# Patient Record
Sex: FEMALE | Race: WHITE | Employment: OTHER | URBAN - METROPOLITAN AREA
[De-identification: names, ages, dates, MRNs, and addresses within clinical notes are randomized per-mention and may not be internally consistent; named-entity substitution may affect disease eponyms.]

---

## 2017-07-26 PROBLEM — M79.89 LEG SWELLING: Status: ACTIVE | Noted: 2017-07-26

## 2017-07-26 PROBLEM — I89.0 LYMPHEDEMA OF BOTH LOWER EXTREMITIES: Status: ACTIVE | Noted: 2017-07-26

## 2018-03-14 ENCOUNTER — TELEPHONE (OUTPATIENT)
Dept: ADMINISTRATIVE | Age: 68
End: 2018-03-14

## 2018-03-14 NOTE — TELEPHONE ENCOUNTER
There is a referral in the workque and she is a cancer patient. I wasn't sure if there was any way she would be able to be seen sooner than August. Please advise thank you.

## 2018-04-25 ENCOUNTER — HOSPITAL ENCOUNTER (OUTPATIENT)
Age: 68
Discharge: HOME | End: 2018-04-27

## 2018-04-25 PROCEDURE — 87081 CULTURE SCREEN ONLY: CPT

## 2018-04-25 PROCEDURE — 88305 TISSUE EXAM BY PATHOLOGIST: CPT

## 2018-04-26 LAB — CLOTEST: NORMAL

## 2018-06-04 ENCOUNTER — HOSPITAL ENCOUNTER (OUTPATIENT)
Age: 68
Discharge: HOME | End: 2018-06-06
Payer: COMMERCIAL

## 2018-06-04 LAB
ALBUMIN SERPL-MCNC: 4 G/DL (ref 3.5–5.2)
ALP BLD-CCNC: 48 U/L (ref 35–104)
ALT SERPL-CCNC: 15 U/L (ref 0–32)
ANION GAP SERPL CALCULATED.3IONS-SCNC: 12 MMOL/L (ref 7–16)
AST SERPL-CCNC: 21 U/L (ref 0–31)
BASOPHILS ABSOLUTE: 0.03 E9/L (ref 0–0.2)
BASOPHILS RELATIVE PERCENT: 0.4 % (ref 0–2)
BILIRUB SERPL-MCNC: 1.1 MG/DL (ref 0–1.2)
BUN BLDV-MCNC: 20 MG/DL (ref 8–23)
CALCIUM SERPL-MCNC: 9.5 MG/DL (ref 8.6–10.2)
CHLORIDE BLD-SCNC: 103 MMOL/L (ref 98–107)
CHOLESTEROL, TOTAL: 203 MG/DL (ref 0–199)
CO2: 27 MMOL/L (ref 22–29)
CREAT SERPL-MCNC: 0.8 MG/DL (ref 0.5–1)
EOSINOPHILS ABSOLUTE: 0.08 E9/L (ref 0.05–0.5)
EOSINOPHILS RELATIVE PERCENT: 1 % (ref 0–6)
GFR AFRICAN AMERICAN: >60
GFR NON-AFRICAN AMERICAN: >60 ML/MIN/1.73
GLUCOSE BLD-MCNC: 94 MG/DL (ref 74–109)
HCT VFR BLD CALC: 40.1 % (ref 34–48)
HDLC SERPL-MCNC: 68 MG/DL
HEMOGLOBIN: 13.1 G/DL (ref 11.5–15.5)
IMMATURE GRANULOCYTES #: 0.01 E9/L
IMMATURE GRANULOCYTES %: 0.1 % (ref 0–5)
LDL CHOLESTEROL CALCULATED: 124 MG/DL (ref 0–99)
LYMPHOCYTES ABSOLUTE: 1.12 E9/L (ref 1.5–4)
LYMPHOCYTES RELATIVE PERCENT: 14.6 % (ref 20–42)
MCH RBC QN AUTO: 31 PG (ref 26–35)
MCHC RBC AUTO-ENTMCNC: 32.7 % (ref 32–34.5)
MCV RBC AUTO: 95 FL (ref 80–99.9)
MONOCYTES ABSOLUTE: 0.74 E9/L (ref 0.1–0.95)
MONOCYTES RELATIVE PERCENT: 9.7 % (ref 2–12)
NEUTROPHILS ABSOLUTE: 5.68 E9/L (ref 1.8–7.3)
NEUTROPHILS RELATIVE PERCENT: 74.2 % (ref 43–80)
PDW BLD-RTO: 13.2 FL (ref 11.5–15)
PLATELET # BLD: 231 E9/L (ref 130–450)
PMV BLD AUTO: 10.4 FL (ref 7–12)
POTASSIUM SERPL-SCNC: 4.7 MMOL/L (ref 3.5–5)
RBC # BLD: 4.22 E12/L (ref 3.5–5.5)
SODIUM BLD-SCNC: 142 MMOL/L (ref 132–146)
T4 FREE: 1.47 NG/DL (ref 0.93–1.7)
TOTAL PROTEIN: 6.6 G/DL (ref 6.4–8.3)
TRIGL SERPL-MCNC: 55 MG/DL (ref 0–149)
TSH SERPL DL<=0.05 MIU/L-ACNC: 3.4 UIU/ML (ref 0.27–4.2)
VITAMIN D 25-HYDROXY: 34 NG/ML (ref 30–100)
VLDLC SERPL CALC-MCNC: 11 MG/DL
WBC # BLD: 7.7 E9/L (ref 4.5–11.5)

## 2018-06-04 PROCEDURE — 82306 VITAMIN D 25 HYDROXY: CPT

## 2018-06-04 PROCEDURE — 84443 ASSAY THYROID STIM HORMONE: CPT

## 2018-06-04 PROCEDURE — 80053 COMPREHEN METABOLIC PANEL: CPT

## 2018-06-04 PROCEDURE — 85025 COMPLETE CBC W/AUTO DIFF WBC: CPT

## 2018-06-04 PROCEDURE — 84439 ASSAY OF FREE THYROXINE: CPT

## 2018-06-04 PROCEDURE — 80061 LIPID PANEL: CPT

## 2018-06-20 ENCOUNTER — OFFICE VISIT (OUTPATIENT)
Dept: VASCULAR SURGERY | Age: 68
End: 2018-06-20
Payer: COMMERCIAL

## 2018-06-20 DIAGNOSIS — R42 DIZZINESS: ICD-10-CM

## 2018-06-20 DIAGNOSIS — R09.89 BRUIT OF RIGHT CAROTID ARTERY: ICD-10-CM

## 2018-06-20 PROCEDURE — 1036F TOBACCO NON-USER: CPT | Performed by: SURGERY

## 2018-06-20 PROCEDURE — 4040F PNEUMOC VAC/ADMIN/RCVD: CPT | Performed by: SURGERY

## 2018-06-20 PROCEDURE — G8399 PT W/DXA RESULTS DOCUMENT: HCPCS | Performed by: SURGERY

## 2018-06-20 PROCEDURE — 3017F COLORECTAL CA SCREEN DOC REV: CPT | Performed by: SURGERY

## 2018-06-20 PROCEDURE — G8427 DOCREV CUR MEDS BY ELIG CLIN: HCPCS | Performed by: SURGERY

## 2018-06-20 PROCEDURE — G8420 CALC BMI NORM PARAMETERS: HCPCS | Performed by: SURGERY

## 2018-06-20 PROCEDURE — 1123F ACP DISCUSS/DSCN MKR DOCD: CPT | Performed by: SURGERY

## 2018-06-20 PROCEDURE — 1090F PRES/ABSN URINE INCON ASSESS: CPT | Performed by: SURGERY

## 2018-06-20 PROCEDURE — 99214 OFFICE O/P EST MOD 30 MIN: CPT | Performed by: SURGERY

## 2018-06-20 RX ORDER — GABAPENTIN 400 MG/1
400 CAPSULE ORAL 3 TIMES DAILY
Refills: 3 | COMMUNITY
Start: 2018-05-26 | End: 2020-12-28 | Stop reason: SDUPTHER

## 2018-06-21 ENCOUNTER — HOSPITAL ENCOUNTER (OUTPATIENT)
Dept: PET IMAGING | Age: 68
Discharge: HOME | End: 2018-06-23
Payer: COMMERCIAL

## 2018-06-21 DIAGNOSIS — C02.9 LEIOMYOSARCOMA OF TONGUE (HCC): ICD-10-CM

## 2018-06-21 PROCEDURE — 3430000000 HC RX DIAGNOSTIC RADIOPHARMACEUTICAL: Performed by: RADIOLOGY

## 2018-06-21 PROCEDURE — 78815 PET IMAGE W/CT SKULL-THIGH: CPT

## 2018-06-21 PROCEDURE — A9552 F18 FDG: HCPCS | Performed by: RADIOLOGY

## 2018-06-21 RX ORDER — FLUDEOXYGLUCOSE F 18 200 MCI/ML
15 INJECTION, SOLUTION INTRAVENOUS
Status: COMPLETED | OUTPATIENT
Start: 2018-06-21 | End: 2018-06-21

## 2018-06-21 RX ADMIN — FLUDEOXYGLUCOSE F 18 15 MILLICURIE: 200 INJECTION, SOLUTION INTRAVENOUS at 07:40

## 2018-08-02 ENCOUNTER — HOSPITAL ENCOUNTER (OUTPATIENT)
Dept: CARDIOLOGY | Age: 68
Discharge: HOME | End: 2018-08-02
Payer: COMMERCIAL

## 2018-08-02 DIAGNOSIS — R42 DIZZINESS: ICD-10-CM

## 2018-08-02 PROCEDURE — 93880 EXTRACRANIAL BILAT STUDY: CPT

## 2018-08-05 ENCOUNTER — TELEPHONE (OUTPATIENT)
Dept: VASCULAR SURGERY | Age: 68
End: 2018-08-05

## 2018-08-05 NOTE — PROCEDURES
[address]                   [address] [facility],  [facility]                                  VASCULAR REPORT    PATIENT NAME: Missy Sinha                  :        1950  MED REC NO:   [id number]                            ROOM:  ACCOUNT NO:   [de-identified]                           ADMIT DATE: 2018  PROVIDER:     Harvey Samuel MD    DATE OF PROCEDURE:  2018    CAROTID ULTRASOUND REPORT    INDICATION:  History of dizziness and concern for carotid artery disease. FINDINGS:  Duplex scan of the right carotid artery revealed the patient has  mild intimal thickening with a peak internal carotid velocity of 958,  diastolic velocity of 57 cm/sec without any significant stenosis. Duplex scan of the left carotid revealed mild intimal thickening with a  peak internal carotid velocity of 317, diastolic velocity of 57 cm/sec with  minimal disease, less than 20% to 30% stenosis. IMPRESSION:  Essentially normal carotid ultrasound study, stenosis being  less than 20% to 30% bilaterally.         Claudia Balbuena MD    D: 2018 17:40:45       T: 2018 94:57:88     [id number]  Job#: [id number]     Doc#: [id number]    CC:  Jesus Riggins DO

## 2018-08-24 ENCOUNTER — OFFICE VISIT (OUTPATIENT)
Dept: PHYSICAL MEDICINE AND REHAB | Age: 68
End: 2018-08-24
Payer: COMMERCIAL

## 2018-08-24 VITALS
SYSTOLIC BLOOD PRESSURE: 140 MMHG | WEIGHT: 117 LBS | TEMPERATURE: 97.6 F | HEIGHT: 62 IN | DIASTOLIC BLOOD PRESSURE: 92 MMHG | OXYGEN SATURATION: 99 % | BODY MASS INDEX: 21.53 KG/M2 | HEART RATE: 66 BPM

## 2018-08-24 DIAGNOSIS — M25.511 CHRONIC PAIN OF BOTH SHOULDERS: Primary | ICD-10-CM

## 2018-08-24 DIAGNOSIS — M25.512 CHRONIC PAIN OF BOTH SHOULDERS: Primary | ICD-10-CM

## 2018-08-24 DIAGNOSIS — G89.29 CHRONIC PAIN OF BOTH SHOULDERS: Primary | ICD-10-CM

## 2018-08-24 DIAGNOSIS — M75.101 TEAR OF RIGHT ROTATOR CUFF, UNSPECIFIED TEAR EXTENT: ICD-10-CM

## 2018-08-24 DIAGNOSIS — M75.102 TEAR OF LEFT ROTATOR CUFF, UNSPECIFIED TEAR EXTENT: ICD-10-CM

## 2018-08-24 DIAGNOSIS — I89.0 LYMPHEDEMA: ICD-10-CM

## 2018-08-24 PROCEDURE — 1123F ACP DISCUSS/DSCN MKR DOCD: CPT | Performed by: PHYSICAL MEDICINE & REHABILITATION

## 2018-08-24 PROCEDURE — 1101F PT FALLS ASSESS-DOCD LE1/YR: CPT | Performed by: PHYSICAL MEDICINE & REHABILITATION

## 2018-08-24 PROCEDURE — 3017F COLORECTAL CA SCREEN DOC REV: CPT | Performed by: PHYSICAL MEDICINE & REHABILITATION

## 2018-08-24 PROCEDURE — G8420 CALC BMI NORM PARAMETERS: HCPCS | Performed by: PHYSICAL MEDICINE & REHABILITATION

## 2018-08-24 PROCEDURE — 1036F TOBACCO NON-USER: CPT | Performed by: PHYSICAL MEDICINE & REHABILITATION

## 2018-08-24 PROCEDURE — 4040F PNEUMOC VAC/ADMIN/RCVD: CPT | Performed by: PHYSICAL MEDICINE & REHABILITATION

## 2018-08-24 PROCEDURE — G8427 DOCREV CUR MEDS BY ELIG CLIN: HCPCS | Performed by: PHYSICAL MEDICINE & REHABILITATION

## 2018-08-24 PROCEDURE — 1090F PRES/ABSN URINE INCON ASSESS: CPT | Performed by: PHYSICAL MEDICINE & REHABILITATION

## 2018-08-24 PROCEDURE — 99204 OFFICE O/P NEW MOD 45 MIN: CPT | Performed by: PHYSICAL MEDICINE & REHABILITATION

## 2018-08-24 PROCEDURE — G8399 PT W/DXA RESULTS DOCUMENT: HCPCS | Performed by: PHYSICAL MEDICINE & REHABILITATION

## 2018-08-24 PROCEDURE — 20610 DRAIN/INJ JOINT/BURSA W/O US: CPT | Performed by: PHYSICAL MEDICINE & REHABILITATION

## 2018-08-24 RX ORDER — TRIAMCINOLONE ACETONIDE 40 MG/ML
40 INJECTION, SUSPENSION INTRA-ARTICULAR; INTRAMUSCULAR ONCE
Status: COMPLETED | OUTPATIENT
Start: 2018-08-24 | End: 2018-08-24

## 2018-08-24 RX ORDER — LIDOCAINE HYDROCHLORIDE 10 MG/ML
6 INJECTION, SOLUTION INFILTRATION; PERINEURAL ONCE
Status: COMPLETED | OUTPATIENT
Start: 2018-08-24 | End: 2018-08-24

## 2018-08-24 RX ADMIN — TRIAMCINOLONE ACETONIDE 40 MG: 40 INJECTION, SUSPENSION INTRA-ARTICULAR; INTRAMUSCULAR at 12:05

## 2018-08-24 RX ADMIN — LIDOCAINE HYDROCHLORIDE 6 ML: 10 INJECTION, SOLUTION INFILTRATION; PERINEURAL at 12:04

## 2018-08-24 RX ADMIN — TRIAMCINOLONE ACETONIDE 40 MG: 40 INJECTION, SUSPENSION INTRA-ARTICULAR; INTRAMUSCULAR at 12:04

## 2018-09-18 ENCOUNTER — TELEPHONE (OUTPATIENT)
Dept: VASCULAR SURGERY | Age: 68
End: 2018-09-18

## 2018-09-18 NOTE — TELEPHONE ENCOUNTER
Referral for removal of venous port received from Dr. Abhishek Casanova left on pt's voice mail for a return call.

## 2018-10-05 ENCOUNTER — HOSPITAL ENCOUNTER (OUTPATIENT)
Dept: RADIATION ONCOLOGY | Age: 68
Discharge: HOME | End: 2018-10-05
Payer: COMMERCIAL

## 2018-10-05 VITALS
SYSTOLIC BLOOD PRESSURE: 124 MMHG | DIASTOLIC BLOOD PRESSURE: 80 MMHG | BODY MASS INDEX: 22.3 KG/M2 | TEMPERATURE: 98 F | HEART RATE: 78 BPM | OXYGEN SATURATION: 98 % | WEIGHT: 121.9 LBS

## 2018-10-05 DIAGNOSIS — C80.1 CANCER (HCC): Primary | ICD-10-CM

## 2018-10-05 PROCEDURE — 99213 OFFICE O/P EST LOW 20 MIN: CPT | Performed by: RADIOLOGY

## 2018-10-05 PROCEDURE — 99212 OFFICE O/P EST SF 10 MIN: CPT

## 2018-10-05 RX ORDER — FERROUS SULFATE 325(65) MG
325 TABLET ORAL
COMMUNITY
End: 2020-07-27

## 2018-10-05 NOTE — PROGRESS NOTES
Radiation Oncology   Follow Up Note  Gabriel Aguilera. Van Vizcarra MD MS DABR      10/5/2018  Kelsey Alcazar  Date of Service: 10/5/18        HPI:         Fide Brown is a pleasant 73year old woman s/p concurrent chemo-RT for locally advanced disease of the OP; she presents today for FU.                        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 Dental clearance 9/21/2016 per Dr. Mery Cote. Note in Media tab on EPIC. She underwent a port placement with Dr. Joseph Ware on 10/4/2016. Chemotherapy was begun on 10/6/2016 with Dr. Marleni Morales. PEG tube placed on 10/31/2016 by Dr. Filipe armijo. Donnell Alvarez 11/29/16, Flor Mendez completed 7000 cGy in 35 fractions directed to the Definitive head and neck irradiation for management of right base of tongue SCC.  States Pt follows with Dr Marleni Morales for medical oncology. PET scan NEGATIVE (SEE BELOW / Dr. Jono Miller following with US for thyroid findings) however she did have (another post Tx #3) interval PET that noted concerning findings. Dr. Jono Miller did a biopsy and the report noted negative findings, probably radiation changes only. A FU CT did not show LAD or a discrete mass (see EPIC) - previous interval.  On the MOST RECENT interval a PET scan negative. We will review this scan with multi-D and coordinate care.  Yadira does have persistent dry mouth and periodic swallowing difficulties; we will order ongoing supportive care measures PRN - continues with therapeutic massage.  Mild skin changes and right face / neck LE.  KPS 80.            PET 6/21/18:  Vera Cap level activity seen in association with the left parotid gland   of uncertain etiology or clinical significance, similar to the prior   examination. Query lymphomatous involvement. 2.  Additional findings, as detailed above.           PET 2/10/18:     1.  No identifiable hypermetabolic focus with
bag with coupons  2.  Route to the dietitian via [id number]

## 2018-10-18 ENCOUNTER — PREP FOR PROCEDURE (OUTPATIENT)
Dept: VASCULAR SURGERY | Age: 68
End: 2018-10-18

## 2018-10-18 ENCOUNTER — OFFICE VISIT (OUTPATIENT)
Dept: VASCULAR SURGERY | Age: 68
End: 2018-10-18
Payer: COMMERCIAL

## 2018-10-18 DIAGNOSIS — C02.9 CANCER OF TONGUE (HCC): Primary | ICD-10-CM

## 2018-10-18 PROCEDURE — G8484 FLU IMMUNIZE NO ADMIN: HCPCS | Performed by: SURGERY

## 2018-10-18 PROCEDURE — 1101F PT FALLS ASSESS-DOCD LE1/YR: CPT | Performed by: SURGERY

## 2018-10-18 PROCEDURE — G8399 PT W/DXA RESULTS DOCUMENT: HCPCS | Performed by: SURGERY

## 2018-10-18 PROCEDURE — 1090F PRES/ABSN URINE INCON ASSESS: CPT | Performed by: SURGERY

## 2018-10-18 PROCEDURE — G8420 CALC BMI NORM PARAMETERS: HCPCS | Performed by: SURGERY

## 2018-10-18 PROCEDURE — 1123F ACP DISCUSS/DSCN MKR DOCD: CPT | Performed by: SURGERY

## 2018-10-18 PROCEDURE — G8427 DOCREV CUR MEDS BY ELIG CLIN: HCPCS | Performed by: SURGERY

## 2018-10-18 PROCEDURE — 1036F TOBACCO NON-USER: CPT | Performed by: SURGERY

## 2018-10-18 PROCEDURE — 99215 OFFICE O/P EST HI 40 MIN: CPT | Performed by: SURGERY

## 2018-10-18 PROCEDURE — 3017F COLORECTAL CA SCREEN DOC REV: CPT | Performed by: SURGERY

## 2018-10-18 PROCEDURE — 4040F PNEUMOC VAC/ADMIN/RCVD: CPT | Performed by: SURGERY

## 2018-10-18 RX ORDER — SODIUM CHLORIDE 0.9 % (FLUSH) 0.9 %
10 SYRINGE (ML) INJECTION EVERY 12 HOURS SCHEDULED
Status: CANCELLED | OUTPATIENT
Start: 2018-10-18

## 2018-10-18 RX ORDER — SODIUM CHLORIDE 9 MG/ML
INJECTION, SOLUTION INTRAVENOUS CONTINUOUS
Status: CANCELLED | OUTPATIENT
Start: 2018-10-18

## 2018-10-18 RX ORDER — SODIUM CHLORIDE 0.9 % (FLUSH) 0.9 %
10 SYRINGE (ML) INJECTION PRN
Status: CANCELLED | OUTPATIENT
Start: 2018-10-18

## 2018-10-18 NOTE — PROGRESS NOTES
Chief Complaint:   Chief Complaint   Patient presents with    Circulatory Problem     port removal, tongue and neck cancer         HPI:  Patient came to the office, for preoperative discussion prior to removal of venous port, that was inserted for treatment Main topics for carcinoma of the tongue, currently remission, has completed  chemotherapy, was recommended to have the venous port removal by her oncologist Dr. Angel Nina    Patient is doing well otherwise, no major health issues      Patient denies any focal lateralizing neurological symptoms like loss of speech, vision or loss of function of extremity    Patient can walk a few blocks without difficulty, and denies any symptoms of rest pain    No Known Allergies    Current Outpatient Prescriptions   Medication Sig Dispense Refill    ferrous sulfate 325 (65 Fe) MG tablet Take 325 mg by mouth daily (with breakfast)      gabapentin (NEURONTIN) 400 MG capsule Take 400 mg by mouth nightly. .  3    Biotin 10 MG CAPS Take by mouth      gabapentin (NEURONTIN) 300 MG capsule Take 300 mg by mouth 2 times daily. Instructed to take with sip water am of procedure.  furosemide (LASIX) 20 MG tablet Take 20 mg by mouth daily as needed      Elastic Bandages & Supports (JOBST KNEE HIGH COMPRESSION ) MISC Knee high with 20- 30 mmhg of compression 1 each 10    Temazepam (RESTORIL PO) Take 15 mg by mouth nightly Sleep aide      ROPINIRole HCl (REQUIP PO) Take by mouth nightly as needed For restless legs      acetaminophen (TYLENOL) 500 MG tablet Take 500 mg by mouth every 6 hours as needed Instructed to take with sip water am of procedure if needs      levothyroxine (SYNTHROID) 75 MCG tablet Take 75 mcg by mouth daily       RABEprazole Sodium (ACIPHEX PO) Take 20 mg by mouth 2 times daily Instructed to take am of procedure      sucralfate (CARAFATE) 1 GM tablet Take 1 g by mouth daily as needed        No current facility-administered medications for this visit.

## 2018-10-19 ENCOUNTER — HOSPITAL ENCOUNTER (OUTPATIENT)
Dept: PET IMAGING | Age: 68
Discharge: HOME | End: 2018-10-21
Payer: COMMERCIAL

## 2018-10-19 DIAGNOSIS — C02.9 LEIOMYOSARCOMA OF TONGUE (HCC): ICD-10-CM

## 2018-10-19 LAB — METER GLUCOSE: 92 MG/DL (ref 70–110)

## 2018-10-19 PROCEDURE — 3430000000 HC RX DIAGNOSTIC RADIOPHARMACEUTICAL: Performed by: RADIOLOGY

## 2018-10-19 PROCEDURE — A9552 F18 FDG: HCPCS | Performed by: RADIOLOGY

## 2018-10-19 PROCEDURE — 78815 PET IMAGE W/CT SKULL-THIGH: CPT

## 2018-10-19 PROCEDURE — 82962 GLUCOSE BLOOD TEST: CPT

## 2018-10-19 RX ORDER — FLUDEOXYGLUCOSE F 18 200 MCI/ML
15 INJECTION, SOLUTION INTRAVENOUS
Status: COMPLETED | OUTPATIENT
Start: 2018-10-19 | End: 2018-10-19

## 2018-10-19 RX ADMIN — FLUDEOXYGLUCOSE F 18 15 MILLICURIE: 200 INJECTION, SOLUTION INTRAVENOUS at 07:45

## 2018-11-06 RX ORDER — POTASSIUM CHLORIDE 1.5 G/1.77G
1 POWDER, FOR SOLUTION ORAL DAILY PRN
COMMUNITY
End: 2019-10-30

## 2018-11-06 RX ORDER — SUCRALFATE ORAL 1 G/10ML
1 SUSPENSION ORAL PRN
COMMUNITY
Start: 2018-01-05 | End: 2019-10-30

## 2018-11-06 RX ORDER — SIMVASTATIN 20 MG
1 TABLET ORAL
COMMUNITY
End: 2019-04-22

## 2018-11-08 ENCOUNTER — ANESTHESIA EVENT (OUTPATIENT)
Dept: OPERATING ROOM | Age: 68
End: 2018-11-08
Payer: COMMERCIAL

## 2018-11-09 ENCOUNTER — HOSPITAL ENCOUNTER (OUTPATIENT)
Age: 68
Setting detail: OUTPATIENT SURGERY
Discharge: HOME | End: 2018-11-09
Attending: SURGERY | Admitting: SURGERY
Payer: COMMERCIAL

## 2018-11-09 ENCOUNTER — ANESTHESIA (OUTPATIENT)
Dept: OPERATING ROOM | Age: 68
End: 2018-11-09
Payer: COMMERCIAL

## 2018-11-09 VITALS
TEMPERATURE: 97.2 F | HEART RATE: 75 BPM | WEIGHT: 120 LBS | OXYGEN SATURATION: 100 % | BODY MASS INDEX: 22.66 KG/M2 | HEIGHT: 61 IN | DIASTOLIC BLOOD PRESSURE: 75 MMHG | RESPIRATION RATE: 16 BRPM | SYSTOLIC BLOOD PRESSURE: 132 MMHG

## 2018-11-09 VITALS — OXYGEN SATURATION: 100 % | DIASTOLIC BLOOD PRESSURE: 51 MMHG | SYSTOLIC BLOOD PRESSURE: 85 MMHG

## 2018-11-09 DIAGNOSIS — C02.9 CANCER OF TONGUE (HCC): Primary | ICD-10-CM

## 2018-11-09 LAB
ANION GAP SERPL CALCULATED.3IONS-SCNC: 10 MMOL/L (ref 7–16)
BUN BLDV-MCNC: 14 MG/DL (ref 8–23)
CALCIUM SERPL-MCNC: 9.5 MG/DL (ref 8.6–10.2)
CHLORIDE BLD-SCNC: 102 MMOL/L (ref 98–107)
CO2: 28 MMOL/L (ref 22–29)
CREAT SERPL-MCNC: 0.7 MG/DL (ref 0.5–1)
GFR AFRICAN AMERICAN: >60
GFR NON-AFRICAN AMERICAN: >60 ML/MIN/1.73
GLUCOSE BLD-MCNC: 96 MG/DL (ref 74–99)
HCT VFR BLD CALC: 38.1 % (ref 34–48)
HEMOGLOBIN: 12.1 G/DL (ref 11.5–15.5)
MCH RBC QN AUTO: 29.2 PG (ref 26–35)
MCHC RBC AUTO-ENTMCNC: 31.8 % (ref 32–34.5)
MCV RBC AUTO: 91.8 FL (ref 80–99.9)
PDW BLD-RTO: 13 FL (ref 11.5–15)
PLATELET # BLD: 236 E9/L (ref 130–450)
PMV BLD AUTO: 10 FL (ref 7–12)
POTASSIUM REFLEX MAGNESIUM: 4.3 MMOL/L (ref 3.5–5)
RBC # BLD: 4.15 E12/L (ref 3.5–5.5)
SODIUM BLD-SCNC: 140 MMOL/L (ref 132–146)
WBC # BLD: 4 E9/L (ref 4.5–11.5)

## 2018-11-09 PROCEDURE — 85027 COMPLETE CBC AUTOMATED: CPT

## 2018-11-09 PROCEDURE — 2580000003 HC RX 258: Performed by: SURGERY

## 2018-11-09 PROCEDURE — 3700000000 HC ANESTHESIA ATTENDED CARE: Performed by: SURGERY

## 2018-11-09 PROCEDURE — 3600000012 HC SURGERY LEVEL 2 ADDTL 15MIN: Performed by: SURGERY

## 2018-11-09 PROCEDURE — 2500000003 HC RX 250 WO HCPCS: Performed by: SURGERY

## 2018-11-09 PROCEDURE — 7100000011 HC PHASE II RECOVERY - ADDTL 15 MIN: Performed by: SURGERY

## 2018-11-09 PROCEDURE — 80048 BASIC METABOLIC PNL TOTAL CA: CPT

## 2018-11-09 PROCEDURE — 36415 COLL VENOUS BLD VENIPUNCTURE: CPT

## 2018-11-09 PROCEDURE — 7100000010 HC PHASE II RECOVERY - FIRST 15 MIN: Performed by: SURGERY

## 2018-11-09 PROCEDURE — 2709999900 HC NON-CHARGEABLE SUPPLY: Performed by: SURGERY

## 2018-11-09 PROCEDURE — 6360000002 HC RX W HCPCS: Performed by: NURSE ANESTHETIST, CERTIFIED REGISTERED

## 2018-11-09 PROCEDURE — 36590 REMOVAL TUNNELED CV CATH: CPT | Performed by: SURGERY

## 2018-11-09 PROCEDURE — 3600000002 HC SURGERY LEVEL 2 BASE: Performed by: SURGERY

## 2018-11-09 PROCEDURE — 6360000002 HC RX W HCPCS: Performed by: SURGERY

## 2018-11-09 PROCEDURE — 3700000001 HC ADD 15 MINUTES (ANESTHESIA): Performed by: SURGERY

## 2018-11-09 RX ORDER — SODIUM CHLORIDE 0.9 % (FLUSH) 0.9 %
10 SYRINGE (ML) INJECTION PRN
Status: DISCONTINUED | OUTPATIENT
Start: 2018-11-09 | End: 2018-11-09 | Stop reason: HOSPADM

## 2018-11-09 RX ORDER — ONDANSETRON 2 MG/ML
INJECTION INTRAMUSCULAR; INTRAVENOUS PRN
Status: DISCONTINUED | OUTPATIENT
Start: 2018-11-09 | End: 2018-11-09 | Stop reason: SDUPTHER

## 2018-11-09 RX ORDER — MIDAZOLAM HYDROCHLORIDE 1 MG/ML
INJECTION INTRAMUSCULAR; INTRAVENOUS PRN
Status: DISCONTINUED | OUTPATIENT
Start: 2018-11-09 | End: 2018-11-09 | Stop reason: SDUPTHER

## 2018-11-09 RX ORDER — SODIUM CHLORIDE 9 MG/ML
INJECTION, SOLUTION INTRAVENOUS CONTINUOUS
Status: DISCONTINUED | OUTPATIENT
Start: 2018-11-09 | End: 2018-11-09 | Stop reason: HOSPADM

## 2018-11-09 RX ORDER — HYDROCODONE BITARTRATE AND ACETAMINOPHEN 5; 325 MG/1; MG/1
1 TABLET ORAL EVERY 6 HOURS PRN
Qty: 28 TABLET | Refills: 0 | Status: SHIPPED | OUTPATIENT
Start: 2018-11-09 | End: 2018-12-03 | Stop reason: ALTCHOICE

## 2018-11-09 RX ORDER — SODIUM CHLORIDE 0.9 % (FLUSH) 0.9 %
10 SYRINGE (ML) INJECTION EVERY 12 HOURS SCHEDULED
Status: DISCONTINUED | OUTPATIENT
Start: 2018-11-09 | End: 2018-11-09 | Stop reason: HOSPADM

## 2018-11-09 RX ORDER — DEXAMETHASONE SODIUM PHOSPHATE 10 MG/ML
INJECTION, SOLUTION INTRAMUSCULAR; INTRAVENOUS PRN
Status: DISCONTINUED | OUTPATIENT
Start: 2018-11-09 | End: 2018-11-09 | Stop reason: SDUPTHER

## 2018-11-09 RX ORDER — PROPOFOL 10 MG/ML
INJECTION, EMULSION INTRAVENOUS PRN
Status: DISCONTINUED | OUTPATIENT
Start: 2018-11-09 | End: 2018-11-09 | Stop reason: SDUPTHER

## 2018-11-09 RX ADMIN — SODIUM CHLORIDE: 9 INJECTION, SOLUTION INTRAVENOUS at 06:11

## 2018-11-09 RX ADMIN — DEXAMETHASONE SODIUM PHOSPHATE 10 MG: 10 INJECTION INTRAMUSCULAR; INTRAVENOUS at 07:17

## 2018-11-09 RX ADMIN — ONDANSETRON HYDROCHLORIDE 4 MG: 2 INJECTION, SOLUTION INTRAMUSCULAR; INTRAVENOUS at 07:52

## 2018-11-09 RX ADMIN — PROPOFOL 150 MG: 10 INJECTION, EMULSION INTRAVENOUS at 07:43

## 2018-11-09 RX ADMIN — Medication 2 G: at 07:16

## 2018-11-09 RX ADMIN — MIDAZOLAM HYDROCHLORIDE 2 MG: 1 INJECTION, SOLUTION INTRAMUSCULAR; INTRAVENOUS at 07:20

## 2018-11-09 RX ADMIN — MIDAZOLAM HYDROCHLORIDE 2 MG: 1 INJECTION, SOLUTION INTRAMUSCULAR; INTRAVENOUS at 07:17

## 2018-11-09 ASSESSMENT — PAIN SCALES - GENERAL
PAINLEVEL_OUTOF10: 0

## 2018-11-09 ASSESSMENT — PAIN - FUNCTIONAL ASSESSMENT: PAIN_FUNCTIONAL_ASSESSMENT: 0-10

## 2018-11-09 NOTE — ANESTHESIA POSTPROCEDURE EVALUATION
Department of Anesthesiology  Postprocedure Note    Patient: Joycelyn Fonseca  MRN: [id number]  YOB: 1950  Date of evaluation: 11/9/2018  Time:  7:29 AM     Procedure Summary     Date:  11/09/18 Room / Location:  [place] [address] / [address] [place] [address]    Anesthesia Start:  0714 Anesthesia Stop:      Procedure:  PORT REMOVAL (N/A ) Diagnosis:  (ORAL CANCER )    Surgeon:  Shelly Carey MD Responsible Provider:  Sree Giang MD    Anesthesia Type:  MAC ASA Status:  3          Anesthesia Type: MAC    Rosa Phase I: Rosa Score: 10    Rosa Phase II:      Last vitals: Reviewed and per EMR flowsheets.        Anesthesia Post Evaluation    Patient location during evaluation: PACU  Patient participation: complete - patient participated  Level of consciousness: awake  Pain score: 0  Airway patency: patent  Nausea & Vomiting: no nausea and no vomiting  Complications: no  Cardiovascular status: hemodynamically stable  Respiratory status: acceptable  Hydration status: euvolemic

## 2018-11-09 NOTE — ANESTHESIA PRE PROCEDURE
Provider, MD   RABEprazole Sodium (ACIPHEX PO) Take 20 mg by mouth daily Instructed to take am of procedure   Yes Historical Provider, MD       Current medications:    Current Facility-Administered Medications   Medication Dose Route Frequency Provider Last Rate Last Dose    0.9 % sodium chloride infusion   Intravenous Continuous Jagjit Higuera MD        ceFAZolin (ANCEF) 2 g in dextrose 5 % 50 mL IVPB  2 g Intravenous On Call to [address] [place] [address],Erica PIERCE MD        sodium chloride flush 0.9 % injection 10 mL  10 mL Intravenous 2 times per day Jagjit Higuera MD        sodium chloride flush 0.9 % injection 10 mL  10 mL Intravenous PRN Jagjit Higuera MD           Allergies:  No Known Allergies    Problem List:    Patient Active Problem List   Diagnosis Code    Postmenopausal atrophic vaginitis N95.2    Closed fracture of right olecranon process S52.021A    Cancer of tongue (HCC) C02.9    Syncope and collapse R55    PEG (percutaneous endoscopic gastrostomy) adjustment/replacement/removal ([facility] [address] Z43.1    Hypokalemia E87.6    Hypomagnesemia E83.42    Volume depletion E86.9    Anemia associated with chemotherapy D64.81, T45.1X5A    Leg swelling M79.89    Lymphedema of both lower extremities I89.0    Dizziness R42       Past Medical History:        Diagnosis Date    Anemia associated with chemotherapy 11/16/2016    resolved    Cancer ([address]     tongue    Cancer of tongue ([facility] [address] 9/16/2016    treated with chemo and radiation / last treatment 11/2016    Cancer of tonsil ([facility] [address]     Cervical herniation     no limits on rom    Difficulty sleeping     Dizziness 6/20/2018    GERD (gastroesophageal reflux disease)     Hyperlipidemia     Hypokalemia 11/16/2016    with chemo / resolved    Hypomagnesemia 11/16/2016    related to chemo / resloved    Hypothyroidism     Leg swelling 7/26/2017    Lumbar herniated disc     after fell 2/2017 / now has chronic back pain and numbnes at left foot and oz (55.3 kg)   08/24/18 117 lb (53.1 kg)     Body mass index is 23.05 kg/m². CBC:   Lab Results   Component Value Date    WBC 7.7 06/04/2018    RBC 4.22 06/04/2018    HGB 13.1 06/04/2018    HCT 40.1 06/04/2018    MCV 95.0 06/04/2018    RDW 13.2 06/04/2018     06/04/2018       CMP:   Lab Results   Component Value Date     06/04/2018    K 4.7 06/04/2018     06/04/2018    CO2 27 06/04/2018    BUN 20 06/04/2018    CREATININE 0.8 06/04/2018    GFRAA >60 06/04/2018    LABGLOM >60 06/04/2018    GLUCOSE 94 06/04/2018    GLUCOSE 98 05/28/2012    PROT 6.6 06/04/2018    CALCIUM 9.5 06/04/2018    BILITOT 1.1 06/04/2018    ALKPHOS 48 06/04/2018    AST 21 06/04/2018    ALT 15 06/04/2018       POC Tests: No results for input(s): POCGLU, POCNA, POCK, POCCL, POCBUN, POCHEMO, POCHCT in the last 72 hours. Coags:   Lab Results   Component Value Date    PROTIME 12.4 08/13/2017    PROTIME 11.8 05/28/2012    INR 1.1 08/13/2017    APTT 24.4 10/04/2016       HCG (If Applicable): No results found for: PREGTESTUR, PREGSERUM, HCG, HCGQUANT     ABGs: No results found for: PHART, PO2ART, XCZ1CRV, VHO8KND, BEART, A1QCYYKZ     Type & Screen (If Applicable):  No results found for: LABABO, LABRH     ECHO 5/4/15   Summary   Ejection fraction is visually estimated at 60%. No regional wall motion abnormalities seen. Mild aortic valve regurgitation. Physiologic and/or trace mitral regurgitation is present. Physiologic and/or trace tricuspid regurgitation.     EKG 8/16/17 8/16/2017  2:10 AM - [facility] Incoming Ekg Results From [facility]   Component Results   Component Value Ref Range & Units Status Collected Lab   Ventricular Rate 103  BPM Final 08/13/2017  9:15 PM [facility]   Atrial Rate 103  BPM Final 08/13/2017  9:15 PM [facility]   P-R Interval 116  ms Final 08/13/2017  9:15 PM [facility]   QRS Duration 66  ms Final 08/13/2017  9:15 PM [facility]   Q-T Interval 316  ms Final 08/13/2017  9:15 PM [facility]   QTc Calculation

## 2018-11-09 NOTE — PROGRESS NOTES
Pt admitted to same day surgery. Pt alert/oriented x3. Respirations non-labored. Denies chest pain/shortness of breath. Skin warm/dry. No open wounds/rash. No distress noted. Call light in reach. Will continue to monitor.   Veronica Whaley RN

## 2018-11-09 NOTE — OP NOTE
DATE OF PROCEDURE:  11/9/2018      SURGEON:  Xavier San M.D.      ASSISTANT:  Cassie Gutierrez C.F.A., C.S.T. PREOPERATIVE DIAGNOSIS:  Carcinoma of tongue      POSTOPERATIVE DIAGNOSIS: same       OPERATION:  Removal of venous port. ANESTHESIA:  LMAC. ESTIMATED BLOOD LOSS:      COMPLICATIONS:      PROCEDURE:   With the patient in the supine position, the chest was prepped   and draped in the usual fashion. Then, 1% Xylocaine was used for local   anesthesia. A horizontal incision of 2 cm in length was made over the previous operative   scar and deepened down to the fibrous capsule where the venous port was   dissected free and removed. Hemostasis was secured. The incision was closed in layers by approximating the subcu with 3-0 Vicryl,   skin with 5-0 Vicryl, and dressed with Dermabond. The patient was   transferred back to the recovery room in a stable condition.            Jagjit Higuera MD  11/9/2018

## 2018-11-15 ENCOUNTER — OFFICE VISIT (OUTPATIENT)
Dept: VASCULAR SURGERY | Age: 68
End: 2018-11-15

## 2018-11-15 DIAGNOSIS — Z98.890 POST-OPERATIVE STATE: Primary | ICD-10-CM

## 2018-11-15 PROCEDURE — 99024 POSTOP FOLLOW-UP VISIT: CPT | Performed by: SURGERY

## 2018-12-03 ENCOUNTER — OFFICE VISIT (OUTPATIENT)
Dept: PHYSICAL MEDICINE AND REHAB | Age: 68
End: 2018-12-03
Payer: COMMERCIAL

## 2018-12-03 VITALS
HEART RATE: 79 BPM | OXYGEN SATURATION: 99 % | TEMPERATURE: 97.7 F | BODY MASS INDEX: 21.52 KG/M2 | DIASTOLIC BLOOD PRESSURE: 78 MMHG | HEIGHT: 61 IN | WEIGHT: 114 LBS | SYSTOLIC BLOOD PRESSURE: 116 MMHG

## 2018-12-03 DIAGNOSIS — M75.102 TEAR OF LEFT ROTATOR CUFF, UNSPECIFIED TEAR EXTENT: ICD-10-CM

## 2018-12-03 DIAGNOSIS — M25.511 CHRONIC PAIN OF BOTH SHOULDERS: ICD-10-CM

## 2018-12-03 DIAGNOSIS — M75.101 TEAR OF RIGHT ROTATOR CUFF, UNSPECIFIED TEAR EXTENT: Primary | ICD-10-CM

## 2018-12-03 DIAGNOSIS — G89.29 CHRONIC PAIN OF BOTH SHOULDERS: ICD-10-CM

## 2018-12-03 DIAGNOSIS — M25.512 CHRONIC PAIN OF BOTH SHOULDERS: ICD-10-CM

## 2018-12-03 PROCEDURE — 99214 OFFICE O/P EST MOD 30 MIN: CPT | Performed by: PHYSICAL MEDICINE & REHABILITATION

## 2018-12-03 PROCEDURE — G8420 CALC BMI NORM PARAMETERS: HCPCS | Performed by: PHYSICAL MEDICINE & REHABILITATION

## 2018-12-03 PROCEDURE — 1090F PRES/ABSN URINE INCON ASSESS: CPT | Performed by: PHYSICAL MEDICINE & REHABILITATION

## 2018-12-03 PROCEDURE — 1101F PT FALLS ASSESS-DOCD LE1/YR: CPT | Performed by: PHYSICAL MEDICINE & REHABILITATION

## 2018-12-03 PROCEDURE — G8484 FLU IMMUNIZE NO ADMIN: HCPCS | Performed by: PHYSICAL MEDICINE & REHABILITATION

## 2018-12-03 PROCEDURE — G8399 PT W/DXA RESULTS DOCUMENT: HCPCS | Performed by: PHYSICAL MEDICINE & REHABILITATION

## 2018-12-03 PROCEDURE — 20610 DRAIN/INJ JOINT/BURSA W/O US: CPT | Performed by: PHYSICAL MEDICINE & REHABILITATION

## 2018-12-03 PROCEDURE — 1123F ACP DISCUSS/DSCN MKR DOCD: CPT | Performed by: PHYSICAL MEDICINE & REHABILITATION

## 2018-12-03 PROCEDURE — G8427 DOCREV CUR MEDS BY ELIG CLIN: HCPCS | Performed by: PHYSICAL MEDICINE & REHABILITATION

## 2018-12-03 PROCEDURE — 3017F COLORECTAL CA SCREEN DOC REV: CPT | Performed by: PHYSICAL MEDICINE & REHABILITATION

## 2018-12-03 PROCEDURE — 4040F PNEUMOC VAC/ADMIN/RCVD: CPT | Performed by: PHYSICAL MEDICINE & REHABILITATION

## 2018-12-03 PROCEDURE — 1036F TOBACCO NON-USER: CPT | Performed by: PHYSICAL MEDICINE & REHABILITATION

## 2018-12-03 RX ORDER — TRIAMCINOLONE ACETONIDE 40 MG/ML
40 INJECTION, SUSPENSION INTRA-ARTICULAR; INTRAMUSCULAR ONCE
Status: COMPLETED | OUTPATIENT
Start: 2018-12-03 | End: 2018-12-03

## 2018-12-03 RX ORDER — BUPIVACAINE HYDROCHLORIDE 2.5 MG/ML
6 INJECTION, SOLUTION EPIDURAL; INFILTRATION; INTRACAUDAL ONCE
Status: COMPLETED | OUTPATIENT
Start: 2018-12-03 | End: 2018-12-03

## 2018-12-03 RX ADMIN — BUPIVACAINE HYDROCHLORIDE 15 MG: 2.5 INJECTION, SOLUTION EPIDURAL; INFILTRATION; INTRACAUDAL at 14:28

## 2018-12-03 RX ADMIN — TRIAMCINOLONE ACETONIDE 40 MG: 40 INJECTION, SUSPENSION INTRA-ARTICULAR; INTRAMUSCULAR at 14:30

## 2018-12-03 RX ADMIN — TRIAMCINOLONE ACETONIDE 40 MG: 40 INJECTION, SUSPENSION INTRA-ARTICULAR; INTRAMUSCULAR at 14:35

## 2019-03-08 ENCOUNTER — OFFICE VISIT (OUTPATIENT)
Dept: PHYSICAL MEDICINE AND REHAB | Age: 69
End: 2019-03-08
Payer: COMMERCIAL

## 2019-03-08 VITALS
WEIGHT: 117 LBS | SYSTOLIC BLOOD PRESSURE: 118 MMHG | HEART RATE: 73 BPM | HEIGHT: 61 IN | BODY MASS INDEX: 22.09 KG/M2 | OXYGEN SATURATION: 98 % | DIASTOLIC BLOOD PRESSURE: 80 MMHG

## 2019-03-08 DIAGNOSIS — M75.101 TEAR OF RIGHT ROTATOR CUFF, UNSPECIFIED TEAR EXTENT: Primary | ICD-10-CM

## 2019-03-08 DIAGNOSIS — M25.512 CHRONIC PAIN OF BOTH SHOULDERS: ICD-10-CM

## 2019-03-08 DIAGNOSIS — I89.0 LYMPHEDEMA: ICD-10-CM

## 2019-03-08 DIAGNOSIS — G89.29 CHRONIC PAIN OF BOTH SHOULDERS: ICD-10-CM

## 2019-03-08 DIAGNOSIS — M25.511 CHRONIC PAIN OF BOTH SHOULDERS: ICD-10-CM

## 2019-03-08 DIAGNOSIS — M75.102 TEAR OF LEFT ROTATOR CUFF, UNSPECIFIED TEAR EXTENT: ICD-10-CM

## 2019-03-08 PROCEDURE — 20610 DRAIN/INJ JOINT/BURSA W/O US: CPT | Performed by: PHYSICAL MEDICINE & REHABILITATION

## 2019-03-08 PROCEDURE — 1036F TOBACCO NON-USER: CPT | Performed by: PHYSICAL MEDICINE & REHABILITATION

## 2019-03-08 PROCEDURE — 1101F PT FALLS ASSESS-DOCD LE1/YR: CPT | Performed by: PHYSICAL MEDICINE & REHABILITATION

## 2019-03-08 PROCEDURE — 1123F ACP DISCUSS/DSCN MKR DOCD: CPT | Performed by: PHYSICAL MEDICINE & REHABILITATION

## 2019-03-08 PROCEDURE — G8420 CALC BMI NORM PARAMETERS: HCPCS | Performed by: PHYSICAL MEDICINE & REHABILITATION

## 2019-03-08 PROCEDURE — 4040F PNEUMOC VAC/ADMIN/RCVD: CPT | Performed by: PHYSICAL MEDICINE & REHABILITATION

## 2019-03-08 PROCEDURE — 3017F COLORECTAL CA SCREEN DOC REV: CPT | Performed by: PHYSICAL MEDICINE & REHABILITATION

## 2019-03-08 PROCEDURE — 1090F PRES/ABSN URINE INCON ASSESS: CPT | Performed by: PHYSICAL MEDICINE & REHABILITATION

## 2019-03-08 PROCEDURE — G8484 FLU IMMUNIZE NO ADMIN: HCPCS | Performed by: PHYSICAL MEDICINE & REHABILITATION

## 2019-03-08 PROCEDURE — G8399 PT W/DXA RESULTS DOCUMENT: HCPCS | Performed by: PHYSICAL MEDICINE & REHABILITATION

## 2019-03-08 PROCEDURE — 99213 OFFICE O/P EST LOW 20 MIN: CPT | Performed by: PHYSICAL MEDICINE & REHABILITATION

## 2019-03-08 PROCEDURE — G8427 DOCREV CUR MEDS BY ELIG CLIN: HCPCS | Performed by: PHYSICAL MEDICINE & REHABILITATION

## 2019-03-08 RX ORDER — TRIAMCINOLONE ACETONIDE 40 MG/ML
40 INJECTION, SUSPENSION INTRA-ARTICULAR; INTRAMUSCULAR ONCE
Status: COMPLETED | OUTPATIENT
Start: 2019-03-08 | End: 2019-03-08

## 2019-03-08 RX ORDER — BUPIVACAINE HYDROCHLORIDE 2.5 MG/ML
6 INJECTION, SOLUTION EPIDURAL; INFILTRATION; INTRACAUDAL ONCE
Status: COMPLETED | OUTPATIENT
Start: 2019-03-08 | End: 2019-03-08

## 2019-03-08 RX ADMIN — BUPIVACAINE HYDROCHLORIDE 15 MG: 2.5 INJECTION, SOLUTION EPIDURAL; INFILTRATION; INTRACAUDAL at 10:31

## 2019-03-08 RX ADMIN — TRIAMCINOLONE ACETONIDE 40 MG: 40 INJECTION, SUSPENSION INTRA-ARTICULAR; INTRAMUSCULAR at 10:32

## 2019-04-18 ENCOUNTER — HOSPITAL ENCOUNTER (OUTPATIENT)
Dept: PET IMAGING | Age: 69
Discharge: HOME | End: 2019-04-20
Payer: COMMERCIAL

## 2019-04-18 DIAGNOSIS — C02.9 CANCER OF TONGUE (HCC): ICD-10-CM

## 2019-04-18 LAB — METER GLUCOSE: 76 MG/DL (ref 74–99)

## 2019-04-18 PROCEDURE — 3430000000 HC RX DIAGNOSTIC RADIOPHARMACEUTICAL: Performed by: RADIOLOGY

## 2019-04-18 PROCEDURE — 78815 PET IMAGE W/CT SKULL-THIGH: CPT

## 2019-04-18 PROCEDURE — 82962 GLUCOSE BLOOD TEST: CPT

## 2019-04-18 PROCEDURE — A9552 F18 FDG: HCPCS | Performed by: RADIOLOGY

## 2019-04-18 RX ORDER — FLUDEOXYGLUCOSE F 18 200 MCI/ML
15 INJECTION, SOLUTION INTRAVENOUS
Status: COMPLETED | OUTPATIENT
Start: 2019-04-18 | End: 2019-04-18

## 2019-04-18 RX ADMIN — FLUDEOXYGLUCOSE F 18 15 MILLICURIE: 200 INJECTION, SOLUTION INTRAVENOUS at 07:56

## 2019-04-22 ENCOUNTER — HOSPITAL ENCOUNTER (OUTPATIENT)
Dept: RADIATION ONCOLOGY | Age: 69
Discharge: HOME | End: 2019-04-22
Payer: COMMERCIAL

## 2019-04-22 VITALS
WEIGHT: 121.3 LBS | HEART RATE: 72 BPM | DIASTOLIC BLOOD PRESSURE: 74 MMHG | OXYGEN SATURATION: 99 % | BODY MASS INDEX: 23.3 KG/M2 | SYSTOLIC BLOOD PRESSURE: 118 MMHG

## 2019-04-22 DIAGNOSIS — C80.1 CANCER (HCC): Primary | ICD-10-CM

## 2019-04-22 PROCEDURE — 99213 OFFICE O/P EST LOW 20 MIN: CPT | Performed by: RADIOLOGY

## 2019-04-22 PROCEDURE — 99212 OFFICE O/P EST SF 10 MIN: CPT

## 2019-04-22 NOTE — PROGRESS NOTES
Winter Daniel  4/22/2019  7:54 AM      There were no vitals filed for this visit. :    Wt Readings from Last 3 Encounters:   03/08/19 117 lb (53.1 kg)   12/03/18 114 lb (51.7 kg)   11/09/18 120 lb (54.4 kg)                Current Outpatient Medications:     Cholecalciferol (VITAMIN D PO), Take 1 tablet by mouth 3000 units daily, Disp: , Rfl:     potassium chloride (KLOR-CON) 20 MEQ packet, Take 1 tablet by mouth daily as needed for Other TAKEN WITH LASIX IF NEEDED, Disp: , Rfl:     simvastatin (ZOCOR) 20 MG tablet, Take 1 tablet by mouth three times a week, Disp: , Rfl:     sucralfate (CARAFATE) 1 GM/10ML suspension, Take 1 g by mouth as needed, Disp: , Rfl:     ferrous sulfate 325 (65 Fe) MG tablet, Take 325 mg by mouth 2 times daily (with meals) , Disp: , Rfl:     gabapentin (NEURONTIN) 400 MG capsule, Take 400 mg by mouth nightly. ., Disp: , Rfl: 3    Biotin 10 MG CAPS, Take by mouth daily HOLD, Disp: , Rfl:     furosemide (LASIX) 20 MG tablet, Take 20 mg by mouth daily as needed, Disp: , Rfl:     Elastic Bandages & Supports (JOBST KNEE HIGH COMPRESSION SM) MISC, Knee high with 20- 30 mmhg of compression, Disp: 1 each, Rfl: 10    Temazepam (RESTORIL PO), Take 15 mg by mouth nightly Sleep aide, Disp: , Rfl:     ROPINIRole HCl (REQUIP PO), Take by mouth nightly as needed For restless legs, Disp: , Rfl:     acetaminophen (TYLENOL) 500 MG tablet, Take 500 mg by mouth every 6 hours as needed Instructed to take with sip water am of procedure if needs, Disp: , Rfl:     levothyroxine (SYNTHROID) 75 MCG tablet, Take 75 mcg by mouth daily , Disp: , Rfl:     RABEprazole Sodium (ACIPHEX PO), Take 20 mg by mouth daily Instructed to take am of procedure, Disp: , Rfl:       Patient is seen today in follow up, 6 month follow up having received radiation therapy to the right base of the tongue plus lymph nodes, 7000 cGy in 35 fractions from 10/3/2016 through 11/29/2016.  Patient follows with Dr Tiffanie Camacho at [facility], last appointment being in October of last year. Patient completed Pet Scan on 4/18/2019 at Dr Nick Hughes request, currently negative results. At patient's request, follow up with Dr Leonardo Rader is at 6 month intervals, next appointment is scheduled for May 2, 2019. 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 As to lasting side effects to her radiation therapy, patient does have a dry mouth in which she compensates with frequent sips of water as well as xylimelts. Patient also utilizes a soft diet to aid with swallowing. FALLS RISK SCREENING ASSESSMENT    Instructions:  Assess the patient and enter the appropriate indicators that are present for fall risk identification. Total the numbers entered and assign a fall risk score from Table 2.  Reassess patient at a minimum every 12 weeks or with status change. Assessment   Date  4/22/2019     1. Mental Ability: confusion/cognitively impaired No - 0       2. Elimination Issues: incontinence, frequency No - 0       3. Ambulatory: use of assistive devices (walker, cane, off-loading devices), attached to equipment (IV pole, oxygen) No - 0     4. Sensory Limitations: dizziness, vertigo, impaired vision No - 0       5. Age 72 years or greater - 1       10. Medication: diuretics, strong analgesics, hypnotics, sedatives, antihypertensive agents   No - 0   7. Falls:  recent history of falls within the last 3 months (not to include slipping or tripping)   No - 0   TOTAL 1    If score of 4 or greater was education given? No       TABLE 2   Risk Score Risk Level Plan of Care   0-3 Little or  No Risk 1. Provide assistance as indicated for ambulation activities  2. Reorient confused/cognitively impaired patient  3. Call-light/bell within patient's reach  4. Chair/bed in low position, stretcher/bed with siderails up except when performing patient care activities  5.   Educate patient/family/caregiver on falls prevention  6.  Reassess in 12 weeks or with any noted change in patient condition which places them at a risk for a fall   4-6 Moderate Risk 1. Provide assistance as indicated for ambulation activities  2. Reorient confused/cognitively impaired patient  3. Call-light/bell within patient's reach  4. Chair/bed in low position, stretcher/bed with siderails up except when performing patient care activities  5. Educate patient/family/caregiver on falls prevention  6. Falls risk precaution (Yellow sticker Level II) placed on patient chart   7 or   Higher High Risk 1. Place patient in easily observable treatment room  2. Patient attended at all times by family member or staff  3. Provide assistance as indicated for ambulation activities  4. Reorient confused/cognitively impaired patient  5. Call-light/bell within patient's reach  6. Chair/bed in low position, stretcher/bed with siderails up except when performing patient care activities  7. Educate patient/family/caregiver on falls prevention  8. Falls risk precaution (Yellow sticker Level III) placed on patient chart           MALNUTRITION RISK SCREENING ASSESSMENT    4/22/2019   Patient:  Noel Severe  Sex:  female    Instructions:  Assess the patient and enter the appropriate indicators that are present for nutrition risk identification. Total the numbers entered and assign a risk score. Follow the appropriate action for total score listed below. Assessment   Date  4/22/2019     1. Have you lost weight without trying? 0- No     2. Have you been eating poorly because of a decreased appetite? 0- No   3. Do you have a diagnosis of head and neck cancer?       1- Yes                                                                                    TOTAL 1          Score of 0-1: No action  Score 2 or greater:  · For Non-Diabetic Patient: Recommend adding Ensure Complete 2 x daily and provide patient with Ensure wellness bag with coupons  · For Diabetic Patient: Recommend adding Glucerna Shake 2 x daily and provide patient with Glucerna Wellness bag with coupons  · Route to the dietitian via [facility] [address]

## 2019-04-22 NOTE — PROGRESS NOTES
significance, similar to the prior   examination. Query lymphomatous involvement. 2.  Additional findings, as detailed above.            PET 2/10/18:     1. No identifiable hypermetabolic focus with avid glucose metabolism   concerning for recurrence.                 PET 11/4/17:         PET images were obtained from skull base to the mid thigh.       Non diagnostic CT images were obtained for the purposes of attenuation   correction.       19.6 of F-18 glucose was injected.       Images reveal a single region of radiotracer uptake identified at the   level of the mylohyoid muscle on the right. This is focal and   asymmetric and has an SUV of 3.55.        There is otherwise a normal biodistribution of radiotracer. There is   no other abnormal tracer uptake seen.           Impression       Single focus of abnormal tracer uptake at the level of the mylohyoid   muscle, this could represent asymmetric muscular uptake. This could   represent focal neoplasm. The finding is somewhat nonspecific although   concerning.                PET 7/10/17:  Mild asymmetric uptake in the tongue predominantly anteriorly and   right side of the tongue posteriorly which is new since the previous   examination. Inflammatory process or recurrent malignancy has to be   considered. Direct visualization and follow-up examination   recommended.                PET 1/21/16:  1.  Mild asymmetrically increased uptake seen in association with the   right thyroid lobe, new when compared to the prior examination. Consider further evaluation with thyroid ultrasound. 2.  Interval resolution of previously seen right parotid and right   base of tongue/lingual tonsil uptake.                  -----           PATH 8/10/17:    Diagnosis:     A. Tongue, anterior right, biopsy: Benign squamous mucosa with mild       hyperkeratosis.     B. Tongue, right at fossa junction, biopsy: Benign squamous mucosa.     C.  Tongue, right posterior, biopsy: Benign squamous mucosa. Comment:     The biopsies show fragments of benign squamous mucosa.  The  underlying submucosa shows rare mildly atypical stromal cells suggestive  of radiation induced atypia.  No evidence of squamous cell carcinoma is  identified.           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 Dental clearance 9/21/2016 per Dr. Meena Vasquez. Note in Media tab on EPIC. She underwent a port placement with Dr. Sonia Tena on 10/4/2016. Chemotherapy was begun on 10/6/2016 with Dr. Earl David. PEG tube placed on 10/31/2016 by Dr. Fela Lamar     Area(s) treated: Definitive head and neck irradiation.     Treatment dates: 9/30/2016 through 11/29/2016.     Daily fractionation: 200 cGy IMRT with daily CBCT.        Total dose:  7000 cGy in 35 fractions over 57 elapsed days.     Ms. Delfin Saldana completed a course of radiation treatments for management of locally advanced base of tongue squamous cell carcinoma. She had refused G-tube placement on presentation.      She did develop expected skin, oral mucositis and swallowing difficulties.  She was symptomatically symptomatically managed. In spite of this, she did have hypotension/dehydration. A PEG tube was placed.  She was hospitalized on 11/15/2016 following a syncopal episode. Preston Carranza is using various skin creams including Aquaphor, Neosporin with pain and most recently Silvadene. She did require IV hydration with Dr. Earl David.  For oral mucositis management, we tried Magic mouthwash, neutral cell, Gelclair.  She is on Salagen for dry mouth.           -----           Past Medical History:   Diagnosis Date    Anemia associated with chemotherapy 11/16/2016    resolved    Cancer ([address]     tongue    Cancer of tongue ([facility] [address] 9/16/2016    treated with chemo and radiation / last treatment 11/2016    [facility] tonsil [facility]     Cervical herniation     no limits on rom    Difficulty sleeping     Dizziness 6/20/2018    GERD (gastroesophageal reflux disease)     Hyperlipidemia     Hypokalemia 11/16/2016    with chemo / resolved    Hypomagnesemia 11/16/2016    related to chemo / resloved    Hypothyroidism     Leg swelling 7/26/2017    Lumbar herniated disc     after fell 2/2017 / now has chronic back pain and numbnes at left foot and ankle    Lymphedema of both lower extremities 07/26/2017    wears support hose    Osteopenia     PONV (postoperative nausea and vomiting)     Restless leg syndrome     Rotator cuff tear     bilateral    Thyroid disease          Past Surgical History:   Procedure Laterality Date   [address]    COLONOSCOPY  2008    neg    COLONOSCOPY  03/19/2014    ENDOSCOPY, COLON, DIAGNOSTIC  2012    FRACTURE SURGERY  2012    RIGHT ELBOW    HYSTERECTOMY  1993    lavh&BSO&A&P    LAPAROSCOPY  1990    LAPAROTOMY  1991    LARYNGOSCOPY  09/08/2016    direct laryngoscopy right tonsil/tongue base biopsy cervical esophagoscopy fine needle aspiration     NERVE BLOCK      OTHER SURGICAL HISTORY  5/28/12    ORIF RIGHT ULNA    OTHER SURGICAL HISTORY  6/18/2012    orif  rivision right olecranon    OTHER SURGICAL HISTORY Right 01/16/2014    Right elbow hardware removal    IN OFFICE/OUTPT VISIT,PROCEDURE ONLY N/A 11/9/2018    PORT REMOVAL performed by Jes Bernstein MD at [facility]  1MONTHS OF AGE    H/O ESOPHAGEAL WEB    TUNNELED VENOUS PORT PLACEMENT  10/04/2016    Dr. Robles [facility]         Social History     Socioeconomic History    Marital status:      Spouse name: Not on file    Number of children: Not on file    Years of education: Not on file    Highest education level: Not on file   Occupational History    Not on file   Social Needs    Financial resource strain: Not on file    Food insecurity: daily HOLD      furosemide (LASIX) 20 MG tablet Take 20 mg by mouth daily as needed      Elastic Bandages & Supports (JOBST KNEE HIGH COMPRESSION SM) MISC Knee high with 20- 30 mmhg of compression 1 each 10    Temazepam (RESTORIL PO) Take 15 mg by mouth nightly Sleep aide      ROPINIRole HCl (REQUIP PO) Take by mouth nightly as needed For restless legs      acetaminophen (TYLENOL) 500 MG tablet Take 500 mg by mouth every 6 hours as needed Instructed to take with sip water am of procedure if needs      levothyroxine (SYNTHROID) 75 MCG tablet Take 75 mcg by mouth daily       RABEprazole Sodium (ACIPHEX PO) Take 20 mg by mouth daily Instructed to take am of procedure       No current facility-administered medications for this encounter. REVIEW OF SYSTEMS  History obtained from chart review and the patient  General ROS: negative  Psychological ROS: negative  Ophthalmic ROS: negative  ENT ROS: negative  Allergy and Immunology ROS: negative  Hematological and Lymphatic ROS: negative  Endocrine ROS: negative   Breast ROS: negative for breast lumps  Respiratory ROS: no cough, shortness of breath, or wheezing  Cardiovascular ROS: no chest pain or dyspnea on exertion  Gastrointestinal ROS: no abdominal pain, change in bowel habits, or black or bloody stools  Genito-Urinary ROS: no dysuria, trouble voiding, or hematuria  Musculoskeletal ROS: negative  Neurological ROS: no TIA or stroke symptoms  Dermatological ROS: negative  -dry mucous membranes        Physical Exam   Constitutional: She is oriented to person, place, and time. She appears well-developed. HENT:   Head: Normocephalic. Eyes: Pupils are equal, round, and reactive to light. Neck: Normal range of motion. Cardiovascular: Normal rate, regular rhythm and normal heart sounds. Pulmonary/Chest: Effort normal.   Abdominal: Soft. Musculoskeletal: Normal range of motion. She exhibits no edema.    Neurological: She is alert and oriented to person, place, and time. Skin: Skin is warm and dry. Psychiatric: She has a normal mood and affect. Her behavior is normal. Judgment and thought content normal.         A/P:           Leobardo is a dominga 77 year old woman s/p concurrent chemo-RT for locally advanced HN cancer. She is NESSA today with grade 1-2 late effects but not difficulty with self care or instrumental ADLS. She is maintaining her elizabeth and th PEG has been removed.  PET negative.  Persistent xerostomia and dysphagia.  The patient did verbalize understanding for the indications of continued FU including imaging and laboratory evaluation as applicable to this case; as well as appropriate lifestyle choices and health maintenance.  All questions answered to the best of my ability. Early delayed or chronic RT grade 1-2 (xerostomia / dysphagia).          *I spent at least [place] this case and with this patient today including personally performing/reviewing the history, ROS, PE, and providing a summary and description of the detailed medical decision making as a basis for any and all recommendations made today (+/- a pertinent RBA discussion): literature and radiology reviews were performed as noted and applicable (22% or more time was spent in direct patient ).          -off Aqoural  -off Carafate   -off Gelclair  -pt declined Salagen re-trial / rec xylomelt trial  -cont FU with Dr. Sofia Antonio  / Dr. Bhavesh Bourgeois per [facility] interval  -cont FU with Dr. Akhil Crow for carefull oral care  -LE clinic ordered pt given script for this / stable  -cleared for esophageal dilatation as needed (last performed spring 2018 - now PRN)  -no RT dose was delivered to the rotator cuffs / cleared for surgery IF indicated by Orthopedic SURG. -FU with me in 6. --- imaging per Dr. Uma Chavarria.  Karissa Saha MD [facility] [address]  Radiation Oncology  Cell: [phone]  [facility]:  [phone]   FAX: [phone]  [address] [place] [address]:   [phone]   FAX: [phone]  [address] [place] [address] Road:  [address]:  [phone]  Email: [email or web address]        NOTE: This report was transcribed using voice recognition software. Every effort was made to ensure accuracy; however, inadvertent computerized transcription errors may be present.

## 2019-04-22 NOTE — PATIENT INSTRUCTIONS
VIOLA Jewell. Jeramie Ward MD [place] [address] Oncology  Cell: [phone]    [facility]:  [phone]   FAX: [phone]  [address] [facility]:  [address] [place] [address] Avenue:    [phone]  [address] [place] [address] Road:  [phone]   FAX:  [phone]  Email: [email or web address]

## 2019-06-28 ENCOUNTER — OFFICE VISIT (OUTPATIENT)
Dept: PHYSICAL MEDICINE AND REHAB | Age: 69
End: 2019-06-28
Payer: COMMERCIAL

## 2019-06-28 VITALS
DIASTOLIC BLOOD PRESSURE: 82 MMHG | OXYGEN SATURATION: 99 % | WEIGHT: 116 LBS | BODY MASS INDEX: 21.9 KG/M2 | SYSTOLIC BLOOD PRESSURE: 124 MMHG | HEART RATE: 68 BPM | HEIGHT: 61 IN

## 2019-06-28 DIAGNOSIS — G89.29 CHRONIC PAIN OF BOTH SHOULDERS: Primary | ICD-10-CM

## 2019-06-28 DIAGNOSIS — M75.122 COMPLETE TEAR OF LEFT ROTATOR CUFF, UNSPECIFIED WHETHER TRAUMATIC: ICD-10-CM

## 2019-06-28 DIAGNOSIS — M75.121 COMPLETE TEAR OF RIGHT ROTATOR CUFF, UNSPECIFIED WHETHER TRAUMATIC: ICD-10-CM

## 2019-06-28 DIAGNOSIS — M25.511 CHRONIC PAIN OF BOTH SHOULDERS: Primary | ICD-10-CM

## 2019-06-28 DIAGNOSIS — M25.512 CHRONIC PAIN OF BOTH SHOULDERS: Primary | ICD-10-CM

## 2019-06-28 DIAGNOSIS — S43.003D SUBLUXATION OF SHOULDER JOINT, UNSPECIFIED LATERALITY, SUBSEQUENT ENCOUNTER: ICD-10-CM

## 2019-06-28 PROCEDURE — G8420 CALC BMI NORM PARAMETERS: HCPCS | Performed by: PHYSICAL MEDICINE & REHABILITATION

## 2019-06-28 PROCEDURE — 1123F ACP DISCUSS/DSCN MKR DOCD: CPT | Performed by: PHYSICAL MEDICINE & REHABILITATION

## 2019-06-28 PROCEDURE — 4040F PNEUMOC VAC/ADMIN/RCVD: CPT | Performed by: PHYSICAL MEDICINE & REHABILITATION

## 2019-06-28 PROCEDURE — G8427 DOCREV CUR MEDS BY ELIG CLIN: HCPCS | Performed by: PHYSICAL MEDICINE & REHABILITATION

## 2019-06-28 PROCEDURE — 99212 OFFICE O/P EST SF 10 MIN: CPT | Performed by: PHYSICAL MEDICINE & REHABILITATION

## 2019-06-28 PROCEDURE — 3017F COLORECTAL CA SCREEN DOC REV: CPT | Performed by: PHYSICAL MEDICINE & REHABILITATION

## 2019-06-28 PROCEDURE — G8399 PT W/DXA RESULTS DOCUMENT: HCPCS | Performed by: PHYSICAL MEDICINE & REHABILITATION

## 2019-06-28 PROCEDURE — 20611 DRAIN/INJ JOINT/BURSA W/US: CPT | Performed by: PHYSICAL MEDICINE & REHABILITATION

## 2019-06-28 PROCEDURE — 1036F TOBACCO NON-USER: CPT | Performed by: PHYSICAL MEDICINE & REHABILITATION

## 2019-06-28 PROCEDURE — 1090F PRES/ABSN URINE INCON ASSESS: CPT | Performed by: PHYSICAL MEDICINE & REHABILITATION

## 2019-06-28 RX ORDER — TRIAMCINOLONE ACETONIDE 40 MG/ML
40 INJECTION, SUSPENSION INTRA-ARTICULAR; INTRAMUSCULAR ONCE
Status: COMPLETED | OUTPATIENT
Start: 2019-06-28 | End: 2019-06-28

## 2019-06-28 RX ORDER — BUPIVACAINE HYDROCHLORIDE 2.5 MG/ML
6 INJECTION, SOLUTION EPIDURAL; INFILTRATION; INTRACAUDAL ONCE
Status: COMPLETED | OUTPATIENT
Start: 2019-06-28 | End: 2019-06-28

## 2019-06-28 RX ADMIN — BUPIVACAINE HYDROCHLORIDE 15 MG: 2.5 INJECTION, SOLUTION EPIDURAL; INFILTRATION; INTRACAUDAL at 12:35

## 2019-06-28 RX ADMIN — TRIAMCINOLONE ACETONIDE 40 MG: 40 INJECTION, SUSPENSION INTRA-ARTICULAR; INTRAMUSCULAR at 12:36

## 2019-06-28 NOTE — PROGRESS NOTES
Sanjay Dang M.D. [address] [facility] PHYSICAL MEDICINE AND REHABILITAION  [address] [facility] [address]  Dept: [phone]  Dept Fax: [phone]    PCP: Kaela Campos MD  Date of visit: 6/28/19    Chief Complaint   Patient presents with    Shoulder Pain     b/l shoulder pain - US Guided injections - last injections were helpful and requesting injections today       Angelique Caraballo is a 80 y.o.  right hand dominant woman who presents for follow up of upper extremity pain and bilateral rotator cuff tears. 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 Chemotherapy was started on 10/6/2016 with Dr. Tiffanie Ibarra. November 2016 she completed head and neck irradiation for management of right base of tongue SCC. She reports she finished all treatment (chemo/radiation) November 2016. She denies any arm pain or numbness/tingling after her chemo/radiation, until her more recent injuries with rotator cuff tears. Her primary complaint continues to be bilateral shoulder pain, which she attributes [facility]. She had massive full thickness bilateral supraspinatus and infraspinatus tears-- the right rotator cuff in December 2017 and left rotator cuff in January 2018. She saw Ortho--they did not recommend surgery due to chemo/radiation and osteoporosis -- unless pain unbearable --only then would consider shoulder replacement. Patient was cleared for shoulder surgery from radiation oncology standpoint IF indicated/recommended by Ortho. Patient does not wish for surgery at this time.          Interval history:   Since last visit patient reports that periodic bilateral subacromial injections continue to be beneficial, decreasing pain significantly and allowing her to continue to be functional. taking due to bruising   Opioids: None  Membrane stabilizers: gabapentin 400mg QHS and 300mg qam for numbness in left leg  Muscle relaxers: None  Previous injections: Bilateral shoulder subacromial injections with relief of pain  Previous surgery at this site: No shoulder surgeries. Saw Ortho - per patient they did not recommend surgery due to chemo/radiation and osteoporosis -- unless pain unbearable --only then would consider shoulder replacement     Francisco Javier Collazo  continues home exercises/stretches from therapy. She is still continuing lymphedema therapy. The patient does perform regular exercise.        Past Medical History:   Diagnosis Date    Anemia associated with chemotherapy 11/16/2016    resolved    Cancer ([address]     tongue    Cancer of tongue ([facility] [address] 9/16/2016    treated with chemo and radiation / last treatment 11/2016    Cancer of tonsil ([facility] [address]     Cervical herniation     no limits on rom    Difficulty sleeping     Dizziness 6/20/2018    GERD (gastroesophageal reflux disease)     Hyperlipidemia     Hypokalemia 11/16/2016    with chemo / resolved    Hypomagnesemia 11/16/2016    related to chemo / resloved    Hypothyroidism     Leg swelling 7/26/2017    Lumbar herniated disc     after fell 2/2017 / now has chronic back pain and numbnes at left foot and ankle    Lymphedema of both lower extremities 07/26/2017    wears support hose    Osteopenia     PONV (postoperative nausea and vomiting)     Restless leg syndrome     Rotator cuff tear     bilateral    Thyroid disease        Past Surgical History:   Procedure Laterality Date   [address]    COLONOSCOPY  2008    neg    COLONOSCOPY  03/19/2014    ENDOSCOPY, COLON, DIAGNOSTIC  2012    FRACTURE SURGERY  2012    RIGHT ELBOW    HYSTERECTOMY  1993    lavh&BSO&A&P    LAPAROSCOPY  1990    LAPAROTOMY  1991    LARYNGOSCOPY  09/08/2016    direct laryngoscopy right tonsil/tongue base biopsy cervical esophagoscopy fine needle aspiration     NERVE BLOCK      OTHER SURGICAL HISTORY  5/28/12    ORIF RIGHT ULNA    OTHER SURGICAL HISTORY  6/18/2012    orif  rivision right olecranon    OTHER SURGICAL HISTORY Right 01/16/2014    Right elbow hardware removal    PA OFFICE/OUTPT VISIT,PROCEDURE ONLY N/A 11/9/2018    PORT REMOVAL performed by Marine Bear MD at [place] [address]  1MONTHS OF AGE    H/O ESOPHAGEAL WEB    TUNNELED VENOUS PORT PLACEMENT  10/04/2016    Dr. Guerra Bare History     Socioeconomic History    Marital status:      Spouse name: Not on file    Number of children: Not on file    Years of education: Not on file    Highest education level: Not on file   Occupational History    Not on file   Social Needs    Financial resource strain: Not on file    Food insecurity:     Worry: Not on file     Inability: Not on file    Transportation needs:     Medical: Not on file     Non-medical: Not on file   Tobacco Use    Smoking status: Never Smoker    Smokeless tobacco: Never Used   Substance and Sexual Activity    Alcohol use: No     Alcohol/week: 0.0 oz    Drug use: No    Sexual activity: Not on file   Lifestyle    Physical activity:     Days per week: Not on file     Minutes per session: Not on file    Stress: Not on file   Relationships    Social connections:     Talks on phone: Not on file     Gets together: Not on file     Attends Protestant service: Not on file     Active member of club or organization: Not on file     Attends meetings of clubs or organizations: Not on file     Relationship status: Not on file    Intimate partner violence:     Fear of current or ex partner: Not on file     Emotionally abused: Not on file     Physically abused: Not on file     Forced sexual activity: Not on file   Other Topics Concern    Not on file   Social History Narrative    Not on file       The patient is retired. Re Manish  Does not require an assistive device.      Family History test bilaterally (3-/5). Weak with Speed's bilaterally. Minimal pain with bilateral Jadon-Land. Full painless cervical spine and elbow ROM. Negative Spurling. There is bilateral upper trapezius muscle spasm and trigger points. Right chest port. R face/jaw area lymphedema. No upper extremity edema. Neurologic: Awake, alert and oriented in three planes. Speech is fluent. No facial weakness. Tongue is midline. Hearing is intact for conversation. Pupils are equal and round. Extraocular muscles are intact. Shoulder shrug symmetric. Strength:   R  L  Deltoid   5-  5-  Biceps   5  5  Triceps  5  5  Wrist Ext  5  5  Finger Abd  5  5      Sensory:  Intact for light touch in all upper and lower extremity dermatomes. Reflexes:   R  L  Biceps   2 2  Triceps  2 2  BR   2 2   Patellar  2 2   Ankle Jerk  2 2    No Dnuaway    Gait is normal.       US guided Glenohumeral joint Injection Procedure: Bilateral  After explaining the indications, risks, benefits and alternatives of a bilateral glenohumeral injection, the patient agreed to proceed. A permit was signed and scanned to the [place]. The patient was placed in the seated position. Chloraprep was used to sterilize the skin. Using an aseptic, no touch technique, a 22 gauge, 1.5\" needle with 1 cc of Kenalog 40mg/cc and 3 cc of bupivacaine 0.25% was directed, under ultrasound guidance, to the right glenohumeral joint. After negative aspiration the medication was injected. Adequate hemostasis was achieved and the injection site was covered with a bandage. The exact same procedure was performed on the left side. US images were scanned to media separately. The patient was observed for complication and left the office without incident. The patient tolerated the procedure well and was educated in post injection care. Impression:   Bilateral shoulder pain -- exam consistent with RTC etiology of pain  Bilateral massive rotator cuff tears.    Right shoulder

## 2019-08-05 ENCOUNTER — HOSPITAL ENCOUNTER (OUTPATIENT)
Age: 69
Discharge: HOME | End: 2019-08-07

## 2019-08-05 PROCEDURE — 87081 CULTURE SCREEN ONLY: CPT

## 2019-08-05 PROCEDURE — 88305 TISSUE EXAM BY PATHOLOGIST: CPT

## 2019-08-06 LAB — CLOTEST: NORMAL

## 2019-10-16 ENCOUNTER — HOSPITAL ENCOUNTER (OUTPATIENT)
Dept: PET IMAGING | Age: 69
Discharge: HOME | End: 2019-10-18
Payer: COMMERCIAL

## 2019-10-16 DIAGNOSIS — C01 CANCER OF BASE OF TONGUE (HCC): ICD-10-CM

## 2019-10-16 LAB — METER GLUCOSE: 80 MG/DL (ref 74–99)

## 2019-10-16 PROCEDURE — A9552 F18 FDG: HCPCS | Performed by: RADIOLOGY

## 2019-10-16 PROCEDURE — 78815 PET IMAGE W/CT SKULL-THIGH: CPT

## 2019-10-16 PROCEDURE — 82962 GLUCOSE BLOOD TEST: CPT

## 2019-10-16 PROCEDURE — 3430000000 HC RX DIAGNOSTIC RADIOPHARMACEUTICAL: Performed by: RADIOLOGY

## 2019-10-16 RX ORDER — FLUDEOXYGLUCOSE F 18 200 MCI/ML
15 INJECTION, SOLUTION INTRAVENOUS
Status: COMPLETED | OUTPATIENT
Start: 2019-10-16 | End: 2019-10-16

## 2019-10-16 RX ADMIN — FLUDEOXYGLUCOSE F 18 15 MILLICURIE: 200 INJECTION, SOLUTION INTRAVENOUS at 08:00

## 2019-10-30 ENCOUNTER — HOSPITAL ENCOUNTER (OUTPATIENT)
Dept: RADIATION ONCOLOGY | Age: 69
Discharge: HOME | End: 2019-10-30
Payer: COMMERCIAL

## 2019-10-30 VITALS
DIASTOLIC BLOOD PRESSURE: 60 MMHG | WEIGHT: 117.5 LBS | HEART RATE: 66 BPM | SYSTOLIC BLOOD PRESSURE: 98 MMHG | BODY MASS INDEX: 22.57 KG/M2 | OXYGEN SATURATION: 99 %

## 2019-10-30 DIAGNOSIS — C10.9 OROPHARYNGEAL CANCER (HCC): Primary | ICD-10-CM

## 2019-10-30 PROCEDURE — 99999 PR OFFICE/OUTPT VISIT,PROCEDURE ONLY: CPT | Performed by: RADIOLOGY

## 2019-10-30 RX ORDER — ESCITALOPRAM OXALATE 5 MG/1
5 TABLET ORAL
COMMUNITY
End: 2020-11-10

## 2019-11-01 ENCOUNTER — OFFICE VISIT (OUTPATIENT)
Dept: PHYSICAL MEDICINE AND REHAB | Age: 69
End: 2019-11-01
Payer: COMMERCIAL

## 2019-11-01 VITALS
SYSTOLIC BLOOD PRESSURE: 98 MMHG | OXYGEN SATURATION: 98 % | BODY MASS INDEX: 22.97 KG/M2 | DIASTOLIC BLOOD PRESSURE: 68 MMHG | WEIGHT: 117 LBS | HEIGHT: 60 IN | HEART RATE: 67 BPM

## 2019-11-01 DIAGNOSIS — S43.003D SUBLUXATION OF SHOULDER JOINT, UNSPECIFIED LATERALITY, SUBSEQUENT ENCOUNTER: ICD-10-CM

## 2019-11-01 DIAGNOSIS — G89.29 CHRONIC PAIN OF BOTH SHOULDERS: Primary | ICD-10-CM

## 2019-11-01 DIAGNOSIS — M75.122 COMPLETE TEAR OF LEFT ROTATOR CUFF, UNSPECIFIED WHETHER TRAUMATIC: ICD-10-CM

## 2019-11-01 DIAGNOSIS — M25.512 CHRONIC PAIN OF BOTH SHOULDERS: Primary | ICD-10-CM

## 2019-11-01 DIAGNOSIS — M75.121 COMPLETE TEAR OF RIGHT ROTATOR CUFF, UNSPECIFIED WHETHER TRAUMATIC: ICD-10-CM

## 2019-11-01 DIAGNOSIS — M25.511 CHRONIC PAIN OF BOTH SHOULDERS: Primary | ICD-10-CM

## 2019-11-01 PROCEDURE — G8399 PT W/DXA RESULTS DOCUMENT: HCPCS | Performed by: PHYSICAL MEDICINE & REHABILITATION

## 2019-11-01 PROCEDURE — G8484 FLU IMMUNIZE NO ADMIN: HCPCS | Performed by: PHYSICAL MEDICINE & REHABILITATION

## 2019-11-01 PROCEDURE — 4040F PNEUMOC VAC/ADMIN/RCVD: CPT | Performed by: PHYSICAL MEDICINE & REHABILITATION

## 2019-11-01 PROCEDURE — 1036F TOBACCO NON-USER: CPT | Performed by: PHYSICAL MEDICINE & REHABILITATION

## 2019-11-01 PROCEDURE — 1123F ACP DISCUSS/DSCN MKR DOCD: CPT | Performed by: PHYSICAL MEDICINE & REHABILITATION

## 2019-11-01 PROCEDURE — 3017F COLORECTAL CA SCREEN DOC REV: CPT | Performed by: PHYSICAL MEDICINE & REHABILITATION

## 2019-11-01 PROCEDURE — 1090F PRES/ABSN URINE INCON ASSESS: CPT | Performed by: PHYSICAL MEDICINE & REHABILITATION

## 2019-11-01 PROCEDURE — G8427 DOCREV CUR MEDS BY ELIG CLIN: HCPCS | Performed by: PHYSICAL MEDICINE & REHABILITATION

## 2019-11-01 PROCEDURE — G8420 CALC BMI NORM PARAMETERS: HCPCS | Performed by: PHYSICAL MEDICINE & REHABILITATION

## 2019-11-01 PROCEDURE — 20610 DRAIN/INJ JOINT/BURSA W/O US: CPT | Performed by: PHYSICAL MEDICINE & REHABILITATION

## 2019-11-01 PROCEDURE — 99213 OFFICE O/P EST LOW 20 MIN: CPT | Performed by: PHYSICAL MEDICINE & REHABILITATION

## 2019-11-01 RX ORDER — TRIAMCINOLONE ACETONIDE 40 MG/ML
40 INJECTION, SUSPENSION INTRA-ARTICULAR; INTRAMUSCULAR ONCE
Status: COMPLETED | OUTPATIENT
Start: 2019-11-01 | End: 2019-11-01

## 2019-11-01 RX ORDER — BUPIVACAINE HYDROCHLORIDE 2.5 MG/ML
6 INJECTION, SOLUTION EPIDURAL; INFILTRATION; INTRACAUDAL ONCE
Status: COMPLETED | OUTPATIENT
Start: 2019-11-01 | End: 2019-11-01

## 2019-11-01 RX ADMIN — BUPIVACAINE HYDROCHLORIDE 15 MG: 2.5 INJECTION, SOLUTION EPIDURAL; INFILTRATION; INTRACAUDAL at 15:20

## 2019-11-01 RX ADMIN — TRIAMCINOLONE ACETONIDE 40 MG: 40 INJECTION, SUSPENSION INTRA-ARTICULAR; INTRAMUSCULAR at 15:21

## 2019-11-13 ENCOUNTER — HOSPITAL ENCOUNTER (OUTPATIENT)
Dept: GENERAL RADIOLOGY | Age: 69
Discharge: HOME | End: 2019-11-15
Payer: COMMERCIAL

## 2019-11-13 DIAGNOSIS — M81.0 OSTEOPOROSIS, UNSPECIFIED OSTEOPOROSIS TYPE, UNSPECIFIED PATHOLOGICAL FRACTURE PRESENCE: ICD-10-CM

## 2019-11-13 PROCEDURE — 77080 DXA BONE DENSITY AXIAL: CPT

## 2019-11-14 LAB
BASOPHILS ABSOLUTE: NORMAL
BASOPHILS RELATIVE PERCENT: NORMAL
CREATININE: NORMAL
EOSINOPHILS ABSOLUTE: NORMAL
EOSINOPHILS RELATIVE PERCENT: NORMAL
HCT VFR BLD CALC: NORMAL %
HEMOGLOBIN: NORMAL
LYMPHOCYTES ABSOLUTE: NORMAL
LYMPHOCYTES RELATIVE PERCENT: NORMAL
MCH RBC QN AUTO: NORMAL PG
MCHC RBC AUTO-ENTMCNC: NORMAL G/DL
MCV RBC AUTO: NORMAL FL
MONOCYTES ABSOLUTE: NORMAL
MONOCYTES RELATIVE PERCENT: NORMAL
NEUTROPHILS ABSOLUTE: NORMAL
NEUTROPHILS RELATIVE PERCENT: NORMAL
PLATELET # BLD: NORMAL 10*3/UL
PMV BLD AUTO: NORMAL FL
POTASSIUM (K+): NORMAL
RBC # BLD: NORMAL 10*6/UL
VITAMIN D 25-HYDROXY: NORMAL
VITAMIN D2, 25 HYDROXY: NORMAL
VITAMIN D3,25 HYDROXY: NORMAL
WBC # BLD: NORMAL 10*3/UL

## 2019-11-19 ENCOUNTER — HOSPITAL ENCOUNTER (OUTPATIENT)
Age: 69
Discharge: HOME | End: 2019-11-21

## 2019-11-19 PROCEDURE — 87081 CULTURE SCREEN ONLY: CPT

## 2019-11-19 PROCEDURE — 88305 TISSUE EXAM BY PATHOLOGIST: CPT

## 2019-11-20 LAB — CLOTEST: NORMAL

## 2019-12-10 LAB
CHOLESTEROL, TOTAL: NORMAL
CHOLESTEROL/HDL RATIO: NORMAL
HDLC SERPL-MCNC: NORMAL MG/DL
LDL CHOLESTEROL CALCULATED: NORMAL
T3 FREE: NORMAL
TRIGL SERPL-MCNC: NORMAL MG/DL
VLDLC SERPL CALC-MCNC: NORMAL MG/DL

## 2019-12-11 ENCOUNTER — HOSPITAL ENCOUNTER (OUTPATIENT)
Dept: GENERAL RADIOLOGY | Age: 69
Discharge: HOME | End: 2019-12-13
Payer: COMMERCIAL

## 2019-12-11 DIAGNOSIS — Z12.31 ENCOUNTER FOR SCREENING MAMMOGRAM FOR MALIGNANT NEOPLASM OF BREAST: ICD-10-CM

## 2019-12-11 PROCEDURE — 77063 BREAST TOMOSYNTHESIS BI: CPT

## 2019-12-31 ENCOUNTER — HOSPITAL ENCOUNTER (OUTPATIENT)
Age: 69
Discharge: HOME | End: 2020-01-02

## 2019-12-31 PROCEDURE — 88305 TISSUE EXAM BY PATHOLOGIST: CPT

## 2020-02-10 ENCOUNTER — OFFICE VISIT (OUTPATIENT)
Dept: PHYSICAL MEDICINE AND REHAB | Age: 70
End: 2020-02-10
Payer: COMMERCIAL

## 2020-02-10 VITALS
HEART RATE: 73 BPM | BODY MASS INDEX: 20.61 KG/M2 | WEIGHT: 112 LBS | OXYGEN SATURATION: 99 % | SYSTOLIC BLOOD PRESSURE: 98 MMHG | HEIGHT: 62 IN | DIASTOLIC BLOOD PRESSURE: 62 MMHG

## 2020-02-10 PROCEDURE — G8427 DOCREV CUR MEDS BY ELIG CLIN: HCPCS | Performed by: PHYSICAL MEDICINE & REHABILITATION

## 2020-02-10 PROCEDURE — G8399 PT W/DXA RESULTS DOCUMENT: HCPCS | Performed by: PHYSICAL MEDICINE & REHABILITATION

## 2020-02-10 PROCEDURE — 20611 DRAIN/INJ JOINT/BURSA W/US: CPT | Performed by: PHYSICAL MEDICINE & REHABILITATION

## 2020-02-10 PROCEDURE — 4040F PNEUMOC VAC/ADMIN/RCVD: CPT | Performed by: PHYSICAL MEDICINE & REHABILITATION

## 2020-02-10 PROCEDURE — 99212 OFFICE O/P EST SF 10 MIN: CPT | Performed by: PHYSICAL MEDICINE & REHABILITATION

## 2020-02-10 PROCEDURE — G8420 CALC BMI NORM PARAMETERS: HCPCS | Performed by: PHYSICAL MEDICINE & REHABILITATION

## 2020-02-10 PROCEDURE — 1090F PRES/ABSN URINE INCON ASSESS: CPT | Performed by: PHYSICAL MEDICINE & REHABILITATION

## 2020-02-10 PROCEDURE — 3017F COLORECTAL CA SCREEN DOC REV: CPT | Performed by: PHYSICAL MEDICINE & REHABILITATION

## 2020-02-10 PROCEDURE — 1123F ACP DISCUSS/DSCN MKR DOCD: CPT | Performed by: PHYSICAL MEDICINE & REHABILITATION

## 2020-02-10 PROCEDURE — G8484 FLU IMMUNIZE NO ADMIN: HCPCS | Performed by: PHYSICAL MEDICINE & REHABILITATION

## 2020-02-10 PROCEDURE — 1036F TOBACCO NON-USER: CPT | Performed by: PHYSICAL MEDICINE & REHABILITATION

## 2020-02-10 RX ORDER — BUPIVACAINE HYDROCHLORIDE 2.5 MG/ML
6 INJECTION, SOLUTION EPIDURAL; INFILTRATION; INTRACAUDAL ONCE
Status: COMPLETED | OUTPATIENT
Start: 2020-02-10 | End: 2020-02-10

## 2020-02-10 RX ORDER — TRIAMCINOLONE ACETONIDE 40 MG/ML
40 INJECTION, SUSPENSION INTRA-ARTICULAR; INTRAMUSCULAR ONCE
Status: COMPLETED | OUTPATIENT
Start: 2020-02-10 | End: 2020-02-10

## 2020-02-10 RX ADMIN — TRIAMCINOLONE ACETONIDE 40 MG: 40 INJECTION, SUSPENSION INTRA-ARTICULAR; INTRAMUSCULAR at 13:09

## 2020-02-10 RX ADMIN — TRIAMCINOLONE ACETONIDE 40 MG: 40 INJECTION, SUSPENSION INTRA-ARTICULAR; INTRAMUSCULAR at 13:10

## 2020-02-10 RX ADMIN — BUPIVACAINE HYDROCHLORIDE 15 MG: 2.5 INJECTION, SOLUTION EPIDURAL; INFILTRATION; INTRACAUDAL at 13:10

## 2020-02-10 NOTE — PROGRESS NOTES
Diogenes Dean M.D. [address] [facility] PHYSICAL MEDICINE AND REHABILITAION  [address] [facility] [address]  Dept: [phone]  Dept Fax: [phone]    PCP: Poppy Jackson MD  Date of visit: 2/10/20    Chief Complaint   Patient presents with    Shoulder Pain     us guided inj bilateral shoulders       Fely Garcia is a 77 y.o.  right hand dominant woman who presents for follow up of upper extremity pain and bilateral rotator cuff tears. 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 Chemotherapy was started on 10/6/2016 with Dr. Bubba Carmichael. November 2016 she completed head and neck irradiation for management of right base of tongue SCC. She reports she finished all treatment (chemo/radiation) November 2016. She denies any arm pain or numbness/tingling after her chemo/radiation, until her more recent injuries with rotator cuff tears. Her primary complaint continues to be bilateral shoulder pain, which she attributes [facility]. She had massive full thickness bilateral supraspinatus and infraspinatus tears-- the right rotator cuff in December 2017 and left rotator cuff in January 2018. She saw Ortho--they did not recommend surgery due to chemo/radiation and osteoporosis -- unless pain unbearable --only then would consider shoulder replacement. Patient was cleared for shoulder surgery from radiation oncology standpoint IF indicated/recommended by Ortho. Patient does not wish for surgery at this time. Interval history:   Since last visit patient reports that prior subacromial injections were again beneficial, decreasing pain significantly and allowing her to continue to be functional. Relief from injections continues to last 3-4 months.  She is using topical compound cream with benefit. She resumed PT since last visit and has now completed therapy. She continues home exercise and stretching program to maintain range of motion. She has returned to work in retail and notes some increase in shoulder pain after shifts. She reports she is not doing heavy lifting. No other changes reported. Shoulder pain is located at the lateral shoulders and subacromial space bilaterally, without radiation to the upper extremities. She denies pain more distally in the arms themselves. Pain is equal in character and degree bilaterally. She denies any associated numbness/tingling, or paresthesia in the upper extremities. There is some weakness about the shoulders bilaterally, unchanged. No weakness of biceps/triceps, forearm or intrinsic hand muscles. The shoulder pain is constant and aching. Pain is worse with overhead activity. Physical therapy has helped with range of motion and function. Overall no changes and periodic injections allow her to continue to be functional.     The prior workup has included:  Bilateral shoulder MRI - May 2018  -Right shoulder MRI: massive full thickness rotator cuff tear involving the supraspinatus and infraspinatus with retraction and uncovering of the humeral head which is high riding and articulating with the undersurface of the acromian. There is OA of the AC joint.   -Left shoulder MRI: massive full thickness rotator cuff tear involving the supraspinatus and infraspinatus with retraction and uncovering of the humeral head which is high riding and articulating with the undersurface of the acromian. There is OA of the AC joint. The prior treatment has included:  PT: Most recent PT winter/spring 2019- improvement in bilateral shoulder ROM and function, but not pain. History of lymphedema therapy with improvement. Chiropractic: None  Modalities: Biofreeze with partial relief. Eucedrin cream  with partial relief. Has not tried heat/ice. Compound cream with relief. LARYNGOSCOPY  09/08/2016    direct laryngoscopy right tonsil/tongue base biopsy cervical esophagoscopy fine needle aspiration     NERVE BLOCK      OTHER SURGICAL HISTORY  5/28/12    ORIF RIGHT ULNA    OTHER SURGICAL HISTORY  6/18/2012    orif  rivision right olecranon    OTHER SURGICAL HISTORY Right 01/16/2014    Right elbow hardware removal    [place] OFFICE/OUTPT VISIT,PROCEDURE ONLY N/A 11/9/2018    PORT REMOVAL performed by Nita Tatum MD at [place] [address]  1MONTHS OF AGE    H/O ESOPHAGEAL WEB    TUNNELED VENOUS PORT PLACEMENT  10/04/2016    Dr. Jose Beatty History     Socioeconomic History    Marital status:      Spouse name: Not on file    Number of children: Not on file    Years of education: Not on file    Highest education level: Not on file   Occupational History    Not on file   Social Needs    Financial resource strain: Not on file    Food insecurity:     Worry: Not on file     Inability: Not on file    Transportation needs:     Medical: Not on file     Non-medical: Not on file   Tobacco Use    Smoking status: Never Smoker    Smokeless tobacco: Never Used   Substance and Sexual Activity    Alcohol use: No     Alcohol/week: 0.0 standard drinks    Drug use: No    Sexual activity: Not on file   Lifestyle    Physical activity:     Days per week: Not on file     Minutes per session: Not on file    Stress: Not on file   Relationships    Social connections:     Talks on phone: Not on file     Gets together: Not on file     Attends Cheondoism service: Not on file     Active member of club or organization: Not on file     Attends meetings of clubs or organizations: Not on file     Relationship status: Not on file    Intimate partner violence:     Fear of current or ex partner: Not on file     Emotionally abused: Not on file     Physically abused: Not on file     Forced sexual activity: Not on file   Other Topics Concern    Not on file   Social History Narrative    Not on file       The patient is retired. Kandy Tanner  Does not require an assistive device. Family History   Problem Relation Age of Onset    Hypertension Father     High Blood Pressure Father     Cancer Mother         uterine    Diabetes Mother     Hypertension Mother     High Blood Pressure Mother     Depression Mother        No Known Allergies    Current Outpatient Medications   Medication Sig Dispense Refill    escitalopram (LEXAPRO) 5 MG tablet Take 5 mg by mouth three times a week      B Complex Vitamins (B-COMPLEX/B-12 PO) Take by mouth daily      Cholecalciferol (VITAMIN D PO) Take 1 tablet by mouth 3000 units daily      gabapentin (NEURONTIN) 400 MG capsule Take 400 mg by mouth 3 times daily. 3    Biotin 10 MG CAPS Take by mouth daily HOLD      Elastic Bandages & Supports (JOBST KNEE HIGH COMPRESSION SM) MISC Knee high with 20- 30 mmhg of compression 1 each 10    Temazepam (RESTORIL PO) Take 15 mg by mouth nightly Sleep aide      ROPINIRole HCl (REQUIP PO) Take by mouth nightly as needed For restless legs      acetaminophen (TYLENOL) 500 MG tablet Take 500 mg by mouth every 6 hours as needed Instructed to take with sip water am of procedure if needs      levothyroxine (SYNTHROID) 75 MCG tablet Take 75 mcg by mouth daily       RABEprazole Sodium (ACIPHEX PO) Take 20 mg by mouth daily Instructed to take am of procedure      ferrous sulfate 325 (65 Fe) MG tablet Take 325 mg by mouth daily (with breakfast)       furosemide (LASIX) 20 MG tablet Take 20 mg by mouth daily as needed       No current facility-administered medications for this visit. Review of Systems  General: No chills, fatigue, fever, malaise, night sweats, weight gain,  weight loss.    Psychological: No anxiety, depression, suicidal ideation   Ophthalmic: No blurry vision, decreased vision, double vision, loss of vision  Ear Nose Throat: No hearing loss, tinnitus, phonophobia, sensitivity to smells, vertigo, or vocal changes. Allergy/Immunology: No watery eyes, itchy eyes, frequent infections. Hematological and Lymphatic: No bleeding problems, blood clots, bruising  Endocrine:  No polydypsia, polyuria, temperature intolerance. Respiratory: No cough, shortness of breath, wheezing. Cardiovascular: No syncope, chest pain, dyspnea on exertion,palpitations. Gastrointestinal: No abdominal pain, hematemesis, melena, nausea, vomiting, stool incontinence  Genito-Urinary: No dysuria, hematuria, incontinence   Musculoskeletal: Per HPI  Neurological: Per HPI  Dermatological:  No rash      Physical Exam:   Blood pressure 98/62, pulse 73, height 5' 1.5\" (1.562 m), weight 112 lb (50.8 kg), SpO2 99 %, not currently breastfeeding. General: The patient is in no apparent distress. HEENT: No rhinorrhea, sneezing, yawning, or lacrimation. No scleral icterus or conjunctival injection. SKIN: No piloerection. No track marks. No rash. Normal turgor. No erythema or ecchymosis. Psychological: Mood and affect are appropriate. Hygiene is appropriate. Cardiovascular:  Heart is regular rate. Peripheral pulses are 2+. There is no edema. Respiratory: Respirations are regular and unlabored. There is no cyanosis. Lymphatic: There is no cervical lymphadenopathy. Gastrointestinal: Soft abdomen  MSK: Shoulder: No edema, erythema, ecchymosis, effusion, or mass of bilateral shoulders. Right shoulder + sulcus sign. Full passive flexion and abduction bilateral shoulders, mild pain at terminal abduction. Cannot actively abduct beyond 90 deg when trapezius is eliminated from action. Decreased internal rotation b/l shoulders. No crepitus. No tenderness to palpation at the acromioclavicular joint or bicipital groove. There is tenderness at bilateral subacromial space. There is weakness with Empty can test bilaterally (3-/5). Weak with Speed's bilaterally. Minimal pain with bilateral Jadon-Land.  Full painless cervical spine and elbow ROM. Negative Spurling. There is bilateral upper trapezius muscle spasm and trigger points. Right chest port. R face/jaw area lymphedema. No upper extremity edema. Neurologic: Awake, alert and oriented in three planes. Speech is fluent. No facial weakness. Tongue is midline. Hearing is intact for conversation. Pupils are equal and round. Extraocular muscles are intact. Shoulder shrug symmetric. Strength:   R  L  Deltoid   5-  5-  Biceps   5  5  Triceps  5  5  Wrist Ext  5  5  Finger Abd  5  5      Sensory:  Intact for light touch in all upper extremity dermatomes. Reflexes:   R  L  Biceps   2 2  Triceps  2 2  BR   2 2       No Dunaway    Gait is normal.     Exam is unchanged     US guided Glenohumeral joint Injection Procedure: Bilateral  After explaining the indications, risks, benefits and alternatives of a bilateral glenohumeral injection, the patient agreed to proceed. A permit was signed and scanned to the [employer] - Financial Reconciliation. The patient was placed in the seated position. Chloraprep was used to sterilize the skin. Using an aseptic, no touch technique, a 22 gauge, 1.5\" needle with 1 cc of Kenalog 40mg/cc and 3 cc of bupivacaine 0.25% was directed, under ultrasound guidance, to the right glenohumeral joint. After negative aspiration the medication was injected. Adequate hemostasis was achieved and the injection site was covered with a bandage. The exact same procedure was performed on the left side. US images were scanned to media separately. The patient was observed for complication and left the office without incident. The patient tolerated the procedure well and was educated in post injection care. Impression:   Bilateral shoulder pain -- exam consistent with RTC etiology of pain  Bilateral massive rotator cuff tears. Right shoulder subluxation      Plan:   · Continue home exercises for bilateral shoulder ROM, gentle strengthening, pain reduction techniques.  Reviewed importance of maintaining range of motion. · Continue topical compound cream PRN  Bilateral US guided subacromial steroid injections for pain relief and improvement in patient ability to perform day to day functions. Reviewed all risks/benefit. Patient wishes to proceed. See above for details. The patient was educated about the diagnosis, prognosis, indications, risks and benefits of treatment. An opportunity to ask questions was given to the patient and questions were answered. The patient agreed to proceed with the recommended treatment as described above. Follow up 4 months        Edward Hadley M.D.   Physical Medicine and Rehabilitation

## 2020-04-16 ENCOUNTER — HOSPITAL ENCOUNTER (OUTPATIENT)
Age: 70
Discharge: HOME | End: 2020-04-16
Payer: COMMERCIAL

## 2020-04-16 ENCOUNTER — HOSPITAL ENCOUNTER (OUTPATIENT)
Dept: CT IMAGING | Age: 70
Discharge: HOME | End: 2020-04-18
Payer: COMMERCIAL

## 2020-04-16 LAB
BUN BLDV-MCNC: 24 MG/DL (ref 8–23)
CREAT SERPL-MCNC: 0.7 MG/DL (ref 0.5–1)
GFR AFRICAN AMERICAN: >60
GFR NON-AFRICAN AMERICAN: >60 ML/MIN/1.73

## 2020-04-16 PROCEDURE — 70491 CT SOFT TISSUE NECK W/DYE: CPT

## 2020-04-16 PROCEDURE — 6360000004 HC RX CONTRAST MEDICATION: Performed by: RADIOLOGY

## 2020-04-16 PROCEDURE — 36415 COLL VENOUS BLD VENIPUNCTURE: CPT

## 2020-04-16 PROCEDURE — 84520 ASSAY OF UREA NITROGEN: CPT

## 2020-04-16 PROCEDURE — 2580000003 HC RX 258: Performed by: RADIOLOGY

## 2020-04-16 PROCEDURE — 82565 ASSAY OF CREATININE: CPT

## 2020-04-16 RX ORDER — SODIUM CHLORIDE 0.9 % (FLUSH) 0.9 %
10 SYRINGE (ML) INJECTION PRN
Status: DISCONTINUED | OUTPATIENT
Start: 2020-04-16 | End: 2020-04-19 | Stop reason: HOSPADM

## 2020-04-16 RX ADMIN — Medication 10 ML: at 08:34

## 2020-04-16 RX ADMIN — IOPAMIDOL 70 ML: 755 INJECTION, SOLUTION INTRAVENOUS at 08:34

## 2020-05-01 ENCOUNTER — HOSPITAL ENCOUNTER (OUTPATIENT)
Dept: RADIATION ONCOLOGY | Age: 70
Discharge: HOME | End: 2020-05-01
Payer: COMMERCIAL

## 2020-05-01 VITALS — BODY MASS INDEX: 22.31 KG/M2 | WEIGHT: 120 LBS

## 2020-05-01 PROCEDURE — 99442 PR PHYS/QHP TELEPHONE EVALUATION 11-20 MIN: CPT | Performed by: RADIOLOGY

## 2020-05-01 PROCEDURE — 99211 OFF/OP EST MAY X REQ PHY/QHP: CPT | Performed by: RADIOLOGY

## 2020-06-03 ENCOUNTER — TELEPHONE (OUTPATIENT)
Dept: VASCULAR SURGERY | Age: 70
End: 2020-06-03

## 2020-06-03 NOTE — TELEPHONE ENCOUNTER
Pt left a message with our answering service requesting an office visit, message left on pt's voicemail for a return call to schedule.

## 2020-06-15 ENCOUNTER — HOSPITAL ENCOUNTER (OUTPATIENT)
Age: 70
Discharge: HOME | End: 2020-06-17
Payer: COMMERCIAL

## 2020-06-15 PROCEDURE — 88305 TISSUE EXAM BY PATHOLOGIST: CPT

## 2020-06-15 PROCEDURE — 87081 CULTURE SCREEN ONLY: CPT

## 2020-06-16 LAB — CLOTEST: NORMAL

## 2020-06-25 ENCOUNTER — OFFICE VISIT (OUTPATIENT)
Dept: VASCULAR SURGERY | Age: 70
End: 2020-06-25
Payer: COMMERCIAL

## 2020-06-25 VITALS — RESPIRATION RATE: 16 BRPM | HEIGHT: 62 IN | BODY MASS INDEX: 21.9 KG/M2 | WEIGHT: 119 LBS

## 2020-06-25 PROBLEM — L81.9 DISCOLORATION OF SKIN OF FOOT: Status: ACTIVE | Noted: 2020-06-25

## 2020-06-25 PROBLEM — R42 DIZZINESS: Status: RESOLVED | Noted: 2018-06-20 | Resolved: 2020-06-25

## 2020-06-25 PROBLEM — R23.0 TOE CYANOSIS: Status: ACTIVE | Noted: 2020-06-25

## 2020-06-25 PROCEDURE — 3017F COLORECTAL CA SCREEN DOC REV: CPT | Performed by: SURGERY

## 2020-06-25 PROCEDURE — 99214 OFFICE O/P EST MOD 30 MIN: CPT | Performed by: SURGERY

## 2020-06-25 PROCEDURE — G8427 DOCREV CUR MEDS BY ELIG CLIN: HCPCS | Performed by: SURGERY

## 2020-06-25 PROCEDURE — 1123F ACP DISCUSS/DSCN MKR DOCD: CPT | Performed by: SURGERY

## 2020-06-25 PROCEDURE — 1090F PRES/ABSN URINE INCON ASSESS: CPT | Performed by: SURGERY

## 2020-06-25 PROCEDURE — 1036F TOBACCO NON-USER: CPT | Performed by: SURGERY

## 2020-06-25 PROCEDURE — G8420 CALC BMI NORM PARAMETERS: HCPCS | Performed by: SURGERY

## 2020-06-25 PROCEDURE — 4040F PNEUMOC VAC/ADMIN/RCVD: CPT | Performed by: SURGERY

## 2020-06-25 PROCEDURE — G8399 PT W/DXA RESULTS DOCUMENT: HCPCS | Performed by: SURGERY

## 2020-06-25 RX ORDER — ROSUVASTATIN CALCIUM 5 MG/1
5 TABLET, COATED ORAL DAILY
COMMUNITY
End: 2021-01-27

## 2020-06-25 NOTE — PROGRESS NOTES
Chief Complaint:   Chief Complaint   Patient presents with    Check-Up     pt. complains of bilateral feet are turning purple had for 2-3 months         HPI: Patient came to the office, for the evaluation of vascular status of both legs, noted purplish discoloration sometimes dusky discoloration of the feet and the toes on and off for the last 3 months, does not involve the hands, not related to any cold weather, did not have similar symptoms in the past    No change in her medications or health issues for the last 3 months      Patient denies any focal lateralizing neurological symptoms like loss of speech, vision or loss of function of extremity    Patient can walk several blocks blocks , and denies any symptoms of rest pain    No Known Allergies    Current Outpatient Medications   Medication Sig Dispense Refill    rosuvastatin (CRESTOR) 5 MG tablet Take 5 mg by mouth daily 3 times a week      escitalopram (LEXAPRO) 5 MG tablet Take 5 mg by mouth three times a week      B Complex Vitamins (B-COMPLEX/B-12 PO) Take by mouth daily      Cholecalciferol (VITAMIN D PO) Take 1 tablet by mouth 3000 units daily      ferrous sulfate 325 (65 Fe) MG tablet Take 325 mg by mouth daily (with breakfast)       gabapentin (NEURONTIN) 400 MG capsule Take 400 mg by mouth 3 times daily.    3    Biotin 10 MG CAPS Take by mouth daily HOLD      furosemide (LASIX) 20 MG tablet Take 20 mg by mouth daily as needed      Elastic Bandages & Supports (JOBST KNEE HIGH COMPRESSION ) MISC Knee high with 20- 30 mmhg of compression 1 each 10    Temazepam (RESTORIL PO) Take 15 mg by mouth nightly Sleep aide      ROPINIRole HCl (REQUIP PO) Take by mouth nightly as needed For restless legs      acetaminophen (TYLENOL) 500 MG tablet Take 500 mg by mouth every 6 hours as needed Instructed to take with sip water am of procedure if needs      levothyroxine (SYNTHROID) 75 MCG tablet Take 75 mcg by mouth daily       RABEprazole Sodium (ACIPHEX PO) Take 20 mg by mouth daily Instructed to take am of procedure       No current facility-administered medications for this visit.         Past Medical History:   Diagnosis Date    Anemia associated with chemotherapy 11/16/2016    resolved    Cancer ([address]     tongue    Cancer of tongue ([address] 9/16/2016    treated with chemo and radiation / last treatment 11/2016    Cancer of tonsil ([facility] [place] [address]     Cervical herniation     no limits on rom    Difficulty sleeping     Discoloration of skin of foot 6/25/2020    Dizziness 6/20/2018    GERD (gastroesophageal reflux disease)     Hyperlipidemia     Hypokalemia 11/16/2016    with chemo / resolved    Hypomagnesemia 11/16/2016    related to chemo / resloved    Hypothyroidism     Leg swelling 7/26/2017    Lumbar herniated disc     after fell 2/2017 / now has chronic back pain and numbnes at left foot and ankle    Lymphedema of both lower extremities 07/26/2017    wears support hose    Osteopenia     PONV (postoperative nausea and vomiting)     Restless leg syndrome     Rotator cuff tear     bilateral    Thyroid disease     Toe cyanosis 6/25/2020       Past Surgical History:   Procedure Laterality Date   [address]    COLONOSCOPY  2008    neg    COLONOSCOPY  03/19/2014    ENDOSCOPY, COLON, DIAGNOSTIC  2012    FRACTURE SURGERY  2012    RIGHT ELBOW    HYSTERECTOMY  1993    lavh&BSO&A&P    LAPAROSCOPY  1990    LAPAROTOMY  1991    LARYNGOSCOPY  09/08/2016    direct laryngoscopy right tonsil/tongue base biopsy cervical esophagoscopy fine needle aspiration     NERVE BLOCK      OTHER SURGICAL HISTORY  5/28/12    ORIF RIGHT ULNA    OTHER SURGICAL HISTORY  6/18/2012    orif  rivision right olecranon    OTHER SURGICAL HISTORY Right 01/16/2014    Right elbow hardware removal    [place] OFFICE/OUTPT VISIT,PROCEDURE ONLY N/A 11/9/2018    PORT REMOVAL performed by Roz Bunch MD at [facility]  1MONTHS OF AGE    H/O ESOPHAGEAL

## 2020-06-29 ENCOUNTER — HOSPITAL ENCOUNTER (OUTPATIENT)
Dept: CARDIOLOGY | Age: 70
Discharge: HOME | End: 2020-06-29
Payer: COMMERCIAL

## 2020-06-29 ENCOUNTER — HOSPITAL ENCOUNTER (OUTPATIENT)
Age: 70
Discharge: HOME | End: 2020-06-29
Payer: COMMERCIAL

## 2020-06-29 ENCOUNTER — OFFICE VISIT (OUTPATIENT)
Dept: PHYSICAL MEDICINE AND REHAB | Age: 70
End: 2020-06-29
Payer: COMMERCIAL

## 2020-06-29 VITALS
DIASTOLIC BLOOD PRESSURE: 72 MMHG | BODY MASS INDEX: 21.9 KG/M2 | HEIGHT: 62 IN | WEIGHT: 119 LBS | SYSTOLIC BLOOD PRESSURE: 124 MMHG

## 2020-06-29 LAB
ALBUMIN SERPL-MCNC: NORMAL G/DL
ALP BLD-CCNC: NORMAL U/L
ALT SERPL-CCNC: NORMAL U/L
ANION GAP SERPL CALCULATED.3IONS-SCNC: NORMAL MMOL/L
ANTI DNA DOUBLE STRANDED: NEGATIVE
ANTI-NUCLEAR ANTIBODY (ANA): NEGATIVE
AST SERPL-CCNC: NORMAL U/L
BASOPHILS ABSOLUTE: NORMAL
BASOPHILS RELATIVE PERCENT: NORMAL
BILIRUB SERPL-MCNC: NORMAL MG/DL
BUN BLDV-MCNC: NORMAL MG/DL
CALCIUM SERPL-MCNC: NORMAL MG/DL
CHLORIDE BLD-SCNC: NORMAL MMOL/L
CO2: NORMAL
CREAT SERPL-MCNC: NORMAL MG/DL
EOSINOPHILS ABSOLUTE: NORMAL
EOSINOPHILS RELATIVE PERCENT: NORMAL
GFR CALCULATED: NORMAL
GLUCOSE BLD-MCNC: NORMAL MG/DL
HCT VFR BLD CALC: NORMAL %
HEMOGLOBIN: NORMAL
LYMPHOCYTES ABSOLUTE: NORMAL
LYMPHOCYTES RELATIVE PERCENT: NORMAL
MCH RBC QN AUTO: NORMAL PG
MCHC RBC AUTO-ENTMCNC: NORMAL G/DL
MCV RBC AUTO: NORMAL FL
MONOCYTES ABSOLUTE: NORMAL
MONOCYTES RELATIVE PERCENT: NORMAL
NEUTROPHILS ABSOLUTE: NORMAL
NEUTROPHILS RELATIVE PERCENT: NORMAL
PLATELET # BLD: NORMAL 10*3/UL
PMV BLD AUTO: NORMAL FL
POTASSIUM SERPL-SCNC: NORMAL MMOL/L
RBC # BLD: NORMAL 10*6/UL
RHEUMATOID FACTOR: 16 IU/ML (ref 0–13)
SEDIMENTATION RATE, ERYTHROCYTE: 5 MM/HR (ref 0–20)
SODIUM BLD-SCNC: NORMAL MMOL/L
TOTAL PROTEIN: NORMAL
WBC # BLD: NORMAL 10*3/UL

## 2020-06-29 PROCEDURE — 86431 RHEUMATOID FACTOR QUANT: CPT

## 2020-06-29 PROCEDURE — 3017F COLORECTAL CA SCREEN DOC REV: CPT | Performed by: PHYSICAL MEDICINE & REHABILITATION

## 2020-06-29 PROCEDURE — G8427 DOCREV CUR MEDS BY ELIG CLIN: HCPCS | Performed by: PHYSICAL MEDICINE & REHABILITATION

## 2020-06-29 PROCEDURE — G8420 CALC BMI NORM PARAMETERS: HCPCS | Performed by: PHYSICAL MEDICINE & REHABILITATION

## 2020-06-29 PROCEDURE — G8399 PT W/DXA RESULTS DOCUMENT: HCPCS | Performed by: PHYSICAL MEDICINE & REHABILITATION

## 2020-06-29 PROCEDURE — 82595 ASSAY OF CRYOGLOBULIN: CPT

## 2020-06-29 PROCEDURE — 20611 DRAIN/INJ JOINT/BURSA W/US: CPT | Performed by: PHYSICAL MEDICINE & REHABILITATION

## 2020-06-29 PROCEDURE — 36415 COLL VENOUS BLD VENIPUNCTURE: CPT

## 2020-06-29 PROCEDURE — 99212 OFFICE O/P EST SF 10 MIN: CPT | Performed by: PHYSICAL MEDICINE & REHABILITATION

## 2020-06-29 PROCEDURE — 1036F TOBACCO NON-USER: CPT | Performed by: PHYSICAL MEDICINE & REHABILITATION

## 2020-06-29 PROCEDURE — 86038 ANTINUCLEAR ANTIBODIES: CPT

## 2020-06-29 PROCEDURE — 85651 RBC SED RATE NONAUTOMATED: CPT

## 2020-06-29 PROCEDURE — 1090F PRES/ABSN URINE INCON ASSESS: CPT | Performed by: PHYSICAL MEDICINE & REHABILITATION

## 2020-06-29 PROCEDURE — 4040F PNEUMOC VAC/ADMIN/RCVD: CPT | Performed by: PHYSICAL MEDICINE & REHABILITATION

## 2020-06-29 PROCEDURE — 93923 UPR/LXTR ART STDY 3+ LVLS: CPT

## 2020-06-29 PROCEDURE — 82585 ASSAY OF CRYOFIBRINOGEN: CPT

## 2020-06-29 PROCEDURE — 1123F ACP DISCUSS/DSCN MKR DOCD: CPT | Performed by: PHYSICAL MEDICINE & REHABILITATION

## 2020-06-29 PROCEDURE — 86225 DNA ANTIBODY NATIVE: CPT

## 2020-06-29 RX ORDER — BUPIVACAINE HYDROCHLORIDE 2.5 MG/ML
30 INJECTION, SOLUTION INFILTRATION; PERINEURAL ONCE
Status: CANCELLED | OUTPATIENT
Start: 2020-06-29 | End: 2020-06-29

## 2020-06-29 NOTE — PROGRESS NOTES
Kerry Malik M.D. [facility] PHYSICAL MEDICINE AND REHABILITAION  [address] [facility] [address]  Dept: [phone]  Dept Fax: [phone]    PCP: Pj Rodriguez MD  Date of visit: 6/29/20    Chief Complaint   Patient presents with    Shoulder Pain     Bilateral shoulder pain,requesing injections       Car Bey is a 77 y.o.  right hand dominant woman who presents for follow up of upper extremity pain and bilateral rotator cuff tears. 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 Chemotherapy was started on 10/6/2016 with Dr. Rashmi Tobias. November 2016 she completed head and neck irradiation for management of right base of tongue SCC. She reports she finished all treatment (chemo/radiation) November 2016. She denies any arm pain or numbness/tingling after her chemo/radiation, until her more recent injuries with rotator cuff tears. Her primary complaint is bilateral shoulder pain, which she attributes [facility]. She had massive full thickness bilateral supraspinatus and infraspinatus tears-- the right rotator cuff in December 2017 and left rotator cuff in January 2018. She saw Ortho--they did not recommend surgery due to chemo/radiation and osteoporosis -- unless pain unbearable --only then would consider shoulder replacement. Patient was cleared for shoulder surgery from radiation oncology standpoint IF indicated/recommended by Ortho. Patient does not wish for surgery at this time. Interval history:   Since last visit patient reports no significant changes. She states that prior subacromial injections were again beneficial, decreasing pain significantly and allowing her to continue to be functional. Relief from injections continues to last 3-4 months.  She is using topical compound cream with benefit. She previously completed PT. She continues home exercise and stretching program to maintain range of motion. She continues to work in retail and notes some increase in shoulder pain after shifts. She reports she is not doing heavy lifting. No other changes reported. Shoulder pain is located at the lateral shoulders and subacromial space bilaterally, without radiation to the upper extremities. She denies pain more distally in the arms themselves. Pain is equal in character and degree bilaterally. She denies any associated numbness/tingling, or paresthesia in the upper extremities. There is some weakness about the shoulders bilaterally, unchanged. No weakness of biceps/triceps, forearm or intrinsic hand muscles. The shoulder pain is constant and aching. Pain is worse with overhead activity. Physical therapy has helped with range of motion and function, though she feels she is losing some range of motion recently. Overall no changes and periodic injections allow her to continue to be functional.     The prior workup has included:  Bilateral shoulder MRI - May 2018  -Right shoulder MRI: massive full thickness rotator cuff tear involving the supraspinatus and infraspinatus with retraction and uncovering of the humeral head which is high riding and articulating with the undersurface of the acromian. There is OA of the AC joint.   -Left shoulder MRI: massive full thickness rotator cuff tear involving the supraspinatus and infraspinatus with retraction and uncovering of the humeral head which is high riding and articulating with the undersurface of the acromian. There is OA of the AC joint. The prior treatment has included:  PT: Most recent PT winter/spring 2019- improvement in bilateral shoulder ROM and function, but not pain. History of lymphedema therapy with improvement. Chiropractic: None  Modalities: Biofreeze with partial relief. Eucedrin cream  with partial relief. SURGERY  2012    RIGHT ELBOW    HYSTERECTOMY  1993    lavh&BSO&A&P    LAPAROSCOPY  1990    LAPAROTOMY  1991    LARYNGOSCOPY  09/08/2016    direct laryngoscopy right tonsil/tongue base biopsy cervical esophagoscopy fine needle aspiration     NERVE BLOCK      OTHER SURGICAL HISTORY  5/28/12    ORIF RIGHT ULNA    OTHER SURGICAL HISTORY  6/18/2012    orif  rivision right olecranon    OTHER SURGICAL HISTORY Right 01/16/2014    Right elbow hardware removal    [facility] OFFICE[facility]OUTPT VISIT,PROCEDURE ONLY N/A 11/9/2018    PORT REMOVAL performed by Myles Lawrence MD at [facility]  1MONTHS OF AGE    H/O ESOPHAGEAL WEB    TUNNELED VENOUS PORT PLACEMENT  10/04/2016    Dr. Demetrius Lepe History     Socioeconomic History    Marital status:      Spouse name: Not on file    Number of children: Not on file    Years of education: Not on file    Highest education level: Not on file   Occupational History    Not on file   Social Needs    Financial resource strain: Not on file    Food insecurity     Worry: Not on file     Inability: Not on file    Transportation needs     Medical: Not on file     Non-medical: Not on file   Tobacco Use    Smoking status: Never Smoker    Smokeless tobacco: Never Used   Substance and Sexual Activity    Alcohol use: No     Alcohol/week: 0.0 standard drinks    Drug use: No    Sexual activity: Not on file   Lifestyle    Physical activity     Days per week: Not on file     Minutes per session: Not on file    Stress: Not on file   Relationships    Social connections     Talks on phone: Not on file     Gets together: Not on file     Attends Shinto service: Not on file     Active member of club or organization: Not on file     Attends meetings of clubs or organizations: Not on file     Relationship status: Not on file    Intimate partner violence     Fear of current or ex partner: Not on file     Emotionally abused: Not on file     Physically abused: Not on file     Forced sexual activity: Not on file   Other Topics Concern    Not on file   Social History Narrative    Not on file       The patient is retired. [place] Ewing  Does not require an assistive device. Family History   Problem Relation Age of Onset    Hypertension Father     High Blood Pressure Father     Cancer Mother         uterine    Diabetes Mother     Hypertension Mother     High Blood Pressure Mother     Depression Mother        No Known Allergies    Current Outpatient Medications   Medication Sig Dispense Refill    rosuvastatin (CRESTOR) 5 MG tablet Take 5 mg by mouth daily 3 times a week      B Complex Vitamins (B-COMPLEX/B-12 PO) Take by mouth daily      Cholecalciferol (VITAMIN D PO) Take 1 tablet by mouth 3000 units daily      gabapentin (NEURONTIN) 400 MG capsule Take 400 mg by mouth 3 times daily. 3    Biotin 10 MG CAPS Take by mouth daily HOLD      Elastic Bandages & Supports (JOBST KNEE HIGH COMPRESSION SM) MISC Knee high with 20- 30 mmhg of compression 1 each 10    Temazepam (RESTORIL PO) Take 15 mg by mouth nightly Sleep aide      ROPINIRole HCl (REQUIP PO) Take by mouth nightly as needed For restless legs      acetaminophen (TYLENOL) 500 MG tablet Take 500 mg by mouth every 6 hours as needed Instructed to take with sip water am of procedure if needs      levothyroxine (SYNTHROID) 75 MCG tablet Take 75 mcg by mouth daily       RABEprazole Sodium (ACIPHEX PO) Take 20 mg by mouth daily Instructed to take am of procedure      escitalopram (LEXAPRO) 5 MG tablet Take 5 mg by mouth three times a week      ferrous sulfate 325 (65 Fe) MG tablet Take 325 mg by mouth daily (with breakfast)       furosemide (LASIX) 20 MG tablet Take 20 mg by mouth daily as needed       No current facility-administered medications for this visit.         Review of Systems  General: No chills, fatigue, fever, malaise, night sweats, weight gain, weight loss. Psychological: No anxiety, depression, suicidal ideation   Ophthalmic: No blurry vision, decreased vision, double vision, loss of vision  Ear Nose Throat: No hearing loss, tinnitus, phonophobia, sensitivity to smells, vertigo, or vocal changes. Allergy/Immunology: No watery eyes, itchy eyes, frequent infections. Hematological and Lymphatic: No bleeding problems, blood clots, bruising  Endocrine:  No polydypsia, polyuria, temperature intolerance. Respiratory: No cough, shortness of breath, wheezing. Cardiovascular: No syncope, chest pain, dyspnea on exertion,palpitations. Gastrointestinal: No abdominal pain, hematemesis, melena, nausea, vomiting, stool incontinence  Genito-Urinary: No dysuria, hematuria, incontinence   Musculoskeletal: Per HPI  Neurological: Per HPI  Dermatological:  No rash      Physical Exam:   Blood pressure 124/72, height 5' 2\" (1.575 m), weight 119 lb (54 kg), not currently breastfeeding. General: The patient is in no apparent distress. HEENT: No rhinorrhea, sneezing, yawning, or lacrimation. No scleral icterus or conjunctival injection. SKIN: No piloerection. No track marks. No rash. Normal turgor. No erythema or ecchymosis. Psychological: Mood and affect are appropriate. Hygiene is appropriate. Cardiovascular:  Heart is regular rate. Peripheral pulses are 2+. There is no edema. Respiratory: Respirations are regular and unlabored. There is no cyanosis. Lymphatic: There is no cervical lymphadenopathy. Gastrointestinal: Soft abdomen  MSK: Shoulder: No edema, erythema, ecchymosis, effusion, or mass of bilateral shoulders. Right shoulder + sulcus sign. Full passive flexion and abduction bilateral shoulders, mild pain at terminal abduction. Cannot actively abduct beyond 90 deg when trapezius is eliminated from action. Decreased internal rotation b/l shoulders. No crepitus. No tenderness to palpation at the acromioclavicular joint or bicipital groove.  There is tenderness at bilateral subacromial space. There is weakness with Empty can test bilaterally (3-/5). Weak with Speed's bilaterally. Minimal pain with bilateral Jadon-Land. Full painless cervical spine and elbow ROM. Negative Spurling. There is bilateral upper trapezius muscle spasm and trigger points. Right chest port. R face/jaw area lymphedema. No upper extremity edema. Neurologic: Awake, alert and oriented in three planes. Speech is fluent. No facial weakness. Tongue is midline. Hearing is intact for conversation. Pupils are equal and round. Extraocular muscles are intact. Shoulder shrug symmetric. Strength:   R  L  Deltoid   5-  5-  Biceps   5  5  Triceps  5  5  Wrist Ext  5  5  Finger Abd  5  5      Sensory:  Intact for light touch in all upper extremity dermatomes. Reflexes:   R  L  Biceps   2 2  Triceps  2 2  BR   2 2       No Dunaway    Gait is normal.     Exam stable    US guided Glenohumeral joint Injection Procedure: Bilateral  After explaining the indications, risks, benefits and alternatives of a bilateral glenohumeral injection, the patient agreed to proceed. A permit was signed and scanned to the media. The patient was placed in the seated position. Chloraprep was used to sterilize the skin. Using an aseptic, no touch technique, a 22 gauge, 1.5\" needle with 1 cc of Kenalog 40mg/cc and 3 cc of bupivacaine 0.25% was directed, under ultrasound guidance, to the right glenohumeral joint. After negative aspiration the medication was injected. Adequate hemostasis was achieved and the injection site was covered with a bandage. The exact same procedure was performed on the left side. US images were scanned to media separately. The patient was observed for complication and left the office without incident. The patient tolerated the procedure well and was educated in post injection care.      Impression:   Bilateral shoulder pain -- exam consistent with RTC etiology of pain  Bilateral massive rotator cuff tears. Right shoulder subluxation      Plan:   · Continue home exercises for bilateral shoulder ROM, gentle strengthening, pain reduction techniques. Reviewed importance of maintaining range of motion. · Resume formal PT for range of motion, gentle strengthening  · Continue topical compound cream PRN  Bilateral US guided subacromial steroid injections for pain relief and improvement in patient ability to perform day to day functions. Reviewed all risks/benefit. Patient wishes to proceed. See above for details. The patient was educated about the diagnosis, prognosis, indications, risks and benefits of treatment. An opportunity to ask questions was given to the patient and questions were answered. The patient agreed to proceed with the recommended treatment as described above. Follow up 3-4 months        Tete Zimmerman M.D.   Physical Medicine and Rehabilitation

## 2020-06-30 LAB — CRYOFIBRINOGEN: NEGATIVE

## 2020-06-30 RX ORDER — TRIAMCINOLONE ACETONIDE 40 MG/ML
40 INJECTION, SUSPENSION INTRA-ARTICULAR; INTRAMUSCULAR ONCE
Status: COMPLETED | OUTPATIENT
Start: 2020-06-30 | End: 2020-06-30

## 2020-06-30 RX ORDER — BUPIVACAINE HYDROCHLORIDE 2.5 MG/ML
6 INJECTION, SOLUTION INFILTRATION; PERINEURAL ONCE
Status: COMPLETED | OUTPATIENT
Start: 2020-06-30 | End: 2020-06-30

## 2020-06-30 RX ADMIN — TRIAMCINOLONE ACETONIDE 40 MG: 40 INJECTION, SUSPENSION INTRA-ARTICULAR; INTRAMUSCULAR at 14:32

## 2020-06-30 RX ADMIN — BUPIVACAINE HYDROCHLORIDE 15 MG: 2.5 INJECTION, SOLUTION INFILTRATION; PERINEURAL at 14:30

## 2020-07-05 NOTE — PROCEDURES
[address]                  [address] [place] [address]  [facility]                                VASCULAR REPORT    PATIENT NAME: Jennifer Coleman                  :        1950  MED REC NO:   [id number]                            ROOM:  ACCOUNT NO:   [de-identified]                           ADMIT DATE: 2020  PROVIDER:     Carlene Pichardo MD    DATE OF PROCEDURE:  20    ANKLE-BRACHIAL INDEX    INDICATION:  Discoloration of toes of both feet. FINDINGS:  The ankle-arm index was normal with normal triphasic ankle  Doppler tracings with good arterial flow to both feet including the toes  based upon the pulse volume recordings.         Andre Reynoso MD    D: 2020 15:04:28       T: 2020 16:27:42     [email or web address]  Job#: [id number]     Doc#: [id number]    CC:  Kari Ariza MD

## 2020-07-06 ENCOUNTER — TELEPHONE (OUTPATIENT)
Dept: VASCULAR SURGERY | Age: 70
End: 2020-07-06

## 2020-07-06 LAB — CRYOGLOBULIN: NEGATIVE

## 2020-07-06 NOTE — TELEPHONE ENCOUNTER
Message left again regarding the test results normal, call PRN if any questions or concerns in the future

## 2020-07-06 NOTE — TELEPHONE ENCOUNTER
Message left regarding the lab work, within normal range, ankle-brachial is, normal, good arterial flow to both feet, follow conservatively from a vascular point, call PRN increased symptoms, call office for any questions

## 2020-07-07 VITALS
DIASTOLIC BLOOD PRESSURE: 82 MMHG | WEIGHT: 111 LBS | SYSTOLIC BLOOD PRESSURE: 145 MMHG | BODY MASS INDEX: 20.96 KG/M2 | HEIGHT: 61 IN

## 2020-07-27 ENCOUNTER — OFFICE VISIT (OUTPATIENT)
Dept: PRIMARY CARE CLINIC | Age: 70
End: 2020-07-27
Payer: COMMERCIAL

## 2020-07-27 VITALS
RESPIRATION RATE: 18 BRPM | OXYGEN SATURATION: 99 % | WEIGHT: 122 LBS | BODY MASS INDEX: 22.45 KG/M2 | DIASTOLIC BLOOD PRESSURE: 88 MMHG | HEART RATE: 61 BPM | SYSTOLIC BLOOD PRESSURE: 130 MMHG | TEMPERATURE: 97.5 F | HEIGHT: 62 IN

## 2020-07-27 PROBLEM — M79.89 LEG SWELLING: Status: RESOLVED | Noted: 2017-07-26 | Resolved: 2020-07-27

## 2020-07-27 PROBLEM — E03.9 ACQUIRED HYPOTHYROIDISM: Status: ACTIVE | Noted: 2020-07-27

## 2020-07-27 PROBLEM — E78.00 PURE HYPERCHOLESTEROLEMIA: Status: ACTIVE | Noted: 2020-07-27

## 2020-07-27 PROBLEM — R06.09 DYSPNEA ON EXERTION: Status: ACTIVE | Noted: 2020-07-27

## 2020-07-27 PROCEDURE — 99214 OFFICE O/P EST MOD 30 MIN: CPT | Performed by: INTERNAL MEDICINE

## 2020-07-27 ASSESSMENT — PATIENT HEALTH QUESTIONNAIRE - PHQ9
2. FEELING DOWN, DEPRESSED OR HOPELESS: 0
1. LITTLE INTEREST OR PLEASURE IN DOING THINGS: 0
SUM OF ALL RESPONSES TO PHQ QUESTIONS 1-9: 0
SUM OF ALL RESPONSES TO PHQ9 QUESTIONS 1 & 2: 0
SUM OF ALL RESPONSES TO PHQ QUESTIONS 1-9: 0

## 2020-07-27 NOTE — PROGRESS NOTES
Catherene Phlegm  7/27/20     Chief Complaint   Patient presents with    Hyperlipidemia     6 month check up         No Known Allergies     Current Outpatient Medications   Medication Sig Dispense Refill    rosuvastatin (CRESTOR) 5 MG tablet Take 5 mg by mouth daily 3 times a week      B Complex Vitamins (B-COMPLEX/B-12 PO) Take by mouth daily      Cholecalciferol (VITAMIN D PO) Take 1 tablet by mouth 3000 units daily      gabapentin (NEURONTIN) 400 MG capsule Take 400 mg by mouth 3 times daily. 3    Biotin 10 MG CAPS Take by mouth daily HOLD      Elastic Bandages & Supports (JOBST KNEE HIGH COMPRESSION SM) MISC Knee high with 20- 30 mmhg of compression 1 each 10    Temazepam (RESTORIL PO) Take 15 mg by mouth nightly Sleep aide      ROPINIRole HCl (REQUIP PO) Take by mouth nightly as needed For restless legs      acetaminophen (TYLENOL) 500 MG tablet Take 500 mg by mouth every 6 hours as needed Instructed to take with sip water am of procedure if needs      levothyroxine (SYNTHROID) 75 MCG tablet Take 75 mcg by mouth daily       RABEprazole Sodium (ACIPHEX PO) Take 20 mg by mouth daily Instructed to take am of procedure      escitalopram (LEXAPRO) 5 MG tablet Take 5 mg by mouth three times a week       No current facility-administered medications for this visit. HPI: Patient comes in for follow-up visit. Currently she feels fairly well except for some shortness of breath with exertion. She denies any chest pain. She follows up with her ENT specialist and radiation oncologist for her history of carcinoma the tongue. She recently had a biopsy few months ago of an area seen on PET scan which came back negative for malignancy. She has been having more problems with lymphedema both legs which started after she fractured multiple lumbar vertebrae and her sacrum in 2017.   She had been getting lymphedema treatments at to do physical therapy which were helping quite a bit and now her insurance is denying further treatment deeming it unnecessary. However without that treatment she notices increased swelling both of her lower legs and feet and also the right side of her face. She states that with the increase in her lymphedema she is noticing increase in numbness mainly involving her left lower leg foot. She states that the treatments were helping both problems and would like to resume them. She remains on all of her current meds and supplements the same as listed on her med list, which was reviewed with her. Review of Systems: as per HPI      Physical Exam:    Patient is a 77 y.o. female. Patient appears to be in no distress. Breathing comfortably. Ambulates without assistance. HEENT: Except for some diffuse swelling right side of her face. Neck supple, no adenopathy or bruits. Heart RR, no MGR. Lungs clear. Abd: normal  Ext: Moderate lymphedema both lower extremities currently she is wearing knee-high compression stockings. Peripheral pulses: normal.  Neurologic exam essentially normal except for decreased sensation left lower leg and foot. Assessment:  :    Dyspnea on exertion  -     XR CHEST (2 VW); Future    Pure hypercholesterolemia  -     Comprehensive Metabolic Panel; Future  -     Lipid Panel; Future      Acquired hypothyroidism, stable on current dose of levothyroxine.  -     TSH without Reflex; Future  -     T4, Free; Future    Lymphedema of both lower extremities          Discussion Notes: Patient to continue all her usual meds and supplements the same as listed on her med list.  We will write a letter of medical necessity for her to resume her lymphedema treatments to both legs and right side of her face. Also note written that she should be allowed to wear athletic shoes to work where she stands for long periods of time as a  at [employer]. She will follow-up with her ENT specialist and radiation therapist and vascular surgeon, as per their instructions. Return here as needed or in 4 months for her annual physical.

## 2020-08-02 ENCOUNTER — HOSPITAL ENCOUNTER (EMERGENCY)
Age: 70
Discharge: HOME | End: 2020-08-02
Payer: COMMERCIAL

## 2020-08-02 ENCOUNTER — HOSPITAL ENCOUNTER (OUTPATIENT)
Age: 70
Discharge: HOME | End: 2020-08-04
Payer: COMMERCIAL

## 2020-08-02 ENCOUNTER — HOSPITAL ENCOUNTER (OUTPATIENT)
Dept: CT IMAGING | Age: 70
Discharge: HOME | End: 2020-08-04
Payer: COMMERCIAL

## 2020-08-02 ENCOUNTER — HOSPITAL ENCOUNTER (OUTPATIENT)
Dept: GENERAL RADIOLOGY | Age: 70
Discharge: HOME | End: 2020-08-04
Payer: COMMERCIAL

## 2020-08-02 VITALS
OXYGEN SATURATION: 98 % | BODY MASS INDEX: 22.08 KG/M2 | HEART RATE: 82 BPM | SYSTOLIC BLOOD PRESSURE: 115 MMHG | TEMPERATURE: 99.1 F | WEIGHT: 120 LBS | RESPIRATION RATE: 16 BRPM | HEIGHT: 62 IN | DIASTOLIC BLOOD PRESSURE: 83 MMHG

## 2020-08-02 PROCEDURE — 90471 IMMUNIZATION ADMIN: CPT | Performed by: NURSE PRACTITIONER

## 2020-08-02 PROCEDURE — 6360000002 HC RX W HCPCS: Performed by: NURSE PRACTITIONER

## 2020-08-02 PROCEDURE — 99283 EMERGENCY DEPT VISIT LOW MDM: CPT

## 2020-08-02 PROCEDURE — 70492 CT SFT TSUE NCK W/O & W/DYE: CPT

## 2020-08-02 PROCEDURE — 10060 I&D ABSCESS SIMPLE/SINGLE: CPT

## 2020-08-02 PROCEDURE — 2500000003 HC RX 250 WO HCPCS: Performed by: NURSE PRACTITIONER

## 2020-08-02 PROCEDURE — 6360000004 HC RX CONTRAST MEDICATION: Performed by: RADIOLOGY

## 2020-08-02 PROCEDURE — 71046 X-RAY EXAM CHEST 2 VIEWS: CPT

## 2020-08-02 PROCEDURE — 90715 TDAP VACCINE 7 YRS/> IM: CPT | Performed by: NURSE PRACTITIONER

## 2020-08-02 PROCEDURE — 6370000000 HC RX 637 (ALT 250 FOR IP): Performed by: NURSE PRACTITIONER

## 2020-08-02 RX ORDER — CEPHALEXIN 500 MG/1
500 CAPSULE ORAL ONCE
Status: COMPLETED | OUTPATIENT
Start: 2020-08-02 | End: 2020-08-02

## 2020-08-02 RX ORDER — LIDOCAINE HYDROCHLORIDE 10 MG/ML
20 INJECTION, SOLUTION INFILTRATION; PERINEURAL ONCE
Status: COMPLETED | OUTPATIENT
Start: 2020-08-02 | End: 2020-08-02

## 2020-08-02 RX ORDER — CEPHALEXIN 500 MG/1
500 CAPSULE ORAL 4 TIMES DAILY
Qty: 40 CAPSULE | Refills: 0 | Status: SHIPPED | OUTPATIENT
Start: 2020-08-02 | End: 2020-08-12

## 2020-08-02 RX ORDER — SULFAMETHOXAZOLE AND TRIMETHOPRIM 800; 160 MG/1; MG/1
1 TABLET ORAL ONCE
Status: COMPLETED | OUTPATIENT
Start: 2020-08-02 | End: 2020-08-02

## 2020-08-02 RX ORDER — SULFAMETHOXAZOLE AND TRIMETHOPRIM 800; 160 MG/1; MG/1
1 TABLET ORAL 2 TIMES DAILY
Qty: 20 TABLET | Refills: 0 | Status: SHIPPED | OUTPATIENT
Start: 2020-08-02 | End: 2020-08-12

## 2020-08-02 RX ADMIN — CEPHALEXIN 500 MG: 500 CAPSULE ORAL at 16:45

## 2020-08-02 RX ADMIN — LIDOCAINE HYDROCHLORIDE 20 ML: 10 INJECTION, SOLUTION INFILTRATION; PERINEURAL at 16:01

## 2020-08-02 RX ADMIN — SULFAMETHOXAZOLE AND TRIMETHOPRIM 1 TABLET: 800; 160 TABLET ORAL at 16:45

## 2020-08-02 RX ADMIN — IOPAMIDOL 70 ML: 755 INJECTION, SOLUTION INTRAVENOUS at 07:43

## 2020-08-02 RX ADMIN — TETANUS TOXOID, REDUCED DIPHTHERIA TOXOID AND ACELLULAR PERTUSSIS VACCINE, ADSORBED 0.5 ML: 5; 2.5; 8; 8; 2.5 SUSPENSION INTRAMUSCULAR at 15:59

## 2020-08-02 ASSESSMENT — PAIN DESCRIPTION - DESCRIPTORS: DESCRIPTORS: THROBBING

## 2020-08-02 ASSESSMENT — PAIN SCALES - GENERAL
PAINLEVEL_OUTOF10: 6
PAINLEVEL_OUTOF10: 6

## 2020-08-02 ASSESSMENT — PAIN DESCRIPTION - PAIN TYPE: TYPE: ACUTE PAIN

## 2020-08-02 ASSESSMENT — PAIN DESCRIPTION - LOCATION: LOCATION: FINGER (COMMENT WHICH ONE)

## 2020-08-02 NOTE — ED PROVIDER NOTES
[facility]           Department of Emergency Medicine   ED  Provider Note  Admit Date/RoomTime: 8/2/2020  2:59 PM  ED Room: 03/03  MRN: [id number]  Chief Complaint: Hand Pain (right first  digit infection)       History of Present Illness   Source of history provided by:  patient. History/Exam Limitations: none. Dameon Go is a 77 y.o. female who has a past medical history of:   Past Medical History:   Diagnosis Date    Anemia associated with chemotherapy 11/16/2016    resolved    Anxiety     Cancer ([facility] [address]     tongue    Cancer of tongue ([address] 9/16/2016    treated with chemo and radiation / last treatment 11/2016    Cancer of tonsil ([facility] [address]     Cervical herniation     no limits on rom    Difficulty sleeping     Discoloration of skin of foot 6/25/2020    Dizziness 6/20/2018    GERD (gastroesophageal reflux disease)     Hyperlipidemia     Hypokalemia 11/16/2016    with chemo / resolved    Hypomagnesemia 11/16/2016    related to chemo / resloved    Hypothyroidism     Leg swelling 7/26/2017    Lumbar herniated disc     after fell 2/2017 / now has chronic back pain and numbnes at left foot and ankle    Lymphedema of both lower extremities 07/26/2017    wears support hose    Lymphedema of lower extremity     Osteopenia     PONV (postoperative nausea and vomiting)     Restless leg syndrome     Rotator cuff tear     bilateral    Sleep apnea     Thyroid disease     Toe cyanosis 6/25/2020    presents to the ED by private car and is alone for infected paronychia on the right thumb and states she pulled the cuticle approximately 1 week ago. Denies any pain with range of motion tenderness is right at the cuticle on the ulnar aspect of the nail where it is whitish in color and surrounding erythema. The complaint has been persistent, moderate in severity. She denies any pain with range of motion of the thumb she is right-hand dominant.   She denies any fever, chills, nausea or vomiting. ROS    Pertinent positives and negatives are stated within HPI, all other systems reviewed and are negative. Past Surgical History:   Procedure Laterality Date    BLADDER REPAIR  1995    COLONOSCOPY  2008    neg    COLONOSCOPY  03/19/2014    ENDOSCOPY, COLON, DIAGNOSTIC  2012    FRACTURE SURGERY  2012    RIGHT ELBOW    HYSTERECTOMY  1993    lavh&BSO&A&P    LAPAROSCOPY  1990    LAPAROTOMY  1991    LARYNGOSCOPY  09/08/2016    direct laryngoscopy right tonsil/tongue base biopsy cervical esophagoscopy fine needle aspiration     NERVE BLOCK      OTHER SURGICAL HISTORY  5/28/12    ORIF RIGHT ULNA    OTHER SURGICAL HISTORY  6/18/2012    orif  rivision right olecranon    OTHER SURGICAL HISTORY Right 01/16/2014    Right elbow hardware removal    [place] OFFICE/OUTPT VISIT,PROCEDURE ONLY N/A 11/9/2018    PORT REMOVAL performed by Yamile Tierney MD at [place] [address]  1MONTHS OF AGE    H/O ESOPHAGEAL WEB    TUNNELED VENOUS PORT PLACEMENT  10/04/2016    Dr. Dar Butler ENDOSCOPY     Social History:  reports that she has never smoked. She has never used smokeless tobacco. She reports that she does not drink alcohol or use drugs. Family History: family history includes Arthritis in her father; Cancer in her mother; Depression in her mother; Diabetes in her mother; High Blood Pressure in her father and mother; Hypertension in her father and mother; Other in her mother. Allergies: Patient has no known allergies.     Physical Exam   Oxygen Saturation Interpretation: Normal.   ED Triage Vitals   BP Temp Temp Source Pulse Resp SpO2 Height Weight   08/02/20 1452 08/02/20 1452 08/02/20 1452 08/02/20 1452 08/02/20 1452 08/02/20 1452 08/02/20 1507 08/02/20 1507   115/83 99.1 °F (37.3 °C) Infrared 82 16 98 % 5' 2\" (1.575 m) 120 lb (54.4 kg)       Physical Exam  Musculoskeletal:        Hands:        · Constitutional/General: Alert and oriented x3, well appearing, non toxic  · HEENT:  NC/NT. PERRLA,  Airway patent. · Neck: Supple, full ROM, non tender to palpation in the midline, no stridor, no crepitus, no meningeal signs  · Respiratory: Lungs clear to auscultation bilaterally, no wheezes, rales, or rhonchi. Not in respiratory distress  · CV:  Regular rate. Regular rhythm. No murmurs, gallops, or rubs. 2+ distal pulses  · Chest: No chest wall tenderness  · GI:  Abdomen Soft, Non tender, Non distended. +BS. No rebound, guarding, or rigidity. No pulsatile masses. · Musculoskeletal: Moves all extremities x 4. Warm and well perfused, no clubbing, cyanosis, or edema. Capillary refill <3 seconds  · Integument: skin warm and dry. No rashes. · Lymphatic: no lymphadenopathy noted  · Neurologic: GCS 15, no focal deficits, symmetric strength 5/5 in the upper and lower extremities bilaterally  · Psychiatric: Normal Affect    Lab / Imaging Results   (All laboratory and radiology results have been personally reviewed by myself)  Labs:  No results found for this visit on 08/02/20. Imaging: All Radiology results interpreted by Radiologist unless otherwise noted. No orders to display       ED Course / Medical Decision Making     Medications   Tetanus-Diphth-Acell Pertussis (BOOSTRIX) injection 0.5 mL (0.5 mLs Intramuscular Given 8/2/20 1559)   lidocaine 1 % injection 20 mL (20 mLs Intradermal Given by Other 8/2/20 1601)   sulfamethoxazole-trimethoprim (BACTRIM DS;SEPTRA DS) 800-160 MG per tablet 1 tablet (1 tablet Oral Given 8/2/20 1645)   cephALEXin (KEFLEX) capsule 500 mg (500 mg Oral Given 8/2/20 1645)        R  Consult(s):   None    Procedure(s):     PROCEDURE  8/2/20       Time: 1640    INCISION AND DRAINAGE  Risks, benefits and alternatives (for applicable procedures below) described. Performed By: SKYE Christine CNP.     Indication: Paronychial abscess  Informed consent obtained: The patient was counseled regarding the procedure, it's indications, risks, potential complications and alternatives and any questions were answered. Consent was obtained. .  Prep: The skin was cleansed with povidone iodine and draped in a sterile fashion. Anesthetic: The wound area was anesthetized with digital block of lidocaine 1% without epinephrine. Incision: Soft tissue abscess of paronychia was Incised by scalpal and thick pus  was drained. A wound culture was not obtained. The wound  was not irrigated and was not packed with iodoform gauze. The wound was then covered with a sterile dressing. Patient tolerated the procedure well. MDM:   Patient had a infected paronychia successfully drained in the ED. She has no signs of flexor tenosynovitis and no signs of pulp infection at this time with no bony tenderness. She was offered Motrin did not want it she felt the pain was relieved with a digital block. Advised on signs and symptoms warranting immediate return such as pain out of portion, fever, chills or any worsening of symptoms to return for reevaluation. Advised to soak the finger 3-4 times a day and antibacterial soapy water. And elevate it as much as possible above the level of her heart. Patient will be discharged with Keflex and Bactrim first dose given in the ED. Tetanus was updated today. Patient was upset with her wait time for the entire visit felt like she was put off in the back. I apologized several times for her weight times. Counseling: The emergency provider has spoken with the patient and discussed todays results, in addition to providing specific details for the plan of care and counseling regarding the diagnosis and prognosis. Questions are answered at this time and they are agreeable with the plan. Assessment      1. Paronychia of right thumb    2.  Need for Tdap vaccination      Plan   Discharge to home  Patient condition is good    New Medications     New Prescriptions    CEPHALEXIN (KEFLEX) 500 MG CAPSULE    Take 1 capsule by mouth 4 times daily for 10 days    SULFAMETHOXAZOLE-TRIMETHOPRIM (BACTRIM DS) 800-160 MG PER TABLET    Take 1 tablet by mouth 2 times daily for 10 days     Electronically signed by SKYE Quiñones CNP   DD: 8/2/20  **This report was transcribed using voice recognition software. Every effort was made to ensure accuracy; however, inadvertent computerized transcription errors may be present.   END OF ED PROVIDER NOTE      SKYE Quiñones CNP  08/02/20 [id number]

## 2020-08-05 ENCOUNTER — TELEPHONE (OUTPATIENT)
Dept: PRIMARY CARE CLINIC | Age: 70
End: 2020-08-05

## 2020-08-05 RX ORDER — TEMAZEPAM 15 MG/1
15 CAPSULE ORAL NIGHTLY
Qty: 90 CAPSULE | Refills: 0 | Status: SHIPPED
Start: 2020-08-05 | End: 2020-10-22 | Stop reason: SDUPTHER

## 2020-08-06 ENCOUNTER — TELEPHONE (OUTPATIENT)
Dept: RADIATION ONCOLOGY | Age: 70
End: 2020-08-06

## 2020-08-06 NOTE — TELEPHONE ENCOUNTER
RN placed call Dr Connie Palacios office to request most recent office visit/physician's note. Fax number provided.

## 2020-08-07 ENCOUNTER — HOSPITAL ENCOUNTER (OUTPATIENT)
Dept: RADIATION ONCOLOGY | Age: 70
Discharge: HOME | End: 2020-08-07
Payer: COMMERCIAL

## 2020-08-07 VITALS
HEART RATE: 64 BPM | BODY MASS INDEX: 22.19 KG/M2 | TEMPERATURE: 97.3 F | RESPIRATION RATE: 18 BRPM | DIASTOLIC BLOOD PRESSURE: 70 MMHG | SYSTOLIC BLOOD PRESSURE: 124 MMHG | OXYGEN SATURATION: 96 % | WEIGHT: 121.3 LBS

## 2020-08-07 PROCEDURE — 99213 OFFICE O/P EST LOW 20 MIN: CPT | Performed by: RADIOLOGY

## 2020-08-07 PROCEDURE — 99212 OFFICE O/P EST SF 10 MIN: CPT

## 2020-08-07 NOTE — PROGRESS NOTES
Lin Valiente  8/7/2020  8:09 AM      There were no vitals filed for this visit. :    Wt Readings from Last 3 Encounters:   08/02/20 120 lb (54.4 kg)   07/27/20 122 lb (55.3 kg)   12/17/19 111 lb (50.3 kg)                Current Outpatient Medications:     temazepam (RESTORIL) 15 MG capsule, Take 1 capsule by mouth nightly for 90 days. Sleep aide, Disp: 90 capsule, Rfl: 0    cephALEXin (KEFLEX) 500 MG capsule, Take 1 capsule by mouth 4 times daily for 10 days, Disp: 40 capsule, Rfl: 0    sulfamethoxazole-trimethoprim (BACTRIM DS) 800-160 MG per tablet, Take 1 tablet by mouth 2 times daily for 10 days, Disp: 20 tablet, Rfl: 0    rosuvastatin (CRESTOR) 5 MG tablet, Take 5 mg by mouth daily 3 times a week, Disp: , Rfl:     escitalopram (LEXAPRO) 5 MG tablet, Take 5 mg by mouth three times a week, Disp: , Rfl:     B Complex Vitamins (B-COMPLEX/B-12 PO), Take by mouth daily, Disp: , Rfl:     Cholecalciferol (VITAMIN D PO), Take 1 tablet by mouth 3000 units daily, Disp: , Rfl:     gabapentin (NEURONTIN) 400 MG capsule, Take 400 mg by mouth 3 times daily.  , Disp: , Rfl: 3    Biotin 10 MG CAPS, Take by mouth daily HOLD, Disp: , Rfl:     Elastic Bandages & Supports (JOBST KNEE HIGH COMPRESSION SM) MISC, Knee high with 20- 30 mmhg of compression, Disp: 1 each, Rfl: 10    ROPINIRole HCl (REQUIP PO), Take by mouth nightly as needed For restless legs, Disp: , Rfl:     acetaminophen (TYLENOL) 500 MG tablet, Take 500 mg by mouth every 6 hours as needed Instructed to take with sip water am of procedure if needs, Disp: , Rfl:     levothyroxine (SYNTHROID) 75 MCG tablet, Take 75 mcg by mouth daily , Disp: , Rfl:     RABEprazole Sodium (ACIPHEX PO), Take 20 mg by mouth daily Instructed to take am of procedure, Disp: , Rfl:       Patient is seen today in follow up, having received radiation therapy to the right tongue base plus lymph nodes from 10/3/2016 through 11/29/2016, 7000 cGy in 35 fractions related to Stage HEATHER poorly differentiated Squamous Cell Carcinoma concurrently with Cisplatin. Patient follows with Dr Kenji Avila, ENT-in January, Dr Kenji Avila performed biopsy of the right tongue base that returned negative results. More recently, patient underwent dilation of the esophagus in June. Patient states she notes some improvement with swallowing, but does still experience occasional difficulty swallowing pills. Patient states she does experience a dry mouth and a change to taste buds since completing her radiation therapy. Patient also follows with her Medical Oncologist, Dr Sandy Otto, with last office visit being 5/11/2020. At Dr Molina Rooney request, patient completed CT of the soft tissue of the neck on 8/2/2020. FALLS RISK SCREENING ASSESSMENT    Instructions:  Assess the patient and enter the appropriate indicators that are present for fall risk identification. Total the numbers entered and assign a fall risk score from Table 2.  Reassess patient at a minimum every 12 weeks or with status change. Assessment   Date  8/7/2020     1. Mental Ability: confusion/cognitively impaired No - 0       2. Elimination Issues: incontinence, frequency No - 0       3. Ambulatory: use of assistive devices (walker, cane, off-loading devices), attached to equipment (IV pole, oxygen) No - 0     4. Sensory Limitations: dizziness, vertigo, impaired vision No - 0       5. Age 72 years or greater - 1       10. Medication: diuretics, strong analgesics, hypnotics, sedatives, antihypertensive agents   No - 0   7. Falls:  recent history of falls within the last 3 months (not to include slipping or tripping)   Yes - 7   TOTAL 8    If score of 4 or greater was education given? Yes       TABLE 2   Risk Score Risk Level Plan of Care   0-3 Little or  No Risk 1. Provide assistance as indicated for ambulation activities  2. Reorient confused/cognitively impaired patient  3. Call-light/bell within patient's reach  4.   Chair/bed in low position, stretcher/bed with siderails up except when performing patient care activities  5. Educate patient/family/caregiver on falls prevention  6.  Reassess in 12 weeks or with any noted change in patient condition which places them at a risk for a fall   4-6 Moderate Risk 1. Provide assistance as indicated for ambulation activities  2. Reorient confused/cognitively impaired patient  3. Call-light/bell within patient's reach  4. Chair/bed in low position, stretcher/bed with siderails up except when performing patient care activities  5. Educate patient/family/caregiver on falls prevention  6. Falls risk precaution (Yellow sticker Level II) placed on patient chart   7 or   Higher High Risk 1. Place patient in easily observable treatment room  2. Patient attended at all times by family member or staff  3. Provide assistance as indicated for ambulation activities  4. Reorient confused/cognitively impaired patient  5. Call-light/bell within patient's reach  6. Chair/bed in low position, stretcher/bed with siderails up except when performing patient care activities  7. Educate patient/family/caregiver on falls prevention  8. Falls risk precaution (Yellow sticker Level III) placed on patient chart           MALNUTRITION RISK SCREENING ASSESSMENT    8/7/2020   Patient:  Trudi Franklin  Sex:  female    Instructions:  Assess the patient and enter the appropriate indicators that are present for nutrition risk identification. Total the numbers entered and assign a risk score. Follow the appropriate action for total score listed below. Assessment   Date  8/7/2020     1. Have you lost weight without trying? 0- No     2. Have you been eating poorly because of a decreased appetite? 1- Yes   3. Do you have a diagnosis of head and neck cancer?       1- Yes                                                                                    TOTAL 2          Score of 0-1: No action  Score 2 or greater:  · For Non-Diabetic Patient: Recommend adding Ensure Complete 2 x daily and provide patient with Ensure wellness bag with coupons  · For Diabetic Patient: Recommend adding Glucerna Shake 2 x daily and provide patient with Glucerna Wellness bag with coupons  · Route to the dietitian via [facility]

## 2020-08-07 NOTE — PATIENT INSTRUCTIONS
VIOLA Seay. Namita Cherry MD [place] [address] Oncology  Cell: [phone]    [facility]:  [phone]   FAX: [phone] [address] [facility]:  [address] [place] [address] Avenue:    [phone]  [facility]:  [phone]   FAX:  [phone]  Email: [email or web address]

## 2020-08-07 NOTE — PROGRESS NOTES
Radiation Oncology   Follow Up Note  Dean Jo. Josy Redd MD MS DABR      8/7/2020  Car Bey  Date of Service: 8/7/20      HPI:          Jinny Schroeder is a pleasant 77year old woman s/p concurrent chemo-RT for locally advanced disease of the OP; she presents today for FU -NESSA.                       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 Dental clearance 9/21/2016 per Dr. Simona Agarwal. Note in Media tab on EPIC. She underwent a port placement with Dr. Kelsey Gotti on 10/4/2016. Chemotherapy was begun on 10/6/2016 with Dr. Rashmi Tobias. PEG tube placed on 10/31/2016 by Dr. Ben armijo. Rashard Arcos 11/29/16, Flor Hardy completed 7000 cGy in 35 fractions directed to the Definitive head and neck irradiation for management of right base of tongue SCC.  States Pt follows with Dr Rashmi Tobias for medical oncology. PET scan NEGATIVE (SEE BELOW / Dr. Nate Lewis following with US for thyroid findings) however she did have (another post Tx #3) interval PET that noted concerning findings. Dr. Nate Lewis did a biopsy and the report noted negative findings, probably radiation changes only. A FU CT did not show LAD or a discrete mass (see EPIC) - previous interval PET scan noted uptake in progression however this uptake changed very little and upon review of the CT and PET, biopsy negative - previous interval.  Most recently a CT scan is negative. S/P esophageal dilation. KPS 80-90. CT 8/2/20:  Impression    Stable CT scan of the neck                   PET 10/16/19:      Impression   1.  Focal tracer uptake at the level of the tongue base on the left,   tracer uptake has increased in the interval.   -----  3.7. Antonio Starkey    When compared to previous the previous SUV in this region was 2.9.                PET 4/18/19:  Impression       No evidence of recurrent malignancy.                   PET 6/21/18:  . Caro Abdullahi level activity seen in association with the left parotid gland   of uncertain etiology or clinical significance, similar to the prior   examination. Query lymphomatous involvement. 2.  Additional findings, as detailed above.            PET 2/10/18:     1. No identifiable hypermetabolic focus with avid glucose metabolism   concerning for recurrence.                 PET 11/4/17:         PET images were obtained from skull base to the mid thigh.       Non diagnostic CT images were obtained for the purposes of attenuation   correction.       19.6 of F-18 glucose was injected.       Images reveal a single region of radiotracer uptake identified at the   level of the mylohyoid muscle on the right. This is focal and   asymmetric and has an SUV of 3.55.        There is otherwise a normal biodistribution of radiotracer. There is   no other abnormal tracer uptake seen.           Impression       Single focus of abnormal tracer uptake at the level of the mylohyoid   muscle, this could represent asymmetric muscular uptake. This could   represent focal neoplasm. The finding is somewhat nonspecific although   concerning.                PET 7/10/17:  Mild asymmetric uptake in the tongue predominantly anteriorly and   right side of the tongue posteriorly which is new since the previous   examination. Inflammatory process or recurrent malignancy has to be   considered. Direct visualization and follow-up examination   recommended.                PET 1/21/16:  1.  Mild asymmetrically increased uptake seen in association with the   right thyroid lobe, new when compared to the prior examination. Consider further evaluation with thyroid ultrasound. 2.  Interval resolution of previously seen right parotid and right   base of tongue/lingual tonsil uptake.                  -----           PATH 8/10/17:    Diagnosis:     A. Tongue, anterior right, biopsy: Benign squamous mucosa with mild       hyperkeratosis.     B.  Tongue, right at fossa junction, biopsy: Benign squamous mucosa.     C. Tongue, right posterior, biopsy: Benign squamous mucosa. Comment:     The biopsies show fragments of benign squamous mucosa.  The  underlying submucosa shows rare mildly atypical stromal cells suggestive  of radiation induced atypia.  No evidence of squamous cell carcinoma is  identified.           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 Dental clearance 9/21/2016 per Dr. Jonathan Gardner. Note in Media tab on EPIC. She underwent a port placement with Dr. Constancia Cabot on 10/4/2016. Chemotherapy was begun on 10/6/2016 with Dr. Monica Meredith. PEG tube placed on 10/31/2016 by Dr. Kailash Mobley     Area(s) treated: Definitive head and neck irradiation.     Treatment dates: 9/30/2016 through 11/29/2016.     Daily fractionation: 200 cGy IMRT with daily CBCT.        Total dose:  7000 cGy in 35 fractions over 57 elapsed days.     Ms. Emma Patel completed a course of radiation treatments for management of locally advanced base of tongue squamous cell carcinoma. She had refused G-tube placement on presentation.      She did develop expected skin, oral mucositis and swallowing difficulties.  She was symptomatically managed. In spite of this, she did have hypotension/dehydration. A PEG tube was placed.  She was hospitalized on 11/15/2016 following a syncopal episode. Dolores Atkins is using various skin creams including Aquaphor, Neosporin with pain and most recently Silvadene. She did require IV hydration with Dr. Monica Meredith.  For oral mucositis management, we tried Magic mouthwash, neutral cell, Gelclair.  She is on Salagen for dry mouth.           -----          Past Medical History:   Diagnosis Date    Anemia associated with chemotherapy 11/16/2016    resolved    Anxiety     sulfamethoxazole-trimethoprim (BACTRIM DS) 800-160 MG per tablet Take 1 tablet by mouth 2 times daily for 10 days 20 tablet 0    rosuvastatin (CRESTOR) 5 MG tablet Take 5 mg by mouth daily 3 times a week      escitalopram (LEXAPRO) 5 MG tablet Take 5 mg by mouth three times a week      B Complex Vitamins (B-COMPLEX/B-12 PO) Take by mouth daily      Cholecalciferol (VITAMIN D PO) Take 1 tablet by mouth 3000 units daily      gabapentin (NEURONTIN) 400 MG capsule Take 400 mg by mouth 3 times daily. 3    Biotin 10 MG CAPS Take by mouth daily HOLD      Elastic Bandages & Supports (JOBST KNEE HIGH COMPRESSION SM) MISC Knee high with 20- 30 mmhg of compression 1 each 10    ROPINIRole HCl (REQUIP PO) Take by mouth nightly as needed For restless legs      acetaminophen (TYLENOL) 500 MG tablet Take 500 mg by mouth every 6 hours as needed Instructed to take with sip water am of procedure if needs      levothyroxine (SYNTHROID) 75 MCG tablet Take 75 mcg by mouth daily       RABEprazole Sodium (ACIPHEX PO) Take 20 mg by mouth daily Instructed to take am of procedure       No current facility-administered medications for this encounter. REVIEW OF SYSTEMS  History obtained from chart review and the patient  General ROS: negative  Psychological ROS: negative  Ophthalmic ROS: negative  ENT ROS: dysphagia, dry mouth  Allergy and Immunology ROS: negative  Hematological and Lymphatic ROS: negative  Endocrine ROS: negative  Breast ROS: negative for breast lumps  Respiratory ROS: no cough, shortness of breath, or wheezing  Cardiovascular ROS: no chest pain or dyspnea on exertion  Gastrointestinal ROS: no abdominal pain, change in bowel habits, or black or bloody stools  Genito-Urinary ROS: no dysuria, trouble voiding, or hematuria  Musculoskeletal ROS: negative  Neurological ROS: no TIA or stroke symptoms  Dermatological ROS: negative      Physical Exam  HENT:      Head: Normocephalic.       Right Ear: External ear normal.      Left Ear: External ear normal.      Nose: Nose normal.   Eyes:      Pupils: Pupils are equal, round, and reactive to light. Neck:      Musculoskeletal: Normal range of motion. Cardiovascular:      Rate and Rhythm: Normal rate. Pulses: Normal pulses. Heart sounds: Normal heart sounds. Abdominal:      General: Abdomen is flat. Palpations: Abdomen is soft. Musculoskeletal: Normal range of motion. Skin:     General: Skin is warm and dry. Neurological:      General: No focal deficit present. Mental Status: She is alert and oriented to person, place, and time. Psychiatric:         Mood and Affect: Mood normal.         Behavior: Behavior normal.         Thought Content: Thought content normal.         Judgment: Judgment normal.           A/P:         Manny Gaviria is a dominga 77year old woman s/p concurrent chemo-RT for locally advanced HN cancer. She is NESSA today with grade 1-2 late effects but not difficulty with self care or instrumental ADLS. She is maintaining her elizabeth and th PEG has been removed.  PET negative.  Persistent xerostomia and dysphagia.  The patient did verbalize understanding for the indications of continued FU including imaging and laboratory evaluation as applicable to this case; as well as appropriate lifestyle choices and health maintenance.  All questions answered to the best of my ability. Early delayed or chronic RT grade 1-2 (xerostomia / dysphagia).          *I spent at least 20 MIN on this case and with this patient today including personally performing/reviewing the history, ROS, PE, and providing a summary and description of the detailed medical decision making as a basis for any and all recommendations made today (+/- a pertinent RBA discussion): literature and radiology reviews were performed as noted and applicable (36% or more time was spent in direct patient ).                -off Aqoural  -off Carafate   -off Gelclair  [facility]  -cont FU with Dr. David Betts [facility] interval and to review CTs  -cont FU with Dr. Vy Goins for carefull oral care  -LE clinic previously / ongoing  -no RT dose was delivered to the rotator cuffs / cleared for surgery IF indicated by Orthopedic SURG. -FU with me in 6m CT ordered. Arturo Banks. Terrence Devlin MD [place] [address] Oncology  Cell: [phone]  [facility]:  [phone]   FAX: [phone]  [facility]:   [address] [place] [address] Avenue:    [phone]  [facility]:  [phone]   FAX:  [phone]  Email: [email or web address]      NOTE: This report was transcribed using voice recognition software. Every effort was made to ensure accuracy; however, inadvertent computerized transcription errors may be present.

## 2020-09-24 ENCOUNTER — TELEPHONE (OUTPATIENT)
Dept: PRIMARY CARE CLINIC | Age: 70
End: 2020-09-24

## 2020-09-24 ENCOUNTER — TELEPHONE (OUTPATIENT)
Dept: ADMINISTRATIVE | Age: 70
End: 2020-09-24

## 2020-09-24 ENCOUNTER — OFFICE VISIT (OUTPATIENT)
Dept: PRIMARY CARE CLINIC | Age: 70
End: 2020-09-24
Payer: COMMERCIAL

## 2020-09-24 VITALS
HEART RATE: 73 BPM | TEMPERATURE: 98.5 F | WEIGHT: 120 LBS | OXYGEN SATURATION: 99 % | RESPIRATION RATE: 16 BRPM | BODY MASS INDEX: 22.08 KG/M2 | SYSTOLIC BLOOD PRESSURE: 104 MMHG | HEIGHT: 62 IN | DIASTOLIC BLOOD PRESSURE: 68 MMHG

## 2020-09-24 PROCEDURE — 3017F COLORECTAL CA SCREEN DOC REV: CPT | Performed by: NURSE PRACTITIONER

## 2020-09-24 PROCEDURE — 4040F PNEUMOC VAC/ADMIN/RCVD: CPT | Performed by: NURSE PRACTITIONER

## 2020-09-24 PROCEDURE — G8420 CALC BMI NORM PARAMETERS: HCPCS | Performed by: NURSE PRACTITIONER

## 2020-09-24 PROCEDURE — 1090F PRES/ABSN URINE INCON ASSESS: CPT | Performed by: NURSE PRACTITIONER

## 2020-09-24 PROCEDURE — G8399 PT W/DXA RESULTS DOCUMENT: HCPCS | Performed by: NURSE PRACTITIONER

## 2020-09-24 PROCEDURE — 99213 OFFICE O/P EST LOW 20 MIN: CPT | Performed by: NURSE PRACTITIONER

## 2020-09-24 PROCEDURE — 1036F TOBACCO NON-USER: CPT | Performed by: NURSE PRACTITIONER

## 2020-09-24 PROCEDURE — G8427 DOCREV CUR MEDS BY ELIG CLIN: HCPCS | Performed by: NURSE PRACTITIONER

## 2020-09-24 PROCEDURE — 1123F ACP DISCUSS/DSCN MKR DOCD: CPT | Performed by: NURSE PRACTITIONER

## 2020-09-24 NOTE — TELEPHONE ENCOUNTER
Patient called in with c/o headache x24 hours. She stated that her doctor office advised her to go to the [facility] flu clinic. I gave patient hours and location details.  Sheri Alexander

## 2020-09-24 NOTE — PATIENT INSTRUCTIONS
Patient Education        Coronavirus (IVPWJ-12): Care Instructions  Overview  The coronavirus disease (COVID-19) is caused by a virus. Symptoms may include a fever, a cough, and shortness of breath. It mainly spreads person-to-person through droplets from coughing and sneezing. The virus also can spread when people are in close contact with someone who is infected. Most people have mild symptoms and can take care of themselves at home. If their symptoms get worse, they may need care in a hospital. There is no medicine to fight the virus. It's important to not spread the virus to others. If you have COVID-19, wear a face cover anytime you are around other people. You need to isolate yourself while you are sick. Your doctor or local public health official will tell you when you no longer need to be isolated. Leave your home only if you need to get medical care. Follow-up care is a key part of your treatment and safety. Be sure to make and go to all appointments, and call your doctor if you are having problems. It's also a good idea to know your test results and keep a list of the medicines you take. How can you care for yourself at home? · Get extra rest. It can help you feel better. · Drink plenty of fluids. This helps replace fluids lost from fever. Fluids also help ease a scratchy throat. Water, soup, fruit juice, and hot tea with lemon are good choices. · Take acetaminophen (such as Tylenol) to reduce a fever. It may also help with muscle aches. Read and follow all instructions on the label. · Sponge your body with lukewarm water to help with fever. Don't use cold water or ice. · Use petroleum jelly on sore skin. This can help if the skin around your nose and lips becomes sore from rubbing a lot with tissues. Tips for isolation  · Wear a cloth face cover when you are around other people. It can help stop the spread of the virus when you cough or sneeze. · Limit contact with people in your home.  If possible, stay in a separate bedroom and use a separate bathroom. · If you have to leave home, avoid crowds and try to stay at least 6 feet away from other people. · Avoid contact with pets and other animals. · Cover your mouth and nose with a tissue when you cough or sneeze. Then throw it in the trash right away. · Wash your hands often, especially after you cough or sneeze. Use soap and water, and scrub for at least 20 seconds. If soap and water aren't available, use an alcohol-based hand . · Don't share personal household items. These include bedding, towels, cups and glasses, and eating utensils. · [address] in the warmest water allowed for the fabric type, and dry it completely. It's okay to wash other people's laundry with yours. · Clean and disinfect your home every day. Use household  and disinfectant wipes or sprays. Take special care to clean things that you grab with your hands. These include doorknobs, remote controls, phones, and handles on your refrigerator and microwave. And don't forget countertops, tabletops, bathrooms, and computer keyboards. When should you call for help? [id number] anytime you think you may need emergency care. For example, call if you have life-threatening symptoms, such as:  · You have severe trouble breathing. (You can't talk at all.)  · You have constant chest pain or pressure. · You are severely dizzy or lightheaded. · You are confused or can't think clearly. · Your face and lips have a blue color. · You pass out (lose consciousness) or are very hard to wake up. Call your doctor now or seek immediate medical care if:  · You have moderate trouble breathing. (You can't speak a full sentence.)  · You are coughing up blood (more than about 1 teaspoon). · You have signs of low blood pressure. These include feeling lightheaded; being too weak to stand; and having cold, pale, clammy skin.   Watch closely for changes in your health, and be sure to contact your doctor if:  · Your symptoms get worse. · You are not getting better as expected. Call before you go to the doctor's office. Follow their instructions. And wear a cloth face cover. Current as of: May 8, 2020               Content Version: 12.5  © 2006-2020 [employer]. Care instructions adapted under license by [facility] ([facility]). If you have questions about a medical condition or this instruction, always ask your healthcare professional. [address] any warranty or liability for your use of this information.

## 2020-09-24 NOTE — TELEPHONE ENCOUNTER
Tamiflu really only indicated for documented influenza which is not really occurring now. Not recommended for other viral infections. For right now I would simply treat with Tylenol and hydration and wait until her COVID tests comes back. If her symptoms get worse she will either have to go to the ER or to 1 of [facility]'s flu clinics, probably the one on [address]. She would have to call first for their hours. Either way let us know tomorrow how she is feeling.

## 2020-09-24 NOTE — TELEPHONE ENCOUNTER
Pt phoned c/o not feeling well, no fever, no cough, but she feels very achy,chest congesiton, pt was tested at [facility] site for covid but results will not be back for 2 days, she is requesting tamiflu to be sent to [facility] in [place]

## 2020-09-24 NOTE — PROGRESS NOTES
Evonne Chappell  1950    Chief Complaint   Patient presents with    Chills    Neck Pain    Chest Congestion       Respiratory Symptoms:  Patient complains of 1 day(s) history of headache,  chest congestion , and posterior neck pain. Symptoms have been worsening with time. She reports that she went to a Covid clinic this morning and was swabbed for Covid. She will not have the results for 2-4 days per patient. She denies fever/chills. No cough, shortness of breath. Appetite has been good, able to stay hydrated. She denies any other symptoms. She has tried OTC medications as listed below. Relevant PMH: No pertinent PMH. Smoking history:  She  reports that she has never smoked. She has never used smokeless tobacco.     She has had no known ill contacts. Treatment to date: black elderberry, zicam, vitamin C, tylenol. Travel screen completed:  Yes          Vitals:    09/24/20 1215   BP: 104/68   Pulse: 73   Resp: 16   Temp: 98.5 °F (36.9 °C)   TempSrc: Oral   SpO2: 99%   Weight: 120 lb (54.4 kg)   Height: 5' 2\" (1.575 m)      Physical Exam  Constitutional:       Appearance: She is well-developed. HENT:      Head: Normocephalic. Eyes:      Pupils: Pupils are equal, round, and reactive to light. Neck:      Musculoskeletal: Normal range of motion and neck supple. Thyroid: No thyromegaly. Cardiovascular:      Rate and Rhythm: Normal rate and regular rhythm. Pulmonary:      Effort: Pulmonary effort is normal.      Breath sounds: Normal breath sounds. Abdominal:      General: Bowel sounds are normal.      Palpations: Abdomen is soft. Musculoskeletal: Normal range of motion. Lymphadenopathy:      Cervical: No cervical adenopathy. Skin:     General: Skin is warm and dry. Neurological:      Mental Status: She is alert and oriented to person, place, and time. Psychiatric:         Behavior: Behavior normal.              Assessment/Plan:  1.  Viral illness  Encouraged continued

## 2020-09-28 ENCOUNTER — OFFICE VISIT (OUTPATIENT)
Dept: PHYSICAL MEDICINE AND REHAB | Age: 70
End: 2020-09-28
Payer: COMMERCIAL

## 2020-09-28 VITALS
BODY MASS INDEX: 22.08 KG/M2 | DIASTOLIC BLOOD PRESSURE: 64 MMHG | TEMPERATURE: 97.3 F | HEIGHT: 62 IN | SYSTOLIC BLOOD PRESSURE: 102 MMHG | WEIGHT: 120 LBS

## 2020-09-28 PROCEDURE — 1036F TOBACCO NON-USER: CPT | Performed by: PHYSICAL MEDICINE & REHABILITATION

## 2020-09-28 PROCEDURE — G8427 DOCREV CUR MEDS BY ELIG CLIN: HCPCS | Performed by: PHYSICAL MEDICINE & REHABILITATION

## 2020-09-28 PROCEDURE — 1123F ACP DISCUSS/DSCN MKR DOCD: CPT | Performed by: PHYSICAL MEDICINE & REHABILITATION

## 2020-09-28 PROCEDURE — 4040F PNEUMOC VAC/ADMIN/RCVD: CPT | Performed by: PHYSICAL MEDICINE & REHABILITATION

## 2020-09-28 PROCEDURE — G8420 CALC BMI NORM PARAMETERS: HCPCS | Performed by: PHYSICAL MEDICINE & REHABILITATION

## 2020-09-28 PROCEDURE — 1090F PRES/ABSN URINE INCON ASSESS: CPT | Performed by: PHYSICAL MEDICINE & REHABILITATION

## 2020-09-28 PROCEDURE — 99212 OFFICE O/P EST SF 10 MIN: CPT | Performed by: PHYSICAL MEDICINE & REHABILITATION

## 2020-09-28 PROCEDURE — 3017F COLORECTAL CA SCREEN DOC REV: CPT | Performed by: PHYSICAL MEDICINE & REHABILITATION

## 2020-09-28 PROCEDURE — G8399 PT W/DXA RESULTS DOCUMENT: HCPCS | Performed by: PHYSICAL MEDICINE & REHABILITATION

## 2020-09-28 PROCEDURE — 20611 DRAIN/INJ JOINT/BURSA W/US: CPT | Performed by: PHYSICAL MEDICINE & REHABILITATION

## 2020-09-28 RX ORDER — TRIAMCINOLONE ACETONIDE 40 MG/ML
40 INJECTION, SUSPENSION INTRA-ARTICULAR; INTRAMUSCULAR ONCE
Status: COMPLETED | OUTPATIENT
Start: 2020-09-28 | End: 2020-09-28

## 2020-09-28 RX ORDER — BUPIVACAINE HYDROCHLORIDE 2.5 MG/ML
6 INJECTION, SOLUTION EPIDURAL; INFILTRATION; INTRACAUDAL ONCE
Status: COMPLETED | OUTPATIENT
Start: 2020-09-28 | End: 2020-09-28

## 2020-09-28 RX ADMIN — TRIAMCINOLONE ACETONIDE 40 MG: 40 INJECTION, SUSPENSION INTRA-ARTICULAR; INTRAMUSCULAR at 11:25

## 2020-09-28 RX ADMIN — BUPIVACAINE HYDROCHLORIDE 15 MG: 2.5 INJECTION, SOLUTION EPIDURAL; INFILTRATION; INTRACAUDAL at 11:27

## 2020-09-28 RX ADMIN — TRIAMCINOLONE ACETONIDE 40 MG: 40 INJECTION, SUSPENSION INTRA-ARTICULAR; INTRAMUSCULAR at 11:26

## 2020-09-28 NOTE — PROGRESS NOTES
Jassi Sweeney M.D. [facility] PHYSICAL MEDICINE AND REHABILITAION  [address] [facility] [address]  Dept: [phone]  Dept Fax: [phone]    PCP: Eri Ferreira MD  Date of visit: 9/28/20    Chief Complaint   Patient presents with    Shoulder Pain     B/L shoulder injections        Apolinar Martínez is a 72 y.o.  right hand dominant woman who presents for follow up of upper extremity pain and bilateral rotator cuff tears. 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 Chemotherapy was started on 10/6/2016 with Dr. Toribio Pierre. November 2016 she completed head and neck irradiation for management of right base of tongue SCC. She reports she finished all treatment (chemo/radiation) November 2016. She denies any arm pain or numbness/tingling after her chemo/radiation, until her more recent injuries with rotator cuff tears. Her primary complaint is bilateral shoulder pain, which she attributes [facility]. She had massive full thickness bilateral supraspinatus and infraspinatus tears-- the right rotator cuff in December 2017 and left rotator cuff in January 2018. She saw Ortho--they did not recommend surgery due to chemo/radiation and osteoporosis -- unless pain unbearable --only then would consider shoulder replacement. Patient was cleared for shoulder surgery from radiation oncology standpoint IF indicated/recommended by Ortho. Patient does not wish for surgery at this time. Interval history:   Since last visit patient completed PT with improvement in pain and range of motion.  She continues her home exercise program. She is still going periodically for dry needling, which she reports has been beneficial. She reports that periodic subacromial injections continue to be beneficial, decreasing pain significantly and allowing her to continue to be functional. Relief from injections continues to last 3-4 months. She requests to repeat injections today. She is using topical compound cream with benefit. She continues home exercise and stretching program to maintain range of motion. No other changes reported. Shoulder pain is located at the lateral shoulders and subacromial space bilaterally, without radiation to the upper extremities. She denies pain more distally in the arms themselves. Pain is equal in character and degree bilaterally. She denies any associated numbness/tingling, or paresthesia in the upper extremities. There is some weakness about the shoulders bilaterally, unchanged. No weakness of biceps/triceps, forearm or intrinsic hand muscles. The shoulder pain is constant and aching. Pain is worse with overhead activity. Physical therapy has helped with range of motion and function. The prior workup has included:  Bilateral shoulder MRI - May 2018  -Right shoulder MRI: massive full thickness rotator cuff tear involving the supraspinatus and infraspinatus with retraction and uncovering of the humeral head which is high riding and articulating with the undersurface of the acromian. There is OA of the AC joint.   -Left shoulder MRI: massive full thickness rotator cuff tear involving the supraspinatus and infraspinatus with retraction and uncovering of the humeral head which is high riding and articulating with the undersurface of the acromian. There is OA of the AC joint. The prior treatment has included:  PT: Most recent PT summer 2020- improvement in bilateral shoulder ROM and function. History of lymphedema therapy with improvement. Chiropractic: None  Modalities: Biofreeze with partial relief. Eucedrin cream  with partial relief. Has not tried heat/ice. Compound cream with relief.    OTC Tylenol: Takes occasionally with partial relief   NSAIDS: Mobic 7.5mg with relief, stopped taking due to bruising   Opioids: None  Membrane stabilizers: gabapentin 400mg QHS and 300mg qam for numbness in left leg  Muscle relaxers: None  Previous injections: Bilateral US guided shoulder subacromial injections with relief of pain  Previous surgery at this site: No shoulder surgeries. Saw Ortho - per patient they did not recommend surgery due to chemo/radiation and osteoporosis -- unless pain unbearable --only then would consider shoulder replacement     Greg Poag  continues home exercises/stretches from therapy. The patient does perform regular exercise.        Past Medical History:   Diagnosis Date    Anemia associated with chemotherapy 11/16/2016    resolved    Anxiety     Cancer ([facility]     tongue    Cancer of tongue ([facility] 9/16/2016    treated with chemo and radiation / last treatment 11/2016    Cancer of tonsil ([facility]     Cervical herniation     no limits on rom    Difficulty sleeping     Discoloration of skin of foot 6/25/2020    Dizziness 6/20/2018    GERD (gastroesophageal reflux disease)     Hyperlipidemia     Hypokalemia 11/16/2016    with chemo / resolved    Hypomagnesemia 11/16/2016    related to chemo / resloved    Hypothyroidism     Leg swelling 7/26/2017    Lumbar herniated disc     after fell 2/2017 / now has chronic back pain and numbnes at left foot and ankle    Lymphedema of both lower extremities 07/26/2017    wears support hose    Lymphedema of lower extremity     Osteopenia     PONV (postoperative nausea and vomiting)     Restless leg syndrome     Rotator cuff tear     bilateral    Sleep apnea     Thyroid disease     Toe cyanosis 6/25/2020       Past Surgical History:   Procedure Laterality Date   [address]    COLONOSCOPY  2008    neg    COLONOSCOPY  03/19/2014    ENDOSCOPY, COLON, DIAGNOSTIC  2012    FRACTURE SURGERY  2012    RIGHT ELBOW    HYSTERECTOMY  1993    [facility]   [address] [facility]  LARYNGOSCOPY  09/08/2016    direct laryngoscopy right tonsil/tongue base biopsy cervical esophagoscopy fine needle aspiration     NERVE BLOCK      OTHER SURGICAL HISTORY  5/28/12    ORIF RIGHT ULNA    OTHER SURGICAL HISTORY  6/18/2012    orif  rivision right olecranon    OTHER SURGICAL HISTORY Right 01/16/2014    Right elbow hardware removal    OH OFFICE/OUTPT VISIT,PROCEDURE ONLY N/A 11/9/2018    PORT REMOVAL performed by Sury Campbell MD at [place] [address]  1MONTHS OF AGE    H/O ESOPHAGEAL WEB    TUNNELED VENOUS PORT PLACEMENT  10/04/2016    Dr. Lee Bias History     Socioeconomic History    Marital status:      Spouse name: Not on file    Number of children: Not on file    Years of education: Not on file    Highest education level: Not on file   Occupational History    Not on file   Social Needs    Financial resource strain: Not on file    Food insecurity     Worry: Not on file     Inability: Not on file    Transportation needs     Medical: Not on file     Non-medical: Not on file   Tobacco Use    Smoking status: Never Smoker    Smokeless tobacco: Never Used   Substance and Sexual Activity    Alcohol use: No     Alcohol/week: 0.0 standard drinks    Drug use: No    Sexual activity: Not on file   Lifestyle    Physical activity     Days per week: Not on file     Minutes per session: Not on file    Stress: Not on file   Relationships    Social connections     Talks on phone: Not on file     Gets together: Not on file     Attends Jew service: Not on file     Active member of club or organization: Not on file     Attends meetings of clubs or organizations: Not on file     Relationship status: Not on file    Intimate partner violence     Fear of current or ex partner: Not on file     Emotionally abused: Not on file     Physically abused: Not on file     Forced sexual activity: Not on file   Other Topics Concern  Not on file   Social History Narrative    Not on file       The patient is retired. Ganesh Carlos  Does not require an assistive device. Family History   Problem Relation Age of Onset    Hypertension Father     High Blood Pressure Father     Arthritis Father     Cancer Mother         uterine    Diabetes Mother     Hypertension Mother     High Blood Pressure Mother     Depression Mother     Other Mother         endocrine disorder       No Known Allergies    Current Outpatient Medications   Medication Sig Dispense Refill    temazepam (RESTORIL) 15 MG capsule Take 1 capsule by mouth nightly for 90 days. Sleep aide 90 capsule 0    rosuvastatin (CRESTOR) 5 MG tablet Take 5 mg by mouth daily 3 times a week      B Complex Vitamins (B-COMPLEX/B-12 PO) Take by mouth daily      Cholecalciferol (VITAMIN D PO) Take 1 tablet by mouth 3000 units daily      gabapentin (NEURONTIN) 400 MG capsule Take 400 mg by mouth 3 times daily. 3    Biotin 10 MG CAPS Take by mouth daily HOLD      Elastic Bandages & Supports (JOBST KNEE HIGH COMPRESSION SM) MISC Knee high with 20- 30 mmhg of compression 1 each 10    ROPINIRole HCl (REQUIP PO) Take by mouth nightly as needed For restless legs      acetaminophen (TYLENOL) 500 MG tablet Take 500 mg by mouth every 6 hours as needed Instructed to take with sip water am of procedure if needs      levothyroxine (SYNTHROID) 75 MCG tablet Take 75 mcg by mouth daily       RABEprazole Sodium (ACIPHEX PO) Take 20 mg by mouth daily Instructed to take am of procedure      escitalopram (LEXAPRO) 5 MG tablet Take 5 mg by mouth three times a week       No current facility-administered medications for this visit. Review of Systems  General: No chills, fatigue, fever, malaise, night sweats, weight gain,  weight loss.    Psychological: No anxiety, depression, suicidal ideation   Ophthalmic: No blurry vision, decreased vision, double vision, loss of vision  Ear Nose Throat: Minimal pain with bilateral Jadon-Land. Full painless cervical spine and elbow ROM. Negative Spurling. There is bilateral upper trapezius muscle spasm and trigger points. No upper extremity edema. Neurologic: Awake, alert and oriented in three planes. Speech is fluent. No facial weakness. Tongue is midline. Hearing is intact for conversation. Pupils are equal and round. Extraocular muscles are intact. Shoulder shrug symmetric. Strength:   R  L  Deltoid   5-  5-  Biceps   5  5  Triceps  5  5  Wrist Ext  5  5  Finger Abd  5  5      Sensory:  Intact for light touch in all upper extremity dermatomes. Reflexes:   R  L  Biceps   2 2  Triceps  2 2  BR   2 2       No Dunaway    Gait is normal.     Exam stable    US guided Glenohumeral joint Injection Procedure: Bilateral  After explaining the indications, risks, benefits and alternatives of a bilateral glenohumeral injection, the patient agreed to proceed. A permit was signed and scanned to the [employer]. The patient was placed in the seated position. Chloraprep was used to sterilize the skin. Using an aseptic, no touch technique, a 22 gauge, 1.5\" needle with 1 cc of Kenalog 40mg/cc and 3 cc of bupivacaine 0.25% was directed, under ultrasound guidance, to the right glenohumeral joint. After negative aspiration the medication was injected. Adequate hemostasis was achieved and the injection site was covered with a bandage. The exact same procedure was performed on the left side. US images were scanned to media separately. The patient was observed for complication and left the office without incident. The patient tolerated the procedure well and was educated in post injection care. Impression:   Bilateral shoulder pain -- exam consistent with RTC etiology of pain  Bilateral massive rotator cuff tears. Right shoulder subluxation      Plan:   · Continue home exercises for bilateral shoulder ROM, gentle strengthening, pain reduction techniques.  Reviewed importance of maintaining range of motion. Recently completed formal PT  · Dry needling as needed   · Continue compound cream  Bilateral US guided subacromial steroid injections today for pain relief and improvement in patient ability to perform day to day functions. Reviewed all risks/benefit. Patient wishes to proceed. See above for details. The patient was educated about the diagnosis, prognosis, indications, risks and benefits of treatment. An opportunity to ask questions was given to the patient and questions were answered. The patient agreed to proceed with the recommended treatment as described above. Follow up 3-4 months        Jayden Vance M.D.   Physical Medicine and Rehabilitation

## 2020-10-16 ENCOUNTER — HOSPITAL ENCOUNTER (OUTPATIENT)
Age: 70
Discharge: HOME | End: 2020-10-18

## 2020-10-16 PROCEDURE — 87081 CULTURE SCREEN ONLY: CPT

## 2020-10-16 PROCEDURE — 88305 TISSUE EXAM BY PATHOLOGIST: CPT

## 2020-10-17 LAB — CLOTEST: NORMAL

## 2020-10-22 RX ORDER — TEMAZEPAM 15 MG/1
15 CAPSULE ORAL NIGHTLY
Qty: 90 CAPSULE | Refills: 3 | Status: SHIPPED | OUTPATIENT
Start: 2020-10-22 | End: 2021-01-20

## 2020-10-30 ENCOUNTER — HOSPITAL ENCOUNTER (OUTPATIENT)
Age: 70
Discharge: HOME | End: 2020-10-30
Payer: COMMERCIAL

## 2020-10-30 LAB
ALBUMIN SERPL-MCNC: 3.8 G/DL (ref 3.5–5.2)
ALP BLD-CCNC: 56 U/L (ref 35–104)
ALT SERPL-CCNC: 15 U/L (ref 0–32)
ANION GAP SERPL CALCULATED.3IONS-SCNC: 9 MMOL/L (ref 7–16)
AST SERPL-CCNC: 24 U/L (ref 0–31)
BILIRUB SERPL-MCNC: 1 MG/DL (ref 0–1.2)
BUN BLDV-MCNC: 13 MG/DL (ref 8–23)
CALCIUM SERPL-MCNC: 9.4 MG/DL (ref 8.6–10.2)
CHLORIDE BLD-SCNC: 103 MMOL/L (ref 98–107)
CHOLESTEROL, TOTAL: 161 MG/DL (ref 0–199)
CO2: 27 MMOL/L (ref 22–29)
CREAT SERPL-MCNC: 0.8 MG/DL (ref 0.5–1)
GFR AFRICAN AMERICAN: >60
GFR NON-AFRICAN AMERICAN: >60 ML/MIN/1.73
GLUCOSE BLD-MCNC: 89 MG/DL (ref 74–99)
HCT VFR BLD CALC: 40.2 % (ref 34–48)
HDLC SERPL-MCNC: 73 MG/DL
HEMOGLOBIN: 12.8 G/DL (ref 11.5–15.5)
LDL CHOLESTEROL CALCULATED: 74 MG/DL (ref 0–99)
MCH RBC QN AUTO: 30 PG (ref 26–35)
MCHC RBC AUTO-ENTMCNC: 31.8 % (ref 32–34.5)
MCV RBC AUTO: 94.4 FL (ref 80–99.9)
PDW BLD-RTO: 13 FL (ref 11.5–15)
PLATELET # BLD: 237 E9/L (ref 130–450)
PMV BLD AUTO: 10.4 FL (ref 7–12)
POTASSIUM SERPL-SCNC: 4.3 MMOL/L (ref 3.5–5)
RBC # BLD: 4.26 E12/L (ref 3.5–5.5)
SODIUM BLD-SCNC: 139 MMOL/L (ref 132–146)
T4 FREE: 1.51 NG/DL (ref 0.93–1.7)
TOTAL PROTEIN: 6.5 G/DL (ref 6.4–8.3)
TRIGL SERPL-MCNC: 71 MG/DL (ref 0–149)
TSH SERPL DL<=0.05 MIU/L-ACNC: 0.63 UIU/ML (ref 0.27–4.2)
VLDLC SERPL CALC-MCNC: 14 MG/DL
WBC # BLD: 4.8 E9/L (ref 4.5–11.5)

## 2020-10-30 PROCEDURE — 36415 COLL VENOUS BLD VENIPUNCTURE: CPT

## 2020-10-30 PROCEDURE — 80053 COMPREHEN METABOLIC PANEL: CPT

## 2020-10-30 PROCEDURE — 84439 ASSAY OF FREE THYROXINE: CPT

## 2020-10-30 PROCEDURE — 85027 COMPLETE CBC AUTOMATED: CPT

## 2020-10-30 PROCEDURE — 84443 ASSAY THYROID STIM HORMONE: CPT

## 2020-10-30 PROCEDURE — 80061 LIPID PANEL: CPT

## 2020-11-01 ENCOUNTER — HOSPITAL ENCOUNTER (OUTPATIENT)
Dept: CT IMAGING | Age: 70
Discharge: HOME | End: 2020-11-03
Payer: COMMERCIAL

## 2020-11-01 PROCEDURE — 6360000004 HC RX CONTRAST MEDICATION: Performed by: RADIOLOGY

## 2020-11-01 PROCEDURE — 70492 CT SFT TSUE NCK W/O & W/DYE: CPT

## 2020-11-01 RX ADMIN — IOPAMIDOL 75 ML: 755 INJECTION, SOLUTION INTRAVENOUS at 08:39

## 2020-11-10 ENCOUNTER — HOSPITAL ENCOUNTER (OUTPATIENT)
Age: 70
Discharge: HOME | End: 2020-11-12
Payer: COMMERCIAL

## 2020-11-10 ENCOUNTER — OFFICE VISIT (OUTPATIENT)
Dept: PRIMARY CARE CLINIC | Age: 70
End: 2020-11-10
Payer: COMMERCIAL

## 2020-11-10 ENCOUNTER — HOSPITAL ENCOUNTER (OUTPATIENT)
Dept: GENERAL RADIOLOGY | Age: 70
Discharge: HOME | End: 2020-11-12
Payer: COMMERCIAL

## 2020-11-10 VITALS
SYSTOLIC BLOOD PRESSURE: 118 MMHG | TEMPERATURE: 96.4 F | WEIGHT: 118 LBS | HEIGHT: 61 IN | RESPIRATION RATE: 18 BRPM | OXYGEN SATURATION: 100 % | BODY MASS INDEX: 22.28 KG/M2 | HEART RATE: 78 BPM | DIASTOLIC BLOOD PRESSURE: 80 MMHG

## 2020-11-10 PROBLEM — G25.81 RESTLESS LEG SYNDROME: Status: ACTIVE | Noted: 2020-11-10

## 2020-11-10 PROBLEM — F51.04 CHRONIC INSOMNIA: Status: ACTIVE | Noted: 2020-11-10

## 2020-11-10 PROBLEM — R23.0 TOE CYANOSIS: Status: RESOLVED | Noted: 2020-06-25 | Resolved: 2020-11-10

## 2020-11-10 PROBLEM — K21.9 GASTROESOPHAGEAL REFLUX DISEASE WITHOUT ESOPHAGITIS: Status: ACTIVE | Noted: 2020-11-10

## 2020-11-10 PROBLEM — F41.9 ANXIETY AND DEPRESSION: Chronic | Status: ACTIVE | Noted: 2020-11-10

## 2020-11-10 PROBLEM — F32.A ANXIETY AND DEPRESSION: Chronic | Status: ACTIVE | Noted: 2020-11-10

## 2020-11-10 PROCEDURE — 72040 X-RAY EXAM NECK SPINE 2-3 VW: CPT

## 2020-11-10 PROCEDURE — 99215 OFFICE O/P EST HI 40 MIN: CPT | Performed by: INTERNAL MEDICINE

## 2020-11-10 RX ORDER — CREAM BASE NO.39
CREAM (GRAM) TOPICAL
Qty: 120 G | Refills: 0 | Status: SHIPPED
Start: 2020-11-10 | End: 2021-01-13

## 2020-11-10 NOTE — PROGRESS NOTES
Nash Castrojacinda  11/10/20     Chief Complaint   Patient presents with    Annual Exam        No Known Allergies     Current Outpatient Medications   Medication Sig Dispense Refill    temazepam (RESTORIL) 15 MG capsule Take 1 capsule by mouth nightly for 90 days. Sleep aide 90 capsule 3    rosuvastatin (CRESTOR) 5 MG tablet Take 5 mg by mouth daily 3 times a week      B Complex Vitamins (B-COMPLEX/B-12 PO) Take by mouth daily      Cholecalciferol (VITAMIN D PO) Take 1 tablet by mouth 3000 units daily      gabapentin (NEURONTIN) 400 MG capsule Take 400 mg by mouth 3 times daily. 3    Biotin 10 MG CAPS Take by mouth daily HOLD      Elastic Bandages & Supports (JOBST KNEE HIGH COMPRESSION SM) MISC Knee high with 20- 30 mmhg of compression 1 each 10    ROPINIRole HCl (REQUIP PO) Take by mouth nightly as needed For restless legs      acetaminophen (TYLENOL) 500 MG tablet Take 500 mg by mouth every 6 hours as needed Instructed to take with sip water am of procedure if needs      levothyroxine (SYNTHROID) 75 MCG tablet Take 75 mcg by mouth daily       RABEprazole Sodium (ACIPHEX PO) Take 20 mg by mouth daily Instructed to take am of procedure      escitalopram (LEXAPRO) 5 MG tablet Take 5 mg by mouth three times a week       No current facility-administered medications for this visit. HPI: Patient comes in for annual physical.  She is now 72years of age. Currently she feels well except for pain and cramping in her left leg and foot foot. She is wondering if it could be due to the rosuvastatin which she is currently taking 5 mg 3 times weekly. She also complains of persistent numbness radiating from the base of the left side of her neck down to her left hand. She had prior issues with cervical degenerative disc disease and required epidural injections in the past.  She follows up with her ENT specialist Dr. Davi Vega and her oncologist Dr. Fredi Salmeron, for her history of tongue cancer.     Review of Exam:  Patient is an 72 y.o. female     Constitutional:  General Appearance: Well-appearing. Level of Distress: NAD. Ambulation: ambulating normally    Psychiatric:  Insight: good judgment: Mental status: normal mood and affect. Orientation: oriented to time place and person. Memory: recent memory normal and remote memory normal.     Head: normocephalic and atraumatic. Eyes:  Lids and Conjunctiva: Non-injected and no pallor. Pupils: PERRLA, Corneas: grossly intact. EOMI, Lens: clear. Sclerae: non-icteric. Vision: peripheral vision grossly intact and acuity grossly intact. ENMT:  Ears: no lesions on external ear. TMs clear and TM mobility normal.  Hearing: no hearing loss. Nose: No lesions on external nose, septal deviation sinus tenderness or nasal discharge and nares patent and nasal passages clear. Lips, Teeth and Gums:  no mouth or lip ulcers or bleeding gums and normal dentition. Oropharynx: no erythema or exudates and moist mucous membranes and tonsils not enlarged. Neck:  Neck supple, diminished range of motion mainly in extension. Lymph nodes: no cervical LAD, supraclavicular LAD, axillary LAD, or inguinal LAD. Thyroid: non-tender and no enlargement. Lungs:  Respiratory effort, no dyspnea. Auscultation: no rales/crackles or rhonchi and breath sounds normal, good air movement and CTA except as noted. Cardiovascular: Apical impulse:not displaced. Heart: Auscultation: normal S1 and S2, no murmurs, rubs or gallops; and RRR. Neck vessels: no carotid bruits. Pulses including femoral/pedal: normal throughout. Abdomen: Bowel sounds: normal.  Inspection and Palpation: no tenderness, guarding, masses, rebound tenderness or CVA tenderness and soft and non-distended. Liver: non-tender and no hepatomegaly. Spleen: non-tender and no splenomegaly. Hernia: none palpable. Musculoskeletal: Motor Strength and Tone: normal tone and motor strength.   Joints, Bones and Muscles: no malalignment, tenderness or bony abnormalities and normal movement of all extremities. Extremities: no cyanosis, 2+ chronic lymphedema both lower legs left greater than right. Neurologic:  Gait and Station: normal gait and station. Cranial nerves:grossly intact. Sensation:grossly intact and monofilament test intact. Left arm appears normal.  No muscle atrophy. DTRs: Left triceps and biceps reflex diminished. Skin: Inspection and palpation: no rash, lesions, ulcer, induration, nodules, jaundice or abnormal nevi and good turgor. Nails: normal.     Back:  Thoracolumbar Appearance: normal curvature. I have examined the patient's feet and found no evidence of deformities, calluses, ulcerations, or evidence of neuropathy. Lab Results   Component Value Date    WBC 4.8 10/30/2020    HGB 12.8 10/30/2020    HCT 40.2 10/30/2020     10/30/2020    CHOL 161 10/30/2020    TRIG 71 10/30/2020    HDL 73 10/30/2020    ALT 15 10/30/2020    AST 24 10/30/2020    TSH 0.627 10/30/2020    INR 1.1 08/13/2017    LABA1C 5.0 05/11/2017    LABMICR <12.0 12/08/2015      Lab Results   Component Value Date     10/30/2020    K 4.3 10/30/2020     10/30/2020    CO2 27 10/30/2020    BUN 13 10/30/2020    CREATININE 0.8 10/30/2020    GLUCOSE 89 10/30/2020    CALCIUM 9.4 10/30/2020    PROT 6.5 10/30/2020    LABALBU 3.8 10/30/2020    BILITOT 1.0 10/30/2020    ALKPHOS 56 10/30/2020    AST 24 10/30/2020    ALT 15 10/30/2020    LABGLOM >60 10/30/2020    GFRAA >60 10/30/2020            Assessment:    Walker Carrasco was seen today for annual exam.    Diagnoses and all orders for this visit:    Lymphedema of both lower extremities    Pure hypercholesterolemia, well controlled with small dose of rosuvastatin. Anemia associated with chemotherapy, stable and followed by her oncologist.    Acquired hypothyroidism, stable on current dose of levothyroxine 75 mcg daily.     Cancer of tongue ([address] currently in remission followed by her oncologist and ENT specialist.    Chronic insomnia, requiring temazepam 15 mg nightly. Anxiety and depression, not optimally controlled and patient declines treatment with Lexapro at this time. Restless leg syndrome, stable on ropinirole as needed. Gastroesophageal reflux disease without esophagitis, currently stable and followed by her gastroenterologist and recently requiring esophageal dilatation. Left cervical radiculopathy manifest mostly by numbness and slight weakness left arm. Discussion Notes: Patient to continue all of her usual meds and supplements the same as listed on her med list.  She may stop the rosuvastatin for 2 or 3 weeks to see if her left foot pain improves. Otherwise we will refer her to a podiatrist for further evaluation and treatment of her left foot pain. We will schedule her for an x-ray of her cervical spine and following that possible MRI for further evaluation of her persistent numbness and weakness left arm.

## 2020-11-18 ENCOUNTER — OFFICE VISIT (OUTPATIENT)
Dept: PODIATRY | Age: 70
End: 2020-11-18
Payer: COMMERCIAL

## 2020-11-18 VITALS
WEIGHT: 118 LBS | DIASTOLIC BLOOD PRESSURE: 64 MMHG | SYSTOLIC BLOOD PRESSURE: 122 MMHG | BODY MASS INDEX: 22.3 KG/M2 | TEMPERATURE: 97.2 F

## 2020-11-18 PROCEDURE — G8399 PT W/DXA RESULTS DOCUMENT: HCPCS | Performed by: PODIATRIST

## 2020-11-18 PROCEDURE — 1036F TOBACCO NON-USER: CPT | Performed by: PODIATRIST

## 2020-11-18 PROCEDURE — 20600 DRAIN/INJ JOINT/BURSA W/O US: CPT | Performed by: PODIATRIST

## 2020-11-18 PROCEDURE — 3017F COLORECTAL CA SCREEN DOC REV: CPT | Performed by: PODIATRIST

## 2020-11-18 PROCEDURE — 1123F ACP DISCUSS/DSCN MKR DOCD: CPT | Performed by: PODIATRIST

## 2020-11-18 PROCEDURE — 99203 OFFICE O/P NEW LOW 30 MIN: CPT | Performed by: PODIATRIST

## 2020-11-18 PROCEDURE — G8420 CALC BMI NORM PARAMETERS: HCPCS | Performed by: PODIATRIST

## 2020-11-18 PROCEDURE — 4040F PNEUMOC VAC/ADMIN/RCVD: CPT | Performed by: PODIATRIST

## 2020-11-18 PROCEDURE — G8484 FLU IMMUNIZE NO ADMIN: HCPCS | Performed by: PODIATRIST

## 2020-11-18 PROCEDURE — G8427 DOCREV CUR MEDS BY ELIG CLIN: HCPCS | Performed by: PODIATRIST

## 2020-11-18 PROCEDURE — 1090F PRES/ABSN URINE INCON ASSESS: CPT | Performed by: PODIATRIST

## 2020-11-18 RX ORDER — BUPIVACAINE HYDROCHLORIDE 5 MG/ML
1 INJECTION, SOLUTION PERINEURAL ONCE
Status: COMPLETED | OUTPATIENT
Start: 2020-11-18 | End: 2020-11-18

## 2020-11-18 RX ORDER — BETAMETHASONE SODIUM PHOSPHATE AND BETAMETHASONE ACETATE 3; 3 MG/ML; MG/ML
6 INJECTION, SUSPENSION INTRA-ARTICULAR; INTRALESIONAL; INTRAMUSCULAR; SOFT TISSUE ONCE
Status: COMPLETED | OUTPATIENT
Start: 2020-11-18 | End: 2020-11-18

## 2020-11-18 RX ADMIN — BUPIVACAINE HYDROCHLORIDE 5 MG: 5 INJECTION, SOLUTION PERINEURAL at 17:10

## 2020-11-18 RX ADMIN — BETAMETHASONE SODIUM PHOSPHATE AND BETAMETHASONE ACETATE 6 MG: 3; 3 INJECTION, SUSPENSION INTRA-ARTICULAR; INTRALESIONAL; INTRAMUSCULAR; SOFT TISSUE at 17:10

## 2020-11-18 NOTE — LETTER
[address] [address]  Phone: [phone]  Fax: [phone] Baljinder Tse DPM        November 18, 2020     [place], [address] [id number]    Patient: Kolton Ko  MR Number: [id number]  YOB: 1950  Date of Visit: 11/18/2020    Dear Dr. [place]: Thank you for the request for consultation for Sanjay Laureano to me for the evaluation of left foot pain. Below are the relevant portions of my assessment and plan of care. If you have questions, please do not hesitate to call me. I look forward to following Deliliah Councilman along with you.     Sincerely,        Bessy Felton DPM

## 2020-11-18 NOTE — PROGRESS NOTES
Difficulty sleeping     Discoloration of skin of foot 6/25/2020    Dizziness 6/20/2018    GERD (gastroesophageal reflux disease)     Hyperlipidemia     Hypokalemia 11/16/2016    with chemo / resolved    Hypomagnesemia 11/16/2016    related to chemo / resloved    Hypothyroidism     Leg swelling 7/26/2017    Lumbar herniated disc     after fell 2/2017 / now has chronic back pain and numbnes at left foot and ankle    Lymphedema of both lower extremities 07/26/2017    wears support hose    Lymphedema of lower extremity     Osteopenia     PONV (postoperative nausea and vomiting)     Restless leg syndrome     Rotator cuff tear     bilateral    Sleep apnea     Thyroid disease     Toe cyanosis 6/25/2020       Prior to Admission medications    Medication Sig Start Date End Date Taking? Authorizing Provider   Cream Base (VERSAPRO) CREA APPLY ONE GRAM (ONE PUMP) EXTERNALLY TWO TIMES A DAY TO EACH SHOULDER  11/10/20  Yes Oma Guerrero MD   temazepam (RESTORIL) 15 MG capsule Take 1 capsule by mouth nightly for 90 days. Sleep aide 10/22/20 1/20/21 Yes Amber Cash MD   rosuvastatin (CRESTOR) 5 MG tablet Take 5 mg by mouth daily 3 times a week   Yes Historical Provider, MD   B Complex Vitamins (B-COMPLEX/B-12 PO) Take by mouth daily   Yes Historical Provider, MD   Cholecalciferol (VITAMIN D PO) Take 1 tablet by mouth 3000 units daily   Yes Historical Provider, MD   gabapentin (NEURONTIN) 400 MG capsule Take 400 mg by mouth 3 times daily.   5/26/18  Yes Historical Provider, MD   Biotin 10 MG CAPS Take by mouth daily HOLD   Yes Historical Provider, MD   Elastic Bandages & Supports (JOBST KNEE HIGH COMPRESSION SM) MISC Knee high with 20- 30 mmhg of compression 7/26/17  Yes Chandrakant Kaiser MD   ROPINIRole HCl (REQUIP PO) Take by mouth nightly as needed For restless legs   Yes Historical Provider, MD   acetaminophen (TYLENOL) 500 MG tablet Take 500 mg by mouth every 6 hours as needed Instructed to take diaphoresis, fatigue, fever and unexpected weight change. Musculoskeletal: Positive for . Neurological ROS: negative for - behavioral changes, confusion, headaches or seizures. Negative for weakness and numbness. Dermatological ROS: negative for - mole changes, rash  Cardiovascular: Negative for leg swelling. Gastrointestinal: Negative for constipation, diarrhea, nausea and vomiting. The left first metatarsophalangeal joint painful crepitus noted. The right hallux nail is loose and split there is no signs of erythematous drainage exposed bone or tendon  Lower Extremity Physical Examination:   Vitals:   Vitals:    11/18/20 1632   BP: 122/64   Temp: 97.2 °F (36.2 °C)        Foot Exam    General  General Appearance: appears stated age and healthy   Orientation: alert and oriented to person, place, and time       Left Foot/Ankle      Inspection and Palpation  Tenderness: none   Swelling: great toe metatarsophalangeal joint   Skin Exam: skin intact; Neurovascular  Dorsalis pedis: 3+  Posterior tibial: 3+  Saphenous nerve sensation: normal  Tibial nerve sensation: normal  Superficial peroneal nerve sensation: normal  Deep peroneal nerve sensation: normal  Sural nerve sensation: normal  Achilles reflex: 2+  Babinski reflex: 2+    Muscle Strength  Ankle dorsiflexion: 5  Ankle plantar flexion: 5  Ankle inversion: 5  Ankle eversion: 5  Great toe extension: 5  Great toe flexion: 5    Range of Motion    Passive  1st MTP extension: pain  1st MTP flexion: pain    Active  1st MTP extension: pain  1st MTP flexion: pain          Left Ankle Exam     Muscle Strength   The patient has normal left ankle strength. Dorsiflexion:  5/5   Plantar flexion:  5/5             General: AAO x 3 in NAD. Dermatologic Exam:  Skin lesion/ulceration Absent . Skin No rashes or nodules noted. .   Musculoskeletal:     1st MPJ ROM decreased, Left.   Muscle strength 5/5, Bilateral.  Pain present upon palpation of , Left. within dose to as low as reasonably achievable. COMPARISON: None. HISTORY: ORDERING SYSTEM PROVIDED HISTORY: Head and neck cancer (HCC) FINDINGS: PHARYNX/LARYNX:  There is no evidence of recurrent or residual mass. No abnormal enhancement is seen. There is stable mild edema along the right tongue base and right lateral wall of the oropharynx, as well as the vallecula. These findings are likely post treatment in etiology. The parapharyngeal region is unremarkable. SALIVARY GLANDS/THYROID:  The parotid, submandibular and thyroid glands are atrophic. No focal lesion or inflammatory changes are seen within them. There is asymmetrically greater fatty infiltration of the right parotid gland. 2 LYMPH NODES:  No cervical or supraclavicular lymphadenopathy is seen. SOFT TISSUES:  No appreciable soft tissue swelling or mass is seen. BRAIN/ORBITS/SINUSES:  The visualized portion of the intracranial contents appear unremarkable. The visualized portion of the orbits, paranasal sinuses and mastoid air cells demonstrate no acute abnormality. LUNG APICES/SUPERIOR MEDIASTINUM:  Biapical pleuroparenchymal scarring is stable. All. No superior mediastinal lymphadenopathy or mass. The visualized portion of the trachea appears unremarkable. BONES:  No aggressive appearing lytic or blastic bony lesion. 1. No evidence of recurrent mass or metastatic disease. 2. Stable post treatment changes. foot/ankle: X-ray was reviewed showing hallux rigidus first metatarsophalangeal joint left foot    Asessment: Patient is a 72 y.o. female with:    Diagnosis Orders   1. Pain in left foot  XR FOOT LEFT (MIN 3 VIEWS)   2. Hallux rigidus of left foot         Plan: Patient examined and evaluated. The patient was advised to go back into her orthotics. Wide comfortable shoe. Patient does have a compounding cream I have advised to apply 3-4 times a day on the first metatarsal phalangeal joint. Injection today.    Left foot first metatarsal phalangeal joint injection:  Potential risks, complications, alternative treatment options and procedure prognosis were explained to the patient. Verbal and signed informed consent were obtained from the patient. An antiseptic preparation of the skin was performed with ChloraPrep 1 cc of 0.5% Marcaine plain 1 cc of Celestone Soluspan first metatarsophalangeal joint no adverse reaction was observed. The puncture site was covered with an adhesive bandage and the patient was fitted with a removable metatarsal pad. Post injection instructions were given. Current condition and treatment options discussed in detail. Discussed conservative and surgical options with the patient. Treatment options today consisted of verbal and written instructions given to patient. Contact office with any questions/problems/concerns. RTC in 3week(s). The right hallux nail was lightly debrided back no dressing was needed.   11/18/2020    Electronically signed by Girma Stock DPM on 11/18/2020 at 5:10 PM  11/18/2020

## 2020-11-24 ENCOUNTER — HOSPITAL ENCOUNTER (OUTPATIENT)
Dept: MRI IMAGING | Age: 70
Discharge: HOME | End: 2020-11-26
Payer: COMMERCIAL

## 2020-11-24 PROCEDURE — 72141 MRI NECK SPINE W/O DYE: CPT

## 2020-12-03 ENCOUNTER — OFFICE VISIT (OUTPATIENT)
Dept: PODIATRY | Age: 70
End: 2020-12-03
Payer: COMMERCIAL

## 2020-12-03 VITALS — TEMPERATURE: 97.9 F | DIASTOLIC BLOOD PRESSURE: 62 MMHG | SYSTOLIC BLOOD PRESSURE: 92 MMHG

## 2020-12-03 PROCEDURE — 1123F ACP DISCUSS/DSCN MKR DOCD: CPT | Performed by: PODIATRIST

## 2020-12-03 PROCEDURE — G8399 PT W/DXA RESULTS DOCUMENT: HCPCS | Performed by: PODIATRIST

## 2020-12-03 PROCEDURE — 4040F PNEUMOC VAC/ADMIN/RCVD: CPT | Performed by: PODIATRIST

## 2020-12-03 PROCEDURE — 3017F COLORECTAL CA SCREEN DOC REV: CPT | Performed by: PODIATRIST

## 2020-12-03 PROCEDURE — G8427 DOCREV CUR MEDS BY ELIG CLIN: HCPCS | Performed by: PODIATRIST

## 2020-12-03 PROCEDURE — 1036F TOBACCO NON-USER: CPT | Performed by: PODIATRIST

## 2020-12-03 PROCEDURE — 99213 OFFICE O/P EST LOW 20 MIN: CPT | Performed by: PODIATRIST

## 2020-12-03 PROCEDURE — 1090F PRES/ABSN URINE INCON ASSESS: CPT | Performed by: PODIATRIST

## 2020-12-03 PROCEDURE — G8420 CALC BMI NORM PARAMETERS: HCPCS | Performed by: PODIATRIST

## 2020-12-03 PROCEDURE — G8484 FLU IMMUNIZE NO ADMIN: HCPCS | Performed by: PODIATRIST

## 2020-12-03 NOTE — PROGRESS NOTES
Known Allergies    Past Medical History:   Diagnosis Date    Anemia associated with chemotherapy 11/16/2016    resolved    Anxiety     Cancer ([facility] [address]     tongue    Cancer of tongue ([facility] [address] 9/16/2016    treated with chemo and radiation / last treatment 11/2016    Cancer of tonsil ([facility] [address]     Cervical herniation     no limits on rom    Difficulty sleeping     Discoloration of skin of foot 6/25/2020    Dizziness 6/20/2018    GERD (gastroesophageal reflux disease)     Hyperlipidemia     Hypokalemia 11/16/2016    with chemo / resolved    Hypomagnesemia 11/16/2016    related to chemo / resloved    Hypothyroidism     Leg swelling 7/26/2017    Lumbar herniated disc     after fell 2/2017 / now has chronic back pain and numbnes at left foot and ankle    Lymphedema of both lower extremities 07/26/2017    wears support hose    Lymphedema of lower extremity     Osteopenia     PONV (postoperative nausea and vomiting)     Restless leg syndrome     Rotator cuff tear     bilateral    Sleep apnea     Thyroid disease     Toe cyanosis 6/25/2020     Past Surgical History:   Procedure Laterality Date   [address]    COLONOSCOPY  2008    neg    COLONOSCOPY  03/19/2014    ENDOSCOPY, COLON, DIAGNOSTIC  2012    FRACTURE SURGERY  2012    RIGHT ELBOW    HYSTERECTOMY  1993    lavh&BSO&A&P    LAPAROSCOPY  1990    LAPAROTOMY  1991    LARYNGOSCOPY  09/08/2016    direct laryngoscopy right tonsil/tongue base biopsy cervical esophagoscopy fine needle aspiration     NERVE BLOCK      OTHER SURGICAL HISTORY  5/28/12    ORIF RIGHT ULNA    OTHER SURGICAL HISTORY  6/18/2012    orif  rivision right olecranon    OTHER SURGICAL HISTORY Right 01/16/2014    Right elbow hardware removal    AK OFFICE/OUTPT VISIT,PROCEDURE ONLY N/A 11/9/2018    PORT REMOVAL performed by Les John MD at [facility]  1MONTHS OF AGE    H/O ESOPHAGEAL WEB    TUNNELED VENOUS PORT PLACEMENT  10/04/2016    Dr. Asher Dunbar  UPPER GASTROINTESTINAL ENDOSCOPY       Family History   Problem Relation Age of Onset    Hypertension Father     High Blood Pressure Father     Arthritis Father     Cancer Mother         uterine    Diabetes Mother     Hypertension Mother     High Blood Pressure Mother     Depression Mother     Other Mother         endocrine disorder     Social History     Socioeconomic History    Marital status:      Spouse name: Not on file    Number of children: Not on file    Years of education: Not on file    Highest education level: Not on file   Occupational History    Not on file   Social Needs    Financial resource strain: Not on file    Food insecurity     Worry: Not on file     Inability: Not on file    Transportation needs     Medical: Not on file     Non-medical: Not on file   Tobacco Use    Smoking status: Never Smoker    Smokeless tobacco: Never Used   Substance and Sexual Activity    Alcohol use: No     Alcohol/week: 0.0 standard drinks    Drug use: No    Sexual activity: Not on file   Lifestyle    Physical activity     Days per week: Not on file     Minutes per session: Not on file    Stress: Not on file   Relationships    Social connections     Talks on phone: Not on file     Gets together: Not on file     Attends Christian service: Not on file     Active member of club or organization: Not on file     Attends meetings of clubs or organizations: Not on file     Relationship status: Not on file    Intimate partner violence     Fear of current or ex partner: Not on file     Emotionally abused: Not on file     Physically abused: Not on file     Forced sexual activity: Not on file   Other Topics Concern    Not on file   Social History Narrative    Not on file       Vitals:    12/03/20 0751   BP: 92/62   Temp: 97.9 °F (36.6 °C)   TempSrc: Temporal   Weight: Comment: refused       Focused Lower Extremity Physical Exam:  Vitals:    12/03/20 0751   BP: 92/62   Temp: 97.9 °F (36.6 °C) Foot Exam    General  General Appearance: appears stated age and healthy   Orientation: alert and oriented to person, place, and time       Left Foot/Ankle      Inspection and Palpation  Tenderness: bony tenderness and great toe metatarsophalangeal joint   Swelling: great toe metatarsophalangeal joint   Skin Exam: skin intact; Neurovascular  Dorsalis pedis: 3+  Posterior tibial: 3+  Saphenous nerve sensation: normal  Tibial nerve sensation: normal  Superficial peroneal nerve sensation: normal  Deep peroneal nerve sensation: normal  Sural nerve sensation: normal  Achilles reflex: 2+  Babinski reflex: 2+    Muscle Strength  Ankle dorsiflexion: 5  Ankle plantar flexion: 5  Ankle inversion: 5  Ankle eversion: 5  Great toe extension: 5  Great toe flexion: 5    Range of Motion    Passive  1st MTP extension: pain  1st MTP flexion: pain    Active  1st MTP extension: pain  1st MTP flexion: pain             Left Ankle Exam     Muscle Strength   The patient has normal left ankle strength. Dorsiflexion:  5/5   Plantar flexion:  5/5             Vascular: pulses  dp  pt  palapble  Capillary Refill Time:   Hair growth  Skin:    Edema:    Neurologic:      Musculoskeletal/ Orthopedic examination: Left foot hallux rigidus noted on x-ray negative pain with palpation to the first metatarsal phalangeal joint decreased range of motion including decreased dorsiflexion. NAIL the right hallux nail does have a subungual hematoma but there is no pressure pain or infection noted. Web space   Derm:          Assessment and Plan: Plan today of the left hallux rigidus we will conservatively treat with injections as needed Voltaren gel. Possible surgery down the road if surgery then it would probably be a implant at this time. The right hallux nail will continue to watch if there is any issues patient is to call  Joe Form was seen today for foot pain.     Diagnoses and all orders for this visit:    Hallux rigidus of left

## 2020-12-16 ENCOUNTER — HOSPITAL ENCOUNTER (OUTPATIENT)
Dept: GENERAL RADIOLOGY | Age: 70
Discharge: HOME | End: 2020-12-18
Payer: COMMERCIAL

## 2020-12-16 PROCEDURE — 77067 SCR MAMMO BI INCL CAD: CPT

## 2020-12-22 ENCOUNTER — OFFICE VISIT (OUTPATIENT)
Dept: PHYSICAL MEDICINE AND REHAB | Age: 70
End: 2020-12-22
Payer: COMMERCIAL

## 2020-12-22 VITALS
HEIGHT: 61 IN | BODY MASS INDEX: 22.28 KG/M2 | WEIGHT: 118 LBS | DIASTOLIC BLOOD PRESSURE: 70 MMHG | TEMPERATURE: 98.4 F | SYSTOLIC BLOOD PRESSURE: 114 MMHG

## 2020-12-22 PROCEDURE — G8420 CALC BMI NORM PARAMETERS: HCPCS | Performed by: PHYSICAL MEDICINE & REHABILITATION

## 2020-12-22 PROCEDURE — G8399 PT W/DXA RESULTS DOCUMENT: HCPCS | Performed by: PHYSICAL MEDICINE & REHABILITATION

## 2020-12-22 PROCEDURE — 1090F PRES/ABSN URINE INCON ASSESS: CPT | Performed by: PHYSICAL MEDICINE & REHABILITATION

## 2020-12-22 PROCEDURE — 99213 OFFICE O/P EST LOW 20 MIN: CPT | Performed by: PHYSICAL MEDICINE & REHABILITATION

## 2020-12-22 PROCEDURE — G8427 DOCREV CUR MEDS BY ELIG CLIN: HCPCS | Performed by: PHYSICAL MEDICINE & REHABILITATION

## 2020-12-22 PROCEDURE — 1123F ACP DISCUSS/DSCN MKR DOCD: CPT | Performed by: PHYSICAL MEDICINE & REHABILITATION

## 2020-12-22 PROCEDURE — G8484 FLU IMMUNIZE NO ADMIN: HCPCS | Performed by: PHYSICAL MEDICINE & REHABILITATION

## 2020-12-22 PROCEDURE — 3017F COLORECTAL CA SCREEN DOC REV: CPT | Performed by: PHYSICAL MEDICINE & REHABILITATION

## 2020-12-22 PROCEDURE — 20611 DRAIN/INJ JOINT/BURSA W/US: CPT | Performed by: PHYSICAL MEDICINE & REHABILITATION

## 2020-12-22 PROCEDURE — 96372 THER/PROPH/DIAG INJ SC/IM: CPT | Performed by: PHYSICAL MEDICINE & REHABILITATION

## 2020-12-22 PROCEDURE — 1036F TOBACCO NON-USER: CPT | Performed by: PHYSICAL MEDICINE & REHABILITATION

## 2020-12-22 PROCEDURE — 4040F PNEUMOC VAC/ADMIN/RCVD: CPT | Performed by: PHYSICAL MEDICINE & REHABILITATION

## 2020-12-22 NOTE — PROGRESS NOTES
Demetrio Vasquez M.D. [facility] PHYSICAL MEDICINE AND REHABILITAION  [address] [address]  Dept: [phone]  Dept Fax: [phone]    PCP: Nancy Rincon MD  Date of visit: 12/22/20    Chief Complaint   Patient presents with    Shoulder Pain     B/L shoulder pain had epidural 1 month ago states noticed improvement doesn't have pain down the arm        Niya Cantu is a 72 y.o.  right hand dominant woman who presents for follow up of upper extremity pain and bilateral rotator cuff tears. 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 Chemotherapy was started on 10/6/2016 with Dr. Gary Diaz. November 2016 she completed head and neck irradiation for management of right base of tongue SCC. She reports she finished all treatment (chemo/radiation) November 2016. She denies any arm pain or numbness/tingling after her chemo/radiation, until her more recent injuries with rotator cuff tears. Her primary complaint is bilateral shoulder pain, which [facility]. She had massive full thickness bilateral supraspinatus and infraspinatus tears-- the right rotator cuff in December 2017 and left rotator cuff in January 2018. She saw Ortho--they did not recommend surgery due to chemo/radiation and osteoporosis -- unless pain unbearable --only then would consider shoulder replacement. Patient was cleared for shoulder surgery from radiation oncology standpoint IF indicated/recommended by Ortho. Patient does not wish for surgery at this time. Interval history:   Since last visit patient reports she had benefit from the last set of US guided bilateral shoulder injections.  Periodic shoulder injections continue to provide relief and allow her to be more functional. She reports that in November she started having more issues with neck pain and pain radiating to the LUE with numbness/tingling in the left hand. We have not evaluated her for neck pain before. She saw her PCP and had a cervical xray and MRI done, with results as below. She underwent cervical BINTA at C7-T1 with Dr. Michael Iniguez on 12/10/2020 and reports improvement in radiating upper extremity symptoms, but persistent shoulder pain. She is taking gabapentin 400mg TID and using voltaren gel on her shoulders with reported benefit. She continues her home exercise program. She is still going periodically for dry needling, which she reports has been beneficial. She reports that periodic subacromial injections continue to be beneficial, decreasing pain significantly and allowing her to continue to be functional. Relief from injections continues to last 3-4 months. She requests to repeat injections today. She is using topical compound cream with benefit. She continues home exercise and stretching program to maintain range of motion. No other changes reported. Shoulder pain is located at the lateral shoulders and subacromial space bilaterally, without current radiation to the upper extremities. Pain is equal in character and degree bilaterally. She denies any associated numbness/tingling, or paresthesia in the upper extremities. There is some weakness about the shoulders bilaterally, unchanged. No weakness of biceps/triceps, forearm or intrinsic hand muscles. The shoulder pain is constant and aching. Pain is worse with overhead activity. Physical therapy has helped with range of motion and function. The prior workup has included:  Bilateral shoulder MRI - May 2018  -Right shoulder MRI: massive full thickness rotator cuff tear involving the supraspinatus and infraspinatus with retraction and uncovering of the humeral head which is high riding and articulating with the undersurface of the acromian.  There is OA of the [facility] joint.   -Left shoulder MRI: massive full thickness rotator cuff tear involving the supraspinatus and infraspinatus with retraction and uncovering of the humeral head which is high riding and articulating with the undersurface of the acromian. There is OA of the [facility] joint.  -Cervical xray 11/10/2020   FINDINGS:   No acute fracture or dislocation is evident.  There is degenerative disc   disease which is especially severe at C5-6 followed by C6-7.  There is mild   retrolisthesis of C5 on C6 which is thought to be degenerative.  Unremarkable   soft tissues.  The bones appear somewhat demineralized.           Impression   Degenerative disc disease.  Osteopenia.  See above. -Cervical MRI 11/24/2020  FINDINGS:   BONES/ALIGNMENT: There is mild retrolisthesis of C5 on C6 and of C6 on C7. There is degenerative disc disease with disc space narrowing and osteophytes   at C4-5, C5-6, and C6-7. SPINAL CORD:  No abnormal signal is identified within the spinal cord. SOFT TISSUES: No paraspinal mass identified. Valentina Rinku is a small nodule in the   right lobe of the thyroid for which no follow-up is suggested per ACR   criteria. C2-C3:  The thecal sac and neural foramina are intact. C3-C4: There are small osteophytes.  The thecal sac is not stenotic. C4-C5: There is a minimal disc protrusion. The thecal sac and neural foramina   are intact. C5-C6: There is a disc protrusion measuring 2 mm.  The thecal sac is mildly   stenotic measuring 9.7 mm. Disc and/or osteophytes result in mild narrowing   of the neural foramina bilaterally. C6-C7: There is disc protrusion and osteophyte measuring 2-3 mm.  The thecal   sac is mildly stenotic measuring 9.8 mm.  Disc and/or osteophytes result in   narrowing of the neural foramina bilaterally.    C7-T1:  The thecal sac and neural foramina are intact.       Impression   Disc and osteophytes result in minimal narrowing of the neural foramina and   mild stenosis of the of both lower extremities 07/26/2017    wears support hose    Lymphedema of lower extremity     Osteopenia     PONV (postoperative nausea and vomiting)     Restless leg syndrome     Rotator cuff tear     bilateral    Sleep apnea     Thyroid disease     Toe cyanosis 6/25/2020       Past Surgical History:   Procedure Laterality Date    BLADDER REPAIR  1995    COLONOSCOPY  2008    neg    COLONOSCOPY  03/19/2014    ENDOSCOPY, COLON, DIAGNOSTIC  2012    FRACTURE SURGERY  2012    RIGHT ELBOW    HYSTERECTOMY  1993    lavh&BSO&A&P    LAPAROSCOPY  1990    LAPAROTOMY  1991    LARYNGOSCOPY  09/08/2016    direct laryngoscopy right tonsil/tongue base biopsy cervical esophagoscopy fine needle aspiration     NERVE BLOCK      OTHER SURGICAL HISTORY  5/28/12    ORIF RIGHT ULNA    OTHER SURGICAL HISTORY  6/18/2012    orif  rivision right olecranon    OTHER SURGICAL HISTORY Right 01/16/2014    Right elbow hardware removal    TN OFFICE/OUTPT VISIT,PROCEDURE ONLY N/A 11/9/2018    PORT REMOVAL performed by Franc Perez MD at [place] [address]  1MONTHS OF AGE    H/O ESOPHAGEAL WEB    TUNNELED VENOUS PORT PLACEMENT  10/04/2016    Dr. Bianchi Daniel History     Socioeconomic History    Marital status:      Spouse name: Not on file    Number of children: Not on file    Years of education: Not on file    Highest education level: Not on file   Occupational History    Not on file   Social Needs    Financial resource strain: Not on file    Food insecurity     Worry: Not on file     Inability: Not on file    Transportation needs     Medical: Not on file     Non-medical: Not on file   Tobacco Use    Smoking status: Never Smoker    Smokeless tobacco: Never Used   Substance and Sexual Activity    Alcohol use: No     Alcohol/week: 0.0 standard drinks    Drug use: No    Sexual activity: Not on file   Lifestyle    Physical activity     Days per week: Not on file     Minutes per session: Not on file    Stress: Not on file   Relationships    Social connections     Talks on phone: Not on file     Gets together: Not on file     Attends Zoroastrianism service: Not on file     Active member of club or organization: Not on file     Attends meetings of clubs or organizations: Not on file     Relationship status: Not on file    Intimate partner violence     Fear of current or ex partner: Not on file     Emotionally abused: Not on file     Physically abused: Not on file     Forced sexual activity: Not on file   Other Topics Concern    Not on file   Social History Narrative    Not on file       The patient is retired. Ghanshyam Zamora  Does not require an assistive device. Family History   Problem Relation Age of Onset    Hypertension Father     High Blood Pressure Father     Arthritis Father     Cancer Mother         uterine    Diabetes Mother     Hypertension Mother     High Blood Pressure Mother     Depression Mother     Other Mother         endocrine disorder       No Known Allergies    Current Outpatient Medications   Medication Sig Dispense Refill    diclofenac sodium (VOLTAREN) 1 % GEL Apply topically 2 times daily 350 g 1    Cream Base (VERSAPRO) CREA APPLY ONE GRAM (ONE PUMP) EXTERNALLY TWO TIMES A DAY TO EACH SHOULDER  120 g 0    temazepam (RESTORIL) 15 MG capsule Take 1 capsule by mouth nightly for 90 days. Sleep aide 90 capsule 3    B Complex Vitamins (B-COMPLEX/B-12 PO) Take by mouth daily      Cholecalciferol (VITAMIN D PO) Take 1 tablet by mouth 3000 units daily      gabapentin (NEURONTIN) 400 MG capsule Take 400 mg by mouth 3 times daily.    3    Biotin 10 MG CAPS Take by mouth daily HOLD      Elastic Bandages & Supports (JOBST KNEE HIGH COMPRESSION SM) MISC Knee high with 20- 30 mmhg of compression 1 each 10    ROPINIRole HCl (REQUIP PO) Take by mouth nightly as needed For restless legs      acetaminophen (TYLENOL) 500 MG There is no cervical lymphadenopathy. Gastrointestinal: Soft abdomen  MSK: Shoulder: No edema, erythema, ecchymosis, effusion, or mass of bilateral shoulders. Right shoulder + sulcus sign. Full passive flexion and abduction bilateral shoulders, mild pain at terminal abduction. Cannot actively abduct beyond 90 deg when trapezius is eliminated from action. Decreased internal rotation b/l shoulders. No crepitus. No tenderness to palpation at the acromioclavicular joint or bicipital groove. There is tenderness at bilateral subacromial space. There is weakness with Empty can test bilaterally (3-/5). Weak with Speed's bilaterally. Minimal pain with bilateral Jadon-Land. Full painless cervical spine and elbow ROM. Negative Spurling. There is bilateral upper trapezius muscle spasm and trigger points. No upper extremity edema. Neurologic: Awake, alert and oriented in three planes. Speech is fluent. No facial weakness. Tongue is midline. Hearing is intact for conversation. Pupils are equal and round. Extraocular muscles are intact. Shoulder shrug symmetric. Strength:   R  L  Deltoid   5-  5-  Biceps   5  5  Triceps  5  5  Wrist Ext  5  5  Finger Abd  5  5      Sensory:  Intact for light touch in all upper extremity dermatomes. Reflexes:   R  L  Biceps   2 2  Triceps  2 2  BR   2 2       No Dunaway    Gait is normal.         US guided Glenohumeral joint Injection Procedure: Bilateral  After explaining the indications, risks, benefits and alternatives of a bilateral glenohumeral injection, the patient agreed to proceed. A permit was signed and scanned to the media. The patient was placed in the seated position. Chloraprep was used to sterilize the skin. Using an aseptic, no touch technique, a 22 gauge, 1.5\" needle with 1 cc of Kenalog 40mg/cc and 3 cc of bupivacaine 0.25% was directed, under ultrasound guidance, to the right glenohumeral joint. After negative aspiration the medication was injected. Adequate hemostasis was achieved and the injection site was covered with a bandage. The exact same procedure was performed on the left side. US images were scanned to media separately. The patient was observed for complication and left the office without incident. The patient tolerated the procedure well and was educated in post injection care. Impression:   -Bilateral shoulder pain   -Bilateral massive rotator cuff tears.   -Right shoulder subluxation  -Left cervical radiculitis -- radicular symptoms improved after cervical BINTA, patient is following with Dr. Samra Chavez:   · Continue home exercises for bilateral shoulder ROM, gentle strengthening, pain reduction techniques. Reviewed importance of maintaining range of motion. -  · Dry needling as needed   · Continue compound cream  Bilateral US guided subacromial steroid injections today for pain relief and improvement in patient ability to perform day to day functions. Reviewed all risks/benefit. Patient wishes to proceed. See above for details. Left cervical radicular symptoms improved after epidural. Monitor, injection can be repeated in future if needed. The patient was educated about the diagnosis, prognosis, indications, risks and benefits of treatment. An opportunity to ask questions was given to the patient and questions were answered. The patient agreed to proceed with the recommended treatment as described above. Follow up 3-4 months        Mike Sims M.D.   Physical Medicine and Rehabilitation

## 2020-12-24 RX ORDER — BUPIVACAINE HYDROCHLORIDE 2.5 MG/ML
6 INJECTION, SOLUTION EPIDURAL; INFILTRATION; INTRACAUDAL ONCE
Status: SHIPPED | OUTPATIENT
Start: 2020-12-24

## 2020-12-24 RX ORDER — TRIAMCINOLONE ACETONIDE 40 MG/ML
40 INJECTION, SUSPENSION INTRA-ARTICULAR; INTRAMUSCULAR ONCE
Status: DISCONTINUED | OUTPATIENT
Start: 2020-12-24 | End: 2021-02-01

## 2020-12-28 RX ORDER — RABEPRAZOLE SODIUM 20 MG/1
TABLET, DELAYED RELEASE ORAL
Qty: 90 TABLET | Refills: 3 | Status: SHIPPED
Start: 2020-12-28 | End: 2020-12-28

## 2020-12-28 RX ORDER — RABEPRAZOLE SODIUM 20 MG/1
TABLET, DELAYED RELEASE ORAL
Qty: 90 TABLET | Refills: 3 | Status: SHIPPED
Start: 2020-12-28 | End: 2021-12-28 | Stop reason: SDUPTHER

## 2020-12-28 RX ORDER — GABAPENTIN 400 MG/1
400 CAPSULE ORAL 3 TIMES DAILY
Qty: 270 CAPSULE | Refills: 3 | Status: SHIPPED
Start: 2020-12-28 | End: 2022-02-11 | Stop reason: SDUPTHER

## 2020-12-28 RX ORDER — RABEPRAZOLE SODIUM 20 MG/1
20 TABLET, DELAYED RELEASE ORAL DAILY
COMMUNITY
End: 2021-02-01

## 2021-01-11 ENCOUNTER — TELEPHONE (OUTPATIENT)
Dept: VASCULAR SURGERY | Age: 71
End: 2021-01-11

## 2021-01-11 DIAGNOSIS — I89.0 LYMPHEDEMA OF BOTH LOWER EXTREMITIES: Primary | ICD-10-CM

## 2021-01-11 NOTE — TELEPHONE ENCOUNTER
Patient would like a new referral to [facility] for lymphedema therapy, can this be done without a new appointment? Please advise.

## 2021-01-13 RX ORDER — CREAM BASE NO.39
CREAM (GRAM) TOPICAL
Qty: 120 G | Refills: 0 | Status: SHIPPED
Start: 2021-01-13 | End: 2021-03-17

## 2021-01-25 DIAGNOSIS — E03.9 ACQUIRED HYPOTHYROIDISM: Primary | ICD-10-CM

## 2021-01-25 RX ORDER — LEVOTHYROXINE SODIUM 75 MCG
75 TABLET ORAL DAILY
Qty: 90 TABLET | Refills: 3 | Status: SHIPPED
Start: 2021-01-25 | End: 2021-12-01 | Stop reason: SDUPTHER

## 2021-01-27 ENCOUNTER — HOSPITAL ENCOUNTER (EMERGENCY)
Age: 71
Discharge: HOME | End: 2021-01-27
Payer: COMMERCIAL

## 2021-01-27 ENCOUNTER — APPOINTMENT (OUTPATIENT)
Dept: CT IMAGING | Age: 71
End: 2021-01-27
Payer: COMMERCIAL

## 2021-01-27 VITALS
HEIGHT: 62 IN | HEART RATE: 65 BPM | SYSTOLIC BLOOD PRESSURE: 116 MMHG | RESPIRATION RATE: 18 BRPM | OXYGEN SATURATION: 96 % | TEMPERATURE: 96.8 F | BODY MASS INDEX: 21.71 KG/M2 | DIASTOLIC BLOOD PRESSURE: 76 MMHG | WEIGHT: 118 LBS
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
## 2021-01-27 DIAGNOSIS — S30.0XXA CONTUSION OF LOWER BACK, INITIAL ENCOUNTER: ICD-10-CM

## 2021-01-27 DIAGNOSIS — S09.90XA MINOR HEAD INJURY WITHOUT LOSS OF CONSCIOUSNESS, INITIAL ENCOUNTER: Primary | ICD-10-CM

## 2021-01-27 DIAGNOSIS — S16.1XXA ACUTE STRAIN OF NECK MUSCLE, INITIAL ENCOUNTER: ICD-10-CM

## 2021-01-27 PROCEDURE — 72131 CT LUMBAR SPINE W/O DYE: CPT

## 2021-01-27 PROCEDURE — 6370000000 HC RX 637 (ALT 250 FOR IP): Performed by: NURSE PRACTITIONER

## 2021-01-27 PROCEDURE — 72125 CT NECK SPINE W/O DYE: CPT

## 2021-01-27 PROCEDURE — 99283 EMERGENCY DEPT VISIT LOW MDM: CPT

## 2021-01-27 PROCEDURE — 70450 CT HEAD/BRAIN W/O DYE: CPT

## 2021-01-27 RX ORDER — ACETAMINOPHEN 500 MG
1000 TABLET ORAL ONCE
Status: COMPLETED | OUTPATIENT
Start: 2021-01-27 | End: 2021-01-27

## 2021-01-27 RX ADMIN — ACETAMINOPHEN 1000 MG: 500 TABLET ORAL at 15:25

## 2021-01-27 ASSESSMENT — PAIN DESCRIPTION - LOCATION: LOCATION: BACK;HEAD

## 2021-01-27 ASSESSMENT — PAIN SCALES - GENERAL: PAINLEVEL_OUTOF10: 5

## 2021-01-27 ASSESSMENT — PAIN DESCRIPTION - PROGRESSION: CLINICAL_PROGRESSION: NOT CHANGED

## 2021-01-27 NOTE — ED NOTES
c collar removed by GEMMA Mariechure   To follow with pmd.    Pt states no change in head pain-  Back pain seems to be easing up     Bandar Shearer RN  01/27/21 7625

## 2021-01-28 NOTE — DISCHARGE INSTR - COC
Continuity of Care Form    Patient Name: Jaspreet Castro   :  1950  MRN:  [id number]    Admit date:  2021  Discharge date:  ***    Code Status Order: Prior   Advance Directives:     Admitting Physician:  No admitting provider for patient encounter.   PCP: Wing Rosa MD    Discharging Nurse: [facility] Unit/Room#: MARK/MARK  Discharging Unit Phone Number: ***    Emergency Contact:   Extended Emergency Contact Information  Primary Emergency Contact: Diana BLANCO  Address: [address] [place] [address] Phone: [phone]  Mobile Phone: [phone]  Relation: Spouse  Secondary Emergency Contact: Angie Stewart of [address] [place] [address] Phone: [phone]  Relation: Child    Past Surgical History:  Past Surgical History:   Procedure Laterality Date   [address]    COLONOSCOPY      neg    COLONOSCOPY  2014    ENDOSCOPY, COLON, DIAGNOSTIC      EYE SURGERY      cataract surgery, bilateral.     FRACTURE SURGERY      RIGHT ELBOW    HYSTERECTOMY      [facility]&BSO&A&P   [address] [place] [address] Floor    LARYNGOSCOPY  2016    direct laryngoscopy right tonsil/tongue base biopsy cervical esophagoscopy fine needle aspiration     NERVE BLOCK      OTHER SURGICAL HISTORY  12    ORIF RIGHT ULNA    OTHER SURGICAL HISTORY  2012    orif  rivision right olecranon    OTHER SURGICAL HISTORY Right 2014    Right elbow hardware removal    HI OFFICE/OUTPT VISIT,PROCEDURE ONLY N/A 2018    PORT REMOVAL performed by Ghislaine Cuevas MD at [place] [address]  1MONTHS OF AGE    H/O ESOPHAGEAL WEB    TUNNELED VENOUS PORT PLACEMENT  10/04/2016    Dr. Lakeshia Alicia ENDOSCOPY         Immunization History:   Immunization History   Administered Date(s) Administered    Influenza Virus Vaccine 10/04/2016, 10/17/2017    Pneumococcal Conjugate 13-valent Jorge Smoke) 10/19/2018    Pneumococcal Polysaccharide ([id number]) 10/23/2017    Td, unspecified formulation 05/28/2012    Tdap (Boostrix, Adacel) 08/02/2020       Active Problems:  Patient Active Problem List   Diagnosis Code    Postmenopausal atrophic vaginitis N95.2    Cancer of tongue ([facility] [address] C02.9    Anemia associated with chemotherapy D64.81, T45.1X5A    Lymphedema of both lower extremities I89.0    Discoloration of skin of foot L81.9    Dyspnea on exertion R06.00    Pure hypercholesterolemia E78.00    Acquired hypothyroidism E03.9    Chronic insomnia F51.04    Anxiety and depression F41.9, F32.9    Restless leg syndrome G25.81    Gastroesophageal reflux disease without esophagitis K21.9       Isolation/Infection:   Isolation          No Isolation        Patient Infection Status     None to display          Nurse Assessment:  Last Vital Signs: /76   Pulse 65   Temp 96.8 °F (36 °C) (Infrared)   Resp 18   Ht 5' 2\" (1.575 m)   Wt 118 lb (53.5 kg)   SpO2 96%   BMI 21.58 kg/m²     Last documented pain score (0-10 scale): Pain Level: 5  Last Weight:   Wt Readings from Last 1 Encounters:   01/27/21 118 lb (53.5 kg)     Mental Status:  {IP PT MENTAL STATUS:20030}    IV Access:  { JASON IV ACCESS:917324807}    Nursing Mobility/ADLs:  Walking   {[facility] DME KEKX:181693773}  Transfer  {[facility] DME SMCF:233869325}  Bathing  {[facility] DME SXBS:738386624}  Dressing  {[facility] DME GHPO:337419691}  Toileting  {[facility] DME SUTA:220226445}  Feeding  {[facility] DME FGQW:967951041}  Med Admin  {[facility] DME EVEA:318235352}  Med Delivery   { JASON MED Delivery:693469664}    Wound Care Documentation and Therapy:        Elimination:  Continence:   · Bowel: {YES / LE:45757}  · Bladder: {YES / IX:90706}  Urinary Catheter: {Urinary Catheter:842848065}   Colostomy/Ileostomy/Ileal Conduit: {YES / UW:39170}       Date of Last BM: ***  No intake or output data in the 24 hours ending 01/28/21 1512  No intake/output data recorded. Safety Concerns:     [address] [facility] Safety Concerns:451075143}    Impairments/Disabilities:      [address] [facility] Impairments/Disabilities:664306002}    Nutrition Therapy:  Current Nutrition Therapy:   [address] [facility] Diet List:296267287}    Routes of Feeding: {CHP DME Other Feedings:519454634}  Liquids: {Slp liquid thickness:17835}  Daily Fluid Restriction: {CHP DME Yes amt example:924767205}  Last Modified Barium Swallow with Video (Video Swallowing Test): {Done Not Done TTOU:290030467}    Treatments at the Time of Hospital Discharge:   Respiratory Treatments: ***  Oxygen Therapy:  {Therapy; copd oxygen:68352}  Ventilator:    { CC Vent TTNB:206460815}    Rehab Therapies: {THERAPEUTIC INTERVENTION:1598152719}  Weight Bearing Status/Restrictions: [address]  Weight Bearin}  Other Medical Equipment (for information only, NOT a DME order):  {EQUIPMENT:400189109}  Other Treatments: ***    Patient's personal belongings (please select all that are sent with patient):  {[facility] DME Belongings:512137182}    RN SIGNATURE:  {Esignature:263515258}    CASE MANAGEMENT/SOCIAL WORK SECTION    Inpatient Status Date: ***    Readmission Risk Assessment Score:  Readmission Risk              Risk of Unplanned Readmission:        0           Discharging to Facility/ Agency   · Name:   · Address:  · Phone:  · Fax:    Dialysis Facility (if applicable)   · Name:  · Address:  · Dialysis Schedule:  · Phone:  · Fax:    / signature: {Esignature:645287313}    PHYSICIAN SECTION    Prognosis: {Prognosis:2422484935}    Condition at Discharge: [address] Patient Condition:681231592}    Rehab Potential (if transferring to Rehab): {Prognosis:1983248148}    Recommended Labs or Other Treatments After Discharge: ***    Physician Certification: I certify the above information and transfer of Kelly Christian  is necessary for the continuing treatment of the diagnosis listed and that she requires {Admit to Appropriate Level of Care:99841} for {GREATER/LESS:824739325} 30 days.      Update Admission H&P: {CHP DME Changes in IEQC:090366869}    PHYSICIAN SIGNATURE:  {Esignature:819289367}

## 2021-01-28 NOTE — ED PROVIDER NOTES
[address]  Department of Emergency Medicine   ED  Encounter Note  Admit Date/RoomTime: 2021  2:59 PM  ED Room: MARK/MARK    NAME: Jacinta Flowers  : 1950  MRN: [id number]     Chief Complaint:  Head Injury (slipped on ice [phone] today. Pain in lower back and head bounced off the cement. No LOC. Some nausea and dizziness. )    History of Present Illness       Jacinta Flowers is a 72 y.o. old female who presents to the emergency department by private vehicle, for a mechanical fall which occured several hour(s) prior to arrival. She was reportedly walking outside of [facility] when she slipped and fell on ice while walking prior to incident with complaints of headache, and head pain, also lower back pain . The patients tetanus status is up to date. Since onset the symptoms have been stable. Her pain is aggraveated by any movement and relieved by nothing. She denies any loss of consciousness, confusion, dizziness, chest pain, abdominal pain, back pain, extremity injury, numbness or weakness. She takes no blood thinning agents. .  ROS   Pertinent positives and negatives are stated within HPI, all other systems reviewed and are negative. Past Medical History:  has a past medical history of Anemia associated with chemotherapy, Anxiety, Cancer ([address] Cancer of tongue ([address] Cancer of tonsil ([address] Cervical herniation, Difficulty sleeping, Discoloration of skin of foot, Dizziness, GERD (gastroesophageal reflux disease), Hx of cataract surgery, Hyperlipidemia, Hypokalemia, Hypomagnesemia, Hypothyroidism, Leg swelling, Lumbar herniated disc, Lymphedema of both lower extremities, Lymphedema of lower extremity, Osteopenia, PONV (postoperative nausea and vomiting), Restless leg syndrome, Rotator cuff tear, Sleep apnea, Thyroid disease, and Toe cyanosis.     Surgical History:  has a past surgical history that includes Hysterectomy (); laparoscopy (); laparotomy (9116); bladder repair (1995); Colonoscopy (2008); thoracotomy (1MONTHS OF AGE); other surgical history (5/28/12); other surgical history (6/18/2012); other surgical history (Right, 01/16/2014); Endoscopy, colon, diagnostic (2012); Colonoscopy (03/19/2014); fracture surgery (2012); laryngoscopy (09/08/2016); Tunneled venous port placement (10/04/2016); Nerve Block; pr office/outpt visit,procedure only (N/A, 11/9/2018); Upper gastrointestinal endoscopy; and eye surgery. Social History:  reports that she has never smoked. She has never used smokeless tobacco. She reports that she does not drink alcohol or use drugs. Family History: family history includes Arthritis in her father; Cancer in her mother; Depression in her mother; Diabetes in her mother; High Blood Pressure in her father and mother; Hypertension in her father and mother; Other in her mother. Allergies: Patient has no known allergies. Physical Exam   Oxygen Saturation Interpretation: Normal.        ED Triage Vitals   BP Temp Temp Source Pulse Resp SpO2 Height Weight   01/27/21 1451 01/27/21 1451 01/27/21 1451 01/27/21 1451 01/27/21 1451 01/27/21 1451 01/27/21 1502 01/27/21 1502   116/76 96.8 °F (36 °C) Infrared 65 18 96 % 5' 2\" (1.575 m) 118 lb (53.5 kg)         Physical Exam  Constitutional:  Alert, development consistent with age. HEENT:  NC/NT. Airway patent. There is tenderness palpation to the submental area of the scalp without any laceration abrasion or hematoma. Neck:  No midline or paravertebral tenderness. Normal ROM. Supple. There is tenderness to palpation to the lower cervical spine paravertebral muscles bilaterally there is full range of motion   Chest:  Symmetrical without visible rash or tenderness. Respiratory:  Lungs Clear to auscultation and breath sounds equal.  CV:  Regular rate and rhythm, normal heart sounds, without pathological murmurs, ectopy, gallops, or rubs.   GI:  Abdomen Soft, nontender, good bowel sounds. No firm or pulsatile mass. Pelvis:  Stable, nontender to palpation. Back:  No midline or paravertebral tenderness. No costovertebral tenderness. There is palpation to the paravertebral muscles the bilateral lower lumbar spine negative CVA tenderness bilaterally negative straight leg raise bilaterally. Extremities: No tenderness or edema noted. Integument:  Normal turgor. Warm, dry, without visible rash, unless noted elsewhere. Lymphatic: no lymphadenopathy noted  Neurological:  Oriented x3, GCS 15. Motor functions intact. Lab / Imaging Results   (All laboratory and radiology results have been personally reviewed by myself)  Labs:  No results found for this visit on 01/27/21. Imaging: All Radiology results interpreted by Radiologist unless otherwise noted. CT CERVICAL SPINE WO CONTRAST   Final Result   1. There is no acute compression fracture or subluxation of the cervical   spine. 2. Multilevel degenerative disc and degenerative joint disease. CT HEAD WO CONTRAST   Final Result   No acute intracranial abnormality. CT LUMBAR SPINE WO CONTRAST   Final Result   1. No acute fracture identified. Remote, healed fracture of the upper   sacrum. 2.  Multilevel degenerative changes. 3.  Acquired spinal stenosis with L4-5 mild central canal narrowing. Multilevel foraminal narrowing, details above. ED Course / Medical Decision Making     Medications   acetaminophen (TYLENOL) tablet 1,000 mg (1,000 mg Oral Given 1/27/21 1525)        Re-examination:  1/27/21     Time: 1700  Patients symptoms are improving. Patient c-collar was removed. Patient at this time denies any symptoms states she is feeling much better. Patient has no neurological or focal deficits she denies any numbness tingling or weakness to her lower extremities. She denies any loss of bowel or bladder function.     Consult(s):   None    Procedure(s):   none    MDM:   At this time the patient is without objective evidence of an acute process requiring hospitalization or inpatient management. They have remained hemodynamically stable throughout their entire ED visit and are stable for discharge with outpatient follow-up. The plan has been discussed in detail and they are aware of the specific conditions for emergent return, as well as the importance of follow-up. Patient this time is nontoxic in appearance and in no distress. Patient will be managed in the outpatient setting with anti-inflammatory medication. Patient also educated on ice to the neck or low back as needed for pain relief. Patient to follow-up with her primary care physician all questions were answered. Patient agreeable to plan of care    Plan of Care/Counseling:  I reviewed today's visit with the patient in addition to providing specific details for the plan of care and counseling regarding the diagnosis and prognosis. Questions are answered at this time and are agreeable with the plan. Assessment      1. Minor head injury without loss of consciousness, initial encounter    2. Acute strain of neck muscle, initial encounter    3. Contusion of lower back, initial encounter    4. Fall due to slipping on ice or snow, initial encounter      Plan   Discharge home. Patient condition is good    New Medications     Discharge Medication List as of 1/27/2021  5:05 PM        Electronically signed by SKYE Briggs CNP   DD: 1/28/21  **This report was transcribed using voice recognition software. Every effort was made to ensure accuracy; however, inadvertent computerized transcription errors may be present.   END OF ED PROVIDER NOTE        SKYE Ray CNP  01/28/21 7159

## 2021-02-01 ENCOUNTER — OFFICE VISIT (OUTPATIENT)
Dept: PRIMARY CARE CLINIC | Age: 71
End: 2021-02-01
Payer: COMMERCIAL

## 2021-02-01 VITALS
RESPIRATION RATE: 18 BRPM | SYSTOLIC BLOOD PRESSURE: 110 MMHG | DIASTOLIC BLOOD PRESSURE: 78 MMHG | HEIGHT: 62 IN | WEIGHT: 122 LBS | HEART RATE: 95 BPM | OXYGEN SATURATION: 99 % | TEMPERATURE: 97 F | BODY MASS INDEX: 22.45 KG/M2

## 2021-02-01 DIAGNOSIS — S06.0X0D CEREBRAL CONCUSSION, WITHOUT LOSS OF CONSCIOUSNESS, SUBSEQUENT ENCOUNTER: Primary | ICD-10-CM

## 2021-02-01 DIAGNOSIS — Z78.9 STATIN INTOLERANCE: ICD-10-CM

## 2021-02-01 DIAGNOSIS — S09.90XD CLOSED HEAD INJURY, SUBSEQUENT ENCOUNTER: ICD-10-CM

## 2021-02-01 PROCEDURE — G8484 FLU IMMUNIZE NO ADMIN: HCPCS | Performed by: INTERNAL MEDICINE

## 2021-02-01 PROCEDURE — 1123F ACP DISCUSS/DSCN MKR DOCD: CPT | Performed by: INTERNAL MEDICINE

## 2021-02-01 PROCEDURE — 4040F PNEUMOC VAC/ADMIN/RCVD: CPT | Performed by: INTERNAL MEDICINE

## 2021-02-01 PROCEDURE — 1090F PRES/ABSN URINE INCON ASSESS: CPT | Performed by: INTERNAL MEDICINE

## 2021-02-01 PROCEDURE — 99213 OFFICE O/P EST LOW 20 MIN: CPT | Performed by: INTERNAL MEDICINE

## 2021-02-01 PROCEDURE — 1036F TOBACCO NON-USER: CPT | Performed by: INTERNAL MEDICINE

## 2021-02-01 PROCEDURE — G8420 CALC BMI NORM PARAMETERS: HCPCS | Performed by: INTERNAL MEDICINE

## 2021-02-01 PROCEDURE — G8399 PT W/DXA RESULTS DOCUMENT: HCPCS | Performed by: INTERNAL MEDICINE

## 2021-02-01 PROCEDURE — 3017F COLORECTAL CA SCREEN DOC REV: CPT | Performed by: INTERNAL MEDICINE

## 2021-02-01 PROCEDURE — G8427 DOCREV CUR MEDS BY ELIG CLIN: HCPCS | Performed by: INTERNAL MEDICINE

## 2021-02-01 RX ORDER — VENLAFAXINE HYDROCHLORIDE 37.5 MG/1
37.5 CAPSULE, EXTENDED RELEASE ORAL DAILY
COMMUNITY
End: 2021-11-30 | Stop reason: SDUPTHER

## 2021-02-01 NOTE — PROGRESS NOTES
Jennifer Monday 2/1/21     Chief Complaint   Patient presents with    Follow-up     er, had a fall on 1/24/21 [place] the mall fell on lower back, tailbone ,neck and head        No Known Allergies     Current Outpatient Medications   Medication Sig Dispense Refill    venlafaxine (EFFEXOR XR) 37.5 MG extended release capsule Take 37.5 mg by mouth daily      SYNTHROID 75 MCG tablet Take 1 tablet by mouth daily 90 tablet 3    Cream Base (VERSAPRO) CREA APPLY ONE GRAM (ONE PUMP) EXTERNALLY TWO TIMES A DAY TO EACH SHOULDER  120 g 0    RABEprazole (ACIPHEX) 20 MG tablet 1 daily 90 tablet 3    B Complex Vitamins (B-COMPLEX/B-12 PO) Take by mouth daily      Cholecalciferol (VITAMIN D PO) Take 1 tablet by mouth 3000 units daily      Biotin 10 MG CAPS Take by mouth daily HOLD      Elastic Bandages & Supports (JOBST KNEE HIGH COMPRESSION SM) MISC Knee high with 20- 30 mmhg of compression 1 each 10    ROPINIRole HCl (REQUIP PO) Take by mouth nightly as needed For restless legs      acetaminophen (TYLENOL) 500 MG tablet Take 500 mg by mouth every 6 hours as needed Instructed to take with sip water am of procedure if needs      gabapentin (NEURONTIN) 400 MG capsule Take 1 capsule by mouth 3 times daily for 30 days. 270 capsule 3     Current Facility-Administered Medications   Medication Dose Route Frequency Provider Last Rate Last Admin    bupivacaine (PF) (MARCAINE) 0.25 % injection 15 mg  6 mL Intradermal Once Solo Padilla MD        triamcinolone acetonide (KENALOG-40) injection 40 mg  40 mg Intramuscular Once Solo Padilla MD        triamcinolone acetonide (KENALOG-40) injection 40 mg  40 mg Intramuscular Once Solo Padilla MD            HPI: Patient comes in for follow-up visit. She was in the ER on 1/27/2021 after slipping and falling in [place] the mall. The fall was unwitnessed.   She states that she struck the back of her head on the pavement but did not lose consciousness. She felt somewhat nauseated and foggy for couple of days afterwards. She was evaluated in the emergency room following the incident and had multiple x-rays which revealed no fracture or any intracranial pathology. She is still getting injections in her neck from pain management and following up with physical medicine for her bilateral shoulder pain. She remains on all her usual meds and supplements the same as listed on her med list.  Recently her oncologist added Effexor XR 37.5 mg daily and she states that has helped her mood greatly and she has not had any crying spells lately. Also she states the rosuvastatin was causing persistent lower leg and foot pain. It resolved when she stopped taking it and it reoccurred when she resumed it. She is now off of it again and is going to stay off of it for now. Review of Systems: as per HPI      Physical Exam:    Patient is a 72 y.o. female. Patient appears to be in no distress. He is alert and oriented. Breathing comfortably. Ambulates without assistance. HEENT: normal.  Neck supple, no adenopathy or bruits. There is some discomfort on range of motion of her neck. Heart RR, no MGR. Lungs clear. Abd: normal  Ext: no edema. Peripheral pulses: normal.  No neurologic deficits noted.     Lab Results   Component Value Date    WBC 4.8 10/30/2020    HGB 12.8 10/30/2020    HCT 40.2 10/30/2020     10/30/2020    CHOL 161 10/30/2020    TRIG 71 10/30/2020    HDL 73 10/30/2020    ALT 15 10/30/2020    AST 24 10/30/2020    TSH 0.627 10/30/2020    INR 1.1 08/13/2017    LABA1C 5.0 05/11/2017    LABMICR <12.0 12/08/2015      Lab Results   Component Value Date     10/30/2020    K 4.3 10/30/2020     10/30/2020    CO2 27 10/30/2020    BUN 13 10/30/2020    CREATININE 0.8 10/30/2020    GLUCOSE 89 10/30/2020    CALCIUM 9.4 10/30/2020    PROT 6.5 10/30/2020    LABALBU 3.8 10/30/2020    BILITOT 1.0 10/30/2020    ALKPHOS 56 10/30/2020    AST 24 10/30/2020    ALT 15 10/30/2020    LABGLOM >60 10/30/2020    GFRAA >60 10/30/2020            Assessment:    Jose Raul Kelly was seen today for follow-up. Diagnoses and all orders for this visit:    Mild cerebral concussion, without loss of consciousness, subsequent encounter. Symptoms have resolved. Closed head injury, subsequent encounter      Discussion: Patient will continue all her usual meds and supplements the same as listed on her med list, except she will remain off rosuvastatin and will continue a low-cholesterol, heart healthy diet. She will return as needed or in May for her routine follow-up visit and labs including a CMP, lipid panel, and TSH. She will follow-up with her other physicians as per their instructions.

## 2021-02-12 ENCOUNTER — APPOINTMENT (OUTPATIENT)
Dept: RADIATION ONCOLOGY | Age: 71
End: 2021-02-12
Payer: COMMERCIAL

## 2021-02-26 ENCOUNTER — TELEPHONE (OUTPATIENT)
Dept: RADIATION ONCOLOGY | Age: 71
End: 2021-02-26

## 2021-02-26 ENCOUNTER — HOSPITAL ENCOUNTER (OUTPATIENT)
Dept: RADIATION ONCOLOGY | Age: 71
Discharge: HOME | End: 2021-02-26
Payer: COMMERCIAL

## 2021-02-26 VITALS
BODY MASS INDEX: 21.67 KG/M2 | SYSTOLIC BLOOD PRESSURE: 110 MMHG | TEMPERATURE: 97.5 F | OXYGEN SATURATION: 98 % | WEIGHT: 118.5 LBS | DIASTOLIC BLOOD PRESSURE: 64 MMHG | HEART RATE: 70 BPM | RESPIRATION RATE: 18 BRPM

## 2021-02-26 DIAGNOSIS — C76.0 HEAD AND NECK CANCER (HCC): Primary | ICD-10-CM

## 2021-02-26 PROCEDURE — 99213 OFFICE O/P EST LOW 20 MIN: CPT | Performed by: RADIOLOGY

## 2021-02-26 PROCEDURE — 99212 OFFICE O/P EST SF 10 MIN: CPT

## 2021-02-26 RX ORDER — SUCRALFATE ORAL 1 G/10ML
1 SUSPENSION ORAL 4 TIMES DAILY
COMMUNITY
End: 2021-05-11

## 2021-02-26 RX ORDER — SUCRALFATE ORAL 1 G/10ML
1 SUSPENSION ORAL 4 TIMES DAILY
Qty: 1200 ML | Refills: 3 | Status: SHIPPED | OUTPATIENT
Start: 2021-02-26

## 2021-02-26 NOTE — PROGRESS NOTES
 Lymphedema of both lower extremities 07/26/2017    wears support hose    Lymphedema of lower extremity     Osteopenia     PONV (postoperative nausea and vomiting)     Restless leg syndrome     Rotator cuff tear     bilateral    Sleep apnea     Thyroid disease     Toe cyanosis 6/25/2020         Past Surgical History:   Procedure Laterality Date    BLADDER REPAIR  1995    COLONOSCOPY  2008    neg    COLONOSCOPY  03/19/2014    ENDOSCOPY, COLON, DIAGNOSTIC  2012    EYE SURGERY      cataract surgery, bilateral.     FRACTURE SURGERY  2012    RIGHT ELBOW    HYSTERECTOMY  1993    [facility]   [address]    LARYNGOSCOPY  09/08/2016    direct laryngoscopy right tonsil/tongue base biopsy cervical esophagoscopy fine needle aspiration     NERVE BLOCK      OTHER SURGICAL HISTORY  5/28/12    ORIF RIGHT ULNA    OTHER SURGICAL HISTORY  6/18/2012    orif  rivision right olecranon    OTHER SURGICAL HISTORY Right 01/16/2014    Right elbow hardware removal    [place] OFFICE/OUTPT VISIT,PROCEDURE ONLY N/A 11/9/2018    PORT REMOVAL performed by Pearl Castaneda MD at [address] [place] [address]  1MONTHS OF AGE    H/O ESOPHAGEAL WEB    TUNNELED VENOUS PORT PLACEMENT  10/04/2016    Dr. Bob Quinn History     Socioeconomic History    Marital status:      Spouse name: Not on file    Number of children: Not on file    Years of education: Not on file    Highest education level: Not on file   Occupational History    Not on file   Social Needs    Financial resource strain: Not on file    Food insecurity     Worry: Not on file     Inability: Not on file    Transportation needs     Medical: Not on file     Non-medical: Not on file   Tobacco Use    Smoking status: Never Smoker    Smokeless tobacco: Never Used   Substance and Sexual Activity    Alcohol use: No     Alcohol/week: 0.0 standard drinks    Drug use:  No  Sexual activity: Not on file   Lifestyle    Physical activity     Days per week: Not on file     Minutes per session: Not on file    Stress: Not on file   Relationships    Social connections     Talks on phone: Not on file     Gets together: Not on file     Attends Yazidism service: Not on file     Active member of club or organization: Not on file     Attends meetings of clubs or organizations: Not on file     Relationship status: Not on file    Intimate partner violence     Fear of current or ex partner: Not on file     Emotionally abused: Not on file     Physically abused: Not on file     Forced sexual activity: Not on file   Other Topics Concern    Not on file   Social History Narrative    Not on file         Family History   Problem Relation Age of Onset    Hypertension Father     High Blood Pressure Father     Arthritis Father     Cancer Mother         uterine    Diabetes Mother     Hypertension Mother     High Blood Pressure Mother     Depression Mother     Other Mother         endocrine disorder       Allergies:   Patient has no known allergies. Current Outpatient Medications   Medication Sig Dispense Refill    NONFORMULARY Indications: elderberry       venlafaxine (EFFEXOR XR) 37.5 MG extended release capsule Take 37.5 mg by mouth daily      SYNTHROID 75 MCG tablet Take 1 tablet by mouth daily 90 tablet 3    Cream Base (VERSAPRO) CREA APPLY ONE GRAM (ONE PUMP) EXTERNALLY TWO TIMES A DAY TO EACH SHOULDER  120 g 0    gabapentin (NEURONTIN) 400 MG capsule Take 1 capsule by mouth 3 times daily for 30 days.  270 capsule 3    RABEprazole (ACIPHEX) 20 MG tablet 1 daily 90 tablet 3    B Complex Vitamins (B-COMPLEX/B-12 PO) Take by mouth daily      Cholecalciferol (VITAMIN D PO) Take 1 tablet by mouth 3000 units daily      Biotin 10 MG CAPS Take by mouth daily HOLD  Elastic Bandages & Supports (JOBST KNEE HIGH COMPRESSION SM) MISC Knee high with 20- 30 mmhg of compression 1 each 10    ROPINIRole HCl (REQUIP PO) Take by mouth nightly as needed For restless legs      acetaminophen (TYLENOL) 500 MG tablet Take 500 mg by mouth every 6 hours as needed Instructed to take with sip water am of procedure if needs       Current Facility-Administered Medications   Medication Dose Route Frequency Provider Last Rate Last Admin    bupivacaine (PF) (MARCAINE) 0.25 % injection 15 mg  6 mL Intradermal Once Chuck Williamson MD             REVIEW OF SYSTEMS  History obtained from chart review and the patient  General ROS: dizziness from the recent fall, CT in system  Psychological ROS: negative  Ophthalmic ROS: negative  ENT ROS: dry mouth at BL - no pain  Allergy and Immunology ROS: negative  Hematological and Lymphatic ROS: negative  Endocrine ROS: negative  Breast ROS: negative for breast lumps  Respiratory ROS: no cough, shortness of breath, or wheezing  Cardiovascular ROS: no chest pain or dyspnea on exertion  Gastrointestinal ROS: no abdominal pain, change in bowel habits, or black or bloody stools  Genito-Urinary ROS: no dysuria, trouble voiding, or hematuria  Musculoskeletal ROS: negative  Neurological ROS: no TIA or stroke symptoms  Dermatological ROS: negative      Physical Exam  HENT:      Head: Normocephalic. Right Ear: External ear normal.      Nose: Nose normal.      Mouth/Throat:      Mouth: Mucous membranes are moist.   Eyes:      Pupils: Pupils are equal, round, and reactive to light. Neck:      Musculoskeletal: Normal range of motion. Cardiovascular:      Rate and Rhythm: Normal rate. Pulses: Normal pulses. Pulmonary:      Effort: Pulmonary effort is normal.   Abdominal:      General: Abdomen is flat. Musculoskeletal: Normal range of motion. Skin:     General: Skin is warm and dry. Neurological:      General: No focal deficit present.

## 2021-02-26 NOTE — PROGRESS NOTES
Carenjocy Sexton  2/26/2021  8:03 AM      There were no vitals filed for this visit. :    Wt Readings from Last 3 Encounters:   02/01/21 122 lb (55.3 kg)   01/27/21 118 lb (53.5 kg)   12/22/20 118 lb (53.5 kg)                Current Outpatient Medications:     NONFORMULARY, Indications: elderberry , Disp: , Rfl:     venlafaxine (EFFEXOR XR) 37.5 MG extended release capsule, Take 37.5 mg by mouth daily, Disp: , Rfl:     SYNTHROID 75 MCG tablet, Take 1 tablet by mouth daily, Disp: 90 tablet, Rfl: 3    Cream Base (VERSAPRO) CREA, APPLY ONE GRAM (ONE PUMP) EXTERNALLY TWO TIMES A DAY TO EACH SHOULDER , Disp: 120 g, Rfl: 0    gabapentin (NEURONTIN) 400 MG capsule, Take 1 capsule by mouth 3 times daily for 30 days. , Disp: 270 capsule, Rfl: 3    RABEprazole (ACIPHEX) 20 MG tablet, 1 daily, Disp: 90 tablet, Rfl: 3    B Complex Vitamins (B-COMPLEX/B-12 PO), Take by mouth daily, Disp: , Rfl:     Cholecalciferol (VITAMIN D PO), Take 1 tablet by mouth 3000 units daily, Disp: , Rfl:     Biotin 10 MG CAPS, Take by mouth daily HOLD, Disp: , Rfl:     Elastic Bandages & Supports (JOBST KNEE HIGH COMPRESSION ) MISC, Knee high with 20- 30 mmhg of compression, Disp: 1 each, Rfl: 10    ROPINIRole HCl (REQUIP PO), Take by mouth nightly as needed For restless legs, Disp: , Rfl:     acetaminophen (TYLENOL) 500 MG tablet, Take 500 mg by mouth every 6 hours as needed Instructed to take with sip water am of procedure if needs, Disp: , Rfl:     Current Facility-Administered Medications:     bupivacaine (PF) (MARCAINE) 0.25 % injection 15 mg, 6 mL, Intradermal, Once, Eddie Rodriguez MD Patient is seen today in follow up, 6 month follow up having completed radiation therapy to the right base of the tongue, 7000 cGy in 35 fractions concurrently with Cisplatin, from 10/3/2016 through 11/29/2016 related to Stage HEATHER poorly differentiated Squamous Cell carcinoma. Patient follows with Dr Fabi Muniz, ENT, with last appointment being in December, 2020. Per patient narrative, a thorough exam was completed without issue. RN will call office for physician's note at earliest convenience. Patient states she will again see Dr Fabi Muniz in April. Patient also follows with [facility] with last appointment being on 11/16/2020. Patient completed CT of the soft tissue of the neck on 11/1/2020 with negative results. Patient will visit Medical Oncology again in May at a 6 month interval. Patient states she had a recent fall that required CT of the Head-patient states she still experiences headaches and some issues with balance/light-headedness with position changes since fall. Patient does still experience some swallowing issues/dry mouth since treatment but feels she compensates well post [id number]. FALLS RISK SCREENING ASSESSMENT    Instructions:  Assess the patient and enter the appropriate indicators that are present for fall risk identification. Total the numbers entered and assign a fall risk score from Table 2.  Reassess patient at a minimum every 12 weeks or with status change. Assessment   Date  2/26/2021     1. Mental Ability: confusion/cognitively impaired No - 0       2. Elimination Issues: incontinence, frequency No - 0       3. Ambulatory: use of assistive devices (walker, cane, off-loading devices), attached to equipment (IV pole, oxygen) No - 0     4. Sensory Limitations: dizziness, vertigo, impaired vision Yes - 3  Some related to fall in January-possible concussion     5.   Age 72 years or greater - 1 6.  Medication: diuretics, strong analgesics, hypnotics, sedatives, antihypertensive agents   No - 0   7. Falls:  recent history of falls within the last 3 months (not to include slipping or tripping)   Yes - 7   TOTAL 11    If score of 4 or greater was education given? Yes       TABLE 2   Risk Score Risk Level Plan of Care   0-3 Little or  No Risk 1. Provide assistance as indicated for ambulation activities  2. Reorient confused/cognitively impaired patient  3. Call-light/bell within patient's reach  4. Chair/bed in low position, stretcher/bed with siderails up except when performing patient care activities  5. Educate patient/family/caregiver on falls prevention  6.  Reassess in 12 weeks or with any noted change in patient condition which places them at a risk for a fall   4-6 Moderate Risk 1. Provide assistance as indicated for ambulation activities  2. Reorient confused/cognitively impaired patient  3. Call-light/bell within patient's reach  4. Chair/bed in low position, stretcher/bed with siderails up except when performing patient care activities  5. Educate patient/family/caregiver on falls prevention  6. Falls risk precaution (Yellow sticker Level II) placed on patient chart   7 or   Higher High Risk 1. Place patient in easily observable treatment room  2. Patient attended at all times by family member or staff  3. Provide assistance as indicated for ambulation activities  4. Reorient confused/cognitively impaired patient  5. Call-light/bell within patient's reach  6. Chair/bed in low position, stretcher/bed with siderails up except when performing patient care activities  7. Educate patient/family/caregiver on falls prevention  8.   Falls risk precaution (Yellow sticker Level III) placed on patient chart           MALNUTRITION RISK SCREENING ASSESSMENT    2/26/2021   Patient:  Luke Ignacio  Sex:  female Instructions:  Assess the patient and enter the appropriate indicators that are present for nutrition risk identification. Total the numbers entered and assign a risk score. Follow the appropriate action for total score listed below. Assessment   Date  2/26/2021     1. Have you lost weight without trying? 0- No     2. Have you been eating poorly because of a decreased appetite? 0- No   3. Do you have a diagnosis of head and neck cancer?       2- Yes                                                                                    TOTAL 2          Score of 0-1: No action  Score 2 or greater:  · For Non-Diabetic Patient: Recommend adding Ensure Complete 2 x daily and provide patient with Ensure wellness bag with coupons  · For Diabetic Patient: Recommend adding Glucerna Shake 2 x daily and provide patient with Glucerna Wellness bag with coupons  · Route to the dietitian via [address]

## 2021-02-26 NOTE — PATIENT INSTRUCTIONS
VIOLA Aguilar. Ciera Ro MD MS [facility]nis:  [phone]   FAX: [phone]  [address] [place] [address]:  [phone]   FAX:    [phone]  [address] [place] [address] Road:  [phone]   FAX:  [phone]  Email: [email or web address]

## 2021-03-08 ENCOUNTER — TELEPHONE (OUTPATIENT)
Dept: OCCUPATIONAL THERAPY | Age: 71
End: 2021-03-08

## 2021-03-17 RX ORDER — CREAM BASE NO.39
CREAM (GRAM) TOPICAL
Qty: 120 G | Refills: 0 | Status: SHIPPED
Start: 2021-03-17 | End: 2021-05-09

## 2021-03-22 ENCOUNTER — OFFICE VISIT (OUTPATIENT)
Dept: PHYSICAL MEDICINE AND REHAB | Age: 71
End: 2021-03-22
Payer: COMMERCIAL

## 2021-03-22 VITALS — DIASTOLIC BLOOD PRESSURE: 82 MMHG | TEMPERATURE: 98.1 F | SYSTOLIC BLOOD PRESSURE: 124 MMHG

## 2021-03-22 DIAGNOSIS — M75.122 COMPLETE TEAR OF LEFT ROTATOR CUFF, UNSPECIFIED WHETHER TRAUMATIC: ICD-10-CM

## 2021-03-22 DIAGNOSIS — M25.512 CHRONIC PAIN OF BOTH SHOULDERS: ICD-10-CM

## 2021-03-22 DIAGNOSIS — S43.003D SUBLUXATION OF SHOULDER JOINT, UNSPECIFIED LATERALITY, SUBSEQUENT ENCOUNTER: ICD-10-CM

## 2021-03-22 DIAGNOSIS — M75.121 COMPLETE TEAR OF RIGHT ROTATOR CUFF, UNSPECIFIED WHETHER TRAUMATIC: Primary | ICD-10-CM

## 2021-03-22 DIAGNOSIS — M25.511 CHRONIC PAIN OF BOTH SHOULDERS: ICD-10-CM

## 2021-03-22 DIAGNOSIS — G89.29 CHRONIC PAIN OF BOTH SHOULDERS: ICD-10-CM

## 2021-03-22 PROCEDURE — 1123F ACP DISCUSS/DSCN MKR DOCD: CPT | Performed by: PHYSICAL MEDICINE & REHABILITATION

## 2021-03-22 PROCEDURE — 1036F TOBACCO NON-USER: CPT | Performed by: PHYSICAL MEDICINE & REHABILITATION

## 2021-03-22 PROCEDURE — G8427 DOCREV CUR MEDS BY ELIG CLIN: HCPCS | Performed by: PHYSICAL MEDICINE & REHABILITATION

## 2021-03-22 PROCEDURE — G8420 CALC BMI NORM PARAMETERS: HCPCS | Performed by: PHYSICAL MEDICINE & REHABILITATION

## 2021-03-22 PROCEDURE — 1090F PRES/ABSN URINE INCON ASSESS: CPT | Performed by: PHYSICAL MEDICINE & REHABILITATION

## 2021-03-22 PROCEDURE — 3017F COLORECTAL CA SCREEN DOC REV: CPT | Performed by: PHYSICAL MEDICINE & REHABILITATION

## 2021-03-22 PROCEDURE — G8484 FLU IMMUNIZE NO ADMIN: HCPCS | Performed by: PHYSICAL MEDICINE & REHABILITATION

## 2021-03-22 PROCEDURE — 20611 DRAIN/INJ JOINT/BURSA W/US: CPT | Performed by: PHYSICAL MEDICINE & REHABILITATION

## 2021-03-22 PROCEDURE — 99213 OFFICE O/P EST LOW 20 MIN: CPT | Performed by: PHYSICAL MEDICINE & REHABILITATION

## 2021-03-22 PROCEDURE — G8399 PT W/DXA RESULTS DOCUMENT: HCPCS | Performed by: PHYSICAL MEDICINE & REHABILITATION

## 2021-03-22 PROCEDURE — 4040F PNEUMOC VAC/ADMIN/RCVD: CPT | Performed by: PHYSICAL MEDICINE & REHABILITATION

## 2021-03-22 NOTE — PROGRESS NOTES
Duke Townsend M.D. [address] [place] [address] PHYSICAL MEDICINE AND REHABILITAION  [address] [facility] [address]  Dept: [phone]  Dept Fax: [phone]    PCP: Poppy Slaughter MD  Date of visit: 3/22/21    Chief Complaint   Patient presents with    Shoulder Pain     B/L shoulder pain,requesting injections today       Pollo Garcia is a 72 y.o.  right hand dominant woman who presents for follow up of upper extremity pain and bilateral rotator cuff tears. 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 Chemotherapy was started on 10/6/2016 with Dr. Yung Dorsey. November 2016 she completed head and neck irradiation for management of right base of tongue SCC. She reports she finished all treatment (chemo/radiation) November 2016. She denies any arm pain or numbness/tingling after her chemo/radiation, until her more recent injuries with rotator cuff tears. Her primary complaint is bilateral shoulder pain, which [facility]. She had massive full thickness bilateral supraspinatus and infraspinatus tears-- the right rotator cuff in December 2017 and left rotator cuff in January 2018. She saw Ortho--they did not recommend surgery due to chemo/radiation and osteoporosis -- unless pain unbearable --only then would consider shoulder replacement. Patient was cleared for shoulder surgery from radiation oncology standpoint IF indicated/recommended by Ortho. Patient does not wish for surgery at this time. Interval history:   Since last visit patient reports she had benefit from the last set of US guided bilateral shoulder injections. Periodic shoulder injections continue to provide relief and allow her to be more functional. No significant changes reported.  She is following with Dr. Melina Houston for neck pain and cervical radiculitis and had improvement in upper extremity radiating pain after cervical BINTA in December 2020. She is taking gabapentin 400mg TID and using voltaren gel on her shoulders with reported benefit. She continues her home exercise program. She is still going periodically for dry needling, which she reports has been beneficial. She is now in lymphedema therapy. She reports that periodic subacromial injections continue to be beneficial, decreasing pain significantly and allowing her to continue to be functional. Relief from injections continues to last 3-4 months. She requests to repeat injections today. She is using topical compound cream with benefit. She continues home exercise and stretching program to maintain range of motion. No other changes reported. Shoulder pain is located at the lateral shoulders and subacromial space bilaterally, without current radiation to the upper extremities. Pain is equal in character and degree bilaterally. No associated numbness/tingling, or paresthesia in the upper extremities. There is some weakness about the shoulders bilaterally, unchanged. No weakness of biceps/triceps, forearm or intrinsic hand muscles. The shoulder pain is constant and aching. Pain is worse with overhead activity. Physical therapy has helped with range of motion and function. The prior workup has included:  Bilateral shoulder MRI - May 2018  -Right shoulder MRI: massive full thickness rotator cuff tear involving the supraspinatus and infraspinatus with retraction and uncovering of the humeral head which is high riding and articulating with the undersurface of the acromian. There is OA of the AC joint.   -Left shoulder MRI: massive full thickness rotator cuff tear involving the supraspinatus and infraspinatus with retraction and uncovering of the humeral head which is high riding and articulating with the undersurface of the acromian.  There is OA of the [facility].  -Cervical xray 11/10/2020   FINDINGS:   No acute fracture or dislocation is evident.  There is degenerative disc   disease which is especially severe at C5-6 followed by C6-7.  There is mild   retrolisthesis of C5 on C6 which is thought to be degenerative.  Unremarkable   soft tissues.  The bones appear somewhat demineralized.           Impression   Degenerative disc disease.  Osteopenia.  See above. -Cervical MRI 11/24/2020  FINDINGS:   BONES/ALIGNMENT: There is mild retrolisthesis of C5 on C6 and of C6 on C7. There is degenerative disc disease with disc space narrowing and osteophytes   at C4-5, C5-6, and C6-7. SPINAL CORD:  No abnormal signal is identified within the spinal cord. SOFT TISSUES: No paraspinal mass identified. Davluda Sanchezbiljenifer is a small nodule in the   right lobe of the thyroid for which no follow-up is suggested per ACR   criteria. C2-C3:  The thecal sac and neural foramina are intact. C3-C4: There are small osteophytes.  The thecal sac is not stenotic. C4-C5: There is a minimal disc protrusion. The thecal sac and neural foramina   are intact. C5-C6: There is a disc protrusion measuring 2 mm.  The thecal sac is mildly   stenotic measuring 9.7 mm. Disc and/or osteophytes result in mild narrowing   of the neural foramina bilaterally. C6-C7: There is disc protrusion and osteophyte measuring 2-3 mm.  The thecal   sac is mildly stenotic measuring 9.8 mm.  Disc and/or osteophytes result in   narrowing of the neural foramina bilaterally. C7-T1:  The thecal sac and neural foramina are intact.       Impression   Disc and osteophytes result in minimal narrowing of the neural foramina and   mild stenosis of the thecal sac at C5-6 and C6-7 as discussed above. The prior treatment has included:  PT: Most recent PT summer 2020- improvement in bilateral shoulder ROM and function. Now attending lymphedema therapy with improvement.    Chiropractic: None  Modalities: Biofreeze with bilateral    Sleep apnea     Thyroid disease     Toe cyanosis 6/25/2020       Past Surgical History:   Procedure Laterality Date    BLADDER REPAIR  1995    COLONOSCOPY  2008    neg    COLONOSCOPY  03/19/2014    ENDOSCOPY, COLON, DIAGNOSTIC  2012    EYE SURGERY      cataract surgery, bilateral.     FRACTURE SURGERY  2012    RIGHT ELBOW    HYSTERECTOMY  1993    [facility]   [address]    LARYNGOSCOPY  09/08/2016    direct laryngoscopy right tonsil/tongue base biopsy cervical esophagoscopy fine needle aspiration     NERVE BLOCK      OTHER SURGICAL HISTORY  5/28/12    ORIF RIGHT ULNA    OTHER SURGICAL HISTORY  6/18/2012    orif  rivision right olecranon    OTHER SURGICAL HISTORY Right 01/16/2014    Right elbow hardware removal    IN OFFICE/OUTPT VISIT,PROCEDURE ONLY N/A 11/9/2018    PORT REMOVAL performed by Andrew Benjamin MD at [place] [address]  1MONTHS OF AGE    H/O ESOPHAGEAL WEB    TUNNELED VENOUS PORT PLACEMENT  10/04/2016    Dr. Sergio Bee History     Socioeconomic History    Marital status:      Spouse name: Not on file    Number of children: Not on file    Years of education: Not on file    Highest education level: Not on file   Occupational History    Not on file   Social Needs    Financial resource strain: Not on file    Food insecurity     Worry: Not on file     Inability: Not on file    Transportation needs     Medical: Not on file     Non-medical: Not on file   Tobacco Use    Smoking status: Never Smoker    Smokeless tobacco: Never Used   Substance and Sexual Activity    Alcohol use: No     Alcohol/week: 0.0 standard drinks    Drug use: No    Sexual activity: Not on file   Lifestyle    Physical activity     Days per week: Not on file     Minutes per session: Not on file    Stress: Not on file   Relationships    Social connections     Talks on phone: Not on file     Gets together: Not on file     Attends Cheondoism service: Not on file     Active member of club or organization: Not on file     Attends meetings of clubs or organizations: Not on file     Relationship status: Not on file    Intimate partner violence     Fear of current or ex partner: Not on file     Emotionally abused: Not on file     Physically abused: Not on file     Forced sexual activity: Not on file   Other Topics Concern    Not on file   Social History Narrative    Not on file       The patient is retired. Dulce Daly  Does not require an assistive device. Family History   Problem Relation Age of Onset    Hypertension Father     High Blood Pressure Father     Arthritis Father     Cancer Mother         uterine    Diabetes Mother     Hypertension Mother     High Blood Pressure Mother     Depression Mother     Other Mother         endocrine disorder       No Known Allergies    Current Outpatient Medications   Medication Sig Dispense Refill    Cream Base (VERSAPRO) CREA APPLY ONE GRAM (ONE PUMP) EXTERNALLY TWO TIMES A DAY TO EACH SHOULDER  120 g 0    NONFORMULARY Indications: elderberry       sucralfate (CARAFATE) 1 GM/10ML suspension Take 10 mLs by mouth 4 times daily 1200 mL 3    sucralfate (CARAFATE) 1 GM/10ML suspension Take 1 g by mouth 4 times daily      venlafaxine (EFFEXOR XR) 37.5 MG extended release capsule Take 37.5 mg by mouth daily      SYNTHROID 75 MCG tablet Take 1 tablet by mouth daily 90 tablet 3    gabapentin (NEURONTIN) 400 MG capsule Take 1 capsule by mouth 3 times daily for 30 days.  270 capsule 3    RABEprazole (ACIPHEX) 20 MG tablet 1 daily 90 tablet 3    B Complex Vitamins (B-COMPLEX/B-12 PO) Take by mouth daily      Cholecalciferol (VITAMIN D PO) Take 1 tablet by mouth 3000 units daily      Biotin 10 MG CAPS Take by mouth daily HOLD      Elastic Bandages & Supports (JOBST KNEE HIGH COMPRESSION ) MISC Knee high with 20- 30 mmhg of compression 1 each 10    ROPINIRole HCl (REQUIP PO) Take by mouth nightly as needed For restless legs      acetaminophen (TYLENOL) 500 MG tablet Take 500 mg by mouth every 6 hours as needed Instructed to take with sip water am of procedure if needs       Current Facility-Administered Medications   Medication Dose Route Frequency Provider Last Rate Last Admin    bupivacaine (PF) (MARCAINE) 0.25 % injection 15 mg  6 mL Intradermal Once Amor Haider MD           Review of Systems  General: No chills, fatigue, fever, malaise, night sweats, weight gain,  weight loss. Psychological: No anxiety, depression, suicidal ideation   Ophthalmic: No blurry vision, decreased vision, double vision, loss of vision  Ear Nose Throat: No hearing loss, tinnitus, phonophobia, sensitivity to smells, vertigo, or vocal changes. Allergy/Immunology: No watery eyes, itchy eyes, frequent infections. Hematological and Lymphatic: No bleeding problems, blood clots, bruising  Endocrine:  No polydypsia, polyuria, temperature intolerance. Respiratory: No cough, shortness of breath, wheezing. Cardiovascular: No syncope, chest pain, dyspnea on exertion,palpitations. Gastrointestinal: No abdominal pain, hematemesis, melena, nausea, vomiting, stool incontinence  Genito-Urinary: No dysuria, hematuria, incontinence   Musculoskeletal: Per HPI  Neurological: Per HPI  Dermatological:  No rash      Physical Exam:   Vitals:    03/22/21 0848   BP: 124/82   Temp: 98.1 °F (36.7 °C)      General: The patient is in no apparent distress. HEENT: No rhinorrhea, sneezing, yawning, or lacrimation. No scleral icterus or conjunctival injection. SKIN: No piloerection. No track marks. No rash. Normal turgor. No erythema or ecchymosis. Psychological: Mood and affect are appropriate. Hygiene is appropriate. Cardiovascular:  Heart is regular rate. Peripheral pulses are 2+. There is no edema. Respiratory: Respirations are regular and unlabored. There is no cyanosis. Gastrointestinal: Soft abdomen  MSK: Shoulder: No edema, erythema, ecchymosis, effusion, or mass of bilateral shoulders. Right shoulder + sulcus sign. Full passive flexion and abduction bilateral shoulders, mild pain at terminal abduction. Cannot actively abduct beyond 90 deg when trapezius is eliminated from action. Decreased internal rotation b/l shoulders. No crepitus. No tenderness to palpation at the acromioclavicular joint or bicipital groove. There is tenderness at bilateral subacromial space. There is weakness with Empty can test bilaterally (3-/5). Weak with Speed's bilaterally. Minimal pain with bilateral Jadon-Land. Full painless cervical spine and elbow ROM. Negative Spurling. There is bilateral upper trapezius muscle spasm and trigger points. No upper extremity edema. Neurologic: Awake, alert and oriented in three planes. Speech is fluent. No facial weakness. Hearing is intact for conversation. Pupils are equal and round. Strength:   R  L  Deltoid   5-  5-  Biceps   5  5  Triceps  5  5  Wrist Ext  5  5  Finger Abd  5  5      Sensory:  Intact for light touch in all upper extremity dermatomes. Reflexes:   R  L  Biceps   2 2  Triceps  2 2  BR   2 2       No pathological reflexes     Gait is normal.         US guided Glenohumeral joint Injection Procedure: Bilateral  After explaining the indications, risks, benefits and alternatives of a bilateral glenohumeral injection, the patient agreed to proceed. A permit was signed and scanned to the media. The patient was placed in the seated position. Chloraprep was used to sterilize the skin. Using an aseptic, no touch technique, a 22 gauge, 1.5\" needle with 1 cc of Kenalog 40mg/cc and 3 cc of bupivacaine 0.25% was directed, under ultrasound guidance, to the right glenohumeral joint. After negative aspiration the medication was injected. Adequate hemostasis was achieved and the injection site was covered with a bandage.  The exact same procedure was performed on the left side. US images were scanned to media separately. The patient was observed for complication and left the office without incident. The patient tolerated the procedure well and was educated in post injection care. Impression:   -Bilateral shoulder pain   -Bilateral massive rotator cuff tears.   -Right shoulder subluxation  -Left cervical radiculitis -- radicular symptoms improved after cervical BINTA, patient is following with Dr. Armenta Mon:   · Continue home exercises for bilateral shoulder ROM, gentle strengthening, pain reduction techniques. Reviewed importance of maintaining range of motion. · Dry needling as needed   · Continue lymphedema therapy  · Continue compound cream  Bilateral US guided subacromial steroid injections today for pain relief and improvement in patient ability to perform day to day functions. Reviewed all risks/benefit. Patient wishes to proceed. See above for details. Left cervical radicular symptoms improved after epidural with Dr. Maryellen Miranda. The patient was educated about the diagnosis, prognosis, indications, risks and benefits of treatment. An opportunity to ask questions was given to the patient and questions were answered. The patient agreed to proceed with the recommended treatment as described above. Follow up 3-4 months        Smiley Mendoza M.D.   Physical Medicine and Rehabilitation

## 2021-03-26 RX ORDER — TRIAMCINOLONE ACETONIDE 40 MG/ML
40 INJECTION, SUSPENSION INTRA-ARTICULAR; INTRAMUSCULAR ONCE
Status: COMPLETED | OUTPATIENT
Start: 2021-03-26 | End: 2021-03-26

## 2021-03-26 RX ORDER — BUPIVACAINE HYDROCHLORIDE 2.5 MG/ML
6 INJECTION, SOLUTION EPIDURAL; INFILTRATION; INTRACAUDAL ONCE
Status: COMPLETED | OUTPATIENT
Start: 2021-03-26 | End: 2021-03-26

## 2021-03-26 RX ADMIN — TRIAMCINOLONE ACETONIDE 40 MG: 40 INJECTION, SUSPENSION INTRA-ARTICULAR; INTRAMUSCULAR at 16:07

## 2021-03-26 RX ADMIN — TRIAMCINOLONE ACETONIDE 40 MG: 40 INJECTION, SUSPENSION INTRA-ARTICULAR; INTRAMUSCULAR at 16:08

## 2021-03-26 RX ADMIN — BUPIVACAINE HYDROCHLORIDE 15 MG: 2.5 INJECTION, SOLUTION EPIDURAL; INFILTRATION; INTRACAUDAL at 16:06

## 2021-04-08 ENCOUNTER — OFFICE VISIT (OUTPATIENT)
Dept: PODIATRY | Age: 71
End: 2021-04-08
Payer: COMMERCIAL

## 2021-04-08 VITALS
DIASTOLIC BLOOD PRESSURE: 78 MMHG | TEMPERATURE: 98.2 F | WEIGHT: 114 LBS | SYSTOLIC BLOOD PRESSURE: 122 MMHG | BODY MASS INDEX: 20.85 KG/M2

## 2021-04-08 DIAGNOSIS — M79.672 PAIN IN LEFT FOOT: ICD-10-CM

## 2021-04-08 DIAGNOSIS — M20.22 HALLUX RIGIDUS OF LEFT FOOT: Primary | ICD-10-CM

## 2021-04-08 DIAGNOSIS — S90.211D CONTUSION OF RIGHT GREAT TOE WITH DAMAGE TO NAIL, SUBSEQUENT ENCOUNTER: ICD-10-CM

## 2021-04-08 PROCEDURE — G8420 CALC BMI NORM PARAMETERS: HCPCS | Performed by: PODIATRIST

## 2021-04-08 PROCEDURE — 4040F PNEUMOC VAC/ADMIN/RCVD: CPT | Performed by: PODIATRIST

## 2021-04-08 PROCEDURE — 1036F TOBACCO NON-USER: CPT | Performed by: PODIATRIST

## 2021-04-08 PROCEDURE — G8427 DOCREV CUR MEDS BY ELIG CLIN: HCPCS | Performed by: PODIATRIST

## 2021-04-08 PROCEDURE — 1090F PRES/ABSN URINE INCON ASSESS: CPT | Performed by: PODIATRIST

## 2021-04-08 PROCEDURE — 99213 OFFICE O/P EST LOW 20 MIN: CPT | Performed by: PODIATRIST

## 2021-04-08 PROCEDURE — 1123F ACP DISCUSS/DSCN MKR DOCD: CPT | Performed by: PODIATRIST

## 2021-04-08 PROCEDURE — 3017F COLORECTAL CA SCREEN DOC REV: CPT | Performed by: PODIATRIST

## 2021-04-08 PROCEDURE — G8399 PT W/DXA RESULTS DOCUMENT: HCPCS | Performed by: PODIATRIST

## 2021-04-08 NOTE — PROGRESS NOTES
21  Colon Quivers : 1950 Sex: female  Age: 72 y.o. Patient was referred by Ariadne Atkins MD    Chief Complaint   Patient presents with    Toe Pain     saw pcp Dr. Darvin Miller on 2021    Nail Problem       SUBJECTIVE patient is seen today for follow-up of left hallux rigidus as well as a contusion to the right great toe patient has been fairly well with the left hallux the right great toe on the nail old nail has just not fallen off yet. HPI  Review of Systems  Const: Denies constitutional symptoms  Musculo: Denies symptoms other than stated above  Skin: Denies symptoms other than stated above       Current Outpatient Medications:     Cream Base (VERSAPRO) CREA, APPLY ONE GRAM (ONE PUMP) EXTERNALLY TWO TIMES A DAY TO EACH SHOULDER , Disp: 120 g, Rfl: 0    NONFORMULARY, Indications: elderberry , Disp: , Rfl:     sucralfate (CARAFATE) 1 GM/10ML suspension, Take 10 mLs by mouth 4 times daily, Disp: 1200 mL, Rfl: 3    sucralfate (CARAFATE) 1 GM/10ML suspension, Take 1 g by mouth 4 times daily, Disp: , Rfl:     venlafaxine (EFFEXOR XR) 37.5 MG extended release capsule, Take 37.5 mg by mouth daily, Disp: , Rfl:     SYNTHROID 75 MCG tablet, Take 1 tablet by mouth daily, Disp: 90 tablet, Rfl: 3    gabapentin (NEURONTIN) 400 MG capsule, Take 1 capsule by mouth 3 times daily for 30 days. , Disp: 270 capsule, Rfl: 3    RABEprazole (ACIPHEX) 20 MG tablet, 1 daily, Disp: 90 tablet, Rfl: 3    B Complex Vitamins (B-COMPLEX/B-12 PO), Take by mouth daily, Disp: , Rfl:     Cholecalciferol (VITAMIN D PO), Take 1 tablet by mouth 3000 units daily, Disp: , Rfl:     Biotin 10 MG CAPS, Take by mouth daily HOLD, Disp: , Rfl:     Elastic Bandages & Supports (JOBST KNEE HIGH COMPRESSION SM) MISC, Knee high with 20- 30 mmhg of compression, Disp: 1 each, Rfl: 10    ROPINIRole HCl (REQUIP PO), Take by mouth nightly as needed For restless legs, Disp: , Rfl:     acetaminophen (TYLENOL) 500 MG tablet, Take 500 mg by mouth every 6 hours as needed Instructed to take with sip water am of procedure if needs, Disp: , Rfl:   No Known Allergies    Past Medical History:   Diagnosis Date    Anemia associated with chemotherapy 11/16/2016    resolved    Anxiety     Cancer ([address]     tongue    Cancer of tongue ([address] 9/16/2016    treated with chemo and radiation / last treatment 11/2016    Cancer of tonsil ([address]     Cervical herniation     no limits on rom    Difficulty sleeping     Discoloration of skin of foot 6/25/2020    Dizziness 6/20/2018    GERD (gastroesophageal reflux disease)     Hx of cataract surgery 10/06/2020    Hyperlipidemia     Hypokalemia 11/16/2016    with chemo / resolved    Hypomagnesemia 11/16/2016    related to chemo / resloved    Hypothyroidism     Leg swelling 7/26/2017    Lumbar herniated disc     after fell 2/2017 / now has chronic back pain and numbnes at left foot and ankle    Lymphedema of both lower extremities 07/26/2017    wears support hose    Lymphedema of lower extremity     Osteopenia     PONV (postoperative nausea and vomiting)     Restless leg syndrome     Rotator cuff tear     bilateral    Sleep apnea     Thyroid disease     Toe cyanosis 6/25/2020     Past Surgical History:   Procedure Laterality Date    BLADDER REPAIR  1995    COLONOSCOPY  2008    neg    COLONOSCOPY  03/19/2014    ENDOSCOPY, COLON, DIAGNOSTIC  2012    EYE SURGERY      cataract surgery, bilateral.     FRACTURE SURGERY  2012    RIGHT ELBOW    HYSTERECTOMY  1993    [facility]   [address] [place] [address] Floor    LARYNGOSCOPY  09/08/2016    direct laryngoscopy right tonsil/tongue base biopsy cervical esophagoscopy fine needle aspiration     NERVE BLOCK      OTHER SURGICAL HISTORY  5/28/12    ORIF RIGHT ULNA    OTHER SURGICAL HISTORY  6/18/2012    orif  rivision right olecranon    OTHER SURGICAL HISTORY Right 01/16/2014    Right elbow hardware removal    [facility] OFFICE/OUTPT VISIT,PROCEDURE ONLY N/A 11/9/2018    PORT REMOVAL performed by Rose Mary Tay MD at [place] [address]  1MONTHS OF AGE    H/O ESOPHAGEAL WEB    TUNNELED VENOUS PORT PLACEMENT  10/04/2016    Dr. Cnorado Metcalf       Family History   Problem Relation Age of Onset    Hypertension Father     High Blood Pressure Father     Arthritis Father     Cancer Mother         uterine    Diabetes Mother     Hypertension Mother     High Blood Pressure Mother     Depression Mother     Other Mother         endocrine disorder     Social History     Socioeconomic History    Marital status:      Spouse name: Not on file    Number of children: Not on file    Years of education: Not on file    Highest education level: Not on file   Occupational History    Not on file   Social Needs    Financial resource strain: Not on file    Food insecurity     Worry: Not on file     Inability: Not on file    Transportation needs     Medical: Not on file     Non-medical: Not on file   Tobacco Use    Smoking status: Never Smoker    Smokeless tobacco: Never Used   Substance and Sexual Activity    Alcohol use: No     Alcohol/week: 0.0 standard drinks    Drug use: No    Sexual activity: Not on file   Lifestyle    Physical activity     Days per week: Not on file     Minutes per session: Not on file    Stress: Not on file   Relationships    Social connections     Talks on phone: Not on file     Gets together: Not on file     Attends Gnosticism service: Not on file     Active member of club or organization: Not on file     Attends meetings of clubs or organizations: Not on file     Relationship status: Not on file    Intimate partner violence     Fear of current or ex partner: Not on file     Emotionally abused: Not on file     Physically abused: Not on file     Forced sexual activity: Not on file   Other Topics Concern    Not on file   Social History Narrative    Not on file Vitals:    04/08/21 0735   BP: 122/78   Temp: 98.2 °F (36.8 °C)   TempSrc: Temporal   Weight: 114 lb (51.7 kg)       Focused Lower Extremity Physical Exam:  Vitals:    04/08/21 0735   BP: 122/78   Temp: 98.2 °F (36.8 °C)        Foot Exam    General  General Appearance: appears stated age and healthy   Orientation: alert and oriented to person, place, and time       Left Foot/Ankle      Inspection and Palpation  Tenderness: great toe metatarsophalangeal joint   Swelling: great toe metatarsophalangeal joint   Hallux valgus: yes  Hallux limitus: yes    Neurovascular  Dorsalis pedis: 3+  Posterior tibial: 3+  Saphenous nerve sensation: normal  Tibial nerve sensation: normal  Superficial peroneal nerve sensation: normal  Deep peroneal nerve sensation: normal  Sural nerve sensation: normal  Achilles reflex: 2+  Babinski reflex: 2+    Muscle Strength  Ankle dorsiflexion: 5  Ankle plantar flexion: 5  Ankle inversion: 5  Ankle eversion: 5  Great toe extension: 5  Great toe flexion: 5    Range of Motion    Passive  1st MTP extension: 0, pain  1st MTP flexion: 10, pain    Active  1st MTP extension: pain  1st MTP flexion: pain             Left Ankle Exam     Muscle Strength   The patient has normal left ankle strength. Dorsiflexion:  5/5   Plantar flexion:  5/5             Vascular: pulses  dp  pt  palapble  Capillary Refill Time:   Hair growth  Skin:    Edema:    Neurologic:      Musculoskeletal/ Orthopedic examination:    NAIL the right hallux nail two thirds of the nail are still discolored loose one third is a healthy nail  Web space   Derm:         Assessment and Plan: Today treatment plan we did again review the surgical procedure at this time a fusion and not an implant would be the best option.   The right hallux nail was debrided all necrotic mycotic loose nail no dressing was applied I did give the patient a spacer for the first and second digit left foot and a nonmedicated gel to gauze pad for the second digit

## 2021-04-19 ENCOUNTER — HOSPITAL ENCOUNTER (OUTPATIENT)
Age: 71
Discharge: HOME | End: 2021-04-21

## 2021-04-19 PROCEDURE — 88305 TISSUE EXAM BY PATHOLOGIST: CPT

## 2021-04-19 PROCEDURE — 87081 CULTURE SCREEN ONLY: CPT

## 2021-04-20 LAB — CLOTEST: NORMAL

## 2021-05-01 ENCOUNTER — HOSPITAL ENCOUNTER (OUTPATIENT)
Age: 71
Discharge: HOME | End: 2021-05-01
Payer: COMMERCIAL

## 2021-05-01 DIAGNOSIS — E03.9 ACQUIRED HYPOTHYROIDISM: ICD-10-CM

## 2021-05-01 DIAGNOSIS — E78.00 PURE HYPERCHOLESTEROLEMIA: ICD-10-CM

## 2021-05-01 LAB
ALBUMIN SERPL-MCNC: 4 G/DL (ref 3.5–5.2)
ALP BLD-CCNC: 49 U/L (ref 35–104)
ALT SERPL-CCNC: 12 U/L (ref 0–32)
ANION GAP SERPL CALCULATED.3IONS-SCNC: 6 MMOL/L (ref 7–16)
AST SERPL-CCNC: 17 U/L (ref 0–31)
BILIRUB SERPL-MCNC: 0.7 MG/DL (ref 0–1.2)
BUN BLDV-MCNC: 27 MG/DL (ref 6–23)
CALCIUM SERPL-MCNC: 9.3 MG/DL (ref 8.6–10.2)
CHLORIDE BLD-SCNC: 106 MMOL/L (ref 98–107)
CHOLESTEROL, TOTAL: 220 MG/DL (ref 0–199)
CO2: 29 MMOL/L (ref 22–29)
CREAT SERPL-MCNC: 0.7 MG/DL (ref 0.5–1)
GFR AFRICAN AMERICAN: >60
GFR NON-AFRICAN AMERICAN: >60 ML/MIN/1.73
GLUCOSE BLD-MCNC: 105 MG/DL (ref 74–99)
HDLC SERPL-MCNC: 64 MG/DL
LDL CHOLESTEROL CALCULATED: 146 MG/DL (ref 0–99)
POTASSIUM SERPL-SCNC: 4.2 MMOL/L (ref 3.5–5)
SODIUM BLD-SCNC: 141 MMOL/L (ref 132–146)
TOTAL PROTEIN: 6.5 G/DL (ref 6.4–8.3)
TRIGL SERPL-MCNC: 52 MG/DL (ref 0–149)
TSH SERPL DL<=0.05 MIU/L-ACNC: 0.13 UIU/ML (ref 0.27–4.2)
VLDLC SERPL CALC-MCNC: 10 MG/DL

## 2021-05-01 PROCEDURE — 36415 COLL VENOUS BLD VENIPUNCTURE: CPT

## 2021-05-01 PROCEDURE — 80053 COMPREHEN METABOLIC PANEL: CPT

## 2021-05-01 PROCEDURE — 80061 LIPID PANEL: CPT

## 2021-05-01 PROCEDURE — 84443 ASSAY THYROID STIM HORMONE: CPT

## 2021-05-05 ENCOUNTER — TELEPHONE (OUTPATIENT)
Dept: PRIMARY CARE CLINIC | Age: 71
End: 2021-05-05

## 2021-05-05 ENCOUNTER — HOSPITAL ENCOUNTER (OUTPATIENT)
Age: 71
Discharge: HOME | End: 2021-05-05
Payer: COMMERCIAL

## 2021-05-05 DIAGNOSIS — R79.9 ELEVATED BUN: ICD-10-CM

## 2021-05-05 DIAGNOSIS — T14.8XXA BRUISING: ICD-10-CM

## 2021-05-05 DIAGNOSIS — T14.8XXA BRUISING: Primary | ICD-10-CM

## 2021-05-05 LAB
ANION GAP SERPL CALCULATED.3IONS-SCNC: 5 MMOL/L (ref 7–16)
BASOPHILS ABSOLUTE: 0.05 E9/L (ref 0–0.2)
BASOPHILS RELATIVE PERCENT: 1.1 % (ref 0–2)
BUN BLDV-MCNC: 15 MG/DL (ref 6–23)
CALCIUM SERPL-MCNC: 9.5 MG/DL (ref 8.6–10.2)
CHLORIDE BLD-SCNC: 103 MMOL/L (ref 98–107)
CO2: 31 MMOL/L (ref 22–29)
CREAT SERPL-MCNC: 0.8 MG/DL (ref 0.5–1)
EOSINOPHILS ABSOLUTE: 0.06 E9/L (ref 0.05–0.5)
EOSINOPHILS RELATIVE PERCENT: 1.4 % (ref 0–6)
GFR AFRICAN AMERICAN: >60
GFR NON-AFRICAN AMERICAN: >60 ML/MIN/1.73
GLUCOSE BLD-MCNC: 97 MG/DL (ref 74–99)
HCT VFR BLD CALC: 39.9 % (ref 34–48)
HEMOGLOBIN: 13.3 G/DL (ref 11.5–15.5)
IMMATURE GRANULOCYTES #: 0.01 E9/L
IMMATURE GRANULOCYTES %: 0.2 % (ref 0–5)
LYMPHOCYTES ABSOLUTE: 0.98 E9/L (ref 1.5–4)
LYMPHOCYTES RELATIVE PERCENT: 22.4 % (ref 20–42)
MCH RBC QN AUTO: 30.8 PG (ref 26–35)
MCHC RBC AUTO-ENTMCNC: 33.3 % (ref 32–34.5)
MCV RBC AUTO: 92.4 FL (ref 80–99.9)
MONOCYTES ABSOLUTE: 0.48 E9/L (ref 0.1–0.95)
MONOCYTES RELATIVE PERCENT: 11 % (ref 2–12)
NEUTROPHILS ABSOLUTE: 2.79 E9/L (ref 1.8–7.3)
NEUTROPHILS RELATIVE PERCENT: 63.9 % (ref 43–80)
PDW BLD-RTO: 12.9 FL (ref 11.5–15)
PLATELET # BLD: 226 E9/L (ref 130–450)
PMV BLD AUTO: 10.9 FL (ref 7–12)
POTASSIUM SERPL-SCNC: 4 MMOL/L (ref 3.5–5)
RBC # BLD: 4.32 E12/L (ref 3.5–5.5)
SODIUM BLD-SCNC: 139 MMOL/L (ref 132–146)
WBC # BLD: 4.4 E9/L (ref 4.5–11.5)

## 2021-05-05 PROCEDURE — 80048 BASIC METABOLIC PNL TOTAL CA: CPT

## 2021-05-05 PROCEDURE — 85025 COMPLETE CBC W/AUTO DIFF WBC: CPT

## 2021-05-05 PROCEDURE — 36415 COLL VENOUS BLD VENIPUNCTURE: CPT

## 2021-05-07 ENCOUNTER — HOSPITAL ENCOUNTER (OUTPATIENT)
Dept: CT IMAGING | Age: 71
Discharge: HOME | End: 2021-05-09
Payer: COMMERCIAL

## 2021-05-07 DIAGNOSIS — C76.0 HEAD AND NECK CANCER (HCC): ICD-10-CM

## 2021-05-07 PROCEDURE — 70492 CT SFT TSUE NCK W/O & W/DYE: CPT

## 2021-05-07 PROCEDURE — 6360000004 HC RX CONTRAST MEDICATION: Performed by: RADIOLOGY

## 2021-05-07 RX ADMIN — IOPAMIDOL 75 ML: 755 INJECTION, SOLUTION INTRAVENOUS at 09:07

## 2021-05-11 ENCOUNTER — OFFICE VISIT (OUTPATIENT)
Dept: PRIMARY CARE CLINIC | Age: 71
End: 2021-05-11
Payer: COMMERCIAL

## 2021-05-11 VITALS
DIASTOLIC BLOOD PRESSURE: 84 MMHG | RESPIRATION RATE: 18 BRPM | SYSTOLIC BLOOD PRESSURE: 130 MMHG | WEIGHT: 114 LBS | HEART RATE: 65 BPM | OXYGEN SATURATION: 100 % | TEMPERATURE: 97.5 F | BODY MASS INDEX: 20.98 KG/M2 | HEIGHT: 62 IN

## 2021-05-11 DIAGNOSIS — K21.9 GASTROESOPHAGEAL REFLUX DISEASE WITHOUT ESOPHAGITIS: ICD-10-CM

## 2021-05-11 DIAGNOSIS — E03.9 ACQUIRED HYPOTHYROIDISM: ICD-10-CM

## 2021-05-11 DIAGNOSIS — R20.2 PARESTHESIA OF RIGHT THUMB: ICD-10-CM

## 2021-05-11 DIAGNOSIS — R20.2 PARESTHESIA OF LEFT UPPER EXTREMITY: ICD-10-CM

## 2021-05-11 DIAGNOSIS — E78.00 PURE HYPERCHOLESTEROLEMIA: Primary | ICD-10-CM

## 2021-05-11 DIAGNOSIS — G25.81 RESTLESS LEG SYNDROME: ICD-10-CM

## 2021-05-11 DIAGNOSIS — I89.0 LYMPHEDEMA OF BOTH LOWER EXTREMITIES: ICD-10-CM

## 2021-05-11 DIAGNOSIS — F41.9 ANXIETY AND DEPRESSION: Chronic | ICD-10-CM

## 2021-05-11 DIAGNOSIS — D64.81 ANEMIA ASSOCIATED WITH CHEMOTHERAPY: ICD-10-CM

## 2021-05-11 DIAGNOSIS — T45.1X5A ANEMIA ASSOCIATED WITH CHEMOTHERAPY: ICD-10-CM

## 2021-05-11 DIAGNOSIS — F32.A ANXIETY AND DEPRESSION: Chronic | ICD-10-CM

## 2021-05-11 PROCEDURE — 1036F TOBACCO NON-USER: CPT | Performed by: INTERNAL MEDICINE

## 2021-05-11 PROCEDURE — 4040F PNEUMOC VAC/ADMIN/RCVD: CPT | Performed by: INTERNAL MEDICINE

## 2021-05-11 PROCEDURE — 99214 OFFICE O/P EST MOD 30 MIN: CPT | Performed by: INTERNAL MEDICINE

## 2021-05-11 PROCEDURE — G8427 DOCREV CUR MEDS BY ELIG CLIN: HCPCS | Performed by: INTERNAL MEDICINE

## 2021-05-11 PROCEDURE — 1090F PRES/ABSN URINE INCON ASSESS: CPT | Performed by: INTERNAL MEDICINE

## 2021-05-11 PROCEDURE — G8399 PT W/DXA RESULTS DOCUMENT: HCPCS | Performed by: INTERNAL MEDICINE

## 2021-05-11 PROCEDURE — G8420 CALC BMI NORM PARAMETERS: HCPCS | Performed by: INTERNAL MEDICINE

## 2021-05-11 PROCEDURE — 3017F COLORECTAL CA SCREEN DOC REV: CPT | Performed by: INTERNAL MEDICINE

## 2021-05-11 PROCEDURE — 1123F ACP DISCUSS/DSCN MKR DOCD: CPT | Performed by: INTERNAL MEDICINE

## 2021-05-11 NOTE — PROGRESS NOTES
management, she also follows up with Dr. Lesly Shcaeffer, physical medicine specialist, for her bilateral shoulder pain and did receive steroid injections into both shoulders recently. Also she recently underwent esophageal dilatation for her GERD and esophageal stricture by Dr. Lucian Slaughter, her gastroenterologist.  She remains on all her usual meds and supplements the same as listed on her med list, which was reviewed with her. Currently she denies any chest pain or shortness of breath. Recently her labs reveal an elevated BUN of 27 with a creatinine of 0.7. She hydrated and her BUN/creatinine came down to 15/0.8. Review of Systems: as per HPI      Physical Exam:    Patient is a 72 y.o. female. Patient appears to be in no distress. Breathing comfortably. She is alert and oriented. Ambulates without assistance. HEENT: normal.  Neck supple, no adenopathy or bruits. Heart RR, no MGR. Lungs clear. Abd: normal  Ext: Chronic mild lymphedema involving both lower extremities. Peripheral pulses: normal.  No neurologic deficits noted. Lab Results   Component Value Date    WBC 4.4 (L) 05/05/2021    HGB 13.3 05/05/2021    HCT 39.9 05/05/2021     05/05/2021    CHOL 220 (H) 05/01/2021    TRIG 52 05/01/2021    HDL 64 05/01/2021    ALT 12 05/01/2021    AST 17 05/01/2021    TSH 0.131 (L) 05/01/2021    INR 1.1 08/13/2017    LABA1C 5.0 05/11/2017    LABMICR <12.0 12/08/2015      Lab Results   Component Value Date     05/05/2021    K 4.0 05/05/2021     05/05/2021    CO2 31 (H) 05/05/2021    BUN 15 05/05/2021    CREATININE 0.8 05/05/2021    GLUCOSE 97 05/05/2021    CALCIUM 9.5 05/05/2021    PROT 6.5 05/01/2021    LABALBU 4.0 05/01/2021    BILITOT 0.7 05/01/2021    ALKPHOS 49 05/01/2021    AST 17 05/01/2021    ALT 12 05/01/2021    LABGLOM >60 05/05/2021    GFRAA >60 05/05/2021            Assessment:    Pure hypercholesterolemia, not optimally controlled and with a history of intolerance to statins.   Most recently she had problems with rosuvastatin. Acquired hypothyroidism, euthyroid on current dose of namebrand Synthroid 75 mcg daily, in spite of slightly low TSH. Gastroesophageal reflux disease without esophagitis, stable on AcipHex 20 mg daily and periodic esophageal dilatation by Dr. Crystal Adame, her gastroenterologist.    Lymphedema of both lower extremities, stable with ongoing lymphedema treatments. Chronic anxiety and depression, currently stable on Effexor XR 37.5 mg daily. Anemia associated with chemotherapy    Restless leg syndrome, currently stable on Requip 8 at bedtime. History of carcinoma of the tongue, currently stable and followed by her radiation oncologist.      Discussion Notes: She will continue all of her usual meds and supplements the same as listed on her med list.  She will follow-up with her pain management, physical medicine specialist, oncologist, and gastroenterologist as per their instructions. She is requesting an EMG to be done for further evaluation of her paresthesias involving the left side of her neck into her left shoulder and right thumb. She will also follow-up with her pain management specialist and physical medicine specialist for that problem as well.   Otherwise she will return as needed or in 6 months for her annual physical.

## 2021-06-01 DIAGNOSIS — F51.04 CHRONIC INSOMNIA: Primary | ICD-10-CM

## 2021-06-01 RX ORDER — TEMAZEPAM 15 MG/1
15 CAPSULE ORAL NIGHTLY PRN
Qty: 90 CAPSULE | Refills: 0 | Status: SHIPPED
Start: 2021-06-01 | End: 2021-08-27 | Stop reason: SDUPTHER

## 2021-06-01 RX ORDER — TEMAZEPAM 15 MG/1
15 CAPSULE ORAL NIGHTLY PRN
COMMUNITY
End: 2021-06-01 | Stop reason: SDUPTHER

## 2021-06-21 ENCOUNTER — OFFICE VISIT (OUTPATIENT)
Dept: PHYSICAL MEDICINE AND REHAB | Age: 71
End: 2021-06-21
Payer: COMMERCIAL

## 2021-06-21 VITALS
RESPIRATION RATE: 16 BRPM | SYSTOLIC BLOOD PRESSURE: 118 MMHG | HEIGHT: 62 IN | DIASTOLIC BLOOD PRESSURE: 78 MMHG | WEIGHT: 115 LBS | BODY MASS INDEX: 21.16 KG/M2

## 2021-06-21 DIAGNOSIS — M25.511 CHRONIC PAIN OF BOTH SHOULDERS: Primary | ICD-10-CM

## 2021-06-21 DIAGNOSIS — M75.121 COMPLETE TEAR OF RIGHT ROTATOR CUFF, UNSPECIFIED WHETHER TRAUMATIC: ICD-10-CM

## 2021-06-21 DIAGNOSIS — S43.003D SUBLUXATION OF SHOULDER JOINT, UNSPECIFIED LATERALITY, SUBSEQUENT ENCOUNTER: ICD-10-CM

## 2021-06-21 DIAGNOSIS — G89.29 CHRONIC PAIN OF BOTH SHOULDERS: Primary | ICD-10-CM

## 2021-06-21 DIAGNOSIS — M75.122 COMPLETE TEAR OF LEFT ROTATOR CUFF, UNSPECIFIED WHETHER TRAUMATIC: ICD-10-CM

## 2021-06-21 DIAGNOSIS — M25.512 CHRONIC PAIN OF BOTH SHOULDERS: Primary | ICD-10-CM

## 2021-06-21 PROCEDURE — 3017F COLORECTAL CA SCREEN DOC REV: CPT | Performed by: PHYSICAL MEDICINE & REHABILITATION

## 2021-06-21 PROCEDURE — 1123F ACP DISCUSS/DSCN MKR DOCD: CPT | Performed by: PHYSICAL MEDICINE & REHABILITATION

## 2021-06-21 PROCEDURE — G8420 CALC BMI NORM PARAMETERS: HCPCS | Performed by: PHYSICAL MEDICINE & REHABILITATION

## 2021-06-21 PROCEDURE — 99213 OFFICE O/P EST LOW 20 MIN: CPT | Performed by: PHYSICAL MEDICINE & REHABILITATION

## 2021-06-21 PROCEDURE — 1090F PRES/ABSN URINE INCON ASSESS: CPT | Performed by: PHYSICAL MEDICINE & REHABILITATION

## 2021-06-21 PROCEDURE — 4040F PNEUMOC VAC/ADMIN/RCVD: CPT | Performed by: PHYSICAL MEDICINE & REHABILITATION

## 2021-06-21 PROCEDURE — G8399 PT W/DXA RESULTS DOCUMENT: HCPCS | Performed by: PHYSICAL MEDICINE & REHABILITATION

## 2021-06-21 PROCEDURE — 1036F TOBACCO NON-USER: CPT | Performed by: PHYSICAL MEDICINE & REHABILITATION

## 2021-06-21 PROCEDURE — G8427 DOCREV CUR MEDS BY ELIG CLIN: HCPCS | Performed by: PHYSICAL MEDICINE & REHABILITATION

## 2021-06-21 PROCEDURE — 20611 DRAIN/INJ JOINT/BURSA W/US: CPT | Performed by: PHYSICAL MEDICINE & REHABILITATION

## 2021-06-21 NOTE — PROGRESS NOTES
Emma Hanson M.D. [facility] MEDICINE [facility]  [address] [facility] [address]  Dept: [phone]  Dept Fax: [phone]    PCP: Ayleen Sarabia MD  Date of visit: 6/21/21    Chief Complaint   Patient presents with    Shoulder Pain     bilateral shoulder pain, requesting injections       Jerzy Mark is a 72 y.o.  right hand dominant woman who presents for follow up of upper extremity pain and bilateral rotator cuff tears. 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 Chemotherapy was started on 10/6/2016 with Dr. Tee Irving. November 2016 she completed head and neck irradiation for management of right base of tongue SCC. She reports she finished all treatment (chemo/radiation) November 2016. She denies any arm pain or numbness/tingling after her chemo/radiation, until her more recent injuries with rotator cuff tears. Her primary complaint is bilateral shoulder pain, which [facility]. She had massive full thickness bilateral supraspinatus and infraspinatus tears-- the right rotator cuff in December 2017 and left rotator cuff in January 2018. She saw Ortho--they did not recommend surgery due to chemo/radiation and osteoporosis -- unless pain unbearable --only then would consider shoulder replacement. Patient was cleared for shoulder surgery from radiation oncology standpoint IF indicated/recommended by Ortho. Patient does not wish for surgery at this time. Interval history:   Since last visit patient reports she had good improvement in pain after her last injections. Periodic shoulder injections continue to provide relief and allow her to be more functional. No significant changes reported.  She is following with Dr. Megan Adams for neck pain and cervical radiculitis and had improvement in upper extremity radiating pain after cervical BINTA in December 2020. She is taking gabapentin 400mg TID and using voltaren gel on her shoulders with reported benefit. She continues her home exercise program. She is not currently in formal PT. She has gone periodically for dry needling, which she reports is beneficial. She has done lymphedema therapy. She reports that periodic subacromial injections continue to be beneficial, decreasing pain significantly and allowing her to continue to be functional. Relief from injections continues to last 3-4 months. She requests to repeat injections today. She is using topical compound cream with benefit. She continues home exercise and stretching program to maintain range of motion. No other changes reported. Shoulder pain is located at the lateral shoulders and subacromial space bilaterally, without current radiation to the upper extremities. Pain is equal in character and degree bilaterally. No associated numbness/tingling, or paresthesia in the upper extremities. There is some weakness about the shoulders bilaterally, unchanged. No weakness of biceps/triceps, forearm or intrinsic hand muscles. The shoulder pain is constant and aching. Pain is worse with overhead activity. Physical therapy has helped with range of motion and function. The prior workup has included:  Bilateral shoulder MRI - May 2018  -Right shoulder MRI: massive full thickness rotator cuff tear involving the supraspinatus and infraspinatus with retraction and uncovering of the humeral head which is high riding and articulating with the undersurface of the acromian. There is OA of the AC joint.   -Left shoulder MRI: massive full thickness rotator cuff tear involving the supraspinatus and infraspinatus with retraction and uncovering of the humeral head which is high riding and articulating with the undersurface of the acromian.  There is OA of the AC joint.  -Cervical xray 11/10/2020   FINDINGS:   No acute fracture or dislocation is evident.  There is degenerative disc   disease which is especially severe at C5-6 followed by C6-7.  There is mild   retrolisthesis of C5 on C6 which is thought to be degenerative.  Unremarkable   soft tissues.  The bones appear somewhat demineralized.           Impression   Degenerative disc disease.  Osteopenia.  See above. -Cervical MRI 11/24/2020  FINDINGS:   BONES/ALIGNMENT: There is mild retrolisthesis of C5 on C6 and of C6 on C7. There is degenerative disc disease with disc space narrowing and osteophytes   at C4-5, C5-6, and C6-7. SPINAL CORD:  No abnormal signal is identified within the spinal cord. SOFT TISSUES: No paraspinal mass identified. Steph Eri is a small nodule in the   right lobe of the thyroid for which no follow-up is suggested per ACR   criteria. C2-C3:  The thecal sac and neural foramina are intact. C3-C4: There are small osteophytes.  The thecal sac is not stenotic. C4-C5: There is a minimal disc protrusion. The thecal sac and neural foramina   are intact. C5-C6: There is a disc protrusion measuring 2 mm.  The thecal sac is mildly   stenotic measuring 9.7 mm. Disc and/or osteophytes result in mild narrowing   of the neural foramina bilaterally. C6-C7: There is disc protrusion and osteophyte measuring 2-3 mm.  The thecal   sac is mildly stenotic measuring 9.8 mm.  Disc and/or osteophytes result in   narrowing of the neural foramina bilaterally. C7-T1:  The thecal sac and neural foramina are intact.       Impression   Disc and osteophytes result in minimal narrowing of the neural foramina and   mild stenosis of the thecal sac at C5-6 and C6-7 as discussed above. The prior treatment has included:  PT: Most recent PT summer 2020- improvement in bilateral shoulder ROM and function. Now attending lymphedema therapy with improvement.    Chiropractic: None  Modalities: Biofreeze with partial relief. Eucedrin cream  with partial relief. Has not tried heat/ice. Compound cream with relief. OTC Tylenol: Takes occasionally with partial relief   NSAIDS: Mobic 7.5mg with relief, stopped taking due to bruising   Opioids: None  Membrane stabilizers: gabapentin 400mg for numbness in left leg, left cervical radiculitis. Muscle relaxers: None  Previous injections: Bilateral US guided shoulder subacromial injections with relief of pain. Cervical BINTA C7-T1 (Dr. Hewitt Comes on 12/10/2020) with improvement in left cervical radiculitis symptoms. Previous surgery at this site: No shoulder surgeries. Previously saw Ortho for b/l shoulder RTC tears- per patient they did not recommend surgery due to chemo/radiation and osteoporosis -- unless pain unbearable --only then would consider shoulder replacement     Carlos Duarte  continues home exercises/stretches from therapy. The patient does perform regular exercise.        Past Medical History:   Diagnosis Date    Anemia associated with chemotherapy 11/16/2016    resolved    Anxiety     Cancer ([address]     tongue    Cancer of tongue ([address] 9/16/2016    treated with chemo and radiation / last treatment 11/2016    Cancer of tonsil ([address]     Cervical herniation     no limits on rom    Difficulty sleeping     Discoloration of skin of foot 6/25/2020    Dizziness 6/20/2018    GERD (gastroesophageal reflux disease)     Hx of cataract surgery 10/06/2020    Hyperlipidemia     Hypokalemia 11/16/2016    with chemo / resolved    Hypomagnesemia 11/16/2016    related to chemo / resloved    Hypothyroidism     Leg swelling 7/26/2017    Lumbar herniated disc     after fell 2/2017 / now has chronic back pain and numbnes at left foot and ankle    Lymphedema of both lower extremities 07/26/2017    wears support hose    Lymphedema of lower extremity     Osteopenia     PONV (postoperative nausea and vomiting)     Restless leg syndrome     Rotator cuff tear bilateral    Sleep apnea     Thyroid disease     Toe cyanosis 6/25/2020       Past Surgical History:   Procedure Laterality Date    BLADDER REPAIR  1995    COLONOSCOPY  2008    neg    COLONOSCOPY  03/19/2014    ENDOSCOPY, COLON, DIAGNOSTIC  2012    EYE SURGERY      cataract surgery, bilateral.     FRACTURE SURGERY  2012    RIGHT ELBOW    HYSTERECTOMY  1993    [facility]   [address]    LARYNGOSCOPY  09/08/2016    direct laryngoscopy right tonsil/tongue base biopsy cervical esophagoscopy fine needle aspiration     NERVE BLOCK      OTHER SURGICAL HISTORY  5/28/12    ORIF RIGHT ULNA    OTHER SURGICAL HISTORY  6/18/2012    orif  rivision right olecranon    OTHER SURGICAL HISTORY Right 01/16/2014    Right elbow hardware removal    MT OFFICE/OUTPT VISIT,PROCEDURE ONLY N/A 11/9/2018    PORT REMOVAL performed by Tmaika Owusu MD at [place] [address]  1MONTHS OF AGE    H/O ESOPHAGEAL WEB    TUNNELED VENOUS PORT PLACEMENT  10/04/2016    Dr. Kamla Thornton History     Socioeconomic History    Marital status:      Spouse name: Not on file    Number of children: Not on file    Years of education: Not on file    Highest education level: Not on file   Occupational History    Not on file   Tobacco Use    Smoking status: Never Smoker    Smokeless tobacco: Never Used   Vaping Use    Vaping Use: Never used   Substance and Sexual Activity    Alcohol use: No     Alcohol/week: 0.0 standard drinks    Drug use: No    Sexual activity: Not on file   Other Topics Concern    Not on file   Social History Narrative    Not on file     Social Determinants of Health     Financial Resource Strain:     Difficulty of Paying Living Expenses:    Food Insecurity:     Worried About Running Out of Food in the Last Year:     Ran Out of Food in the Last Year:    Transportation Needs:     Lack of Transportation (Medical):    [address] [place] [address] Lack of Transportation (Non-Medical):    Physical Activity:     Days of Exercise per Week:     Minutes of Exercise per Session:    Stress:     Feeling of Stress :    Social Connections:     Frequency of Communication with Friends and Family:     Frequency of Social Gatherings with Friends and Family:     Attends [employer] Services:     Active Member of Clubs or Organizations:     Attends Club or Organization Meetings:     Marital Status:    Intimate Partner Violence:     Fear of Current or Ex-Partner:     Emotionally Abused:     Physically Abused:     Sexually Abused: The patient is retired. Ganesh Carlos  Does not require an assistive device. Family History   Problem Relation Age of Onset    Hypertension Father     High Blood Pressure Father     Arthritis Father     Cancer Mother         uterine    Diabetes Mother     Hypertension Mother     High Blood Pressure Mother     Depression Mother     Other Mother         endocrine disorder       No Known Allergies    Current Outpatient Medications   Medication Sig Dispense Refill    temazepam (RESTORIL) 15 MG capsule Take 1 capsule by mouth nightly as needed for Sleep for up to 90 days.  90 capsule 0    Cream Base (VERSAPRO) CREA ONE GRAM (ONE PUMP) EXTERNALLY FOUR TIMES A  g 5    NONFORMULARY Indications: elderberry       sucralfate (CARAFATE) 1 GM/10ML suspension Take 10 mLs by mouth 4 times daily 1200 mL 3    venlafaxine (EFFEXOR XR) 37.5 MG extended release capsule Take 37.5 mg by mouth daily      SYNTHROID 75 MCG tablet Take 1 tablet by mouth daily 90 tablet 3    RABEprazole (ACIPHEX) 20 MG tablet 1 daily 90 tablet 3    B Complex Vitamins (B-COMPLEX/B-12 PO) Take by mouth daily      Cholecalciferol (VITAMIN D PO) Take 1 tablet by mouth 3000 units daily      Biotin 10 MG CAPS Take by mouth daily HOLD      Elastic Bandages & Supports (JOBST KNEE HIGH COMPRESSION ) MISC Knee high with 20- 30 mmhg of compression 1 each 10    ROPINIRole HCl (REQUIP PO) Take by mouth nightly as needed For restless legs      acetaminophen (TYLENOL) 500 MG tablet Take 500 mg by mouth every 6 hours as needed Instructed to take with sip water am of procedure if needs      gabapentin (NEURONTIN) 400 MG capsule Take 1 capsule by mouth 3 times daily for 30 days. 270 capsule 3     Current Facility-Administered Medications   Medication Dose Route Frequency Provider Last Rate Last Admin    bupivacaine (PF) (MARCAINE) 0.25 % injection 15 mg  6 mL Intradermal Once Chris Esparza MD           Review of Systems  General: No chills, fatigue, fever, malaise, night sweats, weight gain,  weight loss. Psychological: No anxiety, depression, suicidal ideation   Ophthalmic: No blurry vision, decreased vision, double vision, loss of vision  Ear Nose Throat: No hearing loss, tinnitus, phonophobia, sensitivity to smells, vertigo, or vocal changes. Allergy/Immunology: No watery eyes, itchy eyes, frequent infections. Hematological and Lymphatic: No bleeding problems, blood clots, bruising  Endocrine:  No polydypsia, polyuria, temperature intolerance. Respiratory: No cough, shortness of breath, wheezing. Cardiovascular: No syncope, chest pain, dyspnea on exertion,palpitations. Gastrointestinal: No abdominal pain, hematemesis, melena, nausea, vomiting, stool incontinence  Genito-Urinary: No dysuria, hematuria, incontinence   Musculoskeletal: Per HPI  Neurological: Per HPI  Dermatological:  No rash      Physical Exam:   Vitals:    06/21/21 0811   BP: 118/78   Resp: 16      General: The patient is in no apparent distress. HEENT: No rhinorrhea, sneezing, yawning, or lacrimation. No scleral icterus or conjunctival injection. SKIN: No piloerection. No track marks. No rash. Normal turgor. No erythema or ecchymosis. Psychological: Mood and affect are appropriate. Hygiene is appropriate. Cardiovascular:  Heart is regular rate. Peripheral pulses are 2+. There is no edema. Respiratory: Respirations are regular and unlabored. There is no cyanosis. Gastrointestinal: No abdominal distension   MSK: Shoulder: No edema, erythema, ecchymosis, effusion, or mass of bilateral shoulders. Right shoulder + sulcus sign. Full passive flexion and abduction bilateral shoulders, mild pain at terminal abduction. Cannot actively abduct beyond 90 deg when trapezius is eliminated from action. Decreased internal rotation b/l shoulders. No crepitus. No tenderness to palpation at the acromioclavicular joint or bicipital groove. There is tenderness at bilateral subacromial space. There is weakness with Empty can test bilaterally (3-/5). Weak with Speed's bilaterally. Minimal pain with bilateral Jadon-Land. Full painless cervical spine and elbow ROM. Negative Spurling. There is bilateral upper trapezius muscle spasm and trigger points. No upper extremity edema. Neurologic: Awake, alert and oriented in three planes. Speech is fluent. No facial weakness. Hearing is intact for conversation. Pupils are equal and round. Strength:   R  L  Deltoid   5-  5-  Biceps   5  5  Triceps  5  5  Wrist Ext  5  5  Finger Abd  5  5      Sensory:  Intact for light touch in all upper extremity dermatomes. Reflexes:   R  L  Biceps   2 2  Triceps  2 2  BR   2 2       No pathological reflexes     Gait is normal.     Exam stable     US guided Glenohumeral joint Injection Procedure: Bilateral  After explaining the indications, risks, benefits and alternatives of a bilateral glenohumeral injection, the patient agreed to proceed. A permit was signed and scanned to the media. The patient was placed in the seated position. Chloraprep was used to sterilize the skin. Using an aseptic, no touch technique, a 22 gauge, 1.5\" needle with 1 cc of Kenalog 40mg/cc and 3 cc of bupivacaine 0.25% was directed, under ultrasound guidance, to the right glenohumeral joint.   After negative aspiration the medication was injected. Adequate hemostasis was achieved and the injection site was covered with a bandage. The exact same procedure was performed on the left side. US images were scanned to media separately. The patient was observed for complication and left the office without incident. The patient tolerated the procedure well and was educated in post injection care. Impression:   -Bilateral shoulder pain   -Bilateral massive rotator cuff tears.   -Right shoulder subluxation  -Left cervical radiculitis -- radicular symptoms improved after cervical BINTA, patient is following with Dr. Quang Tyler:   · Continue home exercises for bilateral shoulder ROM, gentle strengthening, pain reduction techniques. Reviewed importance of maintaining range of motion. · Dry needling as needed   · Continue lymphedema therapy  · Continue compound cream as needed   Bilateral US guided subacromial steroid injections today for pain relief and improvement in patient ability to perform day to day functions. Reviewed all risks/benefit. Patient wishes to proceed. See above for details. Left cervical radicular symptoms improved after prior epidural with Dr. Ivan Berger. The patient was educated about the diagnosis, prognosis, indications, risks and benefits of treatment. An opportunity to ask questions was given to the patient and questions were answered. The patient agreed to proceed with the recommended treatment as described above. Follow up 3-4 months        Maynor Dhillon M.D.   Physical Medicine and Rehabilitation

## 2021-07-18 RX ORDER — TRIAMCINOLONE ACETONIDE 40 MG/ML
40 INJECTION, SUSPENSION INTRA-ARTICULAR; INTRAMUSCULAR ONCE
Status: SHIPPED | OUTPATIENT
Start: 2021-07-18

## 2021-07-18 RX ORDER — BUPIVACAINE HYDROCHLORIDE 2.5 MG/ML
6 INJECTION, SOLUTION INFILTRATION; PERINEURAL ONCE
Status: SHIPPED | OUTPATIENT
Start: 2021-07-18

## 2021-08-18 RX ORDER — ROPINIROLE 0.5 MG/1
0.5 TABLET, FILM COATED ORAL NIGHTLY PRN
Qty: 30 TABLET | Refills: 3 | Status: SHIPPED | OUTPATIENT
Start: 2021-08-18

## 2021-08-20 ENCOUNTER — HOSPITAL ENCOUNTER (OUTPATIENT)
Dept: RADIATION ONCOLOGY | Age: 71
Discharge: HOME | End: 2021-08-20
Payer: COMMERCIAL

## 2021-08-20 VITALS
HEART RATE: 76 BPM | TEMPERATURE: 97.4 F | SYSTOLIC BLOOD PRESSURE: 128 MMHG | DIASTOLIC BLOOD PRESSURE: 76 MMHG | BODY MASS INDEX: 21.18 KG/M2 | OXYGEN SATURATION: 97 % | RESPIRATION RATE: 18 BRPM | WEIGHT: 115.8 LBS

## 2021-08-20 DIAGNOSIS — I89.0 LYMPHEDEMA: ICD-10-CM

## 2021-08-20 DIAGNOSIS — L98.9 SKIN LESION: ICD-10-CM

## 2021-08-20 DIAGNOSIS — C76.0 HEAD AND NECK CANCER (HCC): Primary | ICD-10-CM

## 2021-08-20 PROCEDURE — 99213 OFFICE O/P EST LOW 20 MIN: CPT

## 2021-08-20 PROCEDURE — 99213 OFFICE O/P EST LOW 20 MIN: CPT | Performed by: RADIOLOGY

## 2021-08-20 NOTE — PATIENT INSTRUCTIONS
VIOLA Charles. MD [facility]  Cell: [phone]    [facility]:  [phone]   FAX: [phone]  [facility]:  [address] [place] [address] Avenue:    [phone]  [address] [place] [address] Road:  [phone]   FAX:  [phone]  Email: [email or web address]

## 2021-08-20 NOTE — PROGRESS NOTES
Justin Shanell  8/20/2021  8:10 AM      Vitals:    08/20/21 0809   BP: 128/76   Pulse: 76   Resp: 18   Temp: 97.4 °F (36.3 °C)   SpO2: 97%    : Wt Readings from Last 3 Encounters:   08/20/21 115 lb 12.8 oz (52.5 kg)   06/21/21 115 lb (52.2 kg)   05/11/21 114 lb (51.7 kg)                Current Outpatient Medications:     rOPINIRole (REQUIP) 0.5 MG tablet, Take 1 tablet by mouth nightly as needed (restless leg) For restless legs, Disp: 30 tablet, Rfl: 3    temazepam (RESTORIL) 15 MG capsule, Take 1 capsule by mouth nightly as needed for Sleep for up to 90 days. , Disp: 90 capsule, Rfl: 0    Cream Base (VERSAPRO) CREA, ONE GRAM (ONE PUMP) EXTERNALLY FOUR TIMES A DAY, Disp: 100 g, Rfl: 5    NONFORMULARY, Indications: elderberry , Disp: , Rfl:     sucralfate (CARAFATE) 1 GM/10ML suspension, Take 10 mLs by mouth 4 times daily, Disp: 1200 mL, Rfl: 3    venlafaxine (EFFEXOR XR) 37.5 MG extended release capsule, Take 37.5 mg by mouth daily, Disp: , Rfl:     SYNTHROID 75 MCG tablet, Take 1 tablet by mouth daily, Disp: 90 tablet, Rfl: 3    gabapentin (NEURONTIN) 400 MG capsule, Take 1 capsule by mouth 3 times daily for 30 days. , Disp: 270 capsule, Rfl: 3    RABEprazole (ACIPHEX) 20 MG tablet, 1 daily, Disp: 90 tablet, Rfl: 3    B Complex Vitamins (B-COMPLEX/B-12 PO), Take by mouth daily, Disp: , Rfl:     Cholecalciferol (VITAMIN D PO), Take 1 tablet by mouth 3000 units daily, Disp: , Rfl:     Biotin 10 MG CAPS, Take by mouth daily HOLD, Disp: , Rfl:     Elastic Bandages & Supports (JOBST KNEE HIGH COMPRESSION SM) MISC, Knee high with 20- 30 mmhg of compression, Disp: 1 each, Rfl: 10    acetaminophen (TYLENOL) 500 MG tablet, Take 500 mg by mouth every 6 hours as needed Instructed to take with sip water am of procedure if needs, Disp: , Rfl:     Current Facility-Administered Medications:     bupivacaine (MARCAINE) 0.25 % injection 15 mg, 6 mL, Intradermal, Once, Kvng Callahan MD    triamcinolone Reassess in 12 weeks or with any noted change in patient condition which places them at a risk for a fall   4-6 Moderate Risk 1. Provide assistance as indicated for ambulation activities  2. Reorient confused/cognitively impaired patient  3. Call-light/bell within patient's reach  4. Chair/bed in low position, stretcher/bed with siderails up except when performing patient care activities  5. Educate patient/family/caregiver on falls prevention  6. Falls risk precaution (Yellow sticker Level II) placed on patient chart   7 or   Higher High Risk 1. Place patient in easily observable treatment room  2. Patient attended at all times by family member or staff  3. Provide assistance as indicated for ambulation activities  4. Reorient confused/cognitively impaired patient  5. Call-light/bell within patient's reach  6. Chair/bed in low position, stretcher/bed with siderails up except when performing patient care activities  7. Educate patient/family/caregiver on falls prevention  8. Falls risk precaution (Yellow sticker Level III) placed on patient chart           MALNUTRITION RISK SCREENING ASSESSMENT    8/20/2021   Patient:  Larisa Lei  Sex:  female    Instructions:  Assess the patient and enter the appropriate indicators that are present for nutrition risk identification. Total the numbers entered and assign a risk score. Follow the appropriate action for total score listed below. Assessment   Date  8/20/2021     1. Have you lost weight without trying? 0- No     2. Have you been eating poorly because of a decreased appetite? 0- No   3. Do you have a diagnosis of head and neck cancer?       2- Yes                                                                                    TOTAL 2          Score of 0-1: No action  Score 2 or greater:  · For Non-Diabetic Patient: Recommend adding Ensure Complete 2 x daily and provide patient with Ensure wellness bag with coupons  · For Diabetic Patient: Recommend adding Glucerna Shake 2 x daily and provide patient with Glucerna Wellness bag with coupons  · Route to the dietitian via Robert Box RN

## 2021-08-20 NOTE — PROGRESS NOTES
Radiation Oncology   Follow Up Note  Sushma Sevilla. Clara Downs MD MS DABR      8/20/2021  Royal Blase  Date of Service: 8/20/21        HPI:      Angela Medina is a pleasant 78 year old woman s/p concurrent chemo-RT for locally advanced disease of the OP; she presents today for FU -NESSA.                      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 Dental clearance 9/21/2016 per Dr. Chloe Light. Note in Media tab on EPIC. She underwent a port placement with Dr. Houge Console on 10/4/2016. Chemotherapy was begun on 10/6/2016 with Dr. Dakota Estrella. PEG tube placed on 10/31/2016 by Dr. Ramon armijo. Cassandra Denzel 11/29/16, Flor Summers completed 7000 cGy in 35 fractions directed to the Definitive head and neck irradiation for management of right base of tongue SCC.  States Pt follows with Dr Dakota Estrella for medical oncology. PET scan NEGATIVE (SEE BELOW / Dr. Belén Gomez following with US for thyroid findings) however she did have (another post Tx #3) interval PET that noted concerning findings. Dr. Belén Gomez did a biopsy and the report noted negative findings, probably radiation changes only. A FU CT did not show LAD or a discrete mass (see EPIC) - previous interval PET scan noted uptake in progression however this uptake changed very little and upon review of the CT and PET, biopsy negative - previous interval.  Most recently a CT scan is negative May. Pt requests PT neck LE. S/P esophageal dilation. KPS 80-90.      CT 5/7/21:  Impression   No acute abnormality of the soft tissue structures of the neck.       No evidence for residual or recurrent neoplastic process.          CT 8/2/20:  Impression    Stable CT scan of the neck                     PET 10/16/19:      Impression   1.  Focal tracer uptake at the level of the tongue base on the left,   tracer uptake has increased in the interval.   -----  3.7. Appleton Hilt When compared to previous the previous SUV in this region was 2.9.                PET 4/18/19:  Impression       No evidence of recurrent malignancy.                   PET 6/21/18:  Chen Cranker level activity seen in association with the left parotid gland   of uncertain etiology or clinical significance, similar to the prior   examination. Query lymphomatous involvement. 2.  Additional findings, as detailed above.            PET 2/10/18:     1. No identifiable hypermetabolic focus with avid glucose metabolism   concerning for recurrence.                 PET 11/4/17:         PET images were obtained from skull base to the mid thigh.       Non diagnostic CT images were obtained for the purposes of attenuation   correction.       19.6 of F-18 glucose was injected.       Images reveal a single region of radiotracer uptake identified at the   level of the mylohyoid muscle on the right. This is focal and   asymmetric and has an SUV of 3.55.        There is otherwise a normal biodistribution of radiotracer. There is   no other abnormal tracer uptake seen.           Impression       Single focus of abnormal tracer uptake at the level of the mylohyoid   muscle, this could represent asymmetric muscular uptake. This could   represent focal neoplasm. The finding is somewhat nonspecific although   concerning.                PET 7/10/17:  Mild asymmetric uptake in the tongue predominantly anteriorly and   right side of the tongue posteriorly which is new since the previous   examination. Inflammatory process or recurrent malignancy has to be   considered. Direct visualization and follow-up examination   recommended.                PET 1/21/16:  1.  Mild asymmetrically increased uptake seen in association with the   right thyroid lobe, new when compared to the prior examination. Consider further evaluation with thyroid ultrasound.    2.  Interval resolution of previously seen right parotid and right   base of tongue/lingual tonsil uptake.                  -----           PATH 8/10/17:    Diagnosis:     A. Tongue, anterior right, biopsy: Benign squamous mucosa with mild       hyperkeratosis.     B. Tongue, right at fossa junction, biopsy: Benign squamous mucosa.     C. Tongue, right posterior, biopsy: Benign squamous mucosa. Comment:     The biopsies show fragments of benign squamous mucosa.  The  underlying submucosa shows rare mildly atypical stromal cells suggestive  of radiation induced atypia.  No evidence of squamous cell carcinoma is  identified.           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 Dental clearance 9/21/2016 per Dr. Zaida Olson. Note in Media tab on EPIC. She underwent a port placement with Dr. Josesito Thornton on 10/4/2016. Chemotherapy was begun on 10/6/2016 with Dr. Adan Wild. PEG tube placed on 10/31/2016 by Dr. Shaffer Mail     Area(s) treated: Definitive head and neck irradiation.     Treatment dates: 9/30/2016 through 11/29/2016.     Daily fractionation: 200 cGy IMRT with daily CBCT.        Total dose:  7000 cGy in 35 fractions over 57 elapsed days.     Ms. Sarah Hebert completed a course of radiation treatments for management of locally advanced base of tongue squamous cell carcinoma. She had refused G-tube placement on presentation.      She did develop expected skin, oral mucositis and swallowing difficulties.  She was symptomatically managed. In spite of this, she did have hypotension/dehydration. A PEG tube was placed.  She was hospitalized on 11/15/2016 following a syncopal episode. Connor Hernandez is using various skin creams including Aquaphor, Neosporin with pain and most recently Silvadene.  She did require IV hydration with Dr. Adan Wild.  For oral mucositis management, we tried Magic mouthwash, neutral cell, Gelclair.  She is on Salagen for dry mouth.           -----              Past Medical History:   Diagnosis Date    Anemia associated with chemotherapy 11/16/2016    resolved    Anxiety     Cancer ([address]     tongue    Cancer of tongue ([address] 9/16/2016    treated with chemo and radiation / last treatment 11/2016    Cancer of tonsil ([address]     Cervical herniation     no limits on rom    Difficulty sleeping     Discoloration of skin of foot 6/25/2020    Dizziness 6/20/2018    GERD (gastroesophageal reflux disease)     Hx of cataract surgery 10/06/2020    Hyperlipidemia     Hypokalemia 11/16/2016    with chemo / resolved    Hypomagnesemia 11/16/2016    related to chemo / resloved    Hypothyroidism     Leg swelling 7/26/2017    Lumbar herniated disc     after fell 2/2017 / now has chronic back pain and numbnes at left foot and ankle    Lymphedema of both lower extremities 07/26/2017    wears support hose    Lymphedema of lower extremity     Osteopenia     PONV (postoperative nausea and vomiting)     Restless leg syndrome     Rotator cuff tear     bilateral    Sleep apnea     Thyroid disease     Toe cyanosis 6/25/2020         Past Surgical History:   Procedure Laterality Date    BLADDER REPAIR  1995    COLONOSCOPY  2008    neg    COLONOSCOPY  03/19/2014    ENDOSCOPY, COLON, DIAGNOSTIC  2012    EYE SURGERY      cataract surgery, bilateral.     FRACTURE SURGERY  2012    RIGHT ELBOW    HYSTERECTOMY  1993    [facility]   [address]    LARYNGOSCOPY  09/08/2016    direct laryngoscopy right tonsil/tongue base biopsy cervical esophagoscopy fine needle aspiration     NERVE BLOCK      OTHER SURGICAL HISTORY  5/28/12    ORIF RIGHT ULNA    OTHER SURGICAL HISTORY  6/18/2012    orif  rivision right olecranon    OTHER SURGICAL HISTORY Right 01/16/2014    Right elbow hardware removal    NE OFFICE/OUTPT VISIT,PROCEDURE ONLY N/A 11/9/2018    PORT REMOVAL performed by [facility]kant Kaiser MD at [facility]  1MONTHS OF AGE    H/O ESOPHAGEAL WEB    TUNNELED VENOUS PORT PLACEMENT  10/04/2016    Dr. Karli Hager History     Socioeconomic History    Marital status:      Spouse name: Not on file    Number of children: Not on file    Years of education: Not on file    Highest education level: Not on file   Occupational History    Not on file   Tobacco Use    Smoking status: Never Smoker    Smokeless tobacco: Never Used   Vaping Use    Vaping Use: Never used   Substance and Sexual Activity    Alcohol use: No     Alcohol/week: 0.0 standard drinks    Drug use: No    Sexual activity: Not on file   Other Topics Concern    Not on file   Social History Narrative    Not on file     Social Determinants of Health     Financial Resource Strain:     Difficulty of Paying Living Expenses:    Food Insecurity:     Worried About Running Out of Food in the Last Year:     [place]ou of Food in the Last Year:    Transportation Needs:     Lack of Transportation (Medical):      Lack of Transportation (Non-Medical):    Physical Activity:     Days of Exercise per Week:     Minutes of Exercise per Session:    Stress:     Feeling of Stress :    Social Connections:     Frequency of Communication with Friends and Family:     Frequency of Social Gatherings with Friends and Family:     Attends [employer] Services:     Active Member of Clubs or Organizations:     Attends Club or Organization Meetings:     Marital Status:    Intimate Partner Violence:     Fear of Current or Ex-Partner:     Emotionally Abused:     Physically Abused:     Sexually Abused:          Family History   Problem Relation Age of Onset    Hypertension Father     High Blood Pressure Father     Arthritis Father     Cancer Mother         uterine    Diabetes Mother     Hypertension Mother     High Blood Pressure Mother     Depression Mother    Cynthia Owens Other Mother         endocrine disorder       Allergies:   Patient has no known allergies. Current Outpatient Medications   Medication Sig Dispense Refill    rOPINIRole (REQUIP) 0.5 MG tablet Take 1 tablet by mouth nightly as needed (restless leg) For restless legs 30 tablet 3    temazepam (RESTORIL) 15 MG capsule Take 1 capsule by mouth nightly as needed for Sleep for up to 90 days. 90 capsule 0    Cream Base (VERSAPRO) CREA ONE GRAM (ONE PUMP) EXTERNALLY FOUR TIMES A  g 5    NONFORMULARY Indications: elderberry       sucralfate (CARAFATE) 1 GM/10ML suspension Take 10 mLs by mouth 4 times daily 1200 mL 3    venlafaxine (EFFEXOR XR) 37.5 MG extended release capsule Take 37.5 mg by mouth daily      SYNTHROID 75 MCG tablet Take 1 tablet by mouth daily 90 tablet 3    gabapentin (NEURONTIN) 400 MG capsule Take 1 capsule by mouth 3 times daily for 30 days.  270 capsule 3    RABEprazole (ACIPHEX) 20 MG tablet 1 daily 90 tablet 3    B Complex Vitamins (B-COMPLEX/B-12 PO) Take by mouth daily      Cholecalciferol (VITAMIN D PO) Take 1 tablet by mouth 3000 units daily      Biotin 10 MG CAPS Take by mouth daily HOLD      Elastic Bandages & Supports (JOBST KNEE HIGH COMPRESSION SM) MISC Knee high with 20- 30 mmhg of compression 1 each 10    acetaminophen (TYLENOL) 500 MG tablet Take 500 mg by mouth every 6 hours as needed Instructed to take with sip water am of procedure if needs       Current Facility-Administered Medications   Medication Dose Route Frequency Provider Last Rate Last Admin    bupivacaine (MARCAINE) 0.25 % injection 15 mg  6 mL Intradermal Once Yisel Brown MD        triamcinolone acetonide (KENALOG-40) injection 40 mg  40 mg Intramuscular Once Yisel Brown MD        triamcinolone acetonide (KENALOG-40) injection 40 mg  40 mg Intramuscular Once Yisel Brown MD        bupivacaine (PF) (MARCAINE) 0.25 % injection 15 mg  6 mL Intradermal Once Yisel Brown MD REVIEW OF SYSTEMS  History obtained from chart review and the patient  General ROS: positive for  - fatigue  Psychological ROS: negative  Ophthalmic ROS: negative  ENT ROS: negative  Allergy and Immunology ROS: negative  Hematological and Lymphatic ROS: negative  Endocrine ROS: negative  Breast ROS: negative for breast lumps  Respiratory ROS: no cough, shortness of breath, or wheezing  Cardiovascular ROS: no chest pain or dyspnea on exertion  Gastrointestinal ROS: positive for - swallowing difficulty/pain  Genito-Urinary ROS: no dysuria, trouble voiding, or hematuria  Musculoskeletal ROS: negative  Neurological ROS: no TIA or stroke symptoms  Dermatological ROS: negative      Physical Exam  HENT:      Head: Normocephalic. Right Ear: External ear normal.      Left Ear: External ear normal.      Nose: Nose normal.      Mouth/Throat:      Mouth: Mucous membranes are moist.   Eyes:      Pupils: Pupils are equal, round, and reactive to light. Cardiovascular:      Rate and Rhythm: Normal rate and regular rhythm. Pulses: Normal pulses. Heart sounds: Normal heart sounds. Pulmonary:      Effort: Pulmonary effort is normal.      Breath sounds: Normal breath sounds. Abdominal:      General: Abdomen is flat. Palpations: Abdomen is soft. Musculoskeletal:         General: Normal range of motion. Cervical back: Normal range of motion. Skin:     General: Skin is warm. Neurological:      General: No focal deficit present. Mental Status: She is alert. Psychiatric:         Mood and Affect: Mood normal.         Behavior: Behavior normal.         Thought Content: Thought content normal.         Judgment: Judgment normal.           A/P:         Mickiel Moritz is a dominga 19 year old woman s/p concurrent chemo-RT for locally advanced HN cancer. She is NESSA today with grade 1-2 late effects but not difficulty with self care or instrumental ADLS.  She is maintaining her elizabeth and th PEG has been removed.  PET negative.  Persistent xerostomia and dysphagia.  The patient did verbalize understanding for the indications of continued FU including imaging and laboratory evaluation as applicable to this case; as well as appropriate lifestyle choices and health maintenance.  All questions answered to the best of my ability. Early delayed or chronic RT grade 1-2 (xerostomia / dysphagia).          *I spent at least 20 MIN on this case and with this patient today including personally performing/reviewing the history, ROS, PE, and providing a summary and description of the detailed medical decision making as a basis for any and all recommendations made today (+/- a pertinent RBA discussion): literature and radiology reviews were performed as noted and applicable (70% or more time was spent in direct patient ).          -off Aqoural  -off Carafate   -off Gelclair  -pt declined Salagen re-trial / rec xylomelt trial PRN  -cont FU with Dr. Sheron Quintanilla to review CTs  -cont FU with Dr. Libby Ferraro for carefull oral care  -LE clinic previously / ongoing  -no RT dose was delivered to the rotator cuffs / cleared for surgery IF indicated by Orthopedic SURG. -FU with me in 6m CT ordered. -R cheek lesion, will refer to plastics (order placed)        Sigrid Dumont. Deya Garcia MD [address] [facility]  Cell: [phone]  [address]:  [phone]   FAX: [phone] [address] [facility] [address] [facility] [address]:   [address] [place] [address] Avenue:    [phone]  [address] [place] [address] Road:  [phone]   FAX:  [phone]  Email: [email or web address]      NOTE: This report was transcribed using voice recognition software. Every effort was made to ensure accuracy; however, inadvertent computerized transcription errors may be present.

## 2021-08-25 ENCOUNTER — HOSPITAL ENCOUNTER (OUTPATIENT)
Dept: OCCUPATIONAL THERAPY | Age: 71
Setting detail: THERAPIES SERIES
Discharge: HOME | End: 2021-08-25
Payer: COMMERCIAL

## 2021-08-25 PROCEDURE — 97165 OT EVAL LOW COMPLEX 30 MIN: CPT | Performed by: OCCUPATIONAL THERAPIST

## 2021-08-25 NOTE — PROGRESS NOTES
Occupational Therapy      OCCUPATIONAL THERAPY INITIAL [address]  [address]   Phone: [phone]  Fax: [phone]     Date:  2021  Initial Evaluation Date: 2021     Evaluating Therapist: RICARDO Myers CLT-LANA    Patient Name:  Luba Quick    :  1950    Restrictions/Precautions:  Lymphedema left lower extremity, fall risk  Diagnosis:  Head and neck cancer (c76.0)       Date of Surgery/Injury: n/a     [id number]  Plan of care signed (Y/N): N  Visit# / total visits: Evaluation    Referring Practitioner:  Kathie Pereyra MD  Specific Practitioner Orders: Evaluation and Treatment    Assessment of current deficits   []Pain  []Skin Integrity   [x]Lymphedema   []Functional transfers/mobility   []ADLs   []Strength    []Cognition  []IADLs   []Safety Awareness   []  Motor Endurance    []Fine Motor Coordination   []Balance   []Vision/perception  []Sensation []Gross Motor Coordination  []ROM     OT PLAN OF CARE   OT POC based on physician orders, patient diagnosis and results of clinical assessment    Frequency/Duration:  1-2x/wk for 12 treatment sessions from 2021 through 2021  Specific OT Treatment to include:     Plan of Care:     [x]97140-Manual Lymph Drainage and Combined Decongestive Therapy  [x]68765- Skilled Multilayer Short Stretch Compression Bandaging/ Therapeutic Exercise  [x]Skin Care Education [x]HEP including Self MLD Education and/or Self Bandaging  [x]Education for Lymphedema Risks/Precautions     [x] Caregiver Education   []other:      Patient Specific Goal: reduce swelling in cheek       STG: 3.   weeks    1. Patient will demonstrate knowledge and understanding for lymphedema precautions, skin care and self management to decrease progression of lymphedema and risk of infection.   2.  Patient will present with decreased limb volume in the involved extremity from mild to trace for improved functional mobility. 3.  Patient will demonstrate compliance with compression therapy for independent management of lymphedema. LT weeks  1. Patient will be independent with self management of lymphedema including understanding garment wear schedule, self compression bandaging, HEP and self care. 2.  Patient will be fitted for appropriate compression garments for long term management of lymphedema. 3.  Patient will present with decreased limb volume in the involved extremity from trace  to none  for improved functional mobility.              Past medical History:  Past Medical History:   Past Medical History:   Diagnosis Date    Anemia associated with chemotherapy 2016    resolved    Anxiety     Cancer ([facility] [address]     tongue    Cancer of tongue ([facility] [address] 2016    treated with chemo and radiation / last treatment 2016    Cancer of tonsil ([facility] [address]     Cervical herniation     no limits on rom    Difficulty sleeping     Discoloration of skin of foot 2020    Dizziness 2018    GERD (gastroesophageal reflux disease)     Hx of cataract surgery 10/06/2020    Hyperlipidemia     Hypokalemia 2016    with chemo / resolved    Hypomagnesemia 2016    related to chemo / resloved    Hypothyroidism     Leg swelling 2017    Lumbar herniated disc     after fell 2017 / now has chronic back pain and numbnes at left foot and ankle    Lymphedema of both lower extremities 2017    wears support hose    Lymphedema of lower extremity     Osteopenia     PONV (postoperative nausea and vomiting)     Restless leg syndrome     Rotator cuff tear     bilateral    Sleep apnea     Thyroid disease     Toe cyanosis 2020     Past Surgical History:   Past Surgical History:   Procedure Laterality Date   [address]    COLONOSCOPY      neg    COLONOSCOPY  2014    ENDOSCOPY, COLON, DIAGNOSTIC  2012    EYE SURGERY      cataract surgery, bilateral.     FRACTURE SURGERY  2012    RIGHT ELBOW    HYSTERECTOMY  1993    lav&BSO&A&P   [address]    LARYNGOSCOPY  09/08/2016    direct laryngoscopy right tonsil/tongue base biopsy cervical esophagoscopy fine needle aspiration     NERVE BLOCK      OTHER SURGICAL HISTORY  5/28/12    ORIF RIGHT ULNA    OTHER SURGICAL HISTORY  6/18/2012    orif  rivision right olecranon    OTHER SURGICAL HISTORY Right 01/16/2014    Right elbow hardware removal    CA OFFICE/OUTPT VISIT,PROCEDURE ONLY N/A 11/9/2018    PORT REMOVAL performed by Kiko Day MD at [place] [address]  1MONTHS OF AGE    H/O ESOPHAGEAL WEB    TUNNELED VENOUS PORT PLACEMENT  10/04/2016    Dr. Rafi Moody         []Surgery:    []Lymph node removal:   [x]Radiation Therapy: completed  [x]Chemotherapy: completed  []History of Cellulitis/Infection:   []Family history of lymphedema: patient has left lower extremity lymphedema  []Previous treatment for lymphedema:   []Current compression garment use:     Reason for Referral: Pt was referred to [facility] due to intractable lymphedema of the head/neck, unrelieved by elevation. Chief Complaint: increased swelling head/neck area  Triggers: none noted at time of evaluation    Home Living: patient lives with spouse in one floor home with two step entry. Patient has basement ans uses. Patient has tub/shower combo. Patient performs adl's and iadl's.   Prior Level of Function: independent all aspects    Cognition:   Alert/Oriented x3     IADL History  Homemaking Responsibility: performs all aspects  Shopping Responsibility: performs all aspects  Mode of Transportation: car  Leisure & Hobbies: volunteer work, gardening  Work: retired      Pain Level: none noted at time of evaluation  Pitting Edema: none noted at time of evaluation  Fibrotic tissue: Minimal at right side of neck into cheek area  Skin Integrity: fair at time of evaluaiotn      Measurements marked in one centimeter increments at midline of neck from bottom lip to base of the neck. Patient measurement then taken from ear opening passing through each monique along neckline to opposite ear opening. Evaluation Follow up Follow up   1cm 25.5cm     2cm 26.25cm     3cm 26cm     Upper lip 25cm     Lower lip 25cm                   Top ear to midline of upper lip Right - 13.75cm  Left - 13.25cm     Top ear to midline of 1cm below low lip Right - 15cm  Left - 14.25cm                       Mouth opening 6.5cm     Translation left 1.5cm     Translation right 1.25cm                 Neck circumference      Head circumference 62cm         Neck flexion 35degrees     Neck extension 40degrees     Neck lateral rotation right 70degrees     Neck lateral rotation left 45degrees     Neck lateral flexion right 25degrees     Neck lateral flexion left 25degrees           Cognition:   Alert/Oriented x3     Vision: within functional limits for daily life tasks. Has glasses        Hearing: within functional limits for daily life tasks     ADL  Feeding:  independent  Grooming:  independent  Bathing:  independent  UE Dressing:  independent  LE Dressing:  independent  Toileting:  independent  Transfer:  independent    UE ASSESSMENT: Hand Dominance is right handed  ROM within functional limits for daily life tasks. Patient stated bilateral torn rotator muscles in past.  Strength within functional limits for daily life tasks.      Sensation: within functional limits for light touch with exception of left leg    OT G-codes:  Carrying, Handling, Moving objects   (Current status):    (Discharge Goal):  [facility]  Lymphedema Life Impact Scale Score:  15  Percent Impairment:  21%      Intervention: 96092, 94774, 25345     Eval Complexity: Low Complexity      Rehab Potential:                                 [x] Good  [] Fair [] Poor        Suggested Professional Referral:       [x] No  [] Yes:  Barriers to Goal Achievement[de-identified]          [x] No  [] Yes:  Domestic Concerns:                           [x] No  [] Yes:       Patient. Education:  [x] Plans/Goals, Risks/Benefits discussed  [] Home exercise program  Method of Education: [x] Verbal  [] Demo  [] Written  Comprehension of Education:  [x] Verbalizes understanding. [] Demonstrates understanding. [x] Needs Review. [] Demonstrates/verbalizes understanding of HEP/Ed previously given. Patient understands diagnosis/prognosis and consents to treatment, plan and goals: [x] Yes    [] No   This patient demonstrates the ability to follow the plan of care and progress towards goals. Rehab potential is good       Assessment: Secondaryt Lymphedema, Stage 2, Affecting the Head / Neck. Patient will benefit from a Complete Decongestive Therapy protocol using manual lymphatic drainage techniques, skilled multilayer compression bandaging using short stretch bandages and foam padding, instruction in a home program of self massage and exercise, compression garment fitting and instruction in independent management strategies for lymphedema. Goal Formulation: Patient  Time In: 0910            Time Out: 1000                      Timed Code Treatment Minutes: 50 minutes      CODE  Minutes  Units   22986 OT Eval Low 50 1   43528 OT Eval Medium     07979 OT Eval High     75045 Fluidotherapy     26406 Manual     92918 Therapeutic Ex     14958 Therapeutic Activity     32568 ADL/COMP Tech Train     [id number] Neuromuscular Re-Ed     [id number] OrthoManagementTraining     [id number] Paraffin     [id number] Electrical Stim - Attended     [id number] Iontophoresis     34384 Ultrasound      Other     Total           Electronically signed by: JOSE RAFAEL Stauffer/L, SUJATHA      Physician's Certification / Comments      Frequency/Duration 1-2 / week for 12 visits.    Certification period From: 25Aug. 2021  To: 13Oct. 2021 I have reviewed the Plan of Care established for skilled therapy services and certify that the services are required and that they will be provided while the patient is under my care. Physician's Comments/Revisions:                   Physicians's Printed Name:  Jaquan Grace MD                                   [de-identified] Signature:                                                               Date:      Please review Patient's OT evaluation and if you agree sign/date and fax back to us at our [address] [place] [address] fax [phone].  Thank you for your referral!

## 2021-08-27 DIAGNOSIS — F51.04 CHRONIC INSOMNIA: ICD-10-CM

## 2021-08-27 DIAGNOSIS — C02.9 CANCER OF TONGUE (HCC): Primary | ICD-10-CM

## 2021-08-27 DIAGNOSIS — I89.0 LYMPHEDEMA: ICD-10-CM

## 2021-08-27 RX ORDER — TEMAZEPAM 15 MG/1
15 CAPSULE ORAL NIGHTLY PRN
Qty: 90 CAPSULE | Refills: 0 | Status: SHIPPED | OUTPATIENT
Start: 2021-08-27 | End: 2021-11-25

## 2021-08-30 ENCOUNTER — HOSPITAL ENCOUNTER (OUTPATIENT)
Dept: OCCUPATIONAL THERAPY | Age: 71
Setting detail: THERAPIES SERIES
Discharge: HOME | End: 2021-08-30
Payer: COMMERCIAL

## 2021-08-30 PROCEDURE — 97530 THERAPEUTIC ACTIVITIES: CPT | Performed by: OCCUPATIONAL THERAPIST

## 2021-08-30 PROCEDURE — 97140 MANUAL THERAPY 1/> REGIONS: CPT | Performed by: OCCUPATIONAL THERAPIST

## 2021-08-30 NOTE — PROGRESS NOTES
Occupational Therapy        [facility]UP[facility] THERAPY PROGR[facility] NOTE  [facility] [place] [address]    Phone: [phone]  Fax: [phone]     Date:  2021  Initial Evaluation Date: 2021     Evaluating Therapist: JOSE RAFAEL Lewis/L, CLT-KRYSTLE    Patient Name:  Iris Bess    :  1950    Restrictions/Precautions:  fall risk  Diagnosis:  Head and neck cancer (c76.0)       Date of Surgery/Injury: n/a    [facility]  [facility]  [id number]  Plan of care signed (Y/N): Yes  Visit# / total visits:     Referring Practitioner:  Ene Diehl MD  Specific Practitioner Orders: Evaluation and Treatment    Assessment of current deficits   []Pain  []Skin Integrity   [x]Lymphedema   []Functional transfers/mobility   []ADLs   [x]Strength    []Cognition  []IADLs   []Safety Awareness   []  Motor Endurance    []Fine Motor Coordination   []Balance   []Vision/perception  []Sensation []Gross Motor Coordination  []ROM     OT PLAN OF CARE   OT POC based on physician orders, patient diagnosis and results of clinical assessment    Frequency/Duration:  1-2x/wk for 12 treatment sessions from 2021 through 2021  Specific OT Treatment to include:     Plan of Care:     [x]97140-Manual Lymph Drainage and Combined Decongestive Therapy  [x]85094- Skilled Multilayer Short Stretch Compression Bandaging/ Therapeutic Exercise  [x]Skin Care Education [x]HEP including Self MLD Education and/or Self Bandaging  [x]Education for Lymphedema Risks/Precautions     [x] Caregiver Education   []other:      Patient Specific Goal: reduce swelling in cheek      STG: 3.   weeks     1. Patient will demonstrate knowledge and understanding for lymphedema precautions, skin care and self management to decrease progression of lymphedema and risk of infection.   Therapist initiated patient education regarding lymphedema precautions and skin care / self management. Patient able to verbalize understanding. Patient progressing toward goal  2. Patient will present with decreased limb volume in the involved extremity from mild to trace for improved functional mobility. Therapist initiated patient education regarding manual lymphatic drainage massage sequence for head/neck involvement. Therapist provided initial hand out for home use. Therapist educated patient that she would be receiving a new hand out once we found the exact sequence that works for her. Patient verbalized understanding. Therapist performed manual lymphatic drainage massage sequence for head / neck / cheek involvement. Patient tolerated well. Patient stated seeing less \"puffyness\" once completed. Patient progressing toward goal.  3.  Patient will demonstrate compliance with compression therapy for independent management of lymphedema. Therapist initiated patient education regarding compression garment. Therapist provided garment for night use for patient. Patient educated on wear schedule and how to ernestina/doff garment. Patient able to verbalize and demonstrate understanding. Therapist will follow up next treatment session. Patient progressing toward goal.          LT weeks  1. Patient will be independent with self management of lymphedema including understanding garment wear schedule, self compression bandaging, HEP and self care. 2.  Patient will be fitted for appropriate compression garments for long term management of lymphedema. 3.  Patient will present with decreased limb volume in the involved extremity from trace  to none  for improved functional mobility.         Restrictions/Precautions:  [] No blood pressure/blood draw in: [] Left Upper Extremity     [] Right Upper Extremity        [x] Fall Risk       Intervention:   []Other    Pain:  [x] No    [] Yes  Location:  Pain Rating (0-10 pain scale):  Pain Description:  Interruption of Treatment [] Yes  [] No    Came to Clinic:  [] Bandaged    []Unbandaged    [] With Stocking     [] With Sleeve     [] Kinesiotaped    [] [facility]    [] With Alternative Compression:    Skin Integrity:  [] Normal [] Dry  []Rough []Moist []Rash  Location of problem area/s:  [] Wound: Location/Description   [] Fibrosis: Location/Description  [] Keratosis: Location/Description  [] Papillomas: Location/Description  [] Other    Color:  [] Normal [] Mottled [] Flushed [] Other/Description  Location of problem area/s:    Edema:  Edema noted right side of neck into jaw line and cheek    Subjective:  Patient attended treatment session alone.   Patient with no new issues    Objective:  [] Measurement [x]Manual Lymph Drainage  []Bandaging   []Kinesiotaping [x] Education    []Self Massage   []Self Bandaging [] Exercise    []Wound Care  []Hygiene  [] Other        Assessment:  Knowledge of home program:   [] Good [] Fair  [] Poor  Patient is programming at home:             [] Yes  [] No  Family is assisting with programming: [] Yes  [] No  Home programming is consistent:             [] Yes  [] No  Other:   Response to treatment:  good  Instructions for patient:   [x] Verbal [x] Written  Instructions addressed:  Manual lymphatic drainage massage        Time In: 0800            Time Out: 0900                      Timed Code Treatment Minutes: 60 minutes      CODE  Minutes  Units   13306 OT Eval Low     16329 OT Eval Medium     73992 OT Eval High     91669 Fluidotherapy     30505 Manual 45 3   15409 Therapeutic Ex     46763 Therapeutic Activity 15 1   74379 ADL/COMP Tech Train     [id number] Neuromuscular Re-Ed     57850 OrthoManagementTraining     50915 Paraffin     45102 Electrical Stim - Attended     [id number] Iontophoresis     72593 Ultrasound      Other     Total  60 4       Plan: Continue to address:  []Bandaging  [x] Self Bandaging/Family Assist  [x]MLD  [x]Self Massage  [x]Exercise  [x]Education  []Obtain referral for garments  []Medical Hold  []Discharge to Home Program  Bettye Sherice., OTR/L, CLT

## 2021-09-02 NOTE — ADDENDUM NOTE
Encounter addended by: Sigrid Bowles MD on: 9/2/2021 1:58 PM   Actions taken: Visit diagnoses modified, Order list changed, Diagnosis association updated

## 2021-09-08 ENCOUNTER — HOSPITAL ENCOUNTER (OUTPATIENT)
Dept: OCCUPATIONAL THERAPY | Age: 71
Setting detail: THERAPIES SERIES
Discharge: HOME | End: 2021-09-08
Payer: COMMERCIAL

## 2021-09-08 PROCEDURE — 97530 THERAPEUTIC ACTIVITIES: CPT | Performed by: OCCUPATIONAL THERAPIST

## 2021-09-08 PROCEDURE — 97140 MANUAL THERAPY 1/> REGIONS: CPT | Performed by: OCCUPATIONAL THERAPIST

## 2021-09-08 NOTE — PROGRESS NOTES
Occupational Therapy        OCCUPATIONAL THERAPY PROGRESS NOTE  [address] [place]  [address]    Phone: [phone]  Fax: [phone]     Date:  2021  Initial Evaluation Date: 2021     Evaluating Therapist: RICARDO Gonzalez CLT-LANA    Patient Name:  Carlos Duarte    :  1950    Restrictions/Precautions:  fall risk  Diagnosis:  Head and neck cancer (c76.0)       Date of Surgery/Injury: n/a    [facility]  [id number]  Plan of care signed (Y/N): Yes  Visit# / total visits: 2 / 5    Referring Practitioner:  Noa Cuevas MD  Specific Practitioner Orders: Evaluation and Treatment    Assessment of current deficits   []Pain  []Skin Integrity   [x]Lymphedema   []Functional transfers/mobility   []ADLs   [x]Strength    []Cognition  []IADLs   []Safety Awareness   []  Motor Endurance    []Fine Motor Coordination   []Balance   []Vision/perception  []Sensation []Gross Motor Coordination  []ROM     OT PLAN OF CARE   OT POC based on physician orders, patient diagnosis and results of clinical assessment    Frequency/Duration:  1-2x/wk for 12 treatment sessions from 2021 through 2021  Specific OT Treatment to include:     Plan of Care:     [x]97140-Manual Lymph Drainage and Combined Decongestive Therapy  [x]20839- Skilled Multilayer Short Stretch Compression Bandaging/ Therapeutic Exercise  [x]Skin Care Education [x]HEP including Self MLD Education and/or Self Bandaging  [x]Education for Lymphedema Risks/Precautions     [x] Caregiver Education   []other:      Patient Specific Goal: reduce swelling in cheek      STG: 3.   weeks     1. Patient will demonstrate knowledge and understanding for lymphedema precautions, skin care and self management to decrease progression of lymphedema and risk of infection.   Therapist continued patient education regarding lymphedema precautions and skin care / self management. Patient able to verbalize understanding. Patient progressing toward goal  2. Patient will present with decreased limb volume in the involved extremity from mild to trace for improved functional mobility. Therapist continued patient education regarding manual lymphatic drainage massage sequence for head/neck involvement. Therapist provided initial hand out for home use. Therapist performed manual lymphatic drainage massage sequence for head / neck / cheek involvement. Therapist performed a different sequence than last treatment session. Patient will trial both sequence patterns at home to determine which one works better for her. Therapist did describe each step of the sequence as it wad performed. Patient tolerated well. Patient stated less \"puffyness\" once completed. Patient progressing toward goal.  3.  Patient will demonstrate compliance with compression therapy for independent management of lymphedema. Therapist continued patient education regarding compression garment. Patient utilized garment but stated having difficulty keeping on and keeping foam in place. Therapist to adjust garment / possible new garment that is not as wide. Patient progressing toward goal.          LT weeks  1. Patient will be independent with self management of lymphedema including understanding garment wear schedule, self compression bandaging, HEP and self care. 2.  Patient will be fitted for appropriate compression garments for long term management of lymphedema. 3.  Patient will present with decreased limb volume in the involved extremity from trace  to none  for improved functional mobility.         Restrictions/Precautions:  [] No blood pressure/blood draw in: [] Left Upper Extremity     [] Right Upper Extremity        [x] Fall Risk       Intervention:   []Other    Pain:  [x] No    [] Yes  Location:  Pain Rating (0-10 pain scale):  Pain Description:  Interruption of Treatment [] Yes  [] No    Came to Clinic:  [] Bandaged    []Unbandaged    [] With Stocking     [] With Sleeve     [] Kinesiotaped    [] [facility]    [] With Alternative Compression:    Skin Integrity:  [] Normal [] Dry  []Rough []Moist []Rash  Location of problem area/s:  [] Wound: Location/Description   [] Fibrosis: Location/Description  [] Keratosis: Location/Description  [] Papillomas: Location/Description  [] Other    Color:  [] Normal [] Mottled [] Flushed [] Other/Description  Location of problem area/s:    Edema:  Edema noted right side of neck into jaw line and cheek    Subjective:  Patient attended treatment session alone.   Patient with no new issues    Objective:  [] Measurement [x]Manual Lymph Drainage  []Bandaging   []Kinesiotaping [x] Education    []Self Massage   []Self Bandaging [] Exercise    []Wound Care  []Hygiene  [] Other        Assessment:  Knowledge of home program:   [] Good [] Fair  [] Poor  Patient is programming at home:             [] Yes  [] No  Family is assisting with programming: [] Yes  [] No  Home programming is consistent:             [] Yes  [] No  Other:   Response to treatment:  good  Instructions for patient:   [x] Verbal [x] Written  Instructions addressed:  Manual lymphatic drainage massage        Time In: 1105            Time Out: 1205                      Timed Code Treatment Minutes: 60 minutes      CODE  Minutes  Units   65897 OT Eval Low     74160 OT Eval Medium     46906 OT Eval High     20779 Fluidotherapy     72820 Manual 45 3   79559 Therapeutic Ex     49138 Therapeutic Activity 15 1   65465 ADL/COMP Tech Train     [id number] Neuromuscular Re-Ed     74128 OrthoManagementTraining     25548 Paraffin     03445 Electrical Stim - Attended     [id number] Iontophoresis     60122 Ultrasound      Other     Total  60 4       Plan: Continue to address:  []Bandaging  [x] Self Bandaging/Family Assist  [x]MLD  [x]Self Massage  [x]Exercise  [x]Education  []Obtain referral for garments  []Medical Hold  []Discharge to Home Program  Reed Laguna., OTR/L, CLT

## 2021-09-15 ENCOUNTER — HOSPITAL ENCOUNTER (OUTPATIENT)
Dept: OCCUPATIONAL THERAPY | Age: 71
Setting detail: THERAPIES SERIES
End: 2021-09-15
Payer: COMMERCIAL

## 2021-09-15 NOTE — PROGRESS NOTES
Occupational Therapy      OCCUPATIONAL THERAPY PROGRESS NOTE  Phone: [phone]             Fax: [phone]      Date: 9/15/2021  Patient Name: Millie Smith        : 1950       [] Pt Refusal           [x] Pt Unavailable due to:  Patient called and cancelled scheduled treatment session. Patient next session is scheduled.          Jhoana Peterson OT, OTR/L, CLT-KRYSTLE Date: 9/15/2021

## 2021-09-20 ENCOUNTER — OFFICE VISIT (OUTPATIENT)
Dept: PHYSICAL MEDICINE AND REHAB | Age: 71
End: 2021-09-20
Payer: COMMERCIAL

## 2021-09-20 VITALS
SYSTOLIC BLOOD PRESSURE: 142 MMHG | HEIGHT: 62 IN | TEMPERATURE: 98.5 F | BODY MASS INDEX: 21.35 KG/M2 | WEIGHT: 116 LBS | DIASTOLIC BLOOD PRESSURE: 80 MMHG

## 2021-09-20 DIAGNOSIS — M75.122 COMPLETE TEAR OF LEFT ROTATOR CUFF, UNSPECIFIED WHETHER TRAUMATIC: ICD-10-CM

## 2021-09-20 DIAGNOSIS — G89.29 CHRONIC PAIN OF BOTH SHOULDERS: Primary | ICD-10-CM

## 2021-09-20 DIAGNOSIS — M25.511 CHRONIC PAIN OF BOTH SHOULDERS: Primary | ICD-10-CM

## 2021-09-20 DIAGNOSIS — M75.121 COMPLETE TEAR OF RIGHT ROTATOR CUFF, UNSPECIFIED WHETHER TRAUMATIC: ICD-10-CM

## 2021-09-20 DIAGNOSIS — M54.12 RADICULITIS OF LEFT CERVICAL REGION: ICD-10-CM

## 2021-09-20 DIAGNOSIS — M79.10 TRIGGER POINT: ICD-10-CM

## 2021-09-20 DIAGNOSIS — M79.18 CERVICAL MYOFASCIAL PAIN SYNDROME: ICD-10-CM

## 2021-09-20 DIAGNOSIS — M25.512 CHRONIC PAIN OF BOTH SHOULDERS: Primary | ICD-10-CM

## 2021-09-20 DIAGNOSIS — S43.003D SUBLUXATION OF SHOULDER JOINT, UNSPECIFIED LATERALITY, SUBSEQUENT ENCOUNTER: ICD-10-CM

## 2021-09-20 PROCEDURE — 4040F PNEUMOC VAC/ADMIN/RCVD: CPT | Performed by: PHYSICAL MEDICINE & REHABILITATION

## 2021-09-20 PROCEDURE — G8399 PT W/DXA RESULTS DOCUMENT: HCPCS | Performed by: PHYSICAL MEDICINE & REHABILITATION

## 2021-09-20 PROCEDURE — 1090F PRES/ABSN URINE INCON ASSESS: CPT | Performed by: PHYSICAL MEDICINE & REHABILITATION

## 2021-09-20 PROCEDURE — 1036F TOBACCO NON-USER: CPT | Performed by: PHYSICAL MEDICINE & REHABILITATION

## 2021-09-20 PROCEDURE — G8420 CALC BMI NORM PARAMETERS: HCPCS | Performed by: PHYSICAL MEDICINE & REHABILITATION

## 2021-09-20 PROCEDURE — 99214 OFFICE O/P EST MOD 30 MIN: CPT | Performed by: PHYSICAL MEDICINE & REHABILITATION

## 2021-09-20 PROCEDURE — 1123F ACP DISCUSS/DSCN MKR DOCD: CPT | Performed by: PHYSICAL MEDICINE & REHABILITATION

## 2021-09-20 PROCEDURE — 3017F COLORECTAL CA SCREEN DOC REV: CPT | Performed by: PHYSICAL MEDICINE & REHABILITATION

## 2021-09-20 PROCEDURE — 20611 DRAIN/INJ JOINT/BURSA W/US: CPT | Performed by: PHYSICAL MEDICINE & REHABILITATION

## 2021-09-20 PROCEDURE — G8427 DOCREV CUR MEDS BY ELIG CLIN: HCPCS | Performed by: PHYSICAL MEDICINE & REHABILITATION

## 2021-09-20 RX ORDER — VENLAFAXINE HYDROCHLORIDE 75 MG/1
CAPSULE, EXTENDED RELEASE ORAL
COMMUNITY
Start: 2021-08-18 | End: 2021-09-20

## 2021-09-20 NOTE — PROGRESS NOTES
Ryne Amaya M.D. [address] [place] [address] PHYSICAL MEDICINE AND REHABILITAION  [address] [facility] [address]  Dept: [phone]  Dept Fax: [phone]    PCP: Linda Perry MD  Date of visit: 9/20/21    Chief Complaint   Patient presents with    Shoulder Pain     Bilateral shoulder pain, patient requesting injections into both shoulders. Corby Arevalo is a 80 y.o.  right hand dominant woman who presents for follow up of upper extremity pain and bilateral rotator cuff tears. 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 Chemotherapy was started on 10/6/2016 with Dr. Andrew Cline. November 2016 she completed head and neck irradiation for management of right base of tongue SCC. She reports she finished all treatment (chemo/radiation) November 2016. She denies any arm pain or numbness/tingling after her chemo/radiation, until her more recent injuries with rotator cuff tears. Her primary complaint is bilateral shoulder pain, which she attributes [facility]. She had massive full thickness bilateral supraspinatus and infraspinatus tears-- the right rotator cuff in December 2017 and left rotator cuff in January 2018. She saw Ortho--they did not recommend surgery due to chemo/radiation and osteoporosis -- unless pain unbearable --only then would consider shoulder replacement. Patient was cleared for shoulder surgery from radiation oncology standpoint IF indicated/recommended by Ortho. Patient does not wish for surgery at this time. Interval history:   Since last visit patient reports she had good improvement in pain after her last injections.  Periodic shoulder injections continue to provide relief and allow her to be more functional. She states that recently the left shoulder has been a little more painful after lifting items at work. She reports spasm of the left trapezius muscle. No radiation of pain to the upper extremity. She had a cervical epidural performed by Dr. Dorian Malhotra in August 2021 and has not had radicular pain down the arm since that injection. No new weakness or numbness. No other changes reported. She is following with Dr. Dorian Malhotra for neck pain and cervical radiculitis and had improvement in upper extremity radiating pain after cervical BINTA in December 2020, and repeat BINTA in August 2021. She is taking gabapentin 400mg TID and using voltaren gel on her shoulders with reported benefit. She continues her home exercise program. She is not currently in formal PT. She has gone periodically for dry needling, which she reports is beneficial. She has done lymphedema therapy. She reports that periodic subacromial injections continue to be beneficial, decreasing pain significantly and allowing her to continue to be functional. Relief from injections continues to last 3-4 months. She requests to repeat injections today. She is using topical compound cream with benefit. She continues her home exercise and stretching program to maintain range of motion. No other changes reported. Shoulder pain is located at the lateral aspect of the shoulders and subacromial space bilaterally, without current radiation to the upper extremities. No associated numbness/tingling, or paresthesia in the upper extremities. There is some weakness about the shoulders bilaterally, unchanged. No weakness of biceps/triceps, forearm or intrinsic hand muscles. The shoulder pain is constant and aching. Pain is worse with overhead activity. Physical therapy has helped with range of motion and function.       The prior workup has included:  Bilateral shoulder MRI - May 2018  -Right shoulder MRI: massive full thickness rotator cuff tear involving the supraspinatus and infraspinatus with retraction and uncovering of the humeral head which is high riding and articulating with the undersurface of the acromian. There is OA of the AC joint.   -Left shoulder MRI: massive full thickness rotator cuff tear involving the supraspinatus and infraspinatus with retraction and uncovering of the humeral head which is high riding and articulating with the undersurface of the acromian. There is OA of the [facility] joint.  -Cervical xray 11/10/2020   FINDINGS:   No acute fracture or dislocation is evident.  There is degenerative disc   disease which is especially severe at C5-6 followed by C6-7.  There is mild   retrolisthesis of C5 on C6 which is thought to be degenerative.  Unremarkable   soft tissues.  The bones appear somewhat demineralized.           Impression   Degenerative disc disease.  Osteopenia.  See above. -Cervical MRI 11/24/2020  FINDINGS:   BONES/ALIGNMENT: There is mild retrolisthesis of C5 on C6 and of C6 on C7. There is degenerative disc disease with disc space narrowing and osteophytes   at C4-5, C5-6, and C6-7. SPINAL CORD:  No abnormal signal is identified within the spinal cord. SOFT TISSUES: No paraspinal mass identified. Demario Lava is a small nodule in the   right lobe of the thyroid for which no follow-up is suggested per ACR   criteria. C2-C3:  The thecal sac and neural foramina are intact. C3-C4: There are small osteophytes.  The thecal sac is not stenotic. C4-C5: There is a minimal disc protrusion. The thecal sac and neural foramina   are intact. C5-C6: There is a disc protrusion measuring 2 mm.  The thecal sac is mildly   stenotic measuring 9.7 mm. Disc and/or osteophytes result in mild narrowing   of the neural foramina bilaterally. C6-C7: There is disc protrusion and osteophyte measuring 2-3 mm.  The thecal   sac is mildly stenotic measuring 9.8 mm.  Disc and/or osteophytes result in   narrowing of the neural foramina bilaterally. C7-T1:  The thecal sac and neural foramina are intact.     Impression   Disc and osteophytes result in minimal narrowing of the neural foramina and   mild stenosis of the thecal sac at C5-6 and C6-7 as discussed above. The prior treatment has included:  PT: Most recent PT summer 2020- improvement in bilateral shoulder ROM and function. Has also attended lymphedema therapy with improvement. Chiropractic: None  Modalities: Biofreeze with partial relief. Eucedrin cream  with partial relief. Has not tried heat/ice. Compound cream with relief. OTC Tylenol: Takes occasionally with partial relief   NSAIDS: Mobic 7.5mg with relief, stopped taking due to bruising   Opioids: None  Membrane stabilizers: gabapentin 400mg for numbness in left leg, left cervical radiculitis. Muscle relaxers: None  Previous injections: Bilateral US guided shoulder subacromial injections with relief of pain. Cervical BINTA C7-T1 (Dr. Ivan Berger on 12/10/2020; August 2021) with improvement in left cervical radiculitis symptoms. Previous surgery at this site: No shoulder surgeries. Previously saw Ortho for b/l shoulder RTC tears- per patient they did not recommend surgery due to chemo/radiation and osteoporosis -- unless pain unbearable --only then would consider shoulder replacement     Iris Centeno  continues home exercises/stretches from therapy. The patient does perform regular exercise.        Past Medical History:   Diagnosis Date    Anemia associated with chemotherapy 11/16/2016    resolved    Anxiety     Cancer ([address]     tongue    Cancer of tongue ([address] 9/16/2016    treated with chemo and radiation / last treatment 11/2016    Cancer of tonsil ([address]     Cervical herniation     no limits on rom    Difficulty sleeping     Discoloration of skin of foot 6/25/2020    Dizziness 6/20/2018    GERD (gastroesophageal reflux disease)     Hx of cataract surgery 10/06/2020    Hyperlipidemia     Hypokalemia 11/16/2016    with chemo / resolved    Hypomagnesemia 11/16/2016 related to chemo / resloved    Hypothyroidism     Leg swelling 7/26/2017    Lumbar herniated disc     after fell 2/2017 / now has chronic back pain and numbnes at left foot and ankle    Lymphedema of both lower extremities 07/26/2017    wears support hose    Lymphedema of lower extremity     Osteopenia     PONV (postoperative nausea and vomiting)     Restless leg syndrome     Rotator cuff tear     bilateral    Sleep apnea     Thyroid disease     Toe cyanosis 6/25/2020       Past Surgical History:   Procedure Laterality Date    BLADDER REPAIR  1995    COLONOSCOPY  2008    neg    COLONOSCOPY  03/19/2014    ENDOSCOPY, COLON, DIAGNOSTIC  2012    EYE SURGERY      cataract surgery, bilateral.     FRACTURE SURGERY  2012    RIGHT ELBOW    HYSTERECTOMY  1993    [facility]   [address]    LARYNGOSCOPY  09/08/2016    direct laryngoscopy right tonsil/tongue base biopsy cervical esophagoscopy fine needle aspiration     NERVE BLOCK      OTHER SURGICAL HISTORY  5/28/12    ORIF RIGHT ULNA    OTHER SURGICAL HISTORY  6/18/2012    orif  rivision right olecranon    OTHER SURGICAL HISTORY Right 01/16/2014    Right elbow hardware removal    AK OFFICE/OUTPT VISIT,PROCEDURE ONLY N/A 11/9/2018    PORT REMOVAL performed by Kiko Day MD at [facility]  1MONTHS OF AGE    H/O ESOPHAGEAL WEB    TUNNELED VENOUS PORT PLACEMENT  10/04/2016    Dr. Vick Board History     Socioeconomic History    Marital status:      Spouse name: Not on file    Number of children: Not on file    Years of education: Not on file    Highest education level: Not on file   Occupational History    Not on file   Tobacco Use    Smoking status: Never Smoker    Smokeless tobacco: Never Used   Vaping Use    Vaping Use: Never used   Substance and Sexual Activity    Alcohol use: No     Alcohol/week: 0.0 standard drinks    Drug use: No    Sexual activity: Not on file   Other Topics Concern    Not on file   Social History Narrative    Not on file     Social Determinants of Health     Financial Resource Strain:     Difficulty of Paying Living Expenses:    Food Insecurity:     Worried About Running Out of Food in the Last Year:     [address] in the Last Year:    Transportation Needs:     Lack of Transportation (Medical):  Lack of Transportation (Non-Medical):    Physical Activity:     Days of Exercise per Week:     Minutes of Exercise per Session:    Stress:     Feeling of Stress :    Social Connections:     Frequency of Communication with Friends and Family:     Frequency of Social Gatherings with Friends and Family:     Attends [employer] Services:     Active Member of Clubs or Organizations:     Attends Club or Organization Meetings:     Marital Status:    Intimate Partner Violence:     Fear of Current or Ex-Partner:     Emotionally Abused:     Physically Abused:     Sexually Abused: The patient is retired. Randy Conrad  Does not require an assistive device. Family History   Problem Relation Age of Onset    Hypertension Father     High Blood Pressure Father     Arthritis Father     Cancer Mother         uterine    Diabetes Mother     Hypertension Mother     High Blood Pressure Mother     Depression Mother     Other Mother         endocrine disorder       No Known Allergies    Current Outpatient Medications   Medication Sig Dispense Refill    temazepam (RESTORIL) 15 MG capsule Take 1 capsule by mouth nightly as needed for Sleep for up to 90 days.  90 capsule 0    rOPINIRole (REQUIP) 0.5 MG tablet Take 1 tablet by mouth nightly as needed (restless leg) For restless legs 30 tablet 3    Cream Base (VERSAPRO) CREA ONE GRAM (ONE PUMP) EXTERNALLY FOUR TIMES A DAY (Patient taking differently: Compound cream) 100 g 5    NONFORMULARY Indications: elderberry       sucralfate (CARAFATE) 1 GM/10ML suspension Take 10 mLs by mouth 4 times daily 1200 mL 3    venlafaxine (EFFEXOR XR) 37.5 MG extended release capsule Take 37.5 mg by mouth daily      SYNTHROID 75 MCG tablet Take 1 tablet by mouth daily 90 tablet 3    gabapentin (NEURONTIN) 400 MG capsule Take 1 capsule by mouth 3 times daily for 30 days. 270 capsule 3    RABEprazole (ACIPHEX) 20 MG tablet 1 daily 90 tablet 3    B Complex Vitamins (B-COMPLEX/B-12 PO) Take by mouth daily      Cholecalciferol (VITAMIN D PO) Take 1 tablet by mouth 3000 units daily      Biotin 10 MG CAPS Take by mouth daily HOLD      Elastic Bandages & Supports (JOBST KNEE HIGH COMPRESSION SM) MISC Knee high with 20- 30 mmhg of compression 1 each 10    acetaminophen (TYLENOL) 500 MG tablet Take 500 mg by mouth every 6 hours as needed Instructed to take with sip water am of procedure if needs       Current Facility-Administered Medications   Medication Dose Route Frequency Provider Last Rate Last Admin    bupivacaine (MARCAINE) 0.25 % injection 15 mg  6 mL IntraDERmal Once Yisel Brown MD        triamcinolone acetonide (KENALOG-40) injection 40 mg  40 mg IntraMUSCular Once Yisel Brown MD        triamcinolone acetonide (KENALOG-40) injection 40 mg  40 mg IntraMUSCular Once Yisel Brown MD        bupivacaine (PF) (MARCAINE) 0.25 % injection 15 mg  6 mL IntraDERmal Once Yisel Brown MD           Review of Systems  General: No chills, fatigue, fever, malaise, night sweats, weight gain,  weight loss. Psychological: No anxiety, depression, suicidal ideation   Ophthalmic: No blurry vision, decreased vision, double vision, loss of vision  Ear Nose Throat: No hearing loss, tinnitus, phonophobia, sensitivity to smells, vertigo, or vocal changes. Allergy/Immunology: No watery eyes, itchy eyes, frequent infections. Hematological and Lymphatic: No bleeding problems, blood clots, bruising  Endocrine:  No polydypsia, polyuria, temperature intolerance. Respiratory: No cough, shortness of breath, wheezing. Cardiovascular: No syncope, chest pain, dyspnea on exertion,palpitations. Gastrointestinal: No abdominal pain, hematemesis, melena, nausea, vomiting, stool incontinence  Genito-Urinary: No dysuria, hematuria, incontinence   Musculoskeletal: Per HPI  Neurological: Per HPI  Dermatological:  No rash      Physical Exam:   Vitals:    09/20/21 0808   BP: (!) 142/80   Temp: 98.5 °F (36.9 °C)      General: The patient is in no apparent distress. HEENT: No rhinorrhea, sneezing, yawning, or lacrimation. No scleral icterus or conjunctival injection. SKIN: No piloerection. No track marks. No rash. Normal turgor. No erythema or ecchymosis. Psychological: Mood and affect are appropriate. Hygiene is appropriate. Cardiovascular:   Peripheral pulses are 2+. There is no edema. Respiratory: Respirations are regular and unlabored. There is no cyanosis. Gastrointestinal: No abdominal distension   MSK: Shoulder: No edema, erythema, ecchymosis, effusion, or mass of bilateral shoulders. Right shoulder + sulcus sign. Left shoulder slight + sulcus sign. Full passive flexion and abduction bilateral shoulders, mild pain at terminal abduction. Cannot actively abduct beyond 90 deg when trapezius is eliminated from action. Decreased internal rotation b/l shoulders. No crepitus. No tenderness to palpation at the acromioclavicular joint or bicipital groove. There is tenderness at bilateral subacromial space. There is spasm and active trigger points in the left trapezius muscles. There is weakness with Empty can test bilaterally (3-/5). Weak with Speed's bilaterally. Minimal pain with bilateral Jadon-Land. Full painless cervical spine and elbow ROM. Negative Spurling. There is bilateral upper trapezius muscle spasm and trigger points. No upper extremity edema. Neurologic: Awake, alert and oriented in three planes. Speech is fluent. No facial weakness.    Hearing is intact for conversation. Pupils are equal and round. Strength:   R  L  Deltoid   5-  5-  Biceps   5  5  Triceps  5  5  Wrist Ext  5  5  Finger Abd  5  5      Sensory:  Intact for light touch in all upper extremity dermatomes. Reflexes:   R  L  Biceps   2 2  Triceps  2 2  BR   2 2       No pathological reflexes     Gait is normal.     Exam stable       US guided Glenohumeral joint Injection Procedure: Bilateral  After explaining the indications, risks, benefits and alternatives of a bilateral glenohumeral injection, the patient agreed to proceed. A permit was signed and scanned to the media. The patient was placed in the seated position. Chloraprep was used to sterilize the skin. Using an aseptic, no touch technique, a 22 gauge, 1.5\" needle with 1 cc of Kenalog 40mg/cc and 3 cc of bupivacaine 0.25% was directed, under ultrasound guidance, to the right glenohumeral joint. After negative aspiration the medication was injected. Adequate hemostasis was achieved and the injection site was covered with a bandage. The exact same procedure was performed on the left side. US images were scanned to media separately. The patient was observed for complication and left the office without incident. The patient tolerated the procedure well and was educated in post injection care. Impression:   -Bilateral shoulder pain   -Bilateral massive rotator cuff tears.   -Right shoulder subluxation  -Left cervical radiculitis -- radicular symptoms improved after cervical BINTA, patient is following with Dr. Gonzales Butterfield  -Cervical myofascial pain  -Trigger points      Plan:   · Continue home exercises for bilateral shoulder ROM, gentle strengthening, pain reduction techniques. Reviewed importance of maintaining range of motion.    · PT for dry needling, prescription provided  · Continue lymphedema therapy  · Continue compound cream as needed   Bilateral US guided subacromial steroid injections today for pain relief and improvement in patient ability to perform day to day functions. Reviewed all risks/benefit. Patient wishes to proceed. See above for details. Left cervical radicular symptoms improved after prior epidural with Dr. Ewa Shearer. Pending response to today's injections, can consider trigger point injections for additional relief of trapezius spasm and trigger points  The patient was educated about the diagnosis, prognosis, indications, risks and benefits of treatment. An opportunity to ask questions was given to the patient and questions were answered. The patient agreed to proceed with the recommended treatment as described above. Follow up in 3-4 months. May follow up sooner if she decides she would like to try trigger point injections       Mandy Ray M.D.   Physical Medicine and Rehabilitation

## 2021-09-21 RX ADMIN — TRIAMCINOLONE ACETONIDE 40 MG: 40 INJECTION, SUSPENSION INTRA-ARTICULAR; INTRAMUSCULAR at 08:30

## 2021-09-21 RX ADMIN — BUPIVACAINE HYDROCHLORIDE 15 MG: 2.5 INJECTION, SOLUTION INFILTRATION; PERINEURAL at 08:30

## 2021-09-22 RX ORDER — TRIAMCINOLONE ACETONIDE 40 MG/ML
40 INJECTION, SUSPENSION INTRA-ARTICULAR; INTRAMUSCULAR ONCE
Status: COMPLETED | OUTPATIENT
Start: 2021-09-22 | End: 2021-09-21

## 2021-09-22 RX ORDER — BUPIVACAINE HYDROCHLORIDE 2.5 MG/ML
6 INJECTION, SOLUTION INFILTRATION; PERINEURAL ONCE
Status: COMPLETED | OUTPATIENT
Start: 2021-09-22 | End: 2021-09-21

## 2021-10-13 ENCOUNTER — HOSPITAL ENCOUNTER (OUTPATIENT)
Dept: OCCUPATIONAL THERAPY | Age: 71
Setting detail: THERAPIES SERIES
Discharge: HOME | End: 2021-10-13
Payer: COMMERCIAL

## 2021-10-13 PROCEDURE — 97530 THERAPEUTIC ACTIVITIES: CPT | Performed by: OCCUPATIONAL THERAPIST

## 2021-10-13 PROCEDURE — 97140 MANUAL THERAPY 1/> REGIONS: CPT | Performed by: OCCUPATIONAL THERAPIST

## 2021-10-13 NOTE — PROGRESS NOTES
Occupational Therapy        OCCUPATIONAL THERAPY PROGRESS NOTE  [address]  [address] [place] [address]    Phone: [phone]  Fax: [phone]     Date:  10/13/2021  Initial Evaluation Date: 2021     Evaluating Therapist: RICARDO Guillen CLT-LANA    Patient Name:  Arlean Saint    :  1950    Restrictions/Precautions:  fall risk  Diagnosis:  Head and neck cancer (c76.0)       Date of Surgery/Injury: n/a    [facility]  [id number]  Plan of care signed (Y/N): Yes  Visit# / total visits: 3 / 5    Referring Practitioner:  Jona Moctezuma MD  Specific Practitioner Orders: Evaluation and Treatment    Assessment of current deficits   []Pain  []Skin Integrity   [x]Lymphedema   []Functional transfers/mobility   []ADLs   [x]Strength    []Cognition  []IADLs   []Safety Awareness   []  Motor Endurance    []Fine Motor Coordination   []Balance   []Vision/perception  []Sensation []Gross Motor Coordination  []ROM     OT PLAN OF CARE   OT POC based on physician orders, patient diagnosis and results of clinical assessment    Frequency/Duration:  1-2x/wk for 12 treatment sessions from 2021 through 2021  Specific OT Treatment to include:     Plan of Care:     [x]97140-Manual Lymph Drainage and Combined Decongestive Therapy  [x]67548- Skilled Multilayer Short Stretch Compression Bandaging/ Therapeutic Exercise  [x]Skin Care Education [x]HEP including Self MLD Education and/or Self Bandaging  [x]Education for Lymphedema Risks/Precautions     [x] Caregiver Education   []other:      Patient Specific Goal: reduce swelling in cheek      STG: 3.   weeks     1. Patient will demonstrate knowledge and understanding for lymphedema precautions, skin care and self management to decrease progression of lymphedema and risk of infection.   Therapist continued patient education regarding lymphedema precautions and skin care / self management. Patient able to verbalize and demonstrate understanding. Patient goal met  2. Patient will present with decreased limb volume in the involved extremity from mild to trace for improved functional mobility. Therapist continued patient education regarding manual lymphatic drainage massage sequence for head/neck involvement. Therapist performed manual lymphatic drainage massage sequence for head / neck / cheek involvement. Therapist again performed a different sequence than last treatment session to determine what would work best for patient. Therapist did not care for the efficiency of the fluid movement and will continue to utilize original sequence provided to patient. Patient agrees. Therapist performed original sequence provided to patient with better results. Patient feels she has less \"puffyness\" once completed. Patient progressing toward goal.  3.  Patient will demonstrate compliance with compression therapy for independent management of lymphedema. Therapist continued patient education regarding compression garment. Patient utilized new garment since last seen and stated it works better. Therapist to provide second garment prior to discharge from lymphedema clinic. Patient progressing toward goal.          LT weeks  1. Patient will be independent with self management of lymphedema including understanding garment wear schedule, self compression bandaging, HEP and self care. Therapist continued patient education on self lymphedema management. Therapist discussed current home program and how to adjust as needed. Therapist further discussed activity level and eating lifestyle. Patient able to verbalize understanding. Patient progressing toward goal.  2.  Patient will be fitted for appropriate compression garments for long term management of lymphedema.   3.  Patient will present with decreased limb volume in the involved extremity from trace  to none  for improved functional mobility. Restrictions/Precautions:  [] No blood pressure/blood draw in: [] Left Upper Extremity     [] Right Upper Extremity        [x] Fall Risk       Intervention:   []Other    Pain:  [x] No    [] Yes  Location:  Pain Rating (0-10 pain scale):  Pain Description:  Interruption of Treatment [] Yes  [] No    Came to Clinic:  [] Bandaged    []Unbandaged    [] With Stocking     [] With Sleeve     [] Kinesiotaped    [] [facility]    [] With Alternative Compression:    Skin Integrity:  [] Normal [] Dry  []Rough []Moist []Rash  Location of problem area/s:  [] Wound: Location/Description   [] Fibrosis: Location/Description  [] Keratosis: Location/Description  [] Papillomas: Location/Description  [] Other    Color:  [] Normal [] Mottled [] Flushed [] Other/Description  Location of problem area/s:    Edema:  Edema noted right side of neck into jaw line and cheek    Subjective:  Patient attended treatment session alone.   Patient with no new issues    Objective:  [] Measurement [x]Manual Lymph Drainage  []Bandaging   []Kinesiotaping [x] Education    []Self Massage   []Self Bandaging [] Exercise    []Wound Care  []Hygiene  [] Other        Assessment:  Knowledge of home program:   [] Good [] Fair  [] Poor  Patient is programming at home:             [] Yes  [] No  Family is assisting with programming: [] Yes  [] No  Home programming is consistent:             [] Yes  [] No  Other:   Response to treatment:  good  Instructions for patient:   [x] Verbal [x] Written  Instructions addressed:  Manual lymphatic drainage massage        Time In: 1105            Time Out: 1200                      Timed Code Treatment Minutes: 55 minutes      CODE  Minutes  Units   04714 OT Eval Low     32108 OT Eval Medium     42208 OT Eval High     32662 Fluidotherapy     96664 Manual 45 3   83764 Therapeutic Ex     88399 Therapeutic Activity 10 1   97677 ADL/COMP Tech Train     [id number] Neuromuscular Re-Ed     89380 OrthoManagementTraining     16813 Paraffin     [id number] Electrical Stim - Attended     [id number] Iontophoresis     28969 Ultrasound      Other     Total  55 4       Plan: Continue to address:  []Bandaging  [x] Self Bandaging/Family Assist  [x]MLD  [x]Self Massage  [x]Exercise  [x]Education  []Obtain referral for garments  []Medical Hold  [][facility]charge to [facility] OTR/L, CLT

## 2021-10-18 ENCOUNTER — OFFICE VISIT (OUTPATIENT)
Dept: PHYSICAL MEDICINE AND REHAB | Age: 71
End: 2021-10-18
Payer: COMMERCIAL

## 2021-10-18 VITALS
TEMPERATURE: 98.8 F | HEIGHT: 62 IN | OXYGEN SATURATION: 95 % | BODY MASS INDEX: 21.35 KG/M2 | DIASTOLIC BLOOD PRESSURE: 82 MMHG | WEIGHT: 116 LBS | SYSTOLIC BLOOD PRESSURE: 132 MMHG | HEART RATE: 86 BPM

## 2021-10-18 DIAGNOSIS — M75.121 COMPLETE TEAR OF RIGHT ROTATOR CUFF, UNSPECIFIED WHETHER TRAUMATIC: ICD-10-CM

## 2021-10-18 DIAGNOSIS — M25.512 CHRONIC PAIN OF BOTH SHOULDERS: Primary | ICD-10-CM

## 2021-10-18 DIAGNOSIS — S43.003D SUBLUXATION OF SHOULDER JOINT, UNSPECIFIED LATERALITY, SUBSEQUENT ENCOUNTER: ICD-10-CM

## 2021-10-18 DIAGNOSIS — M54.12 RADICULITIS OF LEFT CERVICAL REGION: ICD-10-CM

## 2021-10-18 DIAGNOSIS — M75.122 COMPLETE TEAR OF LEFT ROTATOR CUFF, UNSPECIFIED WHETHER TRAUMATIC: ICD-10-CM

## 2021-10-18 DIAGNOSIS — M79.18 CERVICAL MYOFASCIAL PAIN SYNDROME: ICD-10-CM

## 2021-10-18 DIAGNOSIS — M79.10 TRIGGER POINT: ICD-10-CM

## 2021-10-18 DIAGNOSIS — M25.511 CHRONIC PAIN OF BOTH SHOULDERS: Primary | ICD-10-CM

## 2021-10-18 DIAGNOSIS — G89.29 CHRONIC PAIN OF BOTH SHOULDERS: Primary | ICD-10-CM

## 2021-10-18 PROCEDURE — 20553 NJX 1/MLT TRIGGER POINTS 3/>: CPT | Performed by: PHYSICAL MEDICINE & REHABILITATION

## 2021-10-18 PROCEDURE — 1123F ACP DISCUSS/DSCN MKR DOCD: CPT | Performed by: PHYSICAL MEDICINE & REHABILITATION

## 2021-10-18 PROCEDURE — 99213 OFFICE O/P EST LOW 20 MIN: CPT | Performed by: PHYSICAL MEDICINE & REHABILITATION

## 2021-10-18 PROCEDURE — 4040F PNEUMOC VAC/ADMIN/RCVD: CPT | Performed by: PHYSICAL MEDICINE & REHABILITATION

## 2021-10-18 PROCEDURE — 3017F COLORECTAL CA SCREEN DOC REV: CPT | Performed by: PHYSICAL MEDICINE & REHABILITATION

## 2021-10-18 PROCEDURE — 1036F TOBACCO NON-USER: CPT | Performed by: PHYSICAL MEDICINE & REHABILITATION

## 2021-10-18 PROCEDURE — G8420 CALC BMI NORM PARAMETERS: HCPCS | Performed by: PHYSICAL MEDICINE & REHABILITATION

## 2021-10-18 PROCEDURE — G8484 FLU IMMUNIZE NO ADMIN: HCPCS | Performed by: PHYSICAL MEDICINE & REHABILITATION

## 2021-10-18 PROCEDURE — G8428 CUR MEDS NOT DOCUMENT: HCPCS | Performed by: PHYSICAL MEDICINE & REHABILITATION

## 2021-10-18 PROCEDURE — 1090F PRES/ABSN URINE INCON ASSESS: CPT | Performed by: PHYSICAL MEDICINE & REHABILITATION

## 2021-10-18 PROCEDURE — G8399 PT W/DXA RESULTS DOCUMENT: HCPCS | Performed by: PHYSICAL MEDICINE & REHABILITATION

## 2021-10-18 RX ORDER — BUPIVACAINE HYDROCHLORIDE 2.5 MG/ML
5 INJECTION, SOLUTION INFILTRATION; PERINEURAL ONCE
Status: COMPLETED | OUTPATIENT
Start: 2021-10-18 | End: 2021-10-18

## 2021-10-18 RX ADMIN — BUPIVACAINE HYDROCHLORIDE 12.5 MG: 2.5 INJECTION, SOLUTION INFILTRATION; PERINEURAL at 10:10

## 2021-10-18 NOTE — PROGRESS NOTES
Cookie Lepe M.D. [facility] PHYSICAL MEDICINE AND REHABILITAION  [address] [facility] [address]  Dept: [phone]  Dept Fax: [phone]    PCP: Cesia Pantoja MD  Date of visit: 10/18/21    Chief Complaint   Patient presents with    Shoulder Pain     F/u bilateral shoulders. She did have some improvement of pain in both shoulders after receiving US guided steroid injections last visit.  Neck Pain     Today she would like to try trigger point injection. She is having tenderness on both sides of her neck. Mich Arvizur is a 80 y.o.  right hand dominant woman who presents for follow up of upper extremity pain and bilateral rotator cuff tears. 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 Chemotherapy was started on 10/6/2016 with Dr. Mary Anne Colon. November 2016 she completed head and neck irradiation for management of right base of tongue SCC. She reports she finished all treatment (chemo/radiation) November 2016. She denies any arm pain or numbness/tingling after her chemo/radiation, until her more recent injuries with rotator cuff tears. Her primary complaint is bilateral shoulder pain, which [facility]. She had massive full thickness bilateral supraspinatus and infraspinatus tears-- the right rotator cuff in December 2017 and left rotator cuff in January 2018. She saw Ortho--they did not recommend surgery due to chemo/radiation and osteoporosis -- unless pain unbearable --only then would consider shoulder replacement. Patient was cleared for shoulder surgery from radiation oncology standpoint IF indicated/recommended by Ortho. Patient does not wish for surgery at this time.          Interval history:   Since last visit patient reports good relief after recent bilateral shoulder steroid injections. No other changes. She is here for trigger point injections. She recently had her evaluation for PT dry needling and is awaiting insurance approval to schedule her first session. Periodic shoulder injections continue to provide relief and allow her to be more functional.  She reports spasm of the left trapezius muscle. No radiation of pain to the upper extremity. She had a cervical epidural performed by Dr. Leena Coello in August 2021 and has not had radicular pain down the arm since that injection. No new weakness or numbness. No other changes reported. She is following with Dr. Leena Coello for neck pain and cervical radiculitis and had improvement in upper extremity radiating pain after cervical BINTA in December 2020, and repeat BINTA in August 2021. She is taking gabapentin 400mg TID and using voltaren gel on her shoulders with reported benefit. She continues her home exercise program. She has gone periodically for dry needling, which she reports is beneficial. She has done lymphedema therapy. She reports that periodic subacromial injections continue to be beneficial, decreasing pain significantly and allowing her to continue to be functional. Relief from injections continues to last 3-4 months. She requests to repeat injections today. She is using topical compound cream with benefit. She continues her home exercise and stretching program to maintain range of motion. No other changes reported. Shoulder pain is located at the lateral aspect of the shoulders and subacromial space bilaterally, without current radiation to the upper extremities. No associated numbness/tingling, or paresthesia in the upper extremities. There is some weakness about the shoulders bilaterally, unchanged. No weakness of biceps/triceps, forearm or intrinsic hand muscles. The shoulder pain is constant and aching. Pain is worse with overhead activity.  Physical therapy has helped with range of motion and function. The prior workup has included:  Bilateral shoulder MRI - May 2018  -Right shoulder MRI: massive full thickness rotator cuff tear involving the supraspinatus and infraspinatus with retraction and uncovering of the humeral head which is high riding and articulating with the undersurface of the acromian. There is OA of the AC joint.   -Left shoulder MRI: massive full thickness rotator cuff tear involving the supraspinatus and infraspinatus with retraction and uncovering of the humeral head which is high riding and articulating with the undersurface of the acromian. There is OA of the [facility] joint.  -Cervical xray 11/10/2020   FINDINGS:   No acute fracture or dislocation is evident.  There is degenerative disc   disease which is especially severe at C5-6 followed by C6-7.  There is mild   retrolisthesis of C5 on C6 which is thought to be degenerative.  Unremarkable   soft tissues.  The bones appear somewhat demineralized.           Impression   Degenerative disc disease.  Osteopenia.  See above. -Cervical MRI 11/24/2020  FINDINGS:   BONES/ALIGNMENT: There is mild retrolisthesis of C5 on C6 and of C6 on C7. There is degenerative disc disease with disc space narrowing and osteophytes   at C4-5, C5-6, and C6-7. SPINAL CORD:  No abnormal signal is identified within the spinal cord. SOFT TISSUES: No paraspinal mass identified. Shelda Clock is a small nodule in the   right lobe of the thyroid for which no follow-up is suggested per ACR   criteria. C2-C3:  The thecal sac and neural foramina are intact. C3-C4: There are small osteophytes.  The thecal sac is not stenotic. C4-C5: There is a minimal disc protrusion. The thecal sac and neural foramina   are intact. C5-C6: There is a disc protrusion measuring 2 mm.  The thecal sac is mildly   stenotic measuring 9.7 mm. Disc and/or osteophytes result in mild narrowing   of the neural foramina bilaterally.    C6-C7: There is disc protrusion and osteophyte measuring 2-3 mm.  The thecal   sac is mildly stenotic measuring 9.8 mm.  Disc and/or osteophytes result in   narrowing of the neural foramina bilaterally. C7-T1:  The thecal sac and neural foramina are intact.       Impression   Disc and osteophytes result in minimal narrowing of the neural foramina and   mild stenosis of the thecal sac at C5-6 and C6-7 as discussed above. The prior treatment has included:  PT: Most recent PT summer 2020- improvement in bilateral shoulder ROM and function. Has also attended lymphedema therapy with improvement. Chiropractic: None  Modalities: Biofreeze with partial relief. Eucedrin cream  with partial relief. Has not tried heat/ice. Compound cream with relief. OTC Tylenol: Takes occasionally with partial relief   NSAIDS: Mobic 7.5mg with relief, stopped taking due to bruising   Opioids: None  Membrane stabilizers: gabapentin 400mg for numbness in left leg, left cervical radiculitis. Muscle relaxers: None  Previous injections: Bilateral US guided shoulder subacromial injections with relief of pain. Cervical BINTA C7-T1 (Dr. Harry Aquino on 12/10/2020; August 2021) with improvement in left cervical radiculitis symptoms. Previous surgery at this site: No shoulder surgeries. Previously saw Ortho for b/l shoulder RTC tears- per patient they did not recommend surgery due to chemo/radiation and osteoporosis -- unless pain unbearable --only then would consider shoulder replacement     Amina Aguilar  continues home exercises/stretches from therapy. The patient does perform regular exercise.        Past Medical History:   Diagnosis Date    Anemia associated with chemotherapy 11/16/2016    resolved    Anxiety     Cancer ([address]     tongue    Cancer of tongue ([address] 9/16/2016    treated with chemo and radiation / last treatment 11/2016    Cancer of tonsil ([address]     Cervical herniation     no limits on rom    Difficulty sleeping     Discoloration of skin of foot 6/25/2020    Dizziness 6/20/2018    GERD (gastroesophageal reflux disease)     Hx of cataract surgery 10/06/2020    Hyperlipidemia     Hypokalemia 11/16/2016    with chemo / resolved    Hypomagnesemia 11/16/2016    related to chemo / resloved    Hypothyroidism     Leg swelling 7/26/2017    Lumbar herniated disc     after fell 2/2017 / now has chronic back pain and numbnes at left foot and ankle    Lymphedema of both lower extremities 07/26/2017    wears support hose    Lymphedema of lower extremity     Osteopenia     PONV (postoperative nausea and vomiting)     Restless leg syndrome     Rotator cuff tear     bilateral    Sleep apnea     Thyroid disease     Toe cyanosis 6/25/2020       Past Surgical History:   Procedure Laterality Date   [address]    COLONOSCOPY  2008    neg    COLONOSCOPY  03/19/2014    ENDOSCOPY, COLON, DIAGNOSTIC  2012    EYE SURGERY      cataract surgery, bilateral.     FRACTURE SURGERY  2012    RIGHT ELBOW    HYSTERECTOMY  1993    [facility]   [address]    LARYNGOSCOPY  09/08/2016    direct laryngoscopy right tonsil/tongue base biopsy cervical esophagoscopy fine needle aspiration     NERVE BLOCK      OTHER SURGICAL HISTORY  5/28/12    ORIF RIGHT ULNA    OTHER SURGICAL HISTORY  6/18/2012    orif  rivision right olecranon    OTHER SURGICAL HISTORY Right 01/16/2014    Right elbow hardware removal    [place] OFFICE/OUTPT VISIT,PROCEDURE ONLY N/A 11/9/2018    PORT REMOVAL performed by Dillon Ramirez MD at [facility]  1MONTHS OF AGE    H/O ESOPHAGEAL WEB    TUNNELED VENOUS PORT PLACEMENT  10/04/2016    Dr. Serafin Charles History     Socioeconomic History    Marital status:      Spouse name: Not on file    Number of children: Not on file    Years of education: Not on file    Highest education level: Not on file   Occupational History    Not on file   Tobacco Use    Smoking status: Never Smoker    Smokeless tobacco: Never Used   Vaping Use    Vaping Use: Never used   Substance and Sexual Activity    Alcohol use: No     Alcohol/week: 0.0 standard drinks    Drug use: No    Sexual activity: Not on file   Other Topics Concern    Not on file   Social History Narrative    Not on file     Social Determinants of Health     Financial Resource Strain:     Difficulty of Paying Living Expenses:    Food Insecurity:     Worried About Running Out of Food in the Last Year:     Ran Out of Food in the Last Year:    Transportation Needs:     Lack of Transportation (Medical):  Lack of Transportation (Non-Medical):    Physical Activity:     Days of Exercise per Week:     Minutes of Exercise per Session:    Stress:     Feeling of Stress :    Social Connections:     Frequency of Communication with Friends and Family:     Frequency of Social Gatherings with Friends and Family:     Attends Jehovah's witness Services:     Active Member of Clubs or Organizations:     Attends Club or Organization Meetings:     Marital Status:    Intimate Partner Violence:     Fear of Current or Ex-Partner:     Emotionally Abused:     Physically Abused:     Sexually Abused: The patient is retired. Sofia Draft  Does not require an assistive device. Family History   Problem Relation Age of Onset    Hypertension Father     High Blood Pressure Father     Arthritis Father     Cancer Mother         uterine    Diabetes Mother     Hypertension Mother     High Blood Pressure Mother     Depression Mother     Other Mother         endocrine disorder       No Known Allergies    Current Outpatient Medications   Medication Sig Dispense Refill    temazepam (RESTORIL) 15 MG capsule Take 1 capsule by mouth nightly as needed for Sleep for up to 90 days.  90 capsule 0    rOPINIRole (REQUIP) 0.5 MG tablet Take 1 tablet by mouth nightly as needed to smells, vertigo, or vocal changes. Allergy/Immunology: No watery eyes, itchy eyes, frequent infections. Hematological and Lymphatic: No bleeding problems, blood clots, bruising  Endocrine:  No polydypsia, polyuria, temperature intolerance. Respiratory: No cough, shortness of breath, wheezing. Cardiovascular: No syncope, chest pain, dyspnea on exertion,palpitations. Gastrointestinal: No abdominal pain, hematemesis, melena, nausea, vomiting, stool incontinence  Genito-Urinary: No dysuria, hematuria, incontinence   Musculoskeletal: Per HPI  Neurological: Per HPI  Dermatological:  No rash      Physical Exam:   Vitals:    10/18/21 0926   BP: 132/82   Pulse: 86   Temp: 98.8 °F (37.1 °C)   SpO2: 95%      General: The patient is in no apparent distress. HEENT: No rhinorrhea, sneezing, yawning, or lacrimation. No scleral icterus or conjunctival injection. SKIN: No piloerection. No track marks. No rash. Normal turgor. No erythema or ecchymosis. Psychological: Mood and affect are appropriate. Hygiene is appropriate. Cardiovascular: There is no edema. Respiratory: Respirations are regular and unlabored. There is no cyanosis. Gastrointestinal: No abdominal distension   MSK: Shoulder: No edema, erythema, ecchymosis, effusion, or mass of bilateral shoulders. Right shoulder + sulcus sign. Left shoulder slight + sulcus sign. Full passive flexion and abduction bilateral shoulders, mild pain at terminal abduction. Cannot actively abduct beyond 90 deg when trapezius is eliminated from action. Decreased internal rotation b/l shoulders. No crepitus. No tenderness to palpation at the acromioclavicular joint or bicipital groove. There is tenderness at bilateral subacromial space. There is spasm and active trigger points in the left trapezius muscles. There is weakness with Empty can test bilaterally (3-/5). Weak with Speed's bilaterally. Minimal pain with bilateral Jadon-Land.  Full painless cervical spine and elbow ROM. Negative Spurling. There is bilateral upper trapezius muscle spasm and trigger points. No upper extremity edema. Neurologic: Awake, alert and oriented in three planes. Speech is fluent. No facial weakness. Hearing is intact for conversation. Pupils are equal and round. Strength:   R  L  Deltoid   5-  5-  Biceps   5  5  Triceps  5  5  Wrist Ext  5  5  Finger Abd  5  5      Sensory:  Intact for light touch in all upper extremity dermatomes. Reflexes:   R  L  Biceps   2 2  Triceps  2 2  BR   2 2       No pathological reflexes     Gait is normal.         Trigger point injection Procedure:   After procedure explained, consent was obtained and scanned to media. The muscles in the areas indicated as painful by the patient were identified and palpated for discreet areas of tender, firm bands or nodules within the muscle belly. If firm palpation of these areas re-created the patients typical focal or referred pain, the area was marked. The areas for injection were then cleaned with alcohol. A 1.5 in 25 g needle was then introduced into the trigger point until 'jump' or 'twitch' sign was elicited or the patient reported a deep ache in the typical area of pain. Solution was then injected in a fanning pattern with. Each trigger point was addressed in the same manner. Trigger points/muscles injected: Right upper trapezius, Right levator scapulae, Left upper trapezius, Left levator scapulae  Total muscles injected: 4   Solution used: plain 0.25% bupivacaine   Total solution injected: 5 cc (1cc into each of 5 trigger points)   There were no complications. No bleeding. Patient tolerated the procedure well.        Impression:   -Bilateral shoulder pain   -Bilateral massive rotator cuff tears.   -Right shoulder subluxation  -Left cervical radiculitis -- radicular symptoms improved after cervical BINTA, patient is following with Dr. Glory Bright  -Cervical myofascial pain  -Trigger points        Plan:

## 2021-10-19 ENCOUNTER — HOSPITAL ENCOUNTER (OUTPATIENT)
Age: 71
Discharge: HOME | End: 2021-10-21

## 2021-10-19 PROCEDURE — 88305 TISSUE EXAM BY PATHOLOGIST: CPT

## 2021-10-19 PROCEDURE — 87081 CULTURE SCREEN ONLY: CPT

## 2021-10-20 ENCOUNTER — HOSPITAL ENCOUNTER (OUTPATIENT)
Dept: OCCUPATIONAL THERAPY | Age: 71
Setting detail: THERAPIES SERIES
Discharge: HOME | End: 2021-10-20
Payer: COMMERCIAL

## 2021-10-20 LAB — CLOTEST: NORMAL

## 2021-10-22 NOTE — PROGRESS NOTES
Occupational Therapy  OCCUPATIONAL THERAPY UPDATE/PROGRESS NOTE  [address]  [address] [facility], [place] [address]    Phone: [phone]  Fax: [phone]     Date:  10/22/2021  Initial Evaluation Date: 2021     Evaluating Therapist: Oriana Adame, OTR/L, OTR/L, SUJATHA     Patient Name:  Jinny Stein    :  1950    Restrictions/Precautions:  fall risk  Diagnosis:  Head and neck cancer (c76.0)        Date of Surgery/Injury: n/a    Insurance/Certification information[place]ma [place] PPO  -  [id number]  Plan of care signed (Y/N): Yes  Visit# / total visits: visit #4  Total Visits to date:  4 Cancels/No-shows to date: 3    Referring Practitioner:  Karie Lopez MD  Specific Practitioner Orders: Evaluation and Treatment    Assessment of current deficits   []Pain  []Skin Integrity   [x]Lymphedema   []Functional transfers/mobility   []ADLs   []Strength    []Cognition  []IADLs   []Safety Awareness   []  Motor Endurance    []Fine Motor Coordination   []Balance   []Vision/perception  []Sensation []Gross Motor Coordination  []ROM     OT PLAN OF CARE   OT POC based on physician orders, patient diagnosis and results of clinical assessment    Frequency/Duration:  1-2x per week for 12 treatment sessions from 2021 through 2021  Specific OT Treatment to include:     Plan of Care:     [x]97140-Manual Lymph Drainage and Combined Decongestive Therapy  [x]45193- Skilled Multilayer Short Stretch Compression Bandaging/ Therapeutic Exercise  [x]Skin Care Education [x]HEP including Self MLD Education and/or Self Bandaging  [x]Education for Lymphedema Risks/Precautions     [x] Caregiver Education   []other:      Patient Specific Goal: reduce swelling in cheek    Goals Status:    STG: 3.   weeks     1.  Patient will demonstrate knowledge and understanding for lymphedema precautions, skin care and self management to decrease progression of lymphedema and risk of infection. Patient goal met  2.  Patient will present with decreased limb volume in the involved extremity from mild to trace for improved functional mobility. Therapist continued patient education regarding manual lymphatic drainage massage sequence for head/neck involvement. Therapist performed manual lymphatic drainage massage sequence for head / neck / cheek involvement. Patient progressing toward goal.  3.  Patient will demonstrate compliance with compression therapy for independent management of lymphedema. Therapist continued patient education regarding compression garment. Patient continues to utilize garment and fitting well and functioning as intended. Patient progressing toward goal.          LT weeks  1.  Patient will be independent with self management of lymphedema including understanding garment wear schedule, self compression bandaging, HEP and self care. Therapist continued patient education on self lymphedema management. Therapist discussed current home program and how to adjust as needed. Therapist further discussed activity level and eating lifestyle. Patient able to verbalize understanding. Patient progressing toward goal.  2.  Patient will be fitted for appropriate compression garments for long term management of lymphedema. Patient fitted with new garment. Fits well and functions as intended. Goal met. 3.  Patient will present with decreased limb volume in the involved extremity from trace  to none  for improved functional mobility. Goal not met    Significant Findings At Last Visit/Comments:    Patient making steady gains toward goals. Patient continues to perform manual lymphatic drainage massage sequence for head/neck with good results. Patient current measurements as follows:    Measurements marked in one centimeter increments at midline of neck from bottom lip to base of the neck.   Patient measurement then taken from ear opening passing through each monique along neckline to opposite ear opening.            Evaluation Follow up Follow up   1cm 25.5cm  24.5cm     2cm 26.25cm  25.25cm     3cm 26cm  26cm     Upper lip 25cm  24.75cm     Lower lip 25cm     24.25cm                         Top ear to midline of upper lip Right - 13.75cm  Left - 13.25cm  right - 13.5  Left - 14.25     Top ear to midline of 1cm below low lip Right - 15cm  Left - 14.25cm  right - 14  Left - 15                                   Mouth opening 6.5cm  6.5cm     Translation left 1.5cm  2cm     Translation right 1.25cm  1.5cm                         Neck circumference         Head circumference 62cm           Neck flexion 35degrees  40degrees     Neck extension 40degrees  40degrees     Neck lateral rotation right 70degrees  70degrees     Neck lateral rotation left 45degrees  65degrees     Neck lateral flexion right 25degrees  35degrees     Neck lateral flexion left 25degrees  30degrees            Restrictions/Precautions:  [] No blood pressure/blood draw in: [] Left Upper Extremity     [] Right Upper Extremity        [x] Fall Risk       Intervention:   []Other    Subjective:      Rehab Potential: [] Excellent [x] Good [] Fair  [] Poor     Goal Status:  [] Achieved [x] Partially Achieved  [] Not Achieved     Patient Status: [x] Patient and therapist agree to continue current plan of care. Time In: 1100            Time Out: 1200                      Timed Code Treatment Minutes: 60 minutes      CODE  Minutes  Units   85686 OT Eval Low     28838 OT Eval Medium     24713 OT Eval High     91012 Fluidotherapy     86554 Manual 30 2   65310 Therapeutic Ex     96722 Therapeutic Activity 30 2   37994 ADL/COMP Tech Train     33527 Neuromuscular Re-Ed     01483 OrthoManagementTraining     86352 Paraffin     81981 Electrical Stim - Attended     31244 Iontophoresis     76245 Ultrasound      Other       60 4     Electronically signed by:  JOSE RAFAEL Block/L, SUJATHA      Physician's Certification / Comments Frequency/Duration 1-2 / week for 12 visits. Certification period From: 20Oct. 2021  To: 17Dec. 2021     I have reviewed the Plan of Care established for skilled therapy services and certify that the services are required and that they will be provided while the patient is under my care. Physician's Comments/Revisions:                   Physicians's Printed Name:  Zainab Romo MD                                   [de-identified] Signature:                                                               Date:      Please review Patient's OT evaluation and if you agree sign/date and fax back to us at our [address] [place] [address] fax [phone].  Thank you for your referral!

## 2021-11-01 ENCOUNTER — OFFICE VISIT (OUTPATIENT)
Dept: PRIMARY CARE CLINIC | Age: 71
End: 2021-11-01
Payer: COMMERCIAL

## 2021-11-01 VITALS
WEIGHT: 117 LBS | DIASTOLIC BLOOD PRESSURE: 80 MMHG | SYSTOLIC BLOOD PRESSURE: 120 MMHG | TEMPERATURE: 97.2 F | BODY MASS INDEX: 21.53 KG/M2 | HEIGHT: 62 IN | HEART RATE: 87 BPM | RESPIRATION RATE: 18 BRPM | OXYGEN SATURATION: 99 %

## 2021-11-01 DIAGNOSIS — T50.Z95A VACCINATION REACTION, INITIAL ENCOUNTER: Primary | ICD-10-CM

## 2021-11-01 PROCEDURE — 99213 OFFICE O/P EST LOW 20 MIN: CPT | Performed by: INTERNAL MEDICINE

## 2021-11-01 PROCEDURE — G8399 PT W/DXA RESULTS DOCUMENT: HCPCS | Performed by: INTERNAL MEDICINE

## 2021-11-01 PROCEDURE — 3017F COLORECTAL CA SCREEN DOC REV: CPT | Performed by: INTERNAL MEDICINE

## 2021-11-01 PROCEDURE — 1036F TOBACCO NON-USER: CPT | Performed by: INTERNAL MEDICINE

## 2021-11-01 PROCEDURE — G8484 FLU IMMUNIZE NO ADMIN: HCPCS | Performed by: INTERNAL MEDICINE

## 2021-11-01 PROCEDURE — 1123F ACP DISCUSS/DSCN MKR DOCD: CPT | Performed by: INTERNAL MEDICINE

## 2021-11-01 PROCEDURE — G8427 DOCREV CUR MEDS BY ELIG CLIN: HCPCS | Performed by: INTERNAL MEDICINE

## 2021-11-01 PROCEDURE — G8420 CALC BMI NORM PARAMETERS: HCPCS | Performed by: INTERNAL MEDICINE

## 2021-11-01 PROCEDURE — 1090F PRES/ABSN URINE INCON ASSESS: CPT | Performed by: INTERNAL MEDICINE

## 2021-11-01 PROCEDURE — 4040F PNEUMOC VAC/ADMIN/RCVD: CPT | Performed by: INTERNAL MEDICINE

## 2021-11-01 SDOH — ECONOMIC STABILITY: FOOD INSECURITY: WITHIN THE PAST 12 MONTHS, THE FOOD YOU BOUGHT JUST DIDN'T LAST AND YOU DIDN'T HAVE MONEY TO GET MORE.: NEVER TRUE

## 2021-11-01 SDOH — ECONOMIC STABILITY: FOOD INSECURITY: WITHIN THE PAST 12 MONTHS, YOU WORRIED THAT YOUR FOOD WOULD RUN OUT BEFORE YOU GOT MONEY TO BUY MORE.: NEVER TRUE

## 2021-11-01 ASSESSMENT — SOCIAL DETERMINANTS OF HEALTH (SDOH): HOW HARD IS IT FOR YOU TO PAY FOR THE VERY BASICS LIKE FOOD, HOUSING, MEDICAL CARE, AND HEATING?: NOT HARD AT ALL

## 2021-11-02 NOTE — PROGRESS NOTES
Genora Poser  11/1/21     Chief Complaint   Patient presents with    Other     right arm swollen /from moderon booster 6 days ago         No Known Allergies     Current Outpatient Medications   Medication Sig Dispense Refill    temazepam (RESTORIL) 15 MG capsule Take 1 capsule by mouth nightly as needed for Sleep for up to 90 days. 90 capsule 0    rOPINIRole (REQUIP) 0.5 MG tablet Take 1 tablet by mouth nightly as needed (restless leg) For restless legs 30 tablet 3    Cream Base (VERSAPRO) CREA ONE GRAM (ONE PUMP) EXTERNALLY FOUR TIMES A DAY (Patient taking differently: Compound cream) 100 g 5    NONFORMULARY Indications: elderberry       sucralfate (CARAFATE) 1 GM/10ML suspension Take 10 mLs by mouth 4 times daily 1200 mL 3    venlafaxine (EFFEXOR XR) 37.5 MG extended release capsule Take 37.5 mg by mouth daily      SYNTHROID 75 MCG tablet Take 1 tablet by mouth daily 90 tablet 3    RABEprazole (ACIPHEX) 20 MG tablet 1 daily 90 tablet 3    B Complex Vitamins (B-COMPLEX/B-12 PO) Take by mouth daily      Cholecalciferol (VITAMIN D PO) Take 1 tablet by mouth 3000 units daily      Biotin 10 MG CAPS Take by mouth daily HOLD      Elastic Bandages & Supports (JOBST KNEE HIGH COMPRESSION SM) MISC Knee high with 20- 30 mmhg of compression 1 each 10    acetaminophen (TYLENOL) 500 MG tablet Take 500 mg by mouth every 6 hours as needed Instructed to take with sip water am of procedure if needs      gabapentin (NEURONTIN) 400 MG capsule Take 1 capsule by mouth 3 times daily for 30 days.  270 capsule 3     Current Facility-Administered Medications   Medication Dose Route Frequency Provider Last Rate Last Admin    bupivacaine (MARCAINE) 0.25 % injection 15 mg  6 mL IntraDERmal Once Naty Vega MD        triamcinolone acetonide (KENALOG-40) injection 40 mg  40 mg IntraMUSCular Once Naty Vega MD        triamcinolone acetonide (KENALOG-40) injection 40 mg  40 mg IntraMUSCular Once Samuel Rothman

## 2021-11-03 ENCOUNTER — HOSPITAL ENCOUNTER (OUTPATIENT)
Age: 71
Discharge: HOME | End: 2021-11-03
Payer: COMMERCIAL

## 2021-11-03 DIAGNOSIS — T45.1X5A ANEMIA ASSOCIATED WITH CHEMOTHERAPY: ICD-10-CM

## 2021-11-03 DIAGNOSIS — E78.00 PURE HYPERCHOLESTEROLEMIA: ICD-10-CM

## 2021-11-03 DIAGNOSIS — E03.9 ACQUIRED HYPOTHYROIDISM: ICD-10-CM

## 2021-11-03 DIAGNOSIS — D64.81 ANEMIA ASSOCIATED WITH CHEMOTHERAPY: ICD-10-CM

## 2021-11-03 LAB
ALBUMIN SERPL-MCNC: 3.8 G/DL (ref 3.5–5.2)
ALP BLD-CCNC: 63 U/L (ref 35–104)
ALT SERPL-CCNC: 16 U/L (ref 0–32)
ANION GAP SERPL CALCULATED.3IONS-SCNC: 10 MMOL/L (ref 7–16)
AST SERPL-CCNC: 18 U/L (ref 0–31)
BASOPHILS ABSOLUTE: 0.03 E9/L (ref 0–0.2)
BASOPHILS RELATIVE PERCENT: 0.7 % (ref 0–2)
BILIRUB SERPL-MCNC: 1.2 MG/DL (ref 0–1.2)
BUN BLDV-MCNC: 19 MG/DL (ref 6–23)
CALCIUM SERPL-MCNC: 10.2 MG/DL (ref 8.6–10.2)
CHLORIDE BLD-SCNC: 100 MMOL/L (ref 98–107)
CHOLESTEROL, TOTAL: 243 MG/DL (ref 0–199)
CO2: 29 MMOL/L (ref 22–29)
CREAT SERPL-MCNC: 0.7 MG/DL (ref 0.5–1)
EOSINOPHILS ABSOLUTE: 0.07 E9/L (ref 0.05–0.5)
EOSINOPHILS RELATIVE PERCENT: 1.6 % (ref 0–6)
GFR AFRICAN AMERICAN: >60
GFR NON-AFRICAN AMERICAN: >60 ML/MIN/1.73
GLUCOSE BLD-MCNC: 85 MG/DL (ref 74–99)
HCT VFR BLD CALC: 40.9 % (ref 34–48)
HDLC SERPL-MCNC: 65 MG/DL
HEMOGLOBIN: 13.3 G/DL (ref 11.5–15.5)
IMMATURE GRANULOCYTES #: 0.01 E9/L
IMMATURE GRANULOCYTES %: 0.2 % (ref 0–5)
LDL CHOLESTEROL CALCULATED: 163 MG/DL (ref 0–99)
LYMPHOCYTES ABSOLUTE: 1.4 E9/L (ref 1.5–4)
LYMPHOCYTES RELATIVE PERCENT: 31.1 % (ref 20–42)
MCH RBC QN AUTO: 29.6 PG (ref 26–35)
MCHC RBC AUTO-ENTMCNC: 32.5 % (ref 32–34.5)
MCV RBC AUTO: 91.1 FL (ref 80–99.9)
MONOCYTES ABSOLUTE: 0.46 E9/L (ref 0.1–0.95)
MONOCYTES RELATIVE PERCENT: 10.2 % (ref 2–12)
NEUTROPHILS ABSOLUTE: 2.53 E9/L (ref 1.8–7.3)
NEUTROPHILS RELATIVE PERCENT: 56.2 % (ref 43–80)
PDW BLD-RTO: 12.9 FL (ref 11.5–15)
PLATELET # BLD: 254 E9/L (ref 130–450)
PMV BLD AUTO: 10.2 FL (ref 7–12)
POTASSIUM SERPL-SCNC: 4.4 MMOL/L (ref 3.5–5)
RBC # BLD: 4.49 E12/L (ref 3.5–5.5)
SODIUM BLD-SCNC: 139 MMOL/L (ref 132–146)
T4 FREE: 1.42 NG/DL (ref 0.93–1.7)
TOTAL PROTEIN: 6.4 G/DL (ref 6.4–8.3)
TRIGL SERPL-MCNC: 75 MG/DL (ref 0–149)
TSH SERPL DL<=0.05 MIU/L-ACNC: 0.23 UIU/ML (ref 0.27–4.2)
VLDLC SERPL CALC-MCNC: 15 MG/DL
WBC # BLD: 4.5 E9/L (ref 4.5–11.5)

## 2021-11-03 PROCEDURE — 84443 ASSAY THYROID STIM HORMONE: CPT

## 2021-11-03 PROCEDURE — 85025 COMPLETE CBC W/AUTO DIFF WBC: CPT

## 2021-11-03 PROCEDURE — 36415 COLL VENOUS BLD VENIPUNCTURE: CPT

## 2021-11-03 PROCEDURE — 80053 COMPREHEN METABOLIC PANEL: CPT

## 2021-11-03 PROCEDURE — 80061 LIPID PANEL: CPT

## 2021-11-03 PROCEDURE — 84439 ASSAY OF FREE THYROXINE: CPT

## 2021-11-12 ENCOUNTER — OFFICE VISIT (OUTPATIENT)
Dept: PRIMARY CARE CLINIC | Age: 71
End: 2021-11-12
Payer: COMMERCIAL

## 2021-11-12 VITALS
TEMPERATURE: 96.9 F | HEIGHT: 62 IN | DIASTOLIC BLOOD PRESSURE: 88 MMHG | WEIGHT: 115 LBS | OXYGEN SATURATION: 99 % | BODY MASS INDEX: 21.16 KG/M2 | SYSTOLIC BLOOD PRESSURE: 138 MMHG | HEART RATE: 81 BPM | RESPIRATION RATE: 18 BRPM

## 2021-11-12 VITALS
OXYGEN SATURATION: 99 % | HEIGHT: 62 IN | HEART RATE: 81 BPM | WEIGHT: 115 LBS | RESPIRATION RATE: 18 BRPM | SYSTOLIC BLOOD PRESSURE: 138 MMHG | TEMPERATURE: 96.9 F | BODY MASS INDEX: 21.16 KG/M2 | DIASTOLIC BLOOD PRESSURE: 88 MMHG

## 2021-11-12 DIAGNOSIS — T45.1X5A ANEMIA ASSOCIATED WITH CHEMOTHERAPY: ICD-10-CM

## 2021-11-12 DIAGNOSIS — D64.81 ANEMIA ASSOCIATED WITH CHEMOTHERAPY: ICD-10-CM

## 2021-11-12 DIAGNOSIS — R73.9 BLOOD GLUCOSE ELEVATED: ICD-10-CM

## 2021-11-12 DIAGNOSIS — I89.0 LYMPHEDEMA OF BOTH LOWER EXTREMITIES: ICD-10-CM

## 2021-11-12 DIAGNOSIS — E03.9 ACQUIRED HYPOTHYROIDISM: Primary | ICD-10-CM

## 2021-11-12 DIAGNOSIS — E78.00 PURE HYPERCHOLESTEROLEMIA: ICD-10-CM

## 2021-11-12 DIAGNOSIS — K21.9 GASTROESOPHAGEAL REFLUX DISEASE WITHOUT ESOPHAGITIS: ICD-10-CM

## 2021-11-12 DIAGNOSIS — F41.9 ANXIETY AND DEPRESSION: Chronic | ICD-10-CM

## 2021-11-12 DIAGNOSIS — Z00.00 ROUTINE GENERAL MEDICAL EXAMINATION AT A HEALTH CARE FACILITY: Primary | ICD-10-CM

## 2021-11-12 DIAGNOSIS — C02.9 CANCER OF TONGUE (HCC): ICD-10-CM

## 2021-11-12 DIAGNOSIS — F32.A ANXIETY AND DEPRESSION: Chronic | ICD-10-CM

## 2021-11-12 DIAGNOSIS — G25.81 RESTLESS LEG SYNDROME: ICD-10-CM

## 2021-11-12 DIAGNOSIS — R73.03 PREDIABETES: ICD-10-CM

## 2021-11-12 DIAGNOSIS — Z78.9 STATIN INTOLERANCE: ICD-10-CM

## 2021-11-12 DIAGNOSIS — F51.04 CHRONIC INSOMNIA: ICD-10-CM

## 2021-11-12 LAB — HBA1C MFR BLD: 5.7 %

## 2021-11-12 PROCEDURE — 83036 HEMOGLOBIN GLYCOSYLATED A1C: CPT | Performed by: INTERNAL MEDICINE

## 2021-11-12 PROCEDURE — G0439 PPPS, SUBSEQ VISIT: HCPCS | Performed by: INTERNAL MEDICINE

## 2021-11-12 PROCEDURE — 1036F TOBACCO NON-USER: CPT | Performed by: INTERNAL MEDICINE

## 2021-11-12 PROCEDURE — G8420 CALC BMI NORM PARAMETERS: HCPCS | Performed by: INTERNAL MEDICINE

## 2021-11-12 PROCEDURE — 99215 OFFICE O/P EST HI 40 MIN: CPT | Performed by: INTERNAL MEDICINE

## 2021-11-12 PROCEDURE — 4040F PNEUMOC VAC/ADMIN/RCVD: CPT | Performed by: INTERNAL MEDICINE

## 2021-11-12 PROCEDURE — G8427 DOCREV CUR MEDS BY ELIG CLIN: HCPCS | Performed by: INTERNAL MEDICINE

## 2021-11-12 PROCEDURE — G8484 FLU IMMUNIZE NO ADMIN: HCPCS | Performed by: INTERNAL MEDICINE

## 2021-11-12 PROCEDURE — G8399 PT W/DXA RESULTS DOCUMENT: HCPCS | Performed by: INTERNAL MEDICINE

## 2021-11-12 PROCEDURE — 1123F ACP DISCUSS/DSCN MKR DOCD: CPT | Performed by: INTERNAL MEDICINE

## 2021-11-12 PROCEDURE — 3017F COLORECTAL CA SCREEN DOC REV: CPT | Performed by: INTERNAL MEDICINE

## 2021-11-12 PROCEDURE — 1090F PRES/ABSN URINE INCON ASSESS: CPT | Performed by: INTERNAL MEDICINE

## 2021-11-12 RX ORDER — ROSUVASTATIN CALCIUM 5 MG/1
TABLET, COATED ORAL
Qty: 30 TABLET | Refills: 1 | Status: SHIPPED | OUTPATIENT
Start: 2021-11-12

## 2021-11-12 ASSESSMENT — PATIENT HEALTH QUESTIONNAIRE - PHQ9
1. LITTLE INTEREST OR PLEASURE IN DOING THINGS: 0
SUM OF ALL RESPONSES TO PHQ QUESTIONS 1-9: 1
SUM OF ALL RESPONSES TO PHQ9 QUESTIONS 1 & 2: 1
2. FEELING DOWN, DEPRESSED OR HOPELESS: 1
SUM OF ALL RESPONSES TO PHQ QUESTIONS 1-9: 1
SUM OF ALL RESPONSES TO PHQ QUESTIONS 1-9: 1

## 2021-11-12 ASSESSMENT — LIFESTYLE VARIABLES: HOW OFTEN DO YOU HAVE A DRINK CONTAINING ALCOHOL: 0

## 2021-11-12 NOTE — PROGRESS NOTES
5/28/12    ORIF RIGHT ULNA    OTHER SURGICAL HISTORY  6/18/2012    orif  rivision right olecranon    OTHER SURGICAL HISTORY Right 01/16/2014    Right elbow hardware removal    SC OFFICE/OUTPT VISIT,PROCEDURE ONLY N/A 11/9/2018    PORT REMOVAL performed by Andre Green MD at [place] [address]  1MONTHS OF AGE    H/O ESOPHAGEAL WEB    TUNNELED VENOUS PORT PLACEMENT  10/04/2016    Dr. Austin Landis UPPER GASTROINTESTINAL ENDOSCOPY           Family History   Problem Relation Age of Onset    Hypertension Father     High Blood Pressure Father     Arthritis Father     Cancer Mother         uterine    Diabetes Mother     Hypertension Mother     High Blood Pressure Mother     Depression Mother     Other Mother         endocrine disorder       CareTeam (Including outside providers/suppliers regularly involved in providing care):   Patient Care Team:  Yadi Martinez MD as PCP - General (Internal Medicine)  Yadi Martinez MD as PCP - [facility] Empaneled Provider  Murali Luciano MD as Surgeon (Colon and Rectal Surgery)  Pedro Hill MD as Consulting Physician (Hematology and Oncology)    Wt Readings from Last 3 Encounters:   11/12/21 115 lb (52.2 kg)   11/12/21 115 lb (52.2 kg)   11/01/21 117 lb (53.1 kg)     Vitals:    11/12/21 0825   BP: 138/88   Pulse: 81   Resp: 18   Temp: 96.9 °F (36.1 °C)   SpO2: 99%   Weight: 115 lb (52.2 kg)   Height: 5' 2\" (1.575 m)     Body mass index is 21.03 kg/m². Based upon direct observation of the patient, evaluation of cognition reveals recent and remote memory intact. Patient's complete Health Risk Assessment and screening values have been reviewed and are found in Flowsheets. The following problems were reviewed today and where indicated follow up appointments were made and/or referrals ordered.     Positive Risk Factor Screenings with Interventions:     Fall Risk:  2 or more falls in past year?: (!) yes  Fall with injury in past year?: (!) yes  Fall Risk Interventions:    · Home safety tips provided          General Health and ACP:  General  In general, how would you say your health is?: Good  In the past 7 days, have you experienced any of the following? New or Increased Pain, New or Increased Fatigue, Loneliness, Social Isolation, Stress or Anger?: (!) Anger  Do you get the social and emotional support that you need?: Yes  Do you have a Living Will?: Yes  Advance Directives     Power of  Living Will ACP-Advance Directive ACP-Power of     Not on File Filed on 06/10/14 Filed Not on File      General Health Risk Interventions:  · No additional health risk interventions. Health Habits/Nutrition:  Health Habits/Nutrition  Do you exercise for at least 20 minutes 2-3 times per week?: Yes  Have you lost any weight without trying in the past 3 months?: No  Do you eat only one meal per day?: (!) Yes  Have you seen the dentist within the past year?: Yes  Body mass index: 21.03  Health Habits/Nutrition Interventions:  · Patient advised to try to maintain a heart healthy diet and exercise on a regular basis as tolerated.     Hearing/Vision:  No exam data present  Hearing/Vision  Do you or your family notice any trouble with your hearing that hasn't been managed with hearing aids?: (!) Yes  Do you have difficulty driving, watching TV, or doing any of your daily activities because of your eyesight?: No  Have you had an eye exam within the past year?: Yes  Hearing/Vision Interventions:  · She should get a hearing evaluation and an annual eye exam.      Personalized Preventive Plan   Current Health Maintenance Status  Immunization History   Administered Date(s) Administered    Influenza Virus Vaccine 10/04/2016, 10/17/2017    Pneumococcal Conjugate 13-valent ([id number]) 10/19/2018    Pneumococcal Polysaccharide ([id number]) 10/23/2017    Td, unspecified formulation 05/28/2012    Tdap (Boostrix, Adacel) 08/02/2020        Health Maintenance   Topic Date Due  Hepatitis C screen  Never done    COVID-19 Vaccine (1) Never done    Shingles Vaccine (2 of 3) 01/30/2018    Flu vaccine (1) 09/01/2021    Annual Wellness Visit (AWV)  Never done    Lipid screen  11/03/2022    TSH testing  11/03/2022    A1C test (Diabetic or Prediabetic)  11/12/2022    Breast cancer screen  12/16/2022    DTaP/Tdap/Td vaccine (2 - Td or Tdap) 08/02/2030    Colon cancer screen colonoscopy  12/30/2030    DEXA (modify frequency per FRAX score)  Completed    Pneumococcal 65+ years Vaccine  Completed    Hepatitis A vaccine  Aged Out    Hepatitis B vaccine  Aged Out    Hib vaccine  Aged Out    Meningococcal (ACWY) vaccine  Aged Out     Recommendations for [email or web address] Due: see orders and patient instructions/AVS.  . Recommended screening schedule for the next 5-10 years is provided to the patient in written form: see Patient Instructions/AVS.    Yael Grider was seen today for medicare awv.     Diagnoses and all orders for this visit:    Routine general medical examination at a health care facility

## 2021-11-12 NOTE — PROGRESS NOTES
Jefrfy Cantu  11/12/21     Chief Complaint   Patient presents with    Hypothyroidism     physical         No Known Allergies     Current Outpatient Medications   Medication Sig Dispense Refill    rosuvastatin (CRESTOR) 5 MG tablet Take 1 tablet by mouth daily 30 tablet 1    temazepam (RESTORIL) 15 MG capsule Take 1 capsule by mouth nightly as needed for Sleep for up to 90 days. 90 capsule 0    rOPINIRole (REQUIP) 0.5 MG tablet Take 1 tablet by mouth nightly as needed (restless leg) For restless legs 30 tablet 3    Cream Base (VERSAPRO) CREA ONE GRAM (ONE PUMP) EXTERNALLY FOUR TIMES A DAY (Patient taking differently: Compound cream) 100 g 5    NONFORMULARY Indications: elderberry       sucralfate (CARAFATE) 1 GM/10ML suspension Take 10 mLs by mouth 4 times daily 1200 mL 3    venlafaxine (EFFEXOR XR) 37.5 MG extended release capsule Take 37.5 mg by mouth daily      SYNTHROID 75 MCG tablet Take 1 tablet by mouth daily 90 tablet 3    gabapentin (NEURONTIN) 400 MG capsule Take 1 capsule by mouth 3 times daily for 30 days.  270 capsule 3    RABEprazole (ACIPHEX) 20 MG tablet 1 daily 90 tablet 3    B Complex Vitamins (B-COMPLEX/B-12 PO) Take by mouth daily      Cholecalciferol (VITAMIN D PO) Take 1 tablet by mouth 3000 units daily      Biotin 10 MG CAPS Take by mouth daily HOLD      Elastic Bandages & Supports (JOBST KNEE HIGH COMPRESSION SM) MISC Knee high with 20- 30 mmhg of compression 1 each 10    acetaminophen (TYLENOL) 500 MG tablet Take 500 mg by mouth every 6 hours as needed Instructed to take with sip water am of procedure if needs       Current Facility-Administered Medications   Medication Dose Route Frequency Provider Last Rate Last Admin    bupivacaine (MARCAINE) 0.25 % injection 15 mg  6 mL IntraDERmal Once Fernando Saldana MD        triamcinolone acetonide (KENALOG-40) injection 40 mg  40 mg IntraMUSCular Once Fernando Saldana MD        triamcinolone acetonide [facility]) injection 40 mg  40 mg IntraMUSCular Once Abimael Craig MD        bupivacaine (PF) (MARCAINE) 0.25 % injection 15 mg  6 mL IntraDERmal Once Abimael Craig MD            HPI: Patient comes in for her annual physical.  She is now 80years of age. Currently she feels fairly well. 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 She remains on all her usual meds and supplements the same as listed on her med list.  She takes ropinirole 0.5 mg at at bedtime as needed for restless legs. She wears compression stockings for her chronic lymphedema both legs. Her anxiety depression has been under good control on Effexor XR 37.5 mg daily. She admits she eats a moderate amount of sweets. Also she remains on temazepam 15 mg at bedtime as needed for her chronic insomnia. Currently she takes rosuvastatin 5 mg 3 times weekly for treatment of her hyperlipidemia and she seems to be tolerating that dose fairly well.     Review of Systems    General:   no weight change, no malaise, no fatigue, no change in appetite, no sleep disturbance, no fever/chills, no night sweats,  Skin:                no abnormal pigmentation, no rash, no scaling, no itching, no masses, no hair or nail changes  Eyes:               no blurring, no diplopia, no eye pain, no glaucoma, no cataracts  ENT:                 no hearing loss, no tinnitus, no vertigo, no nosebleed, no nasal congestion, no rhinorrhea, no sore throat, no jaw pain, no hoarseness,  no bleeding    Neck:     no node tenderness , not rigid, no masses   Respiratory:           no cough, no sputum, no coughing blood, no pleuritic , no chest pain, no dyspnea,  no wheezing  Cardiovascular:         no angina, no chest pain  No syncope, no pedal edema , no orthopnea, no PND, no palpitations, no claudication  Gastrointestinal  no nausea, no vomiting, no heartburn, no diarrhea, no constipation, no bloating, no abdominal pain, no rectal pain, no bleeding no hemorrhoids, no hernia. Genitourinary:            no urinary urgency, no frequency, no dysuria, no nocturia, no hesitancy, no  incontinence, no bleeding, no stones  Musculoskeletal:        no arthritis, no  arthralgia, no myalgia, no  weakness,  no morning stiffness, no joint swelling   Neurologic:                 no paralysis, no paresis, no  paresthesia, no seizures, no tremors, no headaches, no tumors , no stroke, no speech issues,  No incoordination, no head trauma, no memory loss/concentration  Hematologic:              no anemia, no abnormal bleeding / bruising, no fever, no chills, no night sweats, no wollen glands, no unexplained weight loss  Endocrine:        no heat or cold intolerance and no polyphagia, polydipsia,  or polyuria  Psych:  Chronic anxiety and depression currently stable on meds. Physical Exam:  Patient is an 80 y.o. female     Constitutional:  General Appearance: Well-appearing. Level of Distress: NAD. Ambulation: ambulating normally    Psychiatric:  Insight: good judgment: Mental status: normal mood and affect. Orientation: oriented to time place and person. Memory: recent memory normal and remote memory normal.     Head: normocephalic and atraumatic. Eyes:  Lids and Conjunctiva: Non-injected and no pallor. Pupils: PERRLA, Corneas: grossly intact. EOMI, Lens: clear. Sclerae: non-icteric. Vision: peripheral vision grossly intact and acuity grossly intact. ENMT:  Ears: no lesions on external ear. TMs clear and TM mobility normal.  Hearing: no hearing loss. Nose: No lesions on external nose, septal deviation sinus tenderness or nasal discharge and nares patent and nasal passages clear. Lips, Teeth and Gums:  no mouth or lip ulcers or bleeding gums and normal dentition. Oropharynx: no erythema or exudates and moist mucous membranes and tonsils not enlarged.      Neck:  Neck supple, FROM, trachea midline, and no masses. Lymph nodes: no cervical LAD, supraclavicular LAD, axillary LAD, or inguinal LAD. Thyroid: non-tender and no enlargement. Lungs:  Respiratory effort, no dyspnea. Auscultation: no rales/crackles or rhonchi and breath sounds normal, good air movement and CTA except as noted. Cardiovascular: Apical impulse:not displaced. Heart: Auscultation: normal S1 and S2, no murmurs, rubs or gallops; and RRR. Neck vessels: no carotid bruits. Pulses including femoral/pedal: normal throughout. Abdomen: Bowel sounds: normal.  Inspection and Palpation: no tenderness, guarding, masses, rebound tenderness or CVA tenderness and soft and non-distended. Liver: non-tender and no hepatomegaly. Spleen: non-tender and no splenomegaly. Hernia: none palpable. Musculoskeletal: Motor Strength and Tone: normal tone and motor strength. Joints, Bones and Muscles: no malalignment, tenderness or bony abnormalities and normal movement of all extremities. Extremities: no cyanosis, edema, varicosities, or palpable cord. Neurologic:  Gait and Station: normal gait and station. Cranial nerves:grossly intact. Sensation:grossly intact and monofilament test intact. Reflexes: DTRs 2+ bilaterally throughout. Coordination and Cerebellum: finger to nose intact and no tremor. Skin: Inspection and palpation: no rash, lesions, ulcer, induration, nodules, jaundice or abnormal nevi and good turgor. Nails: normal.     Back:  Thoracolumbar Appearance: normal curvature. I have examined the patient's feet and found no evidence of deformities, calluses, ulcerations, or evidence of neuropathy.         Lab Results   Component Value Date    WBC 4.5 11/03/2021    HGB 13.3 11/03/2021    HCT 40.9 11/03/2021     11/03/2021    CHOL 243 (H) 11/03/2021    TRIG 75 11/03/2021    HDL 65 11/03/2021    ALT 16 11/03/2021    AST 18 11/03/2021    TSH 0.231 (L) 11/03/2021    INR 1.1 08/13/2017    LABA1C 5.7 11/12/2021 LABMICR <12.0 12/08/2015      Lab Results   Component Value Date     11/03/2021    K 4.4 11/03/2021     11/03/2021    CO2 29 11/03/2021    BUN 19 11/03/2021    CREATININE 0.7 11/03/2021    GLUCOSE 85 11/03/2021    CALCIUM 10.2 11/03/2021    PROT 6.4 11/03/2021    LABALBU 3.8 11/03/2021    BILITOT 1.2 11/03/2021    ALKPHOS 63 11/03/2021    AST 18 11/03/2021    ALT 16 11/03/2021    LABGLOM >60 11/03/2021    GFRAA >60 11/03/2021            Assessment:    Acquired hypothyroidism, currently stable on levothyroxine 75 mcg daily.  -     TSH without Reflex; Future    Pure hypercholesterolemia, not well controlled on current dose of rosuvastatin 5 mg 3 times weekly. -     Comprehensive Metabolic Panel; Future  -     Lipid Panel; Future    Gastroesophageal reflux disease without esophagitis with history of recurring esophageal stricture, requiring periodic esophageal dilation, currently stable on AcipHex 20 mg daily and followed by her gastroenterologist Dr. Jacqueline Toro. Restless leg syndrome stable on ropinirole 0.5 mg at at bedtime as needed. Chronic anxiety and depression, currently stable on Effexor XR 37.5 mg daily. Anemia associated with chemotherapy    Chronic insomnia, treated with temazepam 15 mg at at bedtime as needed. Lymphedema of both lower extremities, currently treated with compression stockings. Statin intolerance    Cancer of tongue ([facility] [address] currently in remission followed closely by her oncologist and ENT specialist.    Blood glucose elevated with a current hemoglobin A1c of 5.7% which is in the prediabetic range. -     POCT glycosylated hemoglobin (Hb A1C)  -     Hemoglobin A1C; Future    Prediabetes    Other orders  -     Cancel: Basic Metabolic Panel; Future  -     rosuvastatin (CRESTOR) 5 MG tablet;  Take 1 tablet by mouth daily          Discussion Notes: She will continue all her usual meds and supplements the same as listed on her med list.  She is advised to try to limit her sweets and refined carbohydrates. She is encouraged to try to follow a heart healthy diet and exercise on a regular basis as tolerated. She will follow-up with her neurologist, oncologist, and ENT specialist as per their instructions. She will increase her rosuvastatin to 5 mg 5 days weekly with an eventual goal of taking it on a daily basis.   Otherwise she will return as needed or in 6 months for follow-up visit and labs including a CMP, hemoglobin A1c, lipid panel, TSH.

## 2021-11-13 PROBLEM — R73.03 PREDIABETES: Status: ACTIVE | Noted: 2021-11-13

## 2021-11-17 ENCOUNTER — HOSPITAL ENCOUNTER (OUTPATIENT)
Dept: CT IMAGING | Age: 71
Discharge: HOME | End: 2021-11-19
Payer: COMMERCIAL

## 2021-11-17 DIAGNOSIS — C06.9 ORAL CANCER (HCC): ICD-10-CM

## 2021-11-17 PROCEDURE — 2580000003 HC RX 258: Performed by: STUDENT IN AN ORGANIZED HEALTH CARE EDUCATION/TRAINING PROGRAM

## 2021-11-17 PROCEDURE — 70491 CT SOFT TISSUE NECK W/DYE: CPT

## 2021-11-17 PROCEDURE — 6360000004 HC RX CONTRAST MEDICATION: Performed by: STUDENT IN AN ORGANIZED HEALTH CARE EDUCATION/TRAINING PROGRAM

## 2021-11-17 RX ORDER — SODIUM CHLORIDE 0.9 % (FLUSH) 0.9 %
10 SYRINGE (ML) INJECTION PRN
Status: DISCONTINUED | OUTPATIENT
Start: 2021-11-17 | End: 2021-11-20 | Stop reason: HOSPADM

## 2021-11-17 RX ADMIN — SODIUM CHLORIDE, PRESERVATIVE FREE 10 ML: 5 INJECTION INTRAVENOUS at 08:17

## 2021-11-17 RX ADMIN — IOPAMIDOL 75 ML: 755 INJECTION, SOLUTION INTRAVENOUS at 08:16

## 2021-11-29 DIAGNOSIS — F51.04 CHRONIC INSOMNIA: Primary | ICD-10-CM

## 2021-11-29 RX ORDER — TEMAZEPAM 15 MG/1
15 CAPSULE ORAL NIGHTLY PRN
COMMUNITY
End: 2021-11-29 | Stop reason: SDUPTHER

## 2021-11-29 RX ORDER — TEMAZEPAM 15 MG/1
15 CAPSULE ORAL NIGHTLY PRN
Qty: 90 CAPSULE | Refills: 0 | Status: SHIPPED
Start: 2021-11-29 | End: 2021-11-30 | Stop reason: SDUPTHER

## 2021-11-29 NOTE — TELEPHONE ENCOUNTER
----- Message from Rj Forrestdeniakrzysztof sent at 11/29/2021  2:37 PM EST -----  Subject: Refill Request    QUESTIONS  Name of Medication? temazepam (RESTORIL) 15 MG capsule  Patient-reported dosage and instructions? Take 1 capsule by mouth nightly   for 90 days. Sleep aide  How many days do you have left? 7  Preferred Pharmacy? [facility] phone number (if available)? [phone]  Additional Information for Provider? 90 day supply  ---------------------------------------------------------------------------  --------------,  Name of Medication? venlafaxine (EFFEXOR XR) 37.5 MG extended release   capsule  Patient-reported dosage and instructions? Take 37.5 mg by mouth daily  How many days do you have left? 7  Preferred Pharmacy? [facility]  Pharmacy phone number (if available)? [phone]  Additional Information for Provider? 90 day supply  ---------------------------------------------------------------------------  --------------  CALL BACK INFO  What is the best way for the office to contact you? OK to leave message on   voicemail  Preferred Call Back Phone Number?  [phone]

## 2021-11-30 DIAGNOSIS — F41.9 ANXIETY AND DEPRESSION: Primary | ICD-10-CM

## 2021-11-30 DIAGNOSIS — F51.04 CHRONIC INSOMNIA: ICD-10-CM

## 2021-11-30 DIAGNOSIS — F32.A ANXIETY AND DEPRESSION: Primary | ICD-10-CM

## 2021-11-30 RX ORDER — TEMAZEPAM 15 MG/1
15 CAPSULE ORAL NIGHTLY PRN
Qty: 90 CAPSULE | Refills: 0 | Status: SHIPPED
Start: 2021-11-30 | End: 2022-02-16 | Stop reason: SDUPTHER

## 2021-11-30 RX ORDER — VENLAFAXINE HYDROCHLORIDE 37.5 MG/1
37.5 CAPSULE, EXTENDED RELEASE ORAL DAILY
Qty: 90 CAPSULE | Refills: 3 | Status: SHIPPED | OUTPATIENT
Start: 2021-11-30

## 2021-12-01 DIAGNOSIS — E03.9 ACQUIRED HYPOTHYROIDISM: ICD-10-CM

## 2021-12-01 RX ORDER — LEVOTHYROXINE SODIUM 75 MCG
75 TABLET ORAL DAILY
Qty: 90 TABLET | Refills: 3 | Status: SHIPPED
Start: 2021-12-01 | End: 2022-03-16 | Stop reason: SDUPTHER

## 2021-12-03 ENCOUNTER — APPOINTMENT (OUTPATIENT)
Dept: RADIATION ONCOLOGY | Age: 71
End: 2021-12-03
Payer: COMMERCIAL

## 2021-12-07 DIAGNOSIS — F41.9 ANXIETY AND DEPRESSION: Primary | ICD-10-CM

## 2021-12-07 DIAGNOSIS — F32.A ANXIETY AND DEPRESSION: Primary | ICD-10-CM

## 2021-12-07 RX ORDER — VENLAFAXINE HYDROCHLORIDE 75 MG/1
75 CAPSULE, EXTENDED RELEASE ORAL DAILY
Qty: 90 CAPSULE | Refills: 3 | Status: SHIPPED
Start: 2021-12-07 | End: 2022-05-26

## 2021-12-07 RX ORDER — VENLAFAXINE HYDROCHLORIDE 75 MG/1
75 CAPSULE, EXTENDED RELEASE ORAL DAILY
COMMUNITY
End: 2021-12-07 | Stop reason: SDUPTHER

## 2021-12-17 ENCOUNTER — HOSPITAL ENCOUNTER (OUTPATIENT)
Dept: GENERAL RADIOLOGY | Age: 71
Discharge: HOME | End: 2021-12-19
Payer: COMMERCIAL

## 2021-12-17 ENCOUNTER — HOSPITAL ENCOUNTER (OUTPATIENT)
Dept: RADIATION ONCOLOGY | Age: 71
Discharge: HOME | End: 2021-12-17
Payer: COMMERCIAL

## 2021-12-17 VITALS
TEMPERATURE: 97.8 F | HEART RATE: 97 BPM | DIASTOLIC BLOOD PRESSURE: 64 MMHG | SYSTOLIC BLOOD PRESSURE: 108 MMHG | BODY MASS INDEX: 20.3 KG/M2 | OXYGEN SATURATION: 98 % | RESPIRATION RATE: 18 BRPM | WEIGHT: 111 LBS

## 2021-12-17 DIAGNOSIS — C76.0 HEAD AND NECK CANCER (HCC): Primary | ICD-10-CM

## 2021-12-17 DIAGNOSIS — Z78.0 ASYMPTOMATIC MENOPAUSAL STATE: ICD-10-CM

## 2021-12-17 DIAGNOSIS — Z12.31 VISIT FOR SCREENING MAMMOGRAM: ICD-10-CM

## 2021-12-17 PROCEDURE — 99213 OFFICE O/P EST LOW 20 MIN: CPT

## 2021-12-17 PROCEDURE — 99213 OFFICE O/P EST LOW 20 MIN: CPT | Performed by: RADIOLOGY

## 2021-12-17 PROCEDURE — 77063 BREAST TOMOSYNTHESIS BI: CPT

## 2021-12-17 PROCEDURE — 77080 DXA BONE DENSITY AXIAL: CPT

## 2021-12-17 NOTE — PROGRESS NOTES
Radiation Oncology   Follow Up Note  Farhan Landin. Sheree Banks MD MS DABR      12/17/2021  Ramana Mora  Date of Service: 12/17/21        HPI:          Yadira is a pleasant 75 year old woman s/p concurrent chemo-RT for locally advanced disease of the OP; she presents today for FU -NESSA.                      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 Dental clearance 9/21/2016 per Dr. Yovani Gastelum. Note in Media tab on EPIC. She underwent a port placement with Dr. Venkata Camp on 10/4/2016. Chemotherapy was begun on 10/6/2016 with Dr. Anibal Magaña. PEG tube placed on 10/31/2016 by Dr. Misti armijo. Charly Mart 11/29/16, Flor Eng completed 7000 cGy in 35 fractions directed to the Definitive head and neck irradiation for management of right base of tongue SCC.  States Pt follows with Dr Anibal Magaña for medical oncology. PET scan NEGATIVE (SEE BELOW / Dr. Ginny Martínez following with US for thyroid findings) however she did have (another post Tx #3) interval PET that noted concerning findings. Dr. Ginny Martínez did a biopsy and the report noted negative findings, probably radiation changes only. A FU CT did not show LAD or a discrete mass (see EPIC) - previous interval PET scan noted uptake in progression however this uptake changed very little and upon review of the CT and PET, biopsy negative - previous interval.  Most recently a CT scan is negative May. Pt requests PT neck LE PRN.   S/P esophageal dilation again last interval.  KPS 80-90.           CT 11/17/21:  Impression   No acute abnormality of the soft tissue structures of the neck.       Postoperative changes at the right lung base/right lateral wall of the   oropharynx, stable.  No evidence of recurrent mass.       Infiltration of fat in the anterior neck, right more than left, extending   into the right submandibular and right carotid spaces, with thickening of the   right platysmas muscle, likely related to post treatment changes.          CT 5/7/21:  Impression   No acute abnormality of the soft tissue structures of the neck.       No evidence for residual or recurrent neoplastic process.            CT 8/2/20:  Impression    Stable CT scan of the neck                     PET 10/16/19:      Impression   1.  Focal tracer uptake at the level of the tongue base on the left,   tracer uptake has increased in the interval.   -----  3.7. Renita Crape When compared to previous the previous SUV in this region was 2.9.                PET 4/18/19:  Impression       No evidence of recurrent malignancy.                   PET 6/21/18:  Raydell Reddy level activity seen in association with the left parotid gland   of uncertain etiology or clinical significance, similar to the prior   examination. Query lymphomatous involvement. 2.  Additional findings, as detailed above.            PET 2/10/18:     1. No identifiable hypermetabolic focus with avid glucose metabolism   concerning for recurrence.                 PET 11/4/17:         PET images were obtained from skull base to the mid thigh.       Non diagnostic CT images were obtained for the purposes of attenuation   correction.       19.6 of F-18 glucose was injected.       Images reveal a single region of radiotracer uptake identified at the   level of the mylohyoid muscle on the right. This is focal and   asymmetric and has an SUV of 3.55.        There is otherwise a normal biodistribution of radiotracer. There is   no other abnormal tracer uptake seen.           Impression       Single focus of abnormal tracer uptake at the level of the mylohyoid   muscle, this could represent asymmetric muscular uptake. This could   represent focal neoplasm.  The finding is somewhat nonspecific although   concerning.                PET 7/10/17:  Mild asymmetric uptake in the tongue predominantly anteriorly and   right side of the tongue posteriorly on presentation.      She did develop expected skin, oral mucositis and swallowing difficulties.  She was symptomatically managed. In spite of this, she did have hypotension/dehydration. A PEG tube was placed.  She was hospitalized on 11/15/2016 following a syncopal episode. Barbara Hand is using various skin creams including Aquaphor, Neosporin with pain and most recently Silvadene. She did require IV hydration with Dr. Alisha Fuentes.  For oral mucositis management, we tried Magic mouthwash, neutral cell, Gelclair.  She is on Salagen for dry mouth.           -----                    Past Medical History:   Diagnosis Date    Anemia associated with chemotherapy 11/16/2016    resolved    Anxiety     Cancer ([address]     tongue    Cancer of tongue ([facility] [address] 9/16/2016    treated with chemo and radiation / last treatment 11/2016    Cancer of tonsil ([facility] [address]     Cervical herniation     no limits on rom    Difficulty sleeping     Discoloration of skin of foot 6/25/2020    Dizziness 6/20/2018    GERD (gastroesophageal reflux disease)     Hx of cataract surgery 10/06/2020    Hyperlipidemia     Hypokalemia 11/16/2016    with chemo / resolved    Hypomagnesemia 11/16/2016    related to chemo / resloved    Hypothyroidism     Leg swelling 7/26/2017    Lumbar herniated disc     after fell 2/2017 / now has chronic back pain and numbnes at left foot and ankle    Lymphedema of both lower extremities 07/26/2017    wears support hose    Lymphedema of lower extremity     Osteopenia     PONV (postoperative nausea and vomiting)     Restless leg syndrome     Rotator cuff tear     bilateral    Sleep apnea     Thyroid disease     Toe cyanosis 6/25/2020         Past Surgical History:   Procedure Laterality Date    BLADDER REPAIR  1995    COLONOSCOPY  2008    neg    COLONOSCOPY  03/19/2014    ENDOSCOPY, COLON, DIAGNOSTIC  2012    EYE SURGERY      cataract surgery, bilateral.     FRACTURE SURGERY  2012    RIGHT ELBOW   [address]   [facility]    LARYNGOSCOPY  09/08/2016    direct laryngoscopy right tonsil/tongue base biopsy cervical esophagoscopy fine needle aspiration     NERVE BLOCK      OTHER SURGICAL HISTORY  5/28/12    ORIF RIGHT ULNA    OTHER SURGICAL HISTORY  6/18/2012    orif  rivision right olecranon    OTHER SURGICAL HISTORY Right 01/16/2014    Right elbow hardware removal    MT OFFICE/OUTPT VISIT,PROCEDURE ONLY N/A 11/9/2018    PORT REMOVAL performed by Sebastian Read MD at [place] [address]  1MONTHS OF AGE    H/O ESOPHAGEAL WEB    TUNNELED VENOUS PORT PLACEMENT  10/04/2016    Dr. Corley Iberia History     Socioeconomic History    Marital status:      Spouse name: Not on file    Number of children: Not on file    Years of education: Not on file    Highest education level: Not on file   Occupational History    Not on file   Tobacco Use    Smoking status: Never Smoker    Smokeless tobacco: Never Used   Vaping Use    Vaping Use: Never used   Substance and Sexual Activity    Alcohol use: No     Alcohol/week: 0.0 standard drinks    Drug use: No    Sexual activity: Not on file   Other Topics Concern    Not on file   Social History Narrative    Not on file     Social Determinants of Health     Financial Resource Strain: Low Risk     Difficulty of Paying Living Expenses: Not hard at all   Food Insecurity: No Food Insecurity    Worried About [facility] in the Last Year: Never true    [facility] in the Last Year: Never true   Transportation Needs:     Lack of Transportation (Medical): Not on file    Lack of Transportation (Non-Medical):  Not on file   Physical Activity:     Days of Exercise per Week: Not on file    Minutes of Exercise per Session: Not on file   Stress:     Feeling of Stress : Not on file   Social Connections:     Frequency of Communication with Friends and Family: Not on file    Frequency of Social Gatherings with Friends and Family: Not on file    Attends Restorationist Services: Not on file    Active Member of Clubs or Organizations: Not on file    Attends Club or Organization Meetings: Not on file    Marital Status: Not on file   Intimate Partner Violence:     Fear of Current or Ex-Partner: Not on file    Emotionally Abused: Not on file    Physically Abused: Not on file    Sexually Abused: Not on file   Housing Stability:     Unable to Pay for Housing in the Last Year: Not on file    Number of [place] in the Last Year: Not on file    Unstable Housing in the Last Year: Not on file         Family History   Problem Relation Age of Onset    Hypertension Father     High Blood Pressure Father     Arthritis Father     Cancer Mother         uterine    Diabetes Mother     Hypertension Mother     High Blood Pressure Mother     Depression Mother     Other Mother         endocrine disorder       Allergies:   Patient has no known allergies. Current Outpatient Medications   Medication Sig Dispense Refill    venlafaxine (EFFEXOR XR) 75 MG extended release capsule Take 1 capsule by mouth daily 90 capsule 3    SYNTHROID 75 MCG tablet Take 1 tablet by mouth daily 90 tablet 3    venlafaxine (EFFEXOR XR) 37.5 MG extended release capsule Take 1 capsule by mouth daily (Patient not taking: Reported on 12/7/2021) 90 capsule 3    temazepam (RESTORIL) 15 MG capsule Take 1 capsule by mouth nightly as needed for Sleep for up to 90 days.  90 capsule 0    rosuvastatin (CRESTOR) 5 MG tablet Take 1 tablet by mouth daily 30 tablet 1    rOPINIRole (REQUIP) 0.5 MG tablet Take 1 tablet by mouth nightly as needed (restless leg) For restless legs 30 tablet 3    Cream Base (VERSAPRO) CREA ONE GRAM (ONE PUMP) EXTERNALLY FOUR TIMES A DAY (Patient taking differently: Compound cream) 100 g 5    NONFORMULARY Indications: elderberry       sucralfate (CARAFATE) 1 GM/10ML suspension Take 10 mLs by mouth 4 times daily 1200 mL 3    gabapentin (NEURONTIN) 400 MG capsule Take 1 capsule by mouth 3 times daily for 30 days.  270 capsule 3    RABEprazole (ACIPHEX) 20 MG tablet 1 daily 90 tablet 3    B Complex Vitamins (B-COMPLEX/B-12 PO) Take by mouth daily      Cholecalciferol (VITAMIN D PO) Take 1 tablet by mouth 3000 units daily      Biotin 10 MG CAPS Take by mouth daily HOLD      Elastic Bandages & Supports (JOBST KNEE HIGH COMPRESSION SM) MISC Knee high with 20- 30 mmhg of compression 1 each 10    acetaminophen (TYLENOL) 500 MG tablet Take 500 mg by mouth every 6 hours as needed Instructed to take with sip water am of procedure if needs       Current Facility-Administered Medications   Medication Dose Route Frequency Provider Last Rate Last Admin    bupivacaine (MARCAINE) 0.25 % injection 15 mg  6 mL IntraDERmal Once Rolin MD Shana        triamcinolone acetonide (KENALOG-40) injection 40 mg  40 mg IntraMUSCular Once Rolin Simple, MD        triamcinolone acetonide (KENALOG-40) injection 40 mg  40 mg IntraMUSCular Once Rolin Simple, MD        bupivacaine (PF) (MARCAINE) 0.25 % injection 15 mg  6 mL IntraDERmal Once Fernando Saldana MD             REVIEW OF SYSTEMS  History obtained from chart review and the patient  General ROS: negative  Psychological ROS: negative  Ophthalmic ROS: negative  ENT ROS: BL  Allergy and Immunology ROS: negative  Hematological and Lymphatic ROS: negative  Endocrine ROS: negative  Breast ROS: negative for breast lumps  Respiratory ROS: no cough, shortness of breath, or wheezing  Cardiovascular ROS: no chest pain or dyspnea on exertion  Gastrointestinal ROS: no abdominal pain, change in bowel habits, or black or bloody stools  Genito-Urinary ROS: no dysuria, trouble voiding, or hematuria  Musculoskeletal ROS: negative  Neurological ROS: no TIA or stroke symptoms  Dermatological ROS: negative        Physical Exam  HENT:      Head: Normocephalic and atraumatic. Right Ear: External ear normal.      Left Ear: External ear normal.      Nose: Nose normal.      Mouth/Throat:      Mouth: Mucous membranes are moist.   Eyes:      Extraocular Movements: Extraocular movements intact. Cardiovascular:      Rate and Rhythm: Normal rate and regular rhythm. Pulses: Normal pulses. Heart sounds: Normal heart sounds. Pulmonary:      Effort: Pulmonary effort is normal.   Abdominal:      General: Abdomen is flat. Musculoskeletal:         General: Normal range of motion. Cervical back: Normal range of motion. Skin:     General: Skin is warm and dry. Coloration: Skin is not pale. Neurological:      General: No focal deficit present. Mental Status: She is alert. Psychiatric:         Mood and Affect: Mood normal.         Behavior: Behavior normal.         Thought Content: Thought content normal.         Judgment: Judgment normal.               A/P:           Yadira is a dominga 80year old woman s/p concurrent chemo-RT for locally advanced HN cancer. She is NESSA today with grade 1-2 late effects but not difficulty with self care or instrumental ADLS. She is maintaining her elizabeth and th PEG has been removed.  PET negative.  Persistent xerostomia and dysphagia.  The patient did verbalize understanding for the indications of continued FU including imaging and laboratory evaluation as applicable to this case; as well as appropriate lifestyle choices and health maintenance.  All questions answered to the best of my ability. Early delayed or chronic RT grade 1-2 (xerostomia / dysphagia).            *I spent at least 20 MIN on this case and with this patient today including personally performing/reviewing the history, ROS, PE, and providing a summary and description of the detailed medical decision making as a basis for any and all recommendations made today (+/- a pertinent RBA discussion): literature and radiology reviews were performed as noted and

## 2021-12-17 NOTE — PROGRESS NOTES
Eric Tawana  12/17/2021  Wt Readings from Last 10 Encounters:   12/17/21 111 lb (50.3 kg)   11/12/21 115 lb (52.2 kg)   11/12/21 115 lb (52.2 kg)   11/01/21 117 lb (53.1 kg)   10/18/21 116 lb (52.6 kg)   09/20/21 116 lb (52.6 kg)   08/20/21 115 lb 12.8 oz (52.5 kg)   06/21/21 115 lb (52.2 kg)   05/11/21 114 lb (51.7 kg)   04/08/21 114 lb (51.7 kg)     Ht Readings from Last 1 Encounters:   11/12/21 5' 2\" (1.575 m)     Body mass index is 20.3 kg/m². Met wit pt today per referral for recent wt loss, pt is s/p XRT to BOT + LN in 2016. She reported that is comfortable with a wt around 115 lb but recently has had a decreased appetite and is now around 110 lb. She noted fatigue and lack of motivation to prepare/eat with the season changes. She does have PMH of depression and is receiving treatment for this. She stated that she consumes 1 meal daily with snacks throughout the day. Her snacks vary from fruit cups to Uncrustables or cheese with nuts and fruit. She does drink 2 lattes daily as well as Fairlife milk and shakes. She estimated that she is drinking at least 16 oz of water daily as well. She continues to have xerostomia but denied any other nutrition related side effects at this time. Discussed possibly signing up for a meal program to relieve the need to prepare as many meals. She declined information regarding this, she also declined speaking with clinic  to discuss possible other assistance available . Encouraged her to try to incorporate calorie and protein sources with each snack and to try for at least 1 meal and 3 snacks daily. Discussed possibly making a smoothie with her fruit in the morning and incorporating high calorie/protein foods in this. Pt was receptive of information provided and all questions were answered.      Weight change: 5% wt loss in 1.5 months  Appetite: fair  N/V/D/C: none  Calculated Needs if applicable: 3193-7739 kcal (30-32 kcal/kg), 65-75 gm protein (1.3-1.5 gm/kg), 1600 ml FW (32 ml/kg)    Recommendations: Pt is to consume at least 1 meal and 3 snacks daily with high calorie/protein foods with every snack. [employer] GUIDELINES FOR CLINICAL CHARACTERISTICS OF MALNUTRITION IN CHRONIC ILLNESS     Moderate Malnutrition  Severe Malnutrition    Energy intake  <75% energy intake compared to estimated needs for >1month <75% energy intake compared to estimated needs for >1month   Weight changes  5% x 1 month  7.5% x 3 months   10% x 6 months   20% x 1 year  >5% x 1 month  >7.5% x 3 months   >10% x 6 months   >20% x 1 year    Physical findings  Mild   Decrease subcutaneous fat    Decrease muscle mass     Increase fluid/edema   Severe  Decrease subcutaneous fat    Decrease muscle mass     Increase fluid/edema    Pt is at risk for malnutrition AEB 5% wt loss in 1.5 months and pt report of decreased intake.     Andria Nguyen, RD, LD

## 2021-12-17 NOTE — PATIENT INSTRUCTIONS
VIOLA 6 mo. Joyce Blanco. Isaac Durán MD [place] [address] Oncology  Cell: [phone]    [facility]:  [phone]   FAX: [phone]  [facility]:  [address] [place] [address] Avenue:    [phone]  [facility]:  [phone]   FAX:  [phone]  Email: [email or web address]

## 2021-12-17 NOTE — PROGRESS NOTES
Isabela Whalen  12/17/2021  8:35 AM      Vitals:    12/17/21 0834   BP: 108/64   Pulse: 97   Resp: 18   Temp: 97.8 °F (36.6 °C)   SpO2: 98%    : Wt Readings from Last 3 Encounters:   12/17/21 111 lb (50.3 kg)   11/12/21 115 lb (52.2 kg)   11/12/21 115 lb (52.2 kg)                Current Outpatient Medications:     venlafaxine (EFFEXOR XR) 75 MG extended release capsule, Take 1 capsule by mouth daily, Disp: 90 capsule, Rfl: 3    SYNTHROID 75 MCG tablet, Take 1 tablet by mouth daily, Disp: 90 tablet, Rfl: 3    venlafaxine (EFFEXOR XR) 37.5 MG extended release capsule, Take 1 capsule by mouth daily (Patient not taking: Reported on 12/7/2021), Disp: 90 capsule, Rfl: 3    temazepam (RESTORIL) 15 MG capsule, Take 1 capsule by mouth nightly as needed for Sleep for up to 90 days. , Disp: 90 capsule, Rfl: 0    rosuvastatin (CRESTOR) 5 MG tablet, Take 1 tablet by mouth daily, Disp: 30 tablet, Rfl: 1    rOPINIRole (REQUIP) 0.5 MG tablet, Take 1 tablet by mouth nightly as needed (restless leg) For restless legs, Disp: 30 tablet, Rfl: 3    Cream Base (VERSAPRO) CREA, ONE GRAM (ONE PUMP) EXTERNALLY FOUR TIMES A DAY (Patient taking differently: Compound cream), Disp: 100 g, Rfl: 5    NONFORMULARY, Indications: elderberry , Disp: , Rfl:     sucralfate (CARAFATE) 1 GM/10ML suspension, Take 10 mLs by mouth 4 times daily, Disp: 1200 mL, Rfl: 3    gabapentin (NEURONTIN) 400 MG capsule, Take 1 capsule by mouth 3 times daily for 30 days. , Disp: 270 capsule, Rfl: 3    RABEprazole (ACIPHEX) 20 MG tablet, 1 daily, Disp: 90 tablet, Rfl: 3    B Complex Vitamins (B-COMPLEX/B-12 PO), Take by mouth daily, Disp: , Rfl:     Cholecalciferol (VITAMIN D PO), Take 1 tablet by mouth 3000 units daily, Disp: , Rfl:     Biotin 10 MG CAPS, Take by mouth daily HOLD, Disp: , Rfl:     Elastic Bandages & Supports (JOBST KNEE HIGH COMPRESSION SM) MISC, Knee high with 20- 30 mmhg of compression, Disp: 1 each, Rfl: 10    acetaminophen (TYLENOL) 500 MG tablet, Take 500 mg by mouth every 6 hours as needed Instructed to take with sip water am of procedure if needs, Disp: , Rfl:     Current Facility-Administered Medications:     bupivacaine (MARCAINE) 0.25 % injection 15 mg, 6 mL, IntraDERmal, Once, Fernando Saldana MD    triamcinolone acetonide (KENALOG-40) injection 40 mg, 40 mg, IntraMUSCular, Once, Fernando Saldana MD    triamcinolone acetonide (KENALOG-40) injection 40 mg, 40 mg, IntraMUSCular, Once, Fernando Saldana MD    bupivacaine (PF) (MARCAINE) 0.25 % injection 15 mg, 6 mL, IntraDERmal, Once, Fernando Saldana MD      Patient is seen today in follow up for completion of right BOT +LN 10/3/16-11/29/16 35fx/7000cGy. Pt follows Dr. Bran Hilliard last seen her on 11/22/21. Pt had a Ct soft tissue Neck on 11/17/21 showing no abnormalities. Pt has no questions or concerns at this time. FALLS RISK SCREENING ASSESSMENT    Instructions:  Assess the patient and enter the appropriate indicators that are present for fall risk identification. Total the numbers entered and assign a fall risk score from Table 2.  Reassess patient at a minimum every 12 weeks or with status change. Assessment   Date  12/17/2021     1. Mental Ability: confusion/cognitively impaired No - 0       2. Elimination Issues: incontinence, frequency No - 0       3. Ambulatory: use of assistive devices (walker, cane, off-loading devices), attached to equipment (IV pole, oxygen) No - 0     4. Sensory Limitations: dizziness, vertigo, impaired vision No - 0       5. Age 72 years or greater - 1       10. Medication: diuretics, strong analgesics, hypnotics, sedatives, antihypertensive agents   Yes - 3   7. Falls:  recent history of falls within the last 3 months (not to include slipping or tripping)   No - 0   TOTAL 4    If score of 4 or greater was education given? No       TABLE 2   Risk Score Risk Level Plan of Care   0-3 Little or  No Risk 1.   Provide assistance as indicated for ambulation activities  2. Reorient confused/cognitively impaired patient  3. Call-light/bell within patient's reach  4. Chair/bed in low position, stretcher/bed with siderails up except when performing patient care activities  5. Educate patient/family/caregiver on falls prevention  6.  Reassess in 12 weeks or with any noted change in patient condition which places them at a risk for a fall   4-6 Moderate Risk 1. Provide assistance as indicated for ambulation activities  2. Reorient confused/cognitively impaired patient  3. Call-light/bell within patient's reach  4. Chair/bed in low position, stretcher/bed with siderails up except when performing patient care activities  5. Educate patient/family/caregiver on falls prevention  6. Falls risk precaution (Yellow sticker Level II) placed on patient chart   7 or   Higher High Risk 1. Place patient in easily observable treatment room  2. Patient attended at all times by family member or staff  3. Provide assistance as indicated for ambulation activities  4. Reorient confused/cognitively impaired patient  5. Call-light/bell within patient's reach  6. Chair/bed in low position, stretcher/bed with siderails up except when performing patient care activities  7. Educate patient/family/caregiver on falls prevention  8. Falls risk precaution (Yellow sticker Level III) placed on patient chart           MALNUTRITION RISK SCREENING ASSESSMENT    12/17/2021   Patient:  Arlean Saint  Sex:  female    Instructions:  Assess the patient and enter the appropriate indicators that are present for nutrition risk identification. Total the numbers entered and assign a risk score. Follow the appropriate action for total score listed below. Assessment   Date  12/17/2021     1. Have you lost weight without trying? 1- Yes, 0.5 kg to 5 kg     2. Have you been eating poorly because of a decreased appetite? 2- Yes   3.  Do you have a diagnosis of head and neck cancer?       0- No                                                                                    TOTAL 3          Score of 0-1: No action  Score 2 or greater:  · For Non-Diabetic Patient: Recommend adding Ensure Complete 2 x daily and provide patient with Ensure wellness bag with coupons  · For Diabetic Patient: Recommend adding Glucerna Shake 2 x daily and provide patient with Glucerna Wellness bag with coupons  · Route to the dietitian via Rojas Burgess RN

## 2021-12-20 ENCOUNTER — OFFICE VISIT (OUTPATIENT)
Dept: PHYSICAL MEDICINE AND REHAB | Age: 71
End: 2021-12-20
Payer: COMMERCIAL

## 2021-12-20 VITALS
TEMPERATURE: 96 F | OXYGEN SATURATION: 97 % | HEIGHT: 61 IN | WEIGHT: 112.4 LBS | HEART RATE: 81 BPM | BODY MASS INDEX: 21.22 KG/M2 | SYSTOLIC BLOOD PRESSURE: 114 MMHG | DIASTOLIC BLOOD PRESSURE: 58 MMHG

## 2021-12-20 DIAGNOSIS — M75.122 COMPLETE TEAR OF LEFT ROTATOR CUFF, UNSPECIFIED WHETHER TRAUMATIC: ICD-10-CM

## 2021-12-20 DIAGNOSIS — M54.12 RADICULITIS OF LEFT CERVICAL REGION: ICD-10-CM

## 2021-12-20 DIAGNOSIS — M79.10 TRIGGER POINT: ICD-10-CM

## 2021-12-20 DIAGNOSIS — M25.512 CHRONIC PAIN OF BOTH SHOULDERS: Primary | ICD-10-CM

## 2021-12-20 DIAGNOSIS — M75.121 COMPLETE TEAR OF RIGHT ROTATOR CUFF, UNSPECIFIED WHETHER TRAUMATIC: ICD-10-CM

## 2021-12-20 DIAGNOSIS — M79.18 CERVICAL MYOFASCIAL PAIN SYNDROME: ICD-10-CM

## 2021-12-20 DIAGNOSIS — G89.29 CHRONIC PAIN OF BOTH SHOULDERS: Primary | ICD-10-CM

## 2021-12-20 DIAGNOSIS — M25.511 CHRONIC PAIN OF BOTH SHOULDERS: Primary | ICD-10-CM

## 2021-12-20 DIAGNOSIS — S43.003D SUBLUXATION OF SHOULDER JOINT, UNSPECIFIED LATERALITY, SUBSEQUENT ENCOUNTER: ICD-10-CM

## 2021-12-20 PROCEDURE — 1090F PRES/ABSN URINE INCON ASSESS: CPT | Performed by: PHYSICAL MEDICINE & REHABILITATION

## 2021-12-20 PROCEDURE — G8484 FLU IMMUNIZE NO ADMIN: HCPCS | Performed by: PHYSICAL MEDICINE & REHABILITATION

## 2021-12-20 PROCEDURE — 4040F PNEUMOC VAC/ADMIN/RCVD: CPT | Performed by: PHYSICAL MEDICINE & REHABILITATION

## 2021-12-20 PROCEDURE — 3017F COLORECTAL CA SCREEN DOC REV: CPT | Performed by: PHYSICAL MEDICINE & REHABILITATION

## 2021-12-20 PROCEDURE — G8427 DOCREV CUR MEDS BY ELIG CLIN: HCPCS | Performed by: PHYSICAL MEDICINE & REHABILITATION

## 2021-12-20 PROCEDURE — 20611 DRAIN/INJ JOINT/BURSA W/US: CPT | Performed by: PHYSICAL MEDICINE & REHABILITATION

## 2021-12-20 PROCEDURE — 1036F TOBACCO NON-USER: CPT | Performed by: PHYSICAL MEDICINE & REHABILITATION

## 2021-12-20 PROCEDURE — 99214 OFFICE O/P EST MOD 30 MIN: CPT | Performed by: PHYSICAL MEDICINE & REHABILITATION

## 2021-12-20 PROCEDURE — G8399 PT W/DXA RESULTS DOCUMENT: HCPCS | Performed by: PHYSICAL MEDICINE & REHABILITATION

## 2021-12-20 PROCEDURE — G8420 CALC BMI NORM PARAMETERS: HCPCS | Performed by: PHYSICAL MEDICINE & REHABILITATION

## 2021-12-20 PROCEDURE — 1123F ACP DISCUSS/DSCN MKR DOCD: CPT | Performed by: PHYSICAL MEDICINE & REHABILITATION

## 2021-12-20 RX ORDER — TRIAMCINOLONE ACETONIDE 40 MG/ML
40 INJECTION, SUSPENSION INTRA-ARTICULAR; INTRAMUSCULAR ONCE
Status: COMPLETED | OUTPATIENT
Start: 2021-12-20 | End: 2021-12-20

## 2021-12-20 RX ORDER — BUPIVACAINE HYDROCHLORIDE 2.5 MG/ML
6 INJECTION, SOLUTION INFILTRATION; PERINEURAL ONCE
Status: COMPLETED | OUTPATIENT
Start: 2021-12-20 | End: 2021-12-20

## 2021-12-20 RX ADMIN — TRIAMCINOLONE ACETONIDE 40 MG: 40 INJECTION, SUSPENSION INTRA-ARTICULAR; INTRAMUSCULAR at 08:27

## 2021-12-20 RX ADMIN — BUPIVACAINE HYDROCHLORIDE 15 MG: 2.5 INJECTION, SOLUTION INFILTRATION; PERINEURAL at 08:26

## 2021-12-20 RX ADMIN — TRIAMCINOLONE ACETONIDE 40 MG: 40 INJECTION, SUSPENSION INTRA-ARTICULAR; INTRAMUSCULAR at 08:28

## 2021-12-20 NOTE — PROGRESS NOTES
Apryl Virgen M.D. [facility] PHYSICAL MEDICINE AND REHABILITAION  [address] [facility] [address]  Dept: [phone]  Dept Fax: [phone]    PCP: Candace Chatterjee MD  Date of visit: 12/20/21    Chief Complaint   Patient presents with    Shoulder Pain     requesting bilateral shoulder US guided injections, has had some pain, compound cream is helping     Neck Pain     trigger point injections helped Margarito Garcias is a 80 y.o.  right hand dominant woman who presents for follow up of upper extremity pain and bilateral rotator cuff tears. 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 Chemotherapy was started on 10/6/2016 with Dr. Miguel Acevedo. November 2016 she completed head and neck irradiation for management of right base of tongue SCC. She reports she finished all treatment (chemo/radiation) November 2016. She denies any arm pain or numbness/tingling after her chemo/radiation, until her more recent injuries with rotator cuff tears. Her primary complaint is bilateral shoulder pain, which [facility]. She had massive full thickness bilateral supraspinatus and infraspinatus tears-- the right rotator cuff in December 2017 and left rotator cuff in January 2018. She saw Ortho--they did not recommend surgery due to chemo/radiation and osteoporosis -- unless pain unbearable --only then would consider shoulder replacement. Patient was cleared for shoulder surgery from radiation oncology standpoint IF indicated/recommended by Ortho. Patient does not wish for surgery at this time.          Interval history:   Since last visit patient reports that trigger point injections helped significantly with muscle pain and spasm of the trapezius and upper shoulder region. She continues to have improvement in pain and function with periodic bilateral US guided subacromial injections. Relief from shoulder injections has now worn off and she is requesting to repeat injections today. No other changes. She has been doing home exercises. She had a cervical epidural performed by Dr. Domnick Siemens in August 2021 and has not had radicular pain down the arm since that injection. No new weakness or numbness. No other changes reported. She is following with Dr. Domnick Siemens for neck pain and cervical radiculitis and had improvement in upper extremity radiating pain after cervical BINTA in December 2020, and repeat BINTA in August 2021. She is taking gabapentin 400mg TID and using voltaren gel on her shoulders with reported benefit. She continues her home exercise program. She has gone periodically for dry needling, which she reports is beneficial. She is not currently attending PT. She has done lymphedema therapy in the past. She reports that periodic subacromial injections continue to be beneficial, decreasing pain significantly and allowing her to continue to be functional. Relief from injections continues to last 3-4 months. She requests to repeat injections today. She is using topical compound cream with benefit. She continues her home exercise and stretching program to maintain range of motion. Shoulder pain is located at the lateral aspect of the shoulders and subacromial space bilaterally, without current radiation to the upper extremities. No associated numbness/tingling, or paresthesia in the upper extremities. There is some weakness about the shoulders bilaterally, unchanged. No weakness of biceps/triceps, forearm or intrinsic hand muscles. The shoulder pain is constant and aching. Pain is worse with overhead activity. Physical therapy has helped with range of motion and function.       The prior workup has included:  Bilateral shoulder MRI - May 2018  -Right shoulder MRI: massive full thickness rotator cuff tear involving the supraspinatus and infraspinatus with retraction and uncovering of the humeral head which is high riding and articulating with the undersurface of the acromian. There is OA of the AC joint.   -Left shoulder MRI: massive full thickness rotator cuff tear involving the supraspinatus and infraspinatus with retraction and uncovering of the humeral head which is high riding and articulating with the undersurface of the acromian. There is OA of the [facility] joint.  -Cervical xray 11/10/2020   FINDINGS:   No acute fracture or dislocation is evident.  There is degenerative disc   disease which is especially severe at C5-6 followed by C6-7.  There is mild   retrolisthesis of C5 on C6 which is thought to be degenerative.  Unremarkable   soft tissues.  The bones appear somewhat demineralized.           Impression   Degenerative disc disease.  Osteopenia.  See above. -Cervical MRI 11/24/2020  FINDINGS:   BONES/ALIGNMENT: There is mild retrolisthesis of C5 on C6 and of C6 on C7. There is degenerative disc disease with disc space narrowing and osteophytes   at C4-5, C5-6, and C6-7. SPINAL CORD:  No abnormal signal is identified within the spinal cord. SOFT TISSUES: No paraspinal mass identified. Karie Cole is a small nodule in the   right lobe of the thyroid for which no follow-up is suggested per ACR   criteria. C2-C3:  The thecal sac and neural foramina are intact. C3-C4: There are small osteophytes.  The thecal sac is not stenotic. C4-C5: There is a minimal disc protrusion. The thecal sac and neural foramina   are intact. C5-C6: There is a disc protrusion measuring 2 mm.  The thecal sac is mildly   stenotic measuring 9.7 mm. Disc and/or osteophytes result in mild narrowing   of the neural foramina bilaterally.    C6-C7: There is disc protrusion and osteophyte measuring 2-3 mm.  The thecal   sac is mildly stenotic measuring 9.8 mm.  Disc and/or osteophytes result in   narrowing of the neural foramina bilaterally. C7-T1:  The thecal sac and neural foramina are intact.       Impression   Disc and osteophytes result in minimal narrowing of the neural foramina and   mild stenosis of the thecal sac at C5-6 and C6-7 as discussed above. The prior treatment has included:  PT: Most recent PT summer 2020- improvement in bilateral shoulder ROM and function. Has also attended lymphedema therapy with improvement. Chiropractic: None  Modalities: Biofreeze with partial relief. Eucedrin cream  with partial relief. Has not tried heat/ice. Compound cream with relief. OTC Tylenol: Takes occasionally with partial relief   NSAIDS: Mobic 7.5mg with relief, stopped taking due to bruising   Opioids: None  Membrane stabilizers: gabapentin 400mg for numbness in left leg, left cervical radiculitis. Muscle relaxers: None  Previous injections: Bilateral US guided shoulder subacromial injections with relief of pain. Cervical BINTA C7-T1 (Dr. Rhina Anthony on 12/10/2020; August 2021) with improvement in left cervical radiculitis symptoms. Previous surgery at this site: No shoulder surgeries. Previously saw Ortho for b/l shoulder RTC tears- per patient they did not recommend surgery due to chemo/radiation and osteoporosis -- unless pain unbearable --only then would consider shoulder replacement     [facility]  continues home exercises/stretches from therapy. The patient does perform regular exercise.        Past Medical History:   Diagnosis Date    Anemia associated with chemotherapy 11/16/2016    resolved    Anxiety     Cancer ([address]     tongue    Cancer of tongue ([address] 9/16/2016    treated with chemo and radiation / last treatment 11/2016    Cancer of tonsil ([address]     Cervical herniation     no limits on rom    Difficulty sleeping     Discoloration of skin of foot 6/25/2020    Dizziness 6/20/2018    GERD (gastroesophageal reflux disease)     History of therapeutic radiation     Hx antineoplastic chemo     Hx of cataract surgery 10/06/2020    Hyperlipidemia     Hypokalemia 11/16/2016    with chemo / resolved    Hypomagnesemia 11/16/2016    related to chemo / resloved    Hypothyroidism     Leg swelling 7/26/2017    Lumbar herniated disc     after fell 2/2017 / now has chronic back pain and numbnes at left foot and ankle    Lymphedema of both lower extremities 07/26/2017    wears support hose    Lymphedema of lower extremity     Osteopenia     PONV (postoperative nausea and vomiting)     Restless leg syndrome     Rotator cuff tear     bilateral    Sleep apnea     Thyroid disease     Toe cyanosis 6/25/2020       Past Surgical History:   Procedure Laterality Date   [address]    COLONOSCOPY  2008    neg    COLONOSCOPY  03/19/2014    ENDOSCOPY, COLON, DIAGNOSTIC  2012    EYE SURGERY      cataract surgery, bilateral.     FRACTURE SURGERY  2012    RIGHT ELBOW    HYSTERECTOMY  1993    [facility]   [address] [place] [address]    LARYNGOSCOPY  09/08/2016    direct laryngoscopy right tonsil/tongue base biopsy cervical esophagoscopy fine needle aspiration     NERVE BLOCK      OTHER SURGICAL HISTORY  5/28/12    ORIF RIGHT ULNA    OTHER SURGICAL HISTORY  6/18/2012    orif  rivision right olecranon    OTHER SURGICAL HISTORY Right 01/16/2014    Right elbow hardware removal    [place] OFFICE/OUTPT VISIT,PROCEDURE ONLY N/A 11/9/2018    PORT REMOVAL performed by Delmi Ross MD at [place] [address]  1MONTHS OF AGE    H/O ESOPHAGEAL WEB    TUNNELED VENOUS PORT PLACEMENT  10/04/2016    Dr. Gianluca Alfred History     Socioeconomic History    Marital status:      Spouse name: Not on file    Number of children: Not on file    Years of education: Not on file    Highest education level: Not on file   Occupational History    Not on file   Tobacco Use    Smoking status: Never Smoker    Smokeless tobacco: Never Used   Vaping Use    Vaping Use: Never used   Substance and Sexual Activity    Alcohol use: No     Alcohol/week: 0.0 standard drinks    Drug use: No    Sexual activity: Not on file   Other Topics Concern    Not on file   Social History Narrative    Not on file     Social Determinants of Health     Financial Resource Strain: Low Risk     Difficulty of Paying Living Expenses: Not hard at all   Food Insecurity: No Food Insecurity    Worried About [address] in the Last Year: Never true    [facility] in the Last Year: Never true   Transportation Needs:     Lack of Transportation (Medical): Not on file    Lack of Transportation (Non-Medical): Not on file   Physical Activity:     Days of Exercise per Week: Not on file    Minutes of Exercise per Session: Not on file   Stress:     Feeling of Stress : Not on file   Social Connections:     Frequency of Communication with Friends and Family: Not on file    Frequency of Social Gatherings with Friends and Family: Not on file    Attends [employer] Services: Not on file    Active Member of [address] [place] [address] or Organizations: Not on file    Attends Club or Organization Meetings: Not on file    Marital Status: Not on file   Intimate Partner Violence:     Fear of Current or Ex-Partner: Not on file    Emotionally Abused: Not on file    Physically Abused: Not on file    Sexually Abused: Not on file   Housing Stability:     Unable to Pay for Housing in the Last Year: Not on file    Number of [place] in the Last Year: Not on file    Unstable Housing in the Last Year: Not on file       The patient is retired. Margarito Randle  Does not require an assistive device.      Family History   Problem Relation Age of Onset    Hypertension Father     High Blood Pressure Father     Arthritis Father     Cancer Mother         uterine    Diabetes Mother     Hypertension Mother     High Blood Pressure Mother     Depression Mother    Mitzy Hale Other Mother         endocrine disorder       No Known Allergies    Current Outpatient Medications   Medication Sig Dispense Refill    venlafaxine (EFFEXOR XR) 75 MG extended release capsule Take 1 capsule by mouth daily 90 capsule 3    SYNTHROID 75 MCG tablet Take 1 tablet by mouth daily 90 tablet 3    venlafaxine (EFFEXOR XR) 37.5 MG extended release capsule Take 1 capsule by mouth daily (Patient not taking: Reported on 12/7/2021) 90 capsule 3    temazepam (RESTORIL) 15 MG capsule Take 1 capsule by mouth nightly as needed for Sleep for up to 90 days. 90 capsule 0    rosuvastatin (CRESTOR) 5 MG tablet Take 1 tablet by mouth daily 30 tablet 1    rOPINIRole (REQUIP) 0.5 MG tablet Take 1 tablet by mouth nightly as needed (restless leg) For restless legs 30 tablet 3    Cream Base (VERSAPRO) CREA ONE GRAM (ONE PUMP) EXTERNALLY FOUR TIMES A DAY (Patient taking differently: Compound cream) 100 g 5    NONFORMULARY Indications: elderberry       sucralfate (CARAFATE) 1 GM/10ML suspension Take 10 mLs by mouth 4 times daily 1200 mL 3    gabapentin (NEURONTIN) 400 MG capsule Take 1 capsule by mouth 3 times daily for 30 days.  270 capsule 3    RABEprazole (ACIPHEX) 20 MG tablet 1 daily 90 tablet 3    B Complex Vitamins (B-COMPLEX/B-12 PO) Take by mouth daily      Cholecalciferol (VITAMIN D PO) Take 1 tablet by mouth 3000 units daily      Biotin 10 MG CAPS Take by mouth daily HOLD      Elastic Bandages & Supports (JOBST KNEE HIGH COMPRESSION SM) MISC Knee high with 20- 30 mmhg of compression 1 each 10    acetaminophen (TYLENOL) 500 MG tablet Take 500 mg by mouth every 6 hours as needed Instructed to take with sip water am of procedure if needs       Current Facility-Administered Medications   Medication Dose Route Frequency Provider Last Rate Last Admin    bupivacaine (MARCAINE) 0.25 % injection 15 mg  6 mL IntraDERmal Once Johnathan Murray MD        triamcinolone acetonide (KENALOG-40) injection 40 mg  40 mg IntraMUSCular Once [place] MD Allen        triamcinolone acetonide [facility]) injection 40 mg  40 mg IntraMUSCular Once Spring MD Allen        bupivacaine (PF) (MARCAINE) 0.25 % injection 15 mg  6 mL IntraDERmal Once Spring Villa MD           Review of Systems  General: No chills, fatigue, fever, malaise, night sweats, weight gain,  weight loss. Psychological: No anxiety, depression, suicidal ideation   Ophthalmic: No blurry vision, decreased vision, double vision, loss of vision  Ear Nose Throat: No hearing loss, tinnitus, phonophobia, sensitivity to smells, vertigo, or vocal changes. Allergy/Immunology: No watery eyes, itchy eyes, frequent infections. Hematological and Lymphatic: No bleeding problems, blood clots, bruising  Endocrine:  No polydypsia, polyuria, temperature intolerance. Respiratory: No cough, shortness of breath, wheezing. Cardiovascular: No syncope, chest pain, dyspnea on exertion,palpitations. Gastrointestinal: No abdominal pain, hematemesis, melena, nausea, vomiting, stool incontinence  Genito-Urinary: No dysuria, hematuria, incontinence   Musculoskeletal: Per HPI  Neurological: Per HPI  Dermatological:  No rash      Physical Exam:   Vitals:    12/20/21 0803   BP: (!) 114/58   Pulse: 81   Temp: 96 °F (35.6 °C)   SpO2: 97%      General: The patient is in no apparent distress. HEENT: No rhinorrhea, sneezing, yawning, or lacrimation. No scleral icterus or conjunctival injection. SKIN: No piloerection. No track marks. No rash. Normal turgor. No erythema or ecchymosis. Psychological: Mood and affect are appropriate. Hygiene is appropriate. Cardiovascular: There is no edema. Respiratory: Respirations are regular and unlabored. There is no cyanosis. Gastrointestinal: No abdominal distension   MSK: Shoulder: No edema, erythema, ecchymosis, effusion, or mass of bilateral shoulders. Right shoulder + sulcus sign. Left shoulder slight + sulcus sign.  Full passive flexion and abduction bilateral shoulders, mild pain at terminal abduction. Cannot actively abduct beyond 90 deg when trapezius is eliminated from action. Decreased internal rotation b/l shoulders. No crepitus. No tenderness to palpation at the acromioclavicular joint or bicipital groove. There is tenderness at bilateral subacromial space. There is spasm and active trigger points in the left trapezius muscles. There is weakness with Empty can test bilaterally (3-/5). Weak with Speed's bilaterally. Minimal pain with bilateral Jadon-Land. Full painless cervical spine and elbow ROM. Negative Spurling. There is bilateral upper trapezius muscle spasm and trigger points. No upper extremity edema. Neurologic: Awake, alert and oriented in three planes. Speech is fluent. No facial weakness. Hearing is intact for conversation. Pupils are equal and round. Strength:   R  L  Deltoid   5-  5-  Biceps   5  5  Triceps  5  5  Wrist Ext  5  5  Finger Abd  5  5      Sensory:  Intact for light touch in all upper extremity dermatomes. Reflexes:   R  L  Biceps   2 2  Triceps  2 2  BR   2 2       No pathological reflexes     Gait is normal.       US guided Glenohumeral joint Injection Procedure: Bilateral  After explaining the indications, risks, benefits and alternatives of a bilateral glenohumeral injection, the patient agreed to proceed. A permit was signed and scanned to the media. The patient was placed in the seated position. Chloraprep was used to sterilize the skin. Using an aseptic, no touch technique, a 22 gauge, 1.5\" needle with 1 cc of Kenalog 40mg/cc and 3 cc of bupivacaine 0.25% was directed, under ultrasound guidance, to the right glenohumeral joint. After negative aspiration the medication was injected. Adequate hemostasis was achieved and the injection site was covered with a bandage. The exact same procedure was performed on the left side. US images were scanned to media separately.  The patient was observed for complication and left the office without incident. The patient tolerated the procedure well and was educated in post injection care. Impression:   -Bilateral shoulder pain   -Bilateral massive rotator cuff tears.   -Right shoulder subluxation  -Left cervical radiculitis -- radicular symptoms improved after cervical BINTA, patient is following with Dr. Rosetta Runner  -Cervical myofascial pain  -Trigger points        Plan:   · Continue home exercises for bilateral shoulder ROM, gentle strengthening, pain reduction techniques. Reviewed importance of maintaining range of motion. · Can consider further PT for dry needling in the future if needed  · Continue compound cream as needed   Bilateral US guided subacromial injections today, see above   Left cervical radicular symptoms improved after prior epidural with Dr. Rosetta Runner. The patient was educated about the diagnosis, prognosis, indications, risks and benefits of treatment. An opportunity to ask questions was given to the patient and questions were answered. The patient agreed to proceed with the recommended treatment as described above. Follow up in 1-2 months for trigger point injections if needed, otherwise follow up in 3-4 months and can repeat US guided subacromial injections if needed at that time. Irina Smiley M.D.   Physical Medicine and Rehabilitation

## 2021-12-28 RX ORDER — RABEPRAZOLE SODIUM 20 MG/1
TABLET, DELAYED RELEASE ORAL
Qty: 90 TABLET | Refills: 3 | Status: SHIPPED | OUTPATIENT
Start: 2021-12-28

## 2022-02-04 ENCOUNTER — HOSPITAL ENCOUNTER (OUTPATIENT)
Dept: OCCUPATIONAL THERAPY | Age: 72
Setting detail: THERAPIES SERIES
Discharge: HOME | End: 2022-02-04

## 2022-02-04 NOTE — DISCHARGE SUMMARY
Occupational Therapy  OCCUPATIONAL THERAPY DISCHARGE NOTE  [address]  [address] [facility], [place] [address]    Phone: [phone]  Fax: [phone]     Date:  2022  Initial Evaluation Date: 2021     Evaluating Therapist: Jena Dan, OTR/L, OTR/L, SUJATHA     Patient Name:  Noah Breaux    :  1950    Restrictions/Precautions:  fall risk  Diagnosis:  Head and neck cancer (c76.0)        Date of Surgery/Injury: n/a    [facility]  [facility]  [id number]  Plan of care signed (Y/N): Yes  Visit# / total visits: visit #4  Total Visits to date:  4 Cancels/No-shows to date: 3    Referring Practitioner:  Ernesto Mueller MD  Specific Practitioner Orders: Evaluation and Treatment    Assessment of current deficits   []Pain  []Skin Integrity   [x]Lymphedema   []Functional transfers/mobility   []ADLs   []Strength    []Cognition  []IADLs   []Safety Awareness   []  Motor Endurance    []Fine Motor Coordination   []Balance   []Vision/perception  []Sensation []Gross Motor Coordination  []ROM     OT PLAN OF CARE   OT POC based on physician orders, patient diagnosis and results of clinical assessment    Frequency/Duration:  Patient discharged from lymphedema clinic  Specific OT Treatment to include:     Plan of Care:     [x]97140-Manual Lymph Drainage and Combined Decongestive Therapy  [x]13217- Skilled Multilayer Short Stretch Compression Bandaging/ Therapeutic Exercise  [x]Skin Care Education [x]HEP including Self MLD Education and/or Self Bandaging  [x]Education for Lymphedema Risks/Precautions     [x] Caregiver Education   []other:      Patient Specific Goal: reduce swelling in cheek    Goals Status:    STG: 3.   weeks     1.  Patient will demonstrate knowledge and understanding for lymphedema precautions, skin care and self management to decrease progression of lymphedema and risk of infection.   Patient goal met  2.  Patient will present with decreased limb volume in the involved extremity from mild to trace for improved functional mobility. Goal partially met  3.  Patient will demonstrate compliance with compression therapy for independent management of lymphedema. Goal partially met          LT weeks  1.  Patient will be independent with self management of lymphedema including understanding garment wear schedule, self compression bandaging, HEP and self care. Goal partially met  2.  Patient will be fitted for appropriate compression garments for long term management of lymphedema. Goal met. 3.  Patient will present with decreased limb volume in the involved extremity from trace  to none  for improved functional mobility. Goal not met    Significant Findings At Last Visit/Comments:    Patient cancelled last follow up secondary to insurance coverage. Patient asked to be discharged at that time secondary to managing her lymphedema well. Patient last measurements as follows:    Measurements marked in one centimeter increments at midline of neck from bottom lip to base of the neck.   Patient measurement then taken from ear opening passing through each monique along neckline to opposite ear opening.            Evaluation Follow up Follow up   1cm 25.5cm  24.5cm     2cm 26.25cm  25.25cm     3cm 26cm  26cm     Upper lip 25cm  24.75cm     Lower lip 25cm     24.25cm                         Top ear to midline of upper lip Right - 13.75cm  Left - 13.25cm  right - 13.5  Left - 14.25     Top ear to midline of 1cm below low lip Right - 15cm  Left - 14.25cm  right - 14  Left - 15                                   Mouth opening 6.5cm  6.5cm     Translation left 1.5cm  2cm     Translation right 1.25cm  1.5cm                         Neck circumference         Head circumference 62cm           Neck flexion 35degrees  40degrees     Neck extension 40degrees  40degrees     Neck lateral rotation right 70degrees  70degrees     Neck lateral rotation left

## 2022-02-11 RX ORDER — GABAPENTIN 400 MG/1
400 CAPSULE ORAL 3 TIMES DAILY
Qty: 270 CAPSULE | Refills: 3 | Status: SHIPPED | OUTPATIENT
Start: 2022-02-11 | End: 2022-03-13

## 2022-02-16 DIAGNOSIS — F51.04 CHRONIC INSOMNIA: ICD-10-CM

## 2022-02-16 RX ORDER — TEMAZEPAM 15 MG/1
15 CAPSULE ORAL NIGHTLY PRN
Qty: 90 CAPSULE | Refills: 0 | Status: SHIPPED | OUTPATIENT
Start: 2022-02-16 | End: 2022-05-17

## 2022-02-16 NOTE — TELEPHONE ENCOUNTER
Set to eprescribe pending your approval    LAST SEEN: 7/13/21    NEXT APPOINTMENT: 1/14/22   [facility]

## 2022-02-21 ENCOUNTER — PROCEDURE VISIT (OUTPATIENT)
Dept: PHYSICAL MEDICINE AND REHAB | Age: 72
End: 2022-02-21
Payer: COMMERCIAL

## 2022-02-21 VITALS
SYSTOLIC BLOOD PRESSURE: 120 MMHG | BODY MASS INDEX: 21.18 KG/M2 | TEMPERATURE: 98.1 F | HEART RATE: 80 BPM | WEIGHT: 112.2 LBS | HEIGHT: 61 IN | DIASTOLIC BLOOD PRESSURE: 60 MMHG | OXYGEN SATURATION: 99 %

## 2022-02-21 DIAGNOSIS — G89.29 CHRONIC PAIN OF BOTH SHOULDERS: Primary | ICD-10-CM

## 2022-02-21 DIAGNOSIS — M75.121 COMPLETE TEAR OF RIGHT ROTATOR CUFF, UNSPECIFIED WHETHER TRAUMATIC: ICD-10-CM

## 2022-02-21 DIAGNOSIS — M54.12 RADICULITIS OF LEFT CERVICAL REGION: ICD-10-CM

## 2022-02-21 DIAGNOSIS — M79.18 CERVICAL MYOFASCIAL PAIN SYNDROME: ICD-10-CM

## 2022-02-21 DIAGNOSIS — S43.003D SUBLUXATION OF SHOULDER JOINT, UNSPECIFIED LATERALITY, SUBSEQUENT ENCOUNTER: ICD-10-CM

## 2022-02-21 DIAGNOSIS — M79.10 TRIGGER POINT: ICD-10-CM

## 2022-02-21 DIAGNOSIS — M75.122 COMPLETE TEAR OF LEFT ROTATOR CUFF, UNSPECIFIED WHETHER TRAUMATIC: ICD-10-CM

## 2022-02-21 DIAGNOSIS — M25.511 CHRONIC PAIN OF BOTH SHOULDERS: Primary | ICD-10-CM

## 2022-02-21 DIAGNOSIS — M25.512 CHRONIC PAIN OF BOTH SHOULDERS: Primary | ICD-10-CM

## 2022-02-21 PROCEDURE — 20553 NJX 1/MLT TRIGGER POINTS 3/>: CPT | Performed by: PHYSICAL MEDICINE & REHABILITATION

## 2022-02-21 PROCEDURE — 1123F ACP DISCUSS/DSCN MKR DOCD: CPT | Performed by: PHYSICAL MEDICINE & REHABILITATION

## 2022-02-21 PROCEDURE — 3017F COLORECTAL CA SCREEN DOC REV: CPT | Performed by: PHYSICAL MEDICINE & REHABILITATION

## 2022-02-21 PROCEDURE — 1036F TOBACCO NON-USER: CPT | Performed by: PHYSICAL MEDICINE & REHABILITATION

## 2022-02-21 PROCEDURE — G8484 FLU IMMUNIZE NO ADMIN: HCPCS | Performed by: PHYSICAL MEDICINE & REHABILITATION

## 2022-02-21 PROCEDURE — G8399 PT W/DXA RESULTS DOCUMENT: HCPCS | Performed by: PHYSICAL MEDICINE & REHABILITATION

## 2022-02-21 PROCEDURE — G8427 DOCREV CUR MEDS BY ELIG CLIN: HCPCS | Performed by: PHYSICAL MEDICINE & REHABILITATION

## 2022-02-21 PROCEDURE — 99214 OFFICE O/P EST MOD 30 MIN: CPT | Performed by: PHYSICAL MEDICINE & REHABILITATION

## 2022-02-21 PROCEDURE — 4040F PNEUMOC VAC/ADMIN/RCVD: CPT | Performed by: PHYSICAL MEDICINE & REHABILITATION

## 2022-02-21 PROCEDURE — G8420 CALC BMI NORM PARAMETERS: HCPCS | Performed by: PHYSICAL MEDICINE & REHABILITATION

## 2022-02-21 PROCEDURE — 1090F PRES/ABSN URINE INCON ASSESS: CPT | Performed by: PHYSICAL MEDICINE & REHABILITATION

## 2022-02-21 RX ORDER — BUPIVACAINE HYDROCHLORIDE 2.5 MG/ML
5 INJECTION, SOLUTION EPIDURAL; INFILTRATION; INTRACAUDAL ONCE
Status: COMPLETED | OUTPATIENT
Start: 2022-02-21 | End: 2022-02-21

## 2022-02-21 RX ADMIN — BUPIVACAINE HYDROCHLORIDE 12.5 MG: 2.5 INJECTION, SOLUTION EPIDURAL; INFILTRATION; INTRACAUDAL at 14:36

## 2022-02-21 NOTE — PROGRESS NOTES
Dilip Claire M.D. [facility] PHYSICAL MEDICINE AND REHABILITAION  [address] [facility] [address]  Dept: [phone]  Dept Fax: [phone]    PCP: Maribeth Zarco MD  Date of visit: 2/21/22    Chief Complaint   Patient presents with    Neck Pain     f/u trigger point injections patient states they helped tremendously, they lasted until late December, no new c/o, would like a new prescription for [place] Cream sent to [facility]- cannot use [facility] due to them not shipping medication anymore.  Shoulder Pain     f/u bilateral shoulder injections, they are helping,  pt  feels she is losing range of motion in her shoulders again, no new c/o, no new injury       Byron Zuniga is a 80 y.o.  right hand dominant woman who presents for follow up of upper extremity pain and bilateral rotator cuff tears. 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 Chemotherapy was started on 10/6/2016 with Dr. Zachary Jacinto. November 2016 she completed head and neck irradiation for management of right base of tongue SCC. She reports she finished all treatment (chemo/radiation) November 2016. She denies any arm pain or numbness/tingling after her chemo/radiation, until her more recent injuries with rotator cuff tears. Her primary complaint is bilateral shoulder pain, which [facility]. She had massive full thickness bilateral supraspinatus and infraspinatus tears-- the right rotator cuff in December 2017 and left rotator cuff in January 2018. She saw Ortho--they did not recommend surgery due to chemo/radiation and osteoporosis -- unless pain unbearable --only then would consider shoulder replacement.   Patient was cleared for shoulder surgery from radiation oncology standpoint IF indicated/recommended by Ortho. Patient does not wish for surgery at this time. Interval history:   Since last visit patient reports US guided bilateral subacromial injections provided significant relief of shoulder pain, allowing her to be more functional. She does feel that she is starting to lose some range of motion in her shoulders again. She is doing her home exercises regularly. She complains of recurrent trapezius muscle spasms and pain bilaterally. No other changes. She has been doing home exercises. She had a cervical epidural performed by Dr. Kulwant Small in August 2021 and has not had radicular pain down the arm since that injection. No new weakness or numbness. No other changes reported. She is following with Dr. Kulwant Small for neck pain and cervical radiculitis and had improvement in upper extremity radiating pain after cervical BINTA in December 2020, and repeat BINTA in August 2021. She is taking gabapentin 400mg TID and using voltaren gel / compound cream on her shoulders with reported benefit. She continues her home exercise program. She has gone periodically for dry needling, which she reports is beneficial. She is not currently attending PT. She has done lymphedema therapy in the past. She reports that periodic subacromial injections continue to be beneficial, decreasing pain significantly and allowing her to continue to be functional. Relief from injections continues to last 3-4 months. She is using topical compound cream with benefit. She continues her home exercise and stretching program to maintain range of motion. Shoulder pain is located at the lateral aspect of the shoulders and subacromial space bilaterally, without current radiation to the upper extremities. No associated numbness/tingling, or paresthesia in the upper extremities. There is some weakness about the shoulders bilaterally, unchanged. No weakness of biceps/triceps, forearm or intrinsic hand muscles.  The shoulder pain is constant and aching. Pain is worse with overhead activity. Physical therapy has helped in the past with range of motion and function. The prior workup has included:  Bilateral shoulder MRI - May 2018  -Right shoulder MRI: massive full thickness rotator cuff tear involving the supraspinatus and infraspinatus with retraction and uncovering of the humeral head which is high riding and articulating with the undersurface of the acromian. There is OA of the AC joint.   -Left shoulder MRI: massive full thickness rotator cuff tear involving the supraspinatus and infraspinatus with retraction and uncovering of the humeral head which is high riding and articulating with the undersurface of the acromian. There is OA of the [facility] joint.  -Cervical xray 11/10/2020   FINDINGS:   No acute fracture or dislocation is evident.  There is degenerative disc   disease which is especially severe at C5-6 followed by C6-7.  There is mild   retrolisthesis of C5 on C6 which is thought to be degenerative.  Unremarkable   soft tissues.  The bones appear somewhat demineralized.           Impression   Degenerative disc disease.  Osteopenia.  See above. -Cervical MRI 11/24/2020  FINDINGS:   BONES/ALIGNMENT: There is mild retrolisthesis of C5 on C6 and of C6 on C7. There is degenerative disc disease with disc space narrowing and osteophytes   at C4-5, C5-6, and C6-7. SPINAL CORD:  No abnormal signal is identified within the spinal cord. SOFT TISSUES: No paraspinal mass identified. Payam Conradi is a small nodule in the   right lobe of the thyroid for which no follow-up is suggested per ACR   criteria. C2-C3:  The thecal sac and neural foramina are intact. C3-C4: There are small osteophytes.  The thecal sac is not stenotic. C4-C5: There is a minimal disc protrusion. The thecal sac and neural foramina   are intact. C5-C6: There is a disc protrusion measuring 2 mm.  The thecal sac is mildly   stenotic measuring 9.7 mm.  Disc and/or osteophytes result in mild narrowing   of the neural foramina bilaterally. C6-C7: There is disc protrusion and osteophyte measuring 2-3 mm.  The thecal   sac is mildly stenotic measuring 9.8 mm.  Disc and/or osteophytes result in   narrowing of the neural foramina bilaterally. C7-T1:  The thecal sac and neural foramina are intact.       Impression   Disc and osteophytes result in minimal narrowing of the neural foramina and   mild stenosis of the thecal sac at C5-6 and C6-7 as discussed above. The prior treatment has included:  PT: Most recent PT summer 2020- improvement in bilateral shoulder ROM and function. Has also attended lymphedema therapy with improvement. Chiropractic: None  Modalities: Biofreeze with partial relief. Eucedrin cream  with partial relief. Has not tried heat/ice. Compound cream with relief. OTC Tylenol: Takes occasionally with partial relief   NSAIDS: Mobic 7.5mg with relief, stopped taking due to bruising   Opioids: None  Membrane stabilizers: gabapentin 400mg for numbness in left leg, left cervical radiculitis. Muscle relaxers: None  Previous injections: Bilateral US guided shoulder subacromial injections with relief of pain. Cervical BINTA C7-T1 (Dr. Lcuian Slaughter on 12/10/2020; August 2021) with improvement in left cervical radiculitis symptoms. Previous surgery at this site: No shoulder surgeries. Previously saw Ortho for b/l shoulder RTC tears- per patient they did not recommend surgery due to chemo/radiation and osteoporosis -- unless pain unbearable --only then would consider shoulder replacement     Jon Russo  continues home exercises/stretches from therapy. The patient does perform regular exercise.        Past Medical History:   Diagnosis Date    Anemia associated with chemotherapy 11/16/2016    resolved    Anxiety     Cancer ([address]     tongue    Cancer of tongue ([facility] [address] 9/16/2016    treated with chemo and radiation / last treatment 11/2016    Cancer of tonsil ([address]     Cervical herniation     no limits on rom    Difficulty sleeping     Discoloration of skin of foot 6/25/2020    Dizziness 6/20/2018    GERD (gastroesophageal reflux disease)     History of therapeutic radiation     Hx antineoplastic chemo     Hx of cataract surgery 10/06/2020    Hyperlipidemia     Hypokalemia 11/16/2016    with chemo / resolved    Hypomagnesemia 11/16/2016    related to chemo / resloved    Hypothyroidism     Leg swelling 7/26/2017    Lumbar herniated disc     after fell 2/2017 / now has chronic back pain and numbnes at left foot and ankle    Lymphedema of both lower extremities 07/26/2017    wears support hose    Lymphedema of lower extremity     Osteopenia     PONV (postoperative nausea and vomiting)     Restless leg syndrome     Rotator cuff tear     bilateral    Sleep apnea     Thyroid disease     Toe cyanosis 6/25/2020       Past Surgical History:   Procedure Laterality Date   [address]    COLONOSCOPY  2008    neg    COLONOSCOPY  03/19/2014    ENDOSCOPY, COLON, DIAGNOSTIC  2012    EYE SURGERY      cataract surgery, bilateral.     FRACTURE SURGERY  2012    RIGHT ELBOW    HYSTERECTOMY  1993    [facility]   [address]    LARYNGOSCOPY  09/08/2016    direct laryngoscopy right tonsil/tongue base biopsy cervical esophagoscopy fine needle aspiration     NERVE BLOCK      OTHER SURGICAL HISTORY  5/28/12    ORIF RIGHT ULNA    OTHER SURGICAL HISTORY  6/18/2012    orif  rivision right olecranon    OTHER SURGICAL HISTORY Right 01/16/2014    Right elbow hardware removal    [place] OFFICE/OUTPT VISIT,PROCEDURE ONLY N/A 11/9/2018    PORT REMOVAL performed by Earma Shone, MD at [place] [address]  1MONTHS OF AGE    H/O ESOPHAGEAL WEB    TUNNELED VENOUS PORT PLACEMENT  10/04/2016    Dr. Chen Apo UPPER GASTROINTESTINAL ENDOSCOPY         Social History     Socioeconomic History    Marital status:  Spouse name: Not on file    Number of children: Not on file    Years of education: Not on file    Highest education level: Not on file   Occupational History    Not on file   Tobacco Use    Smoking status: Never Smoker    Smokeless tobacco: Never Used   Vaping Use    Vaping Use: Never used   Substance and Sexual Activity    Alcohol use: No     Alcohol/week: 0.0 standard drinks    Drug use: No    Sexual activity: Not on file   Other Topics Concern    Not on file   Social History Narrative    Not on file     Social Determinants of Health     Financial Resource Strain: Low Risk     Difficulty of Paying Living Expenses: Not hard at all   Food Insecurity: No Food Insecurity    Worried About [address] [employer] in the Last Year: Never true    [address] [employer] [place] in the Last Year: Never true   Transportation Needs:     Lack of Transportation (Medical): Not on file    Lack of Transportation (Non-Medical): Not on file   Physical Activity:     Days of Exercise per Week: Not on file    Minutes of Exercise per Session: Not on file   Stress:     Feeling of Stress : Not on file   Social Connections:     Frequency of Communication with Friends and Family: Not on file    Frequency of Social Gatherings with Friends and Family: Not on file    Attends [employer] Services: Not on file    Active Member of [address] [place] [address] [employer] or Organizations: Not on file    Attends Club or Organization Meetings: Not on file    Marital Status: Not on file   Intimate Partner Violence:     Fear of Current or Ex-Partner: Not on file    Emotionally Abused: Not on file    Physically Abused: Not on file    Sexually Abused: Not on file   Housing Stability:     Unable to Pay for Housing in the Last Year: Not on file    Number of [place] in the Last Year: Not on file    Unstable Housing in the Last Year: Not on file       The patient is retired. Margarito Randle  Does not require an assistive device.      Family History   Problem Relation Age of Onset    Hypertension Father     High Blood Pressure Father     Arthritis Father     Cancer Mother         uterine    Diabetes Mother     Hypertension Mother     High Blood Pressure Mother     Depression Mother     Other Mother         endocrine disorder       No Known Allergies    Current Outpatient Medications   Medication Sig Dispense Refill    temazepam (RESTORIL) 15 MG capsule Take 1 capsule by mouth nightly as needed for Sleep for up to 90 days. 90 capsule 0    gabapentin (NEURONTIN) 400 MG capsule Take 1 capsule by mouth 3 times daily for 30 days.  270 capsule 3    RABEprazole (ACIPHEX) 20 MG tablet 1 daily 90 tablet 3    venlafaxine (EFFEXOR XR) 75 MG extended release capsule Take 1 capsule by mouth daily 90 capsule 3    SYNTHROID 75 MCG tablet Take 1 tablet by mouth daily 90 tablet 3    rosuvastatin (CRESTOR) 5 MG tablet Take 1 tablet by mouth daily 30 tablet 1    rOPINIRole (REQUIP) 0.5 MG tablet Take 1 tablet by mouth nightly as needed (restless leg) For restless legs 30 tablet 3    Cream Base (VERSAPRO) CREA ONE GRAM (ONE PUMP) EXTERNALLY FOUR TIMES A DAY (Patient taking differently: Compound cream) 100 g 5    B Complex Vitamins (B-COMPLEX/B-12 PO) Take by mouth daily      Cholecalciferol (VITAMIN D PO) Take 1 tablet by mouth 3000 units daily      Biotin 10 MG CAPS Take by mouth daily HOLD      Elastic Bandages & Supports (JOBST KNEE HIGH COMPRESSION SM) MISC Knee high with 20- 30 mmhg of compression 1 each 10    acetaminophen (TYLENOL) 500 MG tablet Take 500 mg by mouth every 6 hours as needed Instructed to take with sip water am of procedure if needs      venlafaxine (EFFEXOR XR) 37.5 MG extended release capsule Take 1 capsule by mouth daily (Patient not taking: Reported on 2/21/2022) 90 capsule 3    NONFORMULARY Indications: elderberry  (Patient not taking: No sig reported)      sucralfate (CARAFATE) 1 GM/10ML suspension Take 10 mLs by mouth 4 times daily (Patient not taking: Reported on 2/21/2022) 1200 mL 3     Current Facility-Administered Medications   Medication Dose Route Frequency Provider Last Rate Last Admin    bupivacaine (MARCAINE) 0.25 % injection 15 mg  6 mL IntraDERmal Once Stanislav Chaudhari MD        triamcinolone acetonide (KENALOG-40) injection 40 mg  40 mg IntraMUSCular Once Stanislav Chaudhari MD        triamcinolone acetonide (KENALOG-40) injection 40 mg  40 mg IntraMUSCular Once Stanislav Chaudhari MD        bupivacaine (PF) (MARCAINE) 0.25 % injection 15 mg  6 mL IntraDERmal Once Stanislav Chaudhari MD           Review of Systems  General: No chills, fatigue, fever, malaise, night sweats, weight gain,  weight loss. Psychological: No anxiety, depression, suicidal ideation   Ophthalmic: No blurry vision, decreased vision, double vision, loss of vision  Ear Nose Throat: No hearing loss, tinnitus, phonophobia, sensitivity to smells, vertigo, or vocal changes. Allergy/Immunology: No watery eyes, itchy eyes, frequent infections. Hematological and Lymphatic: No bleeding problems, blood clots, bruising  Endocrine:  No polydypsia, polyuria, temperature intolerance. Respiratory: No cough, shortness of breath, wheezing. Cardiovascular: No syncope, chest pain, dyspnea on exertion,palpitations. Gastrointestinal: No abdominal pain, hematemesis, melena, nausea, vomiting, stool incontinence  Genito-Urinary: No dysuria, hematuria, incontinence   Musculoskeletal: Per HPI  Neurological: Per HPI  Dermatological:  No rash      Physical Exam:   Vitals:    02/21/22 0829   BP: 120/60   Pulse: 80   Temp: 98.1 °F (36.7 °C)   SpO2: 99%      General: The patient is in no apparent distress. HEENT: No rhinorrhea, sneezing, yawning, or lacrimation. No scleral icterus or conjunctival injection. SKIN: No piloerection. No track marks. No rash. Normal turgor. No erythema or ecchymosis. Psychological: Mood and affect are appropriate. Hygiene is appropriate.    Cardiovascular: There is no edema. Respiratory: Respirations are regular and unlabored. There is no cyanosis. Gastrointestinal: No abdominal distension   MSK: Shoulder: No edema, erythema, ecchymosis, effusion, or mass of bilateral shoulders. Right shoulder + sulcus sign. Left shoulder slight + sulcus sign. Full passive flexion bilateral shoulders, mild pain at terminal abduction. Minimally decreased passive abduction of both shoulders. Cannot actively abduct beyond 90 deg when trapezius is eliminated from action. Decreased internal rotation b/l shoulders. No crepitus. No tenderness to palpation at the acromioclavicular joint or bicipital groove. There is tenderness at bilateral subacromial space. There is weakness with Empty can test bilaterally (3-/5). Weak with Speed's bilaterally. Minimal pain with bilateral Jadon-Land. Full painless cervical spine and elbow ROM. Negative Spurling. There is bilateral upper trapezius muscle spasm and trigger points. No upper extremity edema. Neurologic: Awake, alert and oriented in three planes. Speech is fluent. No facial weakness. Hearing is intact for conversation. Pupils are equal and round. Strength:   R  L  Deltoid   5-  5-  Biceps   5  5  Triceps  5  5  Wrist Ext  5  5  Finger Abd  5  5      Sensory:  Intact for light touch in all upper extremity dermatomes. Reflexes:   R  L  Biceps   2 2  Triceps  2 2  BR   2 2       No pathological reflexes     Gait is normal.       Trigger point injection Procedure:   After procedure explained, consent was obtained and scanned to media. The muscles in the areas indicated as painful by the patient were identified and palpated for discreet areas of tender, firm bands or nodules within the muscle belly. If firm palpation of these areas re-created the patients typical focal or referred pain, the area was marked. The areas for injection were then cleaned with alcohol.  A 1.5 in 25 g needle was then introduced into the trigger point until 'jump' or 'twitch' sign was elicited or the patient reported a deep ache in the typical area of pain. Solution was then injected in a fanning pattern with. Each trigger point was addressed in the same manner. Trigger points/muscles injected: Right upper trapezius, Right levator scapulae, Left upper trapezius, Left levator scapulae  Total muscles injected: 4   Solution used: plain 0.25% bupivacaine   Total solution injected: 5 cc (1cc into each of 5 trigger points)   There were no complications. No bleeding. Patient tolerated the procedure well. Impression:   -Bilateral shoulder pain   -Bilateral massive rotator cuff tears.   -Right shoulder subluxation  -Left cervical radiculitis -- radicular symptoms improved after cervical BINTA, patient is following with Dr. Robert Hernandez  -Cervical myofascial pain  -Trigger points        Plan:   · Trigger point injections today, see above for details  · Resume formal PT for bilateral shoulder range of motion, gentle strengthening, and pain reduction techniques. New orders placed. · Continue home exercises for bilateral shoulder ROM, gentle strengthening, pain reduction techniques. Reviewed importance of maintaining range of motion. · Continue compound cream as needed - will refill   Left cervical radicular symptoms improved after prior epidural with Dr. Robert Hernandez. The patient was educated about the diagnosis, prognosis, indications, risks and benefits of treatment. An opportunity to ask questions was given to the patient and questions were answered. The patient agreed to proceed with the recommended treatment as described above. Follow up in April as scheduled      Patsy Monet M.D.   Physical Medicine and Rehabilitation

## 2022-03-16 DIAGNOSIS — E03.9 ACQUIRED HYPOTHYROIDISM: ICD-10-CM

## 2022-03-16 DIAGNOSIS — F51.04 CHRONIC INSOMNIA: ICD-10-CM

## 2022-03-16 RX ORDER — LEVOTHYROXINE SODIUM 75 MCG
75 TABLET ORAL DAILY
Qty: 90 TABLET | Refills: 3 | Status: SHIPPED
Start: 2022-03-16 | End: 2022-03-17 | Stop reason: SDUPTHER

## 2022-03-17 DIAGNOSIS — E03.9 ACQUIRED HYPOTHYROIDISM: ICD-10-CM

## 2022-03-17 RX ORDER — LEVOTHYROXINE SODIUM 75 MCG
75 TABLET ORAL DAILY
Qty: 90 TABLET | Refills: 3 | Status: SHIPPED | OUTPATIENT
Start: 2022-03-17 | End: 2022-06-15

## 2022-03-17 RX ORDER — LEVOTHYROXINE SODIUM 75 MCG
75 TABLET ORAL DAILY
Qty: 90 TABLET | Refills: 3 | Status: CANCELLED | OUTPATIENT
Start: 2022-03-17

## 2022-03-17 NOTE — TELEPHONE ENCOUNTER
Phoned krystal ragsdale spoke with a [facility] prior auth rep   Synthroid approved # [id number]  Valid 3/17/22 to 3/17/23

## 2022-03-28 ENCOUNTER — HOSPITAL ENCOUNTER (OUTPATIENT)
Age: 72
Discharge: HOME | End: 2022-03-30

## 2022-03-28 PROCEDURE — 88305 TISSUE EXAM BY PATHOLOGIST: CPT

## 2022-03-28 PROCEDURE — 87081 CULTURE SCREEN ONLY: CPT

## 2022-03-29 LAB — CLOTEST: NORMAL

## 2022-04-05 ENCOUNTER — PROCEDURE VISIT (OUTPATIENT)
Dept: PHYSICAL MEDICINE AND REHAB | Age: 72
End: 2022-04-05
Payer: COMMERCIAL

## 2022-04-05 VITALS
OXYGEN SATURATION: 97 % | WEIGHT: 115 LBS | BODY MASS INDEX: 21.71 KG/M2 | SYSTOLIC BLOOD PRESSURE: 116 MMHG | DIASTOLIC BLOOD PRESSURE: 60 MMHG | HEART RATE: 92 BPM | HEIGHT: 61 IN | TEMPERATURE: 98.2 F

## 2022-04-05 DIAGNOSIS — M75.121 COMPLETE TEAR OF RIGHT ROTATOR CUFF, UNSPECIFIED WHETHER TRAUMATIC: ICD-10-CM

## 2022-04-05 DIAGNOSIS — M79.18 CERVICAL MYOFASCIAL PAIN SYNDROME: ICD-10-CM

## 2022-04-05 DIAGNOSIS — M25.511 CHRONIC PAIN OF BOTH SHOULDERS: Primary | ICD-10-CM

## 2022-04-05 DIAGNOSIS — S43.003D SUBLUXATION OF SHOULDER JOINT, UNSPECIFIED LATERALITY, SUBSEQUENT ENCOUNTER: ICD-10-CM

## 2022-04-05 DIAGNOSIS — M25.512 CHRONIC PAIN OF BOTH SHOULDERS: Primary | ICD-10-CM

## 2022-04-05 DIAGNOSIS — M79.10 TRIGGER POINT: ICD-10-CM

## 2022-04-05 DIAGNOSIS — M75.122 COMPLETE TEAR OF LEFT ROTATOR CUFF, UNSPECIFIED WHETHER TRAUMATIC: ICD-10-CM

## 2022-04-05 DIAGNOSIS — G89.29 CHRONIC PAIN OF BOTH SHOULDERS: Primary | ICD-10-CM

## 2022-04-05 DIAGNOSIS — M54.12 RADICULITIS OF LEFT CERVICAL REGION: ICD-10-CM

## 2022-04-05 PROCEDURE — 3017F COLORECTAL CA SCREEN DOC REV: CPT | Performed by: PHYSICAL MEDICINE & REHABILITATION

## 2022-04-05 PROCEDURE — 1036F TOBACCO NON-USER: CPT | Performed by: PHYSICAL MEDICINE & REHABILITATION

## 2022-04-05 PROCEDURE — 20611 DRAIN/INJ JOINT/BURSA W/US: CPT | Performed by: PHYSICAL MEDICINE & REHABILITATION

## 2022-04-05 PROCEDURE — 4040F PNEUMOC VAC/ADMIN/RCVD: CPT | Performed by: PHYSICAL MEDICINE & REHABILITATION

## 2022-04-05 PROCEDURE — 99213 OFFICE O/P EST LOW 20 MIN: CPT | Performed by: PHYSICAL MEDICINE & REHABILITATION

## 2022-04-05 PROCEDURE — 1090F PRES/ABSN URINE INCON ASSESS: CPT | Performed by: PHYSICAL MEDICINE & REHABILITATION

## 2022-04-05 PROCEDURE — G8399 PT W/DXA RESULTS DOCUMENT: HCPCS | Performed by: PHYSICAL MEDICINE & REHABILITATION

## 2022-04-05 PROCEDURE — 1123F ACP DISCUSS/DSCN MKR DOCD: CPT | Performed by: PHYSICAL MEDICINE & REHABILITATION

## 2022-04-05 PROCEDURE — G8420 CALC BMI NORM PARAMETERS: HCPCS | Performed by: PHYSICAL MEDICINE & REHABILITATION

## 2022-04-05 PROCEDURE — G8427 DOCREV CUR MEDS BY ELIG CLIN: HCPCS | Performed by: PHYSICAL MEDICINE & REHABILITATION

## 2022-04-05 RX ORDER — TRIAMCINOLONE ACETONIDE 40 MG/ML
40 INJECTION, SUSPENSION INTRA-ARTICULAR; INTRAMUSCULAR ONCE
Status: COMPLETED | OUTPATIENT
Start: 2022-04-05 | End: 2022-04-05

## 2022-04-05 RX ORDER — BUPIVACAINE HYDROCHLORIDE 2.5 MG/ML
3 INJECTION, SOLUTION EPIDURAL; INFILTRATION; INTRACAUDAL ONCE
Status: COMPLETED | OUTPATIENT
Start: 2022-04-05 | End: 2022-04-05

## 2022-04-05 RX ADMIN — BUPIVACAINE HYDROCHLORIDE 7.5 MG: 2.5 INJECTION, SOLUTION EPIDURAL; INFILTRATION; INTRACAUDAL at 15:18

## 2022-04-05 RX ADMIN — TRIAMCINOLONE ACETONIDE 40 MG: 40 INJECTION, SUSPENSION INTRA-ARTICULAR; INTRAMUSCULAR at 15:19

## 2022-04-05 RX ADMIN — BUPIVACAINE HYDROCHLORIDE 7.5 MG: 2.5 INJECTION, SOLUTION EPIDURAL; INFILTRATION; INTRACAUDAL at 15:17

## 2022-04-05 NOTE — PROGRESS NOTES
Joe Zhu M.D. [facility] PHYSICAL MEDICINE AND REHABILITAION  [address] [facility] [address]  Dept: [phone]  Dept Fax: [phone]    PCP: Julian Muñoz MD  Date of visit: 4/5/22    Chief Complaint   Patient presents with    Follow-up     Follow up shoulder pain, R>L , she had PT today and has been exercising more, patient states that she is doing much better since the last injections, relief from injections is beginning to wear off. Vesparo cream is helping she notices if she misses a dose. Kaylee Robert is a 80 y.o.  right hand dominant woman who presents for follow up of upper extremity pain and bilateral rotator cuff tears. 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 Chemotherapy was started on 10/6/2016 with Dr. Tere Dominguez. November 2016 she completed head and neck irradiation for management of right base of tongue SCC. She reports she finished all treatment (chemo/radiation) November 2016. She denies any arm pain or numbness/tingling after her chemo/radiation, until her more recent injuries with rotator cuff tears. Her primary complaint is bilateral shoulder pain, which [facility]. She had massive full thickness bilateral supraspinatus and infraspinatus tears-- the right rotator cuff in December 2017 and left rotator cuff in January 2018. She saw Ortho--they did not recommend surgery due to chemo/radiation and osteoporosis -- unless pain unbearable --only then would consider shoulder replacement. Patient was cleared for shoulder surgery from radiation oncology standpoint IF indicated/recommended by Ortho. Patient does not wish for surgery at this time.          Interval history:   Since last visit patient reports US guided bilateral subacromial injections and trigger point injections continue to provide significant relief of shoulder pain and trapezius spasm, allowing her to be more functional. Relief from injections is lasting 3-4 months. She had good relief after her last injections several months ago. Relief from shoulder injections is now wearing off and her typical pain is returning, particularly on the right side. No new injury but she has been exercising more. She completed PT with improvement in bilateral shoulder range of motion. She plans to continue with dry needling. She states the compound cream is helping with her pain. She is doing her home exercises regularly. No other changes. She had a cervical epidural performed by Dr. Yazmin Morocho in August 2021 and has not had radicular pain down the arm since that injection. No new weakness or numbness. No other changes reported. She is following with Dr. Yazmin Morocho for neck pain and cervical radiculitis and had improvement in upper extremity radiating pain after cervical BINTA in December 2020, and repeat BINTA in August 2021. She is taking gabapentin 400mg TID and using voltaren gel / compound cream on her shoulders with reported benefit. Shoulder pain is located at the lateral aspect of the shoulders and subacromial space bilaterally, without current radiation to the upper extremities. No associated numbness/tingling, or paresthesia in the upper extremities. There is some weakness about the shoulders bilaterally, unchanged. No weakness of biceps/triceps, forearm or intrinsic hand muscles. The shoulder pain is constant and aching. Pain is worse with overhead activity. Physical therapy has helped in the past with range of motion and function.       The prior workup has included:  Bilateral shoulder MRI - May 2018  -Right shoulder MRI: massive full thickness rotator cuff tear involving the supraspinatus and infraspinatus with retraction and uncovering of the humeral head which is result in minimal narrowing of the neural foramina and   mild stenosis of the thecal sac at C5-6 and C6-7 as discussed above. The prior treatment has included:  PT: Most recent PT spring 2022- improvement in bilateral shoulder ROM and function. Dry needling with relief. Has also attended lymphedema therapy with improvement. Chiropractic: None  Modalities: Biofreeze with partial relief. Eucedrin cream  with partial relief. Has not tried heat/ice. Compound cream with relief. OTC Tylenol: Takes occasionally with partial relief   NSAIDS: Mobic 7.5mg with relief, stopped taking due to bruising   Opioids: None  Membrane stabilizers: gabapentin 400mg for numbness in left leg, left cervical radiculitis. Muscle relaxers: None  Previous injections: Bilateral US guided shoulder subacromial injections with relief of pain. Trigger point injections with relief. Cervical BINTA C7-T1 (Dr. Jaspal Garcia on 12/10/2020; August 2021) with improvement in left cervical radiculitis symptoms. Previous surgery at this site: No shoulder surgeries. Previously saw Ortho for b/l shoulder RTC tears- per patient they did not recommend surgery due to chemo/radiation and osteoporosis -- unless pain unbearable --only then would consider shoulder replacement     [place]  continues home exercises/stretches from therapy. The patient does perform regular exercise.        Past Medical History:   Diagnosis Date    Anemia associated with chemotherapy 11/16/2016    resolved    Anxiety     Cancer ([address]     tongue    Cancer of tongue ([address] 9/16/2016    treated with chemo and radiation / last treatment 11/2016    Cancer of tonsil ([address]     Cervical herniation     no limits on rom    Difficulty sleeping     Discoloration of skin of foot 6/25/2020    Dizziness 6/20/2018    GERD (gastroesophageal reflux disease)     History of therapeutic radiation     Hx antineoplastic chemo     Hx of cataract surgery 10/06/2020    Hyperlipidemia     Hypokalemia 11/16/2016    with chemo / resolved    Hypomagnesemia 11/16/2016    related to chemo / resloved    Hypothyroidism     Leg swelling 7/26/2017    Lumbar herniated disc     after fell 2/2017 / now has chronic back pain and numbnes at left foot and ankle    Lymphedema of both lower extremities 07/26/2017    wears support hose    Lymphedema of lower extremity     Osteopenia     PONV (postoperative nausea and vomiting)     Restless leg syndrome     Rotator cuff tear     bilateral    Sleep apnea     Thyroid disease     Toe cyanosis 6/25/2020       Past Surgical History:   Procedure Laterality Date   [address]    COLONOSCOPY  2008    neg    COLONOSCOPY  03/19/2014    ENDOSCOPY, COLON, DIAGNOSTIC  2012    EYE SURGERY      cataract surgery, bilateral.     FRACTURE SURGERY  2012    RIGHT ELBOW    HYSTERECTOMY  1993    [facility]   [address]    LARYNGOSCOPY  09/08/2016    direct laryngoscopy right tonsil/tongue base biopsy cervical esophagoscopy fine needle aspiration     NERVE BLOCK      OTHER SURGICAL HISTORY  5/28/12    ORIF RIGHT ULNA    OTHER SURGICAL HISTORY  6/18/2012    orif  rivision right olecranon    OTHER SURGICAL HISTORY Right 01/16/2014    Right elbow hardware removal    [place] OFFICE/OUTPT VISIT,PROCEDURE ONLY N/A 11/9/2018    PORT REMOVAL performed by Man Mota MD at [facility]  1MONTHS OF AGE    H/O ESOPHAGEAL WEB    TUNNELED VENOUS PORT PLACEMENT  10/04/2016    Dr. Yarelis Hung History     Socioeconomic History    Marital status:      Spouse name: Not on file    Number of children: Not on file    Years of education: Not on file    Highest education level: Not on file   Occupational History    Not on file   Tobacco Use    Smoking status: Never Smoker    Smokeless tobacco: Never Used   Vaping Use    Vaping Use: Never used Substance and Sexual Activity    Alcohol use: No     Alcohol/week: 0.0 standard drinks    Drug use: No    Sexual activity: Not on file   Other Topics Concern    Not on file   Social History Narrative    Not on file     Social Determinants of Health     Financial Resource Strain: Low Risk     Difficulty of Paying Living Expenses: Not hard at all   Food Insecurity: No Food Insecurity    Worried About Running Out of Food in the Last Year: Never true    [address] in the Last Year: Never true   Transportation Needs:     Lack of Transportation (Medical): Not on file    Lack of Transportation (Non-Medical): Not on file   Physical Activity:     Days of Exercise per Week: Not on file    Minutes of Exercise per Session: Not on file   Stress:     Feeling of Stress : Not on file   Social Connections:     Frequency of Communication with Friends and Family: Not on file    Frequency of Social Gatherings with Friends and Family: Not on file    Attends Druze Services: Not on file    Active Member of [address] [place] [address] or Organizations: Not on file    Attends Club or Organization Meetings: Not on file    Marital Status: Not on file   Intimate Partner Violence:     Fear of Current or Ex-Partner: Not on file    Emotionally Abused: Not on file    Physically Abused: Not on file    Sexually Abused: Not on file   Housing Stability:     Unable to Pay for Housing in the Last Year: Not on file    Number of [place] in the Last Year: Not on file    Unstable Housing in the Last Year: Not on file       The patient is retired. Andre Baring  Does not require an assistive device.      Family History   Problem Relation Age of Onset    Hypertension Father     High Blood Pressure Father     Arthritis Father     Cancer Mother         uterine    Diabetes Mother     Hypertension Mother     High Blood Pressure Mother     Depression Mother     Other Mother         endocrine disorder       No Known Allergies    Current Outpatient Medications   Medication Sig Dispense Refill    SYNTHROID 75 MCG tablet Take 1 tablet by mouth daily 90 tablet 3    temazepam (RESTORIL) 15 MG capsule Take 1 capsule by mouth nightly as needed for Sleep for up to 90 days. 90 capsule 0    RABEprazole (ACIPHEX) 20 MG tablet 1 daily 90 tablet 3    venlafaxine (EFFEXOR XR) 75 MG extended release capsule Take 1 capsule by mouth daily 90 capsule 3    venlafaxine (EFFEXOR XR) 37.5 MG extended release capsule Take 1 capsule by mouth daily 90 capsule 3    rosuvastatin (CRESTOR) 5 MG tablet Take 1 tablet by mouth daily 30 tablet 1    rOPINIRole (REQUIP) 0.5 MG tablet Take 1 tablet by mouth nightly as needed (restless leg) For restless legs 30 tablet 3    Cream Base (VERSAPRO) CREA ONE GRAM (ONE PUMP) EXTERNALLY FOUR TIMES A DAY (Patient taking differently: Compound cream) 100 g 5    NONFORMULARY Indications: elderberry       sucralfate (CARAFATE) 1 GM/10ML suspension Take 10 mLs by mouth 4 times daily 1200 mL 3    B Complex Vitamins (B-COMPLEX/B-12 PO) Take by mouth daily      Cholecalciferol (VITAMIN D PO) Take 1 tablet by mouth 3000 units daily      Biotin 10 MG CAPS Take by mouth daily HOLD      Elastic Bandages & Supports (JOBST KNEE HIGH COMPRESSION SM) MISC Knee high with 20- 30 mmhg of compression 1 each 10    acetaminophen (TYLENOL) 500 MG tablet Take 500 mg by mouth every 6 hours as needed Instructed to take with sip water am of procedure if needs      gabapentin (NEURONTIN) 400 MG capsule Take 1 capsule by mouth 3 times daily for 30 days.  270 capsule 3     Current Facility-Administered Medications   Medication Dose Route Frequency Provider Last Rate Last Admin    bupivacaine (MARCAINE) 0.25 % injection 15 mg  6 mL IntraDERmal Once Miracle Peraza MD        triamcinolone acetonide (KENALOG-40) injection 40 mg  40 mg IntraMUSCular Once Miracle Peraza MD        triamcinolone acetonide (KENALOG-40) injection 40 mg 40 mg IntraMUSCular Once Vernell Murguia MD        bupivacaine (PF) (MARCAINE) 0.25 % injection 15 mg  6 mL IntraDERmal Once Vernell Murguia MD           Review of Systems  General: No chills, fatigue, fever, malaise, night sweats, weight gain,  weight loss. Psychological: No anxiety, depression, suicidal ideation   Ophthalmic: No blurry vision, decreased vision, double vision, loss of vision  Ear Nose Throat: No hearing loss, tinnitus, phonophobia, sensitivity to smells, vertigo, or vocal changes. Allergy/Immunology: No watery eyes, itchy eyes, frequent infections. Hematological and Lymphatic: No bleeding problems, blood clots, bruising  Endocrine:  No polydypsia, polyuria, temperature intolerance. Respiratory: No cough, shortness of breath, wheezing. Cardiovascular: No syncope, chest pain, dyspnea on exertion,palpitations. Gastrointestinal: No abdominal pain, hematemesis, melena, nausea, vomiting, stool incontinence  Genito-Urinary: No dysuria, hematuria, incontinence   Musculoskeletal: Per HPI  Neurological: Per HPI  Dermatological:  No rash      Physical Exam:   Vitals:    04/05/22 1350   BP: 116/60   Pulse: 92   Temp: 98.2 °F (36.8 °C)   SpO2: 97%      General: The patient is in no apparent distress. HEENT: No rhinorrhea, sneezing, yawning, or lacrimation. No scleral icterus or conjunctival injection. SKIN: No piloerection. No track marks. No rash. Normal turgor. No erythema or ecchymosis. Psychological: Mood and affect are appropriate. Hygiene is appropriate. Cardiovascular: There is no edema. Respiratory: Respirations are regular and unlabored. There is no cyanosis. Gastrointestinal: No abdominal distension   MSK: Shoulder: No edema, erythema, ecchymosis, effusion, or mass of bilateral shoulders. Right shoulder + sulcus sign. Left shoulder slight + sulcus sign. Full passive flexion bilateral shoulders, mild pain at terminal abduction.  Minimally decreased passive abduction of both shoulders. Cannot actively abduct beyond 90 deg when trapezius is eliminated from action. Decreased internal rotation b/l shoulders. No crepitus. No tenderness to palpation at the acromioclavicular joint or bicipital groove. There is tenderness at bilateral subacromial space. There is weakness with Empty can test bilaterally (3-/5). Weak with Speed's bilaterally. Minimal pain with bilateral Jadon-Land. Full painless cervical spine and elbow ROM. Negative Spurling. There is bilateral upper trapezius muscle spasm and trigger points. No upper extremity edema. Neurologic: Awake, alert and oriented in three planes. Speech is fluent. No facial weakness. Hearing is intact for conversation. Pupils are equal and round. Strength:   R  L  Deltoid   5-  5-  Biceps   5  5  Triceps  5  5  Wrist Ext  5  5  Finger Abd  5  5      Sensory:  Intact for light touch in all upper extremity dermatomes. Reflexes:   R  L  Biceps   2 2  Triceps  2 2  BR   2 2       No pathological reflexes     Gait is normal.       US guided Glenohumeral joint Injection Procedure: Bilateral  After explaining the indications, risks, benefits and alternatives of a bilateral glenohumeral injection, the patient agreed to proceed. A permit was signed and scanned to the [employer]. The patient was placed in the seated position. Chloraprep was used to sterilize the skin. Using an aseptic, no touch technique, a 22 gauge, 1.5\" needle with 1 cc of Kenalog 40mg/cc and 3 cc of bupivacaine 0.25% was directed, under ultrasound guidance, to the right glenohumeral joint.  After negative aspiration the medication was injected. Adequate hemostasis was achieved and the injection site was covered with a bandage. The exact same procedure was performed on the left side. US images were scanned to media separately. The patient was observed for complication and left the office without incident.  The patient tolerated the procedure well and was educated in post injection care. Impression:   -Bilateral shoulder pain   -Bilateral massive rotator cuff tears.   -Right shoulder subluxation  -Left cervical radiculitis -- radicular symptoms improved after cervical BINTA, patient is following with Dr. Saima Lipscomb  -Cervical myofascial pain  -Trigger points        Plan:   · US guided bilateral glenohumeral joint injections today, see above for details. Patient wishes to hold off on further trigger point injections for now. · Completed formal PT. May continue with dry needling   · Continue home exercises for bilateral shoulder ROM, gentle strengthening, pain reduction techniques. Reviewed importance of maintaining range of motion. · Continue compound cream as needed   Left cervical radicular symptoms improved after prior epidural with Dr. Saima Lipscomb. The patient was educated about the diagnosis, prognosis, indications, risks and benefits of treatment. An opportunity to ask questions was given to the patient and questions were answered. The patient agreed to proceed with the recommended treatment as described above. Follow up 4 months       Pancho Harris M.D.   Physical Medicine and Rehabilitation

## 2022-04-20 ENCOUNTER — OFFICE VISIT (OUTPATIENT)
Dept: SURGERY | Age: 72
End: 2022-04-20
Payer: COMMERCIAL

## 2022-04-20 VITALS
RESPIRATION RATE: 20 BRPM | DIASTOLIC BLOOD PRESSURE: 92 MMHG | SYSTOLIC BLOOD PRESSURE: 128 MMHG | HEIGHT: 61 IN | TEMPERATURE: 97.3 F | BODY MASS INDEX: 20.71 KG/M2 | WEIGHT: 109.7 LBS | OXYGEN SATURATION: 98 % | HEART RATE: 93 BPM

## 2022-04-20 DIAGNOSIS — L81.9 PIGMENTED SKIN LESION OF UNCERTAIN NATURE: Primary | ICD-10-CM

## 2022-04-20 PROCEDURE — 1036F TOBACCO NON-USER: CPT | Performed by: PLASTIC SURGERY

## 2022-04-20 PROCEDURE — 4040F PNEUMOC VAC/ADMIN/RCVD: CPT | Performed by: PLASTIC SURGERY

## 2022-04-20 PROCEDURE — 11102 TANGNTL BX SKIN SINGLE LES: CPT | Performed by: PLASTIC SURGERY

## 2022-04-20 PROCEDURE — 1123F ACP DISCUSS/DSCN MKR DOCD: CPT | Performed by: PLASTIC SURGERY

## 2022-04-20 PROCEDURE — G8420 CALC BMI NORM PARAMETERS: HCPCS | Performed by: PLASTIC SURGERY

## 2022-04-20 PROCEDURE — G8427 DOCREV CUR MEDS BY ELIG CLIN: HCPCS | Performed by: PLASTIC SURGERY

## 2022-04-20 PROCEDURE — 3017F COLORECTAL CA SCREEN DOC REV: CPT | Performed by: PLASTIC SURGERY

## 2022-04-20 PROCEDURE — 1090F PRES/ABSN URINE INCON ASSESS: CPT | Performed by: PLASTIC SURGERY

## 2022-04-20 PROCEDURE — G8399 PT W/DXA RESULTS DOCUMENT: HCPCS | Performed by: PLASTIC SURGERY

## 2022-04-20 PROCEDURE — 99204 OFFICE O/P NEW MOD 45 MIN: CPT | Performed by: PLASTIC SURGERY

## 2022-04-20 NOTE — PROGRESS NOTES
COLONOSCOPY  2008    neg    COLONOSCOPY  03/19/2014    ENDOSCOPY, COLON, DIAGNOSTIC  2012    EYE SURGERY      cataract surgery, bilateral.     FRACTURE SURGERY  2012    RIGHT ELBOW    HYSTERECTOMY  1993    [facility]   [address]    LARYNGOSCOPY  09/08/2016    direct laryngoscopy right tonsil/tongue base biopsy cervical esophagoscopy fine needle aspiration     NERVE BLOCK      OTHER SURGICAL HISTORY  5/28/12    ORIF RIGHT ULNA    OTHER SURGICAL HISTORY  6/18/2012    orif  rivision right olecranon    OTHER SURGICAL HISTORY Right 01/16/2014    Right elbow hardware removal    ID OFFICE/OUTPT VISIT,PROCEDURE ONLY N/A 11/9/2018    PORT REMOVAL performed by Nina Lee MD at [place] [address]  1MONTHS OF AGE    H/O ESOPHAGEAL WEB    TUNNELED VENOUS PORT PLACEMENT  10/04/2016    Dr. Yobani Soto UPPER GASTROINTESTINAL ENDOSCOPY       Current Medications:      Current Outpatient Medications   Medication Sig Dispense Refill    SYNTHROID 75 MCG tablet Take 1 tablet by mouth daily 90 tablet 3    temazepam (RESTORIL) 15 MG capsule Take 1 capsule by mouth nightly as needed for Sleep for up to 90 days.  90 capsule 0    RABEprazole (ACIPHEX) 20 MG tablet 1 daily 90 tablet 3    venlafaxine (EFFEXOR XR) 75 MG extended release capsule Take 1 capsule by mouth daily 90 capsule 3    venlafaxine (EFFEXOR XR) 37.5 MG extended release capsule Take 1 capsule by mouth daily 90 capsule 3    rosuvastatin (CRESTOR) 5 MG tablet Take 1 tablet by mouth daily 30 tablet 1    rOPINIRole (REQUIP) 0.5 MG tablet Take 1 tablet by mouth nightly as needed (restless leg) For restless legs 30 tablet 3    Cream Base (VERSAPRO) CREA ONE GRAM (ONE PUMP) EXTERNALLY FOUR TIMES A DAY (Patient taking differently: Compound cream) 100 g 5    NONFORMULARY Indications: elderberry       sucralfate (CARAFATE) 1 GM/10ML suspension Take 10 mLs by mouth 4 times daily 1200 mL 3    B Complex Vitamins (B-COMPLEX/B-12 PO) Take by mouth daily      Cholecalciferol (VITAMIN D PO) Take 1 tablet by mouth 3000 units daily      Biotin 10 MG CAPS Take by mouth daily HOLD      Elastic Bandages & Supports (JOBST KNEE HIGH COMPRESSION SM) MISC Knee high with 20- 30 mmhg of compression 1 each 10    acetaminophen (TYLENOL) 500 MG tablet Take 500 mg by mouth every 6 hours as needed Instructed to take with sip water am of procedure if needs      gabapentin (NEURONTIN) 400 MG capsule Take 1 capsule by mouth 3 times daily for 30 days. 270 capsule 3     Current Facility-Administered Medications   Medication Dose Route Frequency Provider Last Rate Last Admin    bupivacaine (MARCAINE) 0.25 % injection 15 mg  6 mL IntraDERmal Once Aline Favre, MD        triamcinolone acetonide (KENALOG-40) injection 40 mg  40 mg IntraMUSCular Once Aline Favre, MD        triamcinolone acetonide (KENALOG-40) injection 40 mg  40 mg IntraMUSCular Once Aline Favre, MD        bupivacaine (PF) (MARCAINE) 0.25 % injection 15 mg  6 mL IntraDERmal Once Aline Favre, MD         Allergies:  Patient has no known allergies.     Social History:   Social History     Socioeconomic History    Marital status:      Spouse name: Not on file    Number of children: Not on file    Years of education: Not on file    Highest education level: Not on file   Occupational History    Not on file   Tobacco Use    Smoking status: Never Smoker    Smokeless tobacco: Never Used   Vaping Use    Vaping Use: Never used   Substance and Sexual Activity    Alcohol use: No     Alcohol/week: 0.0 standard drinks    Drug use: No    Sexual activity: Not on file   Other Topics Concern    Not on file   Social History Narrative    Not on file     Social Determinants of Health     Financial Resource Strain: Low Risk     Difficulty of Paying Living Expenses: Not hard at all   Food Insecurity: No Food Insecurity    Worried About [facility] in the Last Year: Never true    Ran Out of Food in the Last Year: Never true   Transportation Needs:     Lack of Transportation (Medical): Not on file    Lack of Transportation (Non-Medical): Not on file   Physical Activity:     Days of Exercise per Week: Not on file    Minutes of Exercise per Session: Not on file   Stress:     Feeling of Stress : Not on file   Social Connections:     Frequency of Communication with Friends and Family: Not on file    Frequency of Social Gatherings with Friends and Family: Not on file    Attends Gnosticism Services: Not on file    Active Member of Clubs or Organizations: Not on file    Attends Club or Organization Meetings: Not on file    Marital Status: Not on file   Intimate Partner Violence:     Fear of Current or Ex-Partner: Not on file    Emotionally Abused: Not on file    Physically Abused: Not on file    Sexually Abused: Not on file   Housing Stability:     Unable to Pay for Housing in the Last Year: Not on file    Number of [place] in the Last Year: Not on file    Unstable Housing in the Last Year: Not on file     Family History:   Family History   Problem Relation Age of Onset    Hypertension Father     High Blood Pressure Father     Arthritis Father     Cancer Mother         uterine    Diabetes Mother     Hypertension Mother     High Blood Pressure Mother     Depression Mother     Other Mother         endocrine disorder       REVIEW OF SYSTEMS:    CONSTITUTIONAL:  negative for  fevers, chills, sweats and fatigue  EYES: negative for dipolpia or acute vision loss. RESPIRATORY:  negative for  dry cough, cough with sputum, dyspnea, wheezing and chest pain  HENT:negative for pain, headache, difficulty swallowing or nose bleeds.   CARDIOVASCULAR:  negative for  chest pain, dyspnea, palpitations, syncope  GASTROINTESTINAL:  negative for nausea, vomiting, change in bowel habits, diarrhea, constipation and abdominal pain  EXTREMITIES: negative for edema  MUSCULOSKELETAL: negative for muscle weakness  SKIN: positive for lesion,negative for itching or rashes. HEME: negative for easy brusing or bleeding  BEHAVIOR/PSYCH:  negative for poor appetite, increased appetite, decreased sleep and poor concentration    PHYSICAL EXAM:        VITALS:  BP (!) 128/92 (Site: Left Upper Arm, Position: Sitting, Cuff Size: Medium Adult) Comment: pt states nervous being here has hx of cancer  Pulse 93   Temp 97.3 °F (36.3 °C) (Infrared)   Resp 20   Ht 5' 1\" (1.549 m)   Wt 109 lb 11.2 oz (49.8 kg)   SpO2 98%   BMI 20.73 kg/m²   CONSTITUTIONAL:  awake, alert, cooperative, no apparent distress, and appears stated age  EYES: PERRLA, EOMI, no signs of occular infection  LUNGS:  No increased work of breathing, good air exchange  CARDIOVASCULAR:  regular rate and rhythm   EXTREMITIES: no signs of clubbing or cyanosis. MUSCULOSKELETAL: negative for flaccid muscle tone or spastic movements. NEURO: Cranial nerves II-XII grossly intact. No signs of agitated mood. SKIN: Right face-  6mm x 10mm, variegated pigment in color, irregular border, raised, no signs of bleeding,drainage or infection. Mild tender to palpation.   Slightly verrucous in nature        DATA:    Labs: CBC:   Lab Results   Component Value Date    WBC 4.5 11/03/2021    RBC 4.49 11/03/2021    HGB 13.3 11/03/2021    HCT 40.9 11/03/2021    MCV 91.1 11/03/2021    MCH 29.6 11/03/2021    MCHC 32.5 11/03/2021    RDW 12.9 11/03/2021     11/03/2021    MPV 10.2 11/03/2021     BMP:    Lab Results   Component Value Date     11/03/2021    K 4.4 11/03/2021    K 4.3 11/09/2018     11/03/2021    CO2 29 11/03/2021    BUN 19 11/03/2021    LABALBU 3.8 11/03/2021    LABALBU 4.4 05/28/2012    CREATININE 0.7 11/03/2021    CALCIUM 10.2 11/03/2021    GFRAA >60 11/03/2021    LABGLOM >60 11/03/2021    GLUCOSE 85 11/03/2021    GLUCOSE 98 05/28/2012       Radiology Review:  No radiology needed at this time  Pathology

## 2022-04-25 NOTE — PROGRESS NOTES
Josiane Herman is a 80 y.o. female evaluated via telephone on 4/29/2022. Consent:  She and/or health care decision maker is aware that that she may receive a bill for this telephone service, depending on her insurance coverage, and has provided verbal consent to proceed: Yes    The patient was in the state [address] during the entirety of the phone conversation. Documentation:  I communicated with the patient and/or health care decision maker about the pathology results from their most recent biopsy.    Details of this discussion including any medical advice provided:     Pathology [address]       [place] [address] [place] [address] 27     [address]            [address]                                                   [place] [facility], [facility] [address] [place], [address] [facility], [address] [place] [address]                                                           [id number]               FINAL SURGICAL PATHOLOGY REPORT     NAME:            SHIRA THORNE        Date of       04/20/2022                                            Collection:   Medical Record   [id number]              Date of       04/21/2022   Number:                                  Receipt:   Age:  66 Y        Sex:  F                Date          04/25/2022 08:22                                            Reported:   Date Of Birth:   1950   Financial        [id number]            Admitting     [place]   Number:                                  Physician:   Patient          HEWMILDRED                   Ordering      YARELI BYNUM   Location:                                Physician:     Accession Number:  HES-                                            Additional Physicians:SRIRAM BURLESON       Diagnosis:   Skin, right side of face: Seborrheic keratosis                                            Dipika Van M.D.                                        (Electronic Signature)         Specimen Submitted:     SKIN LESION, RIGHT FACE      Preoperative Dx:   Pigmented skin lesion of uncertain nature     I explained to the patient their pathology results which are benign in nature. I explained to the patient that they can discontinue bacitracin to the biopsy site and allow the wound to heal by secondary intention. They can cover with a Band-Aid for continued wound covering if there is any open granulation tissue. I finally explained to the patient that they can begin scar massage once the wound is well-healed and to keep sunscreen over this when they were out in the sun as to have optimal scarring. Lastly I informed the patient that although the biopsy results are benign this lesion can ultimately return in the future. Explained to the patient should the lesion return the future to monitor and with any changes bring these changes to our attention. FU-PRN      I affirm this is a Patient Initiated Episode with a Patient who has not had a related appointment within my department in the past 7 days or scheduled within the next 24 hours. Patient identification was verified at the start of the visit: Yes    Total Time: minutes: <5 minutes (not billable)    The visit was conducted pursuant to the emergency declaration under the [facility], [address] [place] [address] authority and the [employer] and [employer] Act. Patient identification was verified, and a caregiver was present when appropriate. The patient was located in a state where the provider was credentialed to provide care.     Note: not billable if this call serves to triage the patient into an appointment for the relevant concern      MARCELLUS Zelaya

## 2022-04-27 ENCOUNTER — HOSPITAL ENCOUNTER (OUTPATIENT)
Dept: RADIATION ONCOLOGY | Age: 72
Discharge: HOME | End: 2022-04-27

## 2022-04-27 DIAGNOSIS — C76.0 HEAD AND NECK CANCER (HCC): Primary | ICD-10-CM

## 2022-04-27 PROCEDURE — 99999 PR OFFICE/OUTPT VISIT,PROCEDURE ONLY: CPT | Performed by: RADIOLOGY

## 2022-04-27 NOTE — PROGRESS NOTES
Radiation Oncology        N/C walk in FU. -new R jaw pain X 3 weeks refractory of ABX Tx   -no visible oral or oropharyngeal lesions on exam today   -no LAD   -B/L xerostomia     -following with Dr. Elsa Rodríguez    Rec:   -cont Abx   -CT (ordered) RO recurrence   -consider Oral Surgery eval, defer to Dr. 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 Jaret Whittington.  Khoi Arredondo MD [place] [address] Oncology  Cell: [phone]    [facility]:  [phone]   FAX: [phone]  [facility]:  [address] [place] [address] Avenue:    [phone]  [address] [place] [address] Road:  [address] [place] [address] Road:  [phone]

## 2022-04-28 ENCOUNTER — TELEPHONE (OUTPATIENT)
Dept: PRIMARY CARE CLINIC | Age: 72
End: 2022-04-28

## 2022-04-29 ENCOUNTER — HOSPITAL ENCOUNTER (OUTPATIENT)
Age: 72
Discharge: HOME | End: 2022-04-29
Payer: COMMERCIAL

## 2022-04-29 ENCOUNTER — SCHEDULED TELEPHONE ENCOUNTER (OUTPATIENT)
Dept: SURGERY | Age: 72
End: 2022-04-29

## 2022-04-29 DIAGNOSIS — E78.00 PURE HYPERCHOLESTEROLEMIA: ICD-10-CM

## 2022-04-29 DIAGNOSIS — R73.9 BLOOD GLUCOSE ELEVATED: ICD-10-CM

## 2022-04-29 DIAGNOSIS — E03.9 ACQUIRED HYPOTHYROIDISM: ICD-10-CM

## 2022-04-29 DIAGNOSIS — L82.1 SK (SEBORRHEIC KERATOSIS): Primary | ICD-10-CM

## 2022-04-29 LAB
ALBUMIN SERPL-MCNC: 4.2 G/DL (ref 3.5–5.2)
ALP BLD-CCNC: 55 U/L (ref 35–104)
ALT SERPL-CCNC: 14 U/L (ref 0–32)
ANION GAP SERPL CALCULATED.3IONS-SCNC: 8 MMOL/L (ref 7–16)
AST SERPL-CCNC: 19 U/L (ref 0–31)
BILIRUB SERPL-MCNC: 1 MG/DL (ref 0–1.2)
BUN BLDV-MCNC: 10 MG/DL (ref 6–23)
CALCIUM SERPL-MCNC: 9.1 MG/DL (ref 8.6–10.2)
CHLORIDE BLD-SCNC: 104 MMOL/L (ref 98–107)
CHOLESTEROL, TOTAL: 247 MG/DL (ref 0–199)
CO2: 29 MMOL/L (ref 22–29)
CREAT SERPL-MCNC: 0.8 MG/DL (ref 0.5–1)
GFR AFRICAN AMERICAN: >60
GFR NON-AFRICAN AMERICAN: >60 ML/MIN/1.73
GLUCOSE BLD-MCNC: 97 MG/DL (ref 74–99)
HBA1C MFR BLD: 5.5 % (ref 4–5.6)
HDLC SERPL-MCNC: 73 MG/DL
LDL CHOLESTEROL CALCULATED: 161 MG/DL (ref 0–99)
POTASSIUM SERPL-SCNC: 4.3 MMOL/L (ref 3.5–5)
SODIUM BLD-SCNC: 141 MMOL/L (ref 132–146)
TOTAL PROTEIN: 6.5 G/DL (ref 6.4–8.3)
TRIGL SERPL-MCNC: 65 MG/DL (ref 0–149)
TSH SERPL DL<=0.05 MIU/L-ACNC: 0.31 UIU/ML (ref 0.27–4.2)
VLDLC SERPL CALC-MCNC: 13 MG/DL

## 2022-04-29 PROCEDURE — 80053 COMPREHEN METABOLIC PANEL: CPT

## 2022-04-29 PROCEDURE — 84443 ASSAY THYROID STIM HORMONE: CPT

## 2022-04-29 PROCEDURE — 99999 PR OFFICE/OUTPT VISIT,PROCEDURE ONLY: CPT | Performed by: PHYSICIAN ASSISTANT

## 2022-04-29 PROCEDURE — 83036 HEMOGLOBIN GLYCOSYLATED A1C: CPT

## 2022-04-29 PROCEDURE — 80061 LIPID PANEL: CPT

## 2022-04-29 PROCEDURE — 36415 COLL VENOUS BLD VENIPUNCTURE: CPT

## 2022-05-04 ENCOUNTER — HOSPITAL ENCOUNTER (OUTPATIENT)
Dept: CT IMAGING | Age: 72
Discharge: HOME | End: 2022-05-06
Payer: COMMERCIAL

## 2022-05-04 DIAGNOSIS — C76.0 HEAD AND NECK CANCER (HCC): ICD-10-CM

## 2022-05-04 PROCEDURE — 2580000003 HC RX 258: Performed by: RADIOLOGY

## 2022-05-04 PROCEDURE — 70492 CT SFT TSUE NCK W/O & W/DYE: CPT

## 2022-05-04 PROCEDURE — 6360000004 HC RX CONTRAST MEDICATION: Performed by: RADIOLOGY

## 2022-05-04 RX ORDER — SODIUM CHLORIDE 0.9 % (FLUSH) 0.9 %
10 SYRINGE (ML) INJECTION PRN
Status: COMPLETED | OUTPATIENT
Start: 2022-05-04 | End: 2022-05-04

## 2022-05-04 RX ADMIN — IOPAMIDOL 75 ML: 755 INJECTION, SOLUTION INTRAVENOUS at 14:34

## 2022-05-04 RX ADMIN — SODIUM CHLORIDE, PRESERVATIVE FREE 10 ML: 5 INJECTION INTRAVENOUS at 14:33

## 2022-05-24 DIAGNOSIS — F51.04 CHRONIC INSOMNIA: Primary | ICD-10-CM

## 2022-05-24 RX ORDER — TEMAZEPAM 15 MG/1
15 CAPSULE ORAL NIGHTLY PRN
COMMUNITY
End: 2022-05-24 | Stop reason: SDUPTHER

## 2022-05-24 RX ORDER — TEMAZEPAM 15 MG/1
15 CAPSULE ORAL NIGHTLY PRN
Qty: 90 CAPSULE | Refills: 0 | Status: SHIPPED
Start: 2022-05-24 | End: 2022-05-25 | Stop reason: SDUPTHER

## 2022-05-25 DIAGNOSIS — F51.04 CHRONIC INSOMNIA: ICD-10-CM

## 2022-05-25 RX ORDER — TEMAZEPAM 15 MG/1
15 CAPSULE ORAL NIGHTLY PRN
Qty: 90 CAPSULE | Refills: 0 | Status: SHIPPED | OUTPATIENT
Start: 2022-05-25 | End: 2022-08-23

## 2022-05-26 ENCOUNTER — OFFICE VISIT (OUTPATIENT)
Dept: PRIMARY CARE CLINIC | Age: 72
End: 2022-05-26
Payer: COMMERCIAL

## 2022-05-26 VITALS
DIASTOLIC BLOOD PRESSURE: 70 MMHG | WEIGHT: 111 LBS | TEMPERATURE: 96.8 F | SYSTOLIC BLOOD PRESSURE: 120 MMHG | BODY MASS INDEX: 20.96 KG/M2 | RESPIRATION RATE: 16 BRPM | HEART RATE: 70 BPM | HEIGHT: 61 IN | OXYGEN SATURATION: 99 %

## 2022-05-26 DIAGNOSIS — T45.1X5A ANEMIA ASSOCIATED WITH CHEMOTHERAPY: ICD-10-CM

## 2022-05-26 DIAGNOSIS — D64.81 ANEMIA ASSOCIATED WITH CHEMOTHERAPY: ICD-10-CM

## 2022-05-26 DIAGNOSIS — E78.00 PURE HYPERCHOLESTEROLEMIA: ICD-10-CM

## 2022-05-26 DIAGNOSIS — Z91.81 AT HIGH RISK FOR FALLS: Primary | ICD-10-CM

## 2022-05-26 DIAGNOSIS — E03.9 ACQUIRED HYPOTHYROIDISM: ICD-10-CM

## 2022-05-26 DIAGNOSIS — R73.03 PREDIABETES: ICD-10-CM

## 2022-05-26 DIAGNOSIS — R73.9 BLOOD GLUCOSE ELEVATED: ICD-10-CM

## 2022-05-26 PROCEDURE — 1036F TOBACCO NON-USER: CPT | Performed by: INTERNAL MEDICINE

## 2022-05-26 PROCEDURE — 1123F ACP DISCUSS/DSCN MKR DOCD: CPT | Performed by: INTERNAL MEDICINE

## 2022-05-26 PROCEDURE — 99214 OFFICE O/P EST MOD 30 MIN: CPT | Performed by: INTERNAL MEDICINE

## 2022-05-26 PROCEDURE — 1090F PRES/ABSN URINE INCON ASSESS: CPT | Performed by: INTERNAL MEDICINE

## 2022-05-26 PROCEDURE — 3017F COLORECTAL CA SCREEN DOC REV: CPT | Performed by: INTERNAL MEDICINE

## 2022-05-26 PROCEDURE — G8427 DOCREV CUR MEDS BY ELIG CLIN: HCPCS | Performed by: INTERNAL MEDICINE

## 2022-05-26 PROCEDURE — G8399 PT W/DXA RESULTS DOCUMENT: HCPCS | Performed by: INTERNAL MEDICINE

## 2022-05-26 PROCEDURE — G8420 CALC BMI NORM PARAMETERS: HCPCS | Performed by: INTERNAL MEDICINE

## 2022-05-26 ASSESSMENT — PATIENT HEALTH QUESTIONNAIRE - PHQ9
5. POOR APPETITE OR OVEREATING: 0
2. FEELING DOWN, DEPRESSED OR HOPELESS: 0
3. TROUBLE FALLING OR STAYING ASLEEP: 1
SUM OF ALL RESPONSES TO PHQ QUESTIONS 1-9: 1
6. FEELING BAD ABOUT YOURSELF - OR THAT YOU ARE A FAILURE OR HAVE LET YOURSELF OR YOUR FAMILY DOWN: 0
SUM OF ALL RESPONSES TO PHQ QUESTIONS 1-9: 1
SUM OF ALL RESPONSES TO PHQ QUESTIONS 1-9: 1
4. FEELING TIRED OR HAVING LITTLE ENERGY: 0
SUM OF ALL RESPONSES TO PHQ QUESTIONS 1-9: 1
9. THOUGHTS THAT YOU WOULD BE BETTER OFF DEAD, OR OF HURTING YOURSELF: 0
10. IF YOU CHECKED OFF ANY PROBLEMS, HOW DIFFICULT HAVE THESE PROBLEMS MADE IT FOR YOU TO DO YOUR WORK, TAKE CARE OF THINGS AT HOME, OR GET ALONG WITH OTHER PEOPLE: 0
7. TROUBLE CONCENTRATING ON THINGS, SUCH AS READING THE NEWSPAPER OR WATCHING TELEVISION: 0
8. MOVING OR SPEAKING SO SLOWLY THAT OTHER PEOPLE COULD HAVE NOTICED. OR THE OPPOSITE, BEING SO FIGETY OR RESTLESS THAT YOU HAVE BEEN MOVING AROUND A LOT MORE THAN USUAL: 0

## 2022-05-26 NOTE — PROGRESS NOTES
Diego Willingham  5/26/22     Chief Complaint   Patient presents with    Hyperlipidemia     6 months w labs     Otalgia     right    Knee Pain     left         No Known Allergies     Current Outpatient Medications   Medication Sig Dispense Refill    temazepam (RESTORIL) 15 MG capsule Take 1 capsule by mouth nightly as needed for Sleep for up to 90 days. 90 capsule 0    SYNTHROID 75 MCG tablet Take 1 tablet by mouth daily 90 tablet 3    RABEprazole (ACIPHEX) 20 MG tablet 1 daily 90 tablet 3    venlafaxine (EFFEXOR XR) 37.5 MG extended release capsule Take 1 capsule by mouth daily 90 capsule 3    rosuvastatin (CRESTOR) 5 MG tablet Take 1 tablet by mouth daily 30 tablet 1    rOPINIRole (REQUIP) 0.5 MG tablet Take 1 tablet by mouth nightly as needed (restless leg) For restless legs 30 tablet 3    NONFORMULARY Indications: elderberry       sucralfate (CARAFATE) 1 GM/10ML suspension Take 10 mLs by mouth 4 times daily 1200 mL 3    B Complex Vitamins (B-COMPLEX/B-12 PO) Take by mouth daily      Cholecalciferol (VITAMIN D PO) Take 1 tablet by mouth 3000 units daily      Biotin 10 MG CAPS Take by mouth daily HOLD      Elastic Bandages & Supports (JOBST KNEE HIGH COMPRESSION SM) MISC Knee high with 20- 30 mmhg of compression 1 each 10    acetaminophen (TYLENOL) 500 MG tablet Take 500 mg by mouth every 6 hours as needed Instructed to take with sip water am of procedure if needs      gabapentin (NEURONTIN) 400 MG capsule Take 1 capsule by mouth 3 times daily for 30 days.  270 capsule 3    venlafaxine (EFFEXOR XR) 75 MG extended release capsule Take 1 capsule by mouth daily (Patient not taking: Reported on 5/26/2022) 90 capsule 3    Cream Base (VERSAPRO) CREA ONE GRAM (ONE PUMP) EXTERNALLY FOUR TIMES A DAY (Patient not taking: Reported on 5/26/2022) 100 g 5     Current Facility-Administered Medications   Medication Dose Route Frequency Provider Last Rate Last Admin    bupivacaine (MARCAINE) 0.25 % injection 15 mg  6 mL IntraDERmal Once Daniel Hairston MD        triamcinolone acetonide (KENALOG-40) injection 40 mg  40 mg IntraMUSCular Once Daniel aHirston MD        triamcinolone acetonide (KENALOG-40) injection 40 mg  40 mg IntraMUSCular Once Daniel Hairston MD        bupivacaine (PF) (MARCAINE) 0.25 % injection 15 mg  6 mL IntraDERmal Once Daniel Hairston MD            HPI: Patient comes in for routine follow-up visit and to review labs. Currently she feels fairly well except for pain in the right mandible area radiating to the right ear and also left knee pain which she notices mostly when she is going downstairs. She does have a history of osteoarthritis involving the left knee and does follow-up with  [facility], orthopedic surgeon and gets occasional steroid injections. She denies any chest pain or shortness of breath. She remains on all her usual meds and supplements the same as listed on her med list except she states she does not take her rosuvastatin on a regular basis. She follows up with her oncologist and ENT specialist for her history of squamous cell carcinoma of the tongue. Review of Systems: as per HPI      Physical Exam:    Patient is a 80 y.o. female. Patient appears to be in no distress. Breathing comfortably. Ambulates without assistance. HEENT: normal.  Neck supple, no adenopathy or bruits. Heart RR, no MGR. Lungs clear. Abd: normal  Ext: no edema. Peripheral pulses: normal.  No neurologic deficits noted.     Lab Results   Component Value Date    WBC 4.5 11/03/2021    HGB 13.3 11/03/2021    HCT 40.9 11/03/2021     11/03/2021    CHOL 247 (H) 04/29/2022    TRIG 65 04/29/2022    HDL 73 04/29/2022    ALT 14 04/29/2022    AST 19 04/29/2022    TSH 0.314 04/29/2022    INR 1.1 08/13/2017    LABA1C 5.5 04/29/2022    LABMICR <12.0 12/08/2015      Lab Results   Component Value Date     04/29/2022    K 4.3 04/29/2022     04/29/2022    CO2 29 04/29/2022    BUN 10 04/29/2022 CREATININE 0.8 04/29/2022    GLUCOSE 97 04/29/2022    CALCIUM 9.1 04/29/2022    PROT 6.5 04/29/2022    LABALBU 4.2 04/29/2022    BILITOT 1.0 04/29/2022    ALKPHOS 55 04/29/2022    AST 19 04/29/2022    ALT 14 04/29/2022    LABGLOM >60 04/29/2022    GFRAA >60 04/29/2022            Assessment:    There are no diagnoses linked to this encounter. Discussion Notes: She will continue her usual meds the same as listed on her med list and is advised that start taking her rosuvastatin 5 mg daily. She will follow-up with orthopedic surgeon regarding her left knee pain and follow-up with her ENT specialist for her right jaw and ear pain. She has an appointment to see an endodontist to rule out anything along those lines as a possible cause for her pain. She is encouraged to try to follow-up low-cholesterol, heart healthy diet and exercise on a regular basis as tolerated. Otherwise return as needed or in 6 months for her annual physical.      On the basis of positive falls risk screening, assessment and plan is as follows: home safety tips provided.

## 2022-05-27 NOTE — TELEPHONE ENCOUNTER
Patient left a voice message requesting refill of [place] Cream     Pt of Dr. Becca Rodriguez     Summary: Disp-100 g, R-5, Fax     Start: 5/9/2021    End: 5/26/2022    Ord/Sold: 5/9/2021 (O)      Report    Long-term:       Pharmacy: [facility] - [address] [place] [address] [place] [phone] - F [phone]    Med Dose History           Patient Sig: ONE GRAM (ONE PUMP) EXTERNALLY FOUR TIMES A DAY       Ordered on: 5/9/2021       Authorized by: Goyo Luke: 100 g       Discontinued On: 5/26/2022       DC Reason: LIST CLEANUP       Note to Pharmacy: CUSTOM COMP TO CONTAIN: METHOCARBAMOL 5%, IBUPROFEN 10%, GABAPENTIN 6% IN SALT BASE            Last Office Visit: 4/5/2022     Order pended and routed for decision and signature.      Pharmacy: [facility]     No OARRS access     Future Appointments   Date Time Provider Arnaldo Conradi   6/17/2022  8:00 AM Dolores Dozier MD [facility]   8/15/2022  8:00 AM Vernell Murguia MD [facility]   11/29/2022  8:00 AM MD CATH[facility]   11/29/2022  8:30 AM MD CATHY [facility]   12/5/2022  8:00 AM Vernell Murguia MD [facility]

## 2022-06-01 RX ORDER — CREAM BASE NO.39
CREAM (GRAM) TOPICAL
Qty: 100 G | Refills: 5 | Status: SHIPPED | OUTPATIENT
Start: 2022-06-01

## 2022-08-26 DIAGNOSIS — F51.01 PRIMARY INSOMNIA: Primary | ICD-10-CM

## 2022-08-26 RX ORDER — TEMAZEPAM 15 MG/1
15 CAPSULE ORAL NIGHTLY PRN
COMMUNITY
End: 2022-08-26 | Stop reason: SDUPTHER

## 2022-08-26 RX ORDER — TEMAZEPAM 15 MG/1
15 CAPSULE ORAL NIGHTLY PRN
Qty: 90 CAPSULE | Refills: 0 | Status: SHIPPED | OUTPATIENT
Start: 2022-08-26 | End: 2022-11-24

## 2022-09-21 ENCOUNTER — HOSPITAL ENCOUNTER (OUTPATIENT)
Dept: RADIATION ONCOLOGY | Age: 72
Discharge: HOME | End: 2022-09-21

## 2022-09-21 DIAGNOSIS — C76.0 HEAD AND NECK CANCER (HCC): Primary | ICD-10-CM

## 2022-09-21 PROCEDURE — 99999 PR OFFICE/OUTPT VISIT,PROCEDURE ONLY: CPT | Performed by: RADIOLOGY

## 2022-09-21 NOTE — PATIENT INSTRUCTIONS
CT  PT  VIOLA Blanco. MD [place] [address] Oncology  Cell: [phone]    [facility]:  [phone]   FAX: [phone] [address] [facility]:   [address] [place] [address] Avenue:    [phone]  [address] [place] [address] Road:  [phone]   FAX:  [phone]    Email: [email or web address]

## 2022-09-21 NOTE — PROGRESS NOTES
Radiation Oncology          -N/C FU  -pt presents with right neck mass per narrative, jaw pain resolved   -RI neck edema about the sternocleidomastoid muscle   -no discreet mass or lesions in the neck SCLAV or mouth  -LE TX rec Gopi PT (ordered)  -CT neck. W (ordered)    -FU 6 mo pending CT findings          Jin Galvan.  Marisabel Hayes MD [place] [address] Oncology  Cell: [phone]    [facility]:  [phone]   FAX: [phone]  [facility]:  [address] [place] [address] Avenue:    [phone]  [facility]:  [address] [place] [address] Road:  [phone]

## 2022-09-27 ENCOUNTER — PROCEDURE VISIT (OUTPATIENT)
Dept: PHYSICAL MEDICINE AND REHAB | Age: 72
End: 2022-09-27
Payer: COMMERCIAL

## 2022-09-27 VITALS
HEIGHT: 62 IN | OXYGEN SATURATION: 100 % | WEIGHT: 110 LBS | DIASTOLIC BLOOD PRESSURE: 70 MMHG | TEMPERATURE: 98.7 F | HEART RATE: 68 BPM | SYSTOLIC BLOOD PRESSURE: 120 MMHG | BODY MASS INDEX: 20.24 KG/M2

## 2022-09-27 DIAGNOSIS — M25.512 CHRONIC PAIN OF BOTH SHOULDERS: Primary | ICD-10-CM

## 2022-09-27 DIAGNOSIS — G89.29 CHRONIC PAIN OF BOTH SHOULDERS: Primary | ICD-10-CM

## 2022-09-27 DIAGNOSIS — M75.121 COMPLETE TEAR OF RIGHT ROTATOR CUFF, UNSPECIFIED WHETHER TRAUMATIC: ICD-10-CM

## 2022-09-27 DIAGNOSIS — M79.18 CERVICAL MYOFASCIAL PAIN SYNDROME: ICD-10-CM

## 2022-09-27 DIAGNOSIS — M79.10 TRIGGER POINT: ICD-10-CM

## 2022-09-27 DIAGNOSIS — M75.122 COMPLETE TEAR OF LEFT ROTATOR CUFF, UNSPECIFIED WHETHER TRAUMATIC: ICD-10-CM

## 2022-09-27 DIAGNOSIS — S43.003D SUBLUXATION OF SHOULDER JOINT, UNSPECIFIED LATERALITY, SUBSEQUENT ENCOUNTER: ICD-10-CM

## 2022-09-27 DIAGNOSIS — M54.12 RADICULITIS OF LEFT CERVICAL REGION: ICD-10-CM

## 2022-09-27 DIAGNOSIS — M25.511 CHRONIC PAIN OF BOTH SHOULDERS: Primary | ICD-10-CM

## 2022-09-27 PROCEDURE — 1036F TOBACCO NON-USER: CPT | Performed by: PHYSICAL MEDICINE & REHABILITATION

## 2022-09-27 PROCEDURE — G8427 DOCREV CUR MEDS BY ELIG CLIN: HCPCS | Performed by: PHYSICAL MEDICINE & REHABILITATION

## 2022-09-27 PROCEDURE — 1123F ACP DISCUSS/DSCN MKR DOCD: CPT | Performed by: PHYSICAL MEDICINE & REHABILITATION

## 2022-09-27 PROCEDURE — 1090F PRES/ABSN URINE INCON ASSESS: CPT | Performed by: PHYSICAL MEDICINE & REHABILITATION

## 2022-09-27 PROCEDURE — 3017F COLORECTAL CA SCREEN DOC REV: CPT | Performed by: PHYSICAL MEDICINE & REHABILITATION

## 2022-09-27 PROCEDURE — G8399 PT W/DXA RESULTS DOCUMENT: HCPCS | Performed by: PHYSICAL MEDICINE & REHABILITATION

## 2022-09-27 PROCEDURE — 99214 OFFICE O/P EST MOD 30 MIN: CPT | Performed by: PHYSICAL MEDICINE & REHABILITATION

## 2022-09-27 PROCEDURE — 20611 DRAIN/INJ JOINT/BURSA W/US: CPT | Performed by: PHYSICAL MEDICINE & REHABILITATION

## 2022-09-27 PROCEDURE — G8420 CALC BMI NORM PARAMETERS: HCPCS | Performed by: PHYSICAL MEDICINE & REHABILITATION

## 2022-09-27 RX ORDER — TRIAMCINOLONE ACETONIDE 40 MG/ML
80 INJECTION, SUSPENSION INTRA-ARTICULAR; INTRAMUSCULAR ONCE
Status: COMPLETED | OUTPATIENT
Start: 2022-09-27 | End: 2022-09-27

## 2022-09-27 RX ORDER — BUPIVACAINE HYDROCHLORIDE 2.5 MG/ML
50 INJECTION, SOLUTION INFILTRATION; PERINEURAL ONCE
Status: CANCELLED | OUTPATIENT
Start: 2022-09-27 | End: 2022-09-27

## 2022-09-27 RX ORDER — BUPIVACAINE HYDROCHLORIDE 2.5 MG/ML
6 INJECTION, SOLUTION INFILTRATION; PERINEURAL ONCE
Status: COMPLETED | OUTPATIENT
Start: 2022-09-27 | End: 2022-09-27

## 2022-09-27 RX ADMIN — BUPIVACAINE HYDROCHLORIDE 15 MG: 2.5 INJECTION, SOLUTION INFILTRATION; PERINEURAL at 14:32

## 2022-09-27 RX ADMIN — TRIAMCINOLONE ACETONIDE 80 MG: 40 INJECTION, SUSPENSION INTRA-ARTICULAR; INTRAMUSCULAR at 14:36

## 2022-09-27 NOTE — PROGRESS NOTES
Santos Pendleton M.D. [address] [facility] PHYSICAL MEDICINE AND REHABILITAION  [address] [facility] [address]  Dept: [phone]  Dept Fax: [phone]    PCP: Dora Ellis MD  Date of visit: 9/27/22    Chief Complaint   Patient presents with    Pain     Both shoulders        Tyrone Cuevas is a 71 y.o.  right hand dominant woman who presents for follow up of upper extremity pain and bilateral rotator cuff tears. 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 Chemotherapy was started on 10/6/2016 with Dr. Martinez Meredith. November 2016 she completed head and neck irradiation for management of right base of tongue SCC. She reports she finished all treatment (chemo/radiation) November 2016. She denies any arm pain or numbness/tingling after her chemo/radiation, until her more recent injuries with rotator cuff tears. Her primary complaint is bilateral shoulder pain, which [facility] attributes [facility]. She had massive full thickness bilateral supraspinatus and infraspinatus tears-- the right rotator cuff in December 2017 and left rotator cuff in January 2018. She saw Ortho--they did not recommend surgery due to chemo/radiation and osteoporosis -- unless pain unbearable --only then would consider shoulder replacement. Patient was cleared for shoulder surgery from radiation oncology standpoint IF indicated/recommended by Ortho. Patient does not wish for surgery at this time. Interval history:   Since last visit patient reports US guided bilateral subacromial injections and trigger point injections continue to provide significant relief of shoulder pain and trapezius spasm, allowing her to be more functional. Relief from injections is lasting 3-4 months.  Relief from shoulder injections is now wearing off and her typical pain is returning. She feels that she has lost a little bit of her range of motion in her shoulders. No new injury. Compound cream helps with her pain. She is doing her home exercises regularly. No other changes. She had a cervical epidural performed by Dr. Aminata Meredith in August 2021 and has not had radicular pain down the arm since that injection. No new weakness or numbness. No other changes reported. She is following with Dr. Aminata Meredith for neck pain and cervical radiculitis and had improvement in upper extremity radiating pain after cervical BINTA in December 2020, and repeat BINTA in August 2021. She is taking gabapentin 400mg TID and using voltaren gel / compound cream on her shoulders with reported benefit. Shoulder pain is located at the lateral aspect of the shoulders and subacromial space bilaterally, without current radiation to the upper extremities. No associated numbness/tingling, or paresthesia in the upper extremities. There is some weakness about the shoulders bilaterally, unchanged. No weakness of biceps/triceps, forearm or intrinsic hand muscles. The shoulder pain is constant and aching. Pain is worse with overhead activity. Physical therapy has helped in the past with range of motion and function. The prior workup has included:  Bilateral shoulder MRI - May 2018  -Right shoulder MRI: massive full thickness rotator cuff tear involving the supraspinatus and infraspinatus with retraction and uncovering of the humeral head which is high riding and articulating with the undersurface of the acromian. There is OA of the AC joint.   -Left shoulder MRI: massive full thickness rotator cuff tear involving the supraspinatus and infraspinatus with retraction and uncovering of the humeral head which is high riding and articulating with the undersurface of the acromian.  There is OA of the [facility] joint.  -Cervical xray 11/10/2020   FINDINGS:   No acute fracture or dislocation is evident. There is degenerative disc   disease which is especially severe at C5-6 followed by C6-7. There is mild   retrolisthesis of C5 on C6 which is thought to be degenerative. Unremarkable   soft tissues. The bones appear somewhat demineralized. Impression   Degenerative disc disease. Osteopenia. See above. -Cervical MRI 11/24/2020  FINDINGS:   BONES/ALIGNMENT: There is mild retrolisthesis of C5 on C6 and of C6 on C7. There is degenerative disc disease with disc space narrowing and osteophytes   at C4-5, C5-6, and C6-7. SPINAL CORD:  No abnormal signal is identified within the spinal cord. SOFT TISSUES: No paraspinal mass identified. There is a small nodule in the   right lobe of the thyroid for which no follow-up is suggested per ACR   criteria. C2-C3:  The thecal sac and neural foramina are intact. C3-C4: There are small osteophytes. The thecal sac is not stenotic. C4-C5: There is a minimal disc protrusion. The thecal sac and neural foramina   are intact. C5-C6: There is a disc protrusion measuring 2 mm. The thecal sac is mildly   stenotic measuring 9.7 mm. Disc and/or osteophytes result in mild narrowing   of the neural foramina bilaterally. C6-C7: There is disc protrusion and osteophyte measuring 2-3 mm. The thecal   sac is mildly stenotic measuring 9.8 mm. Disc and/or osteophytes result in   narrowing of the neural foramina bilaterally. C7-T1:  The thecal sac and neural foramina are intact. Impression   Disc and osteophytes result in minimal narrowing of the neural foramina and   mild stenosis of the thecal sac at C5-6 and C6-7 as discussed above. The prior treatment has included:  PT: Most recent PT spring 2022- improvement in bilateral shoulder ROM and function. Dry needling with relief. Has also attended lymphedema therapy with improvement. Chiropractic: None  Modalities: Biofreeze with partial relief. Eucedrin cream  with partial relief. Has not tried heat/ice. Compound cream with relief. OTC Tylenol: Takes occasionally with partial relief   NSAIDS: Mobic 7.5mg with relief, stopped taking due to bruising   Opioids: None  Membrane stabilizers: gabapentin 400mg for numbness in left leg, left cervical radiculitis. Muscle relaxers: None  Previous injections: Bilateral US guided shoulder subacromial injections with relief of pain. Trigger point injections with relief. Cervical BINTA C7-T1 (Dr. Carlos Tyler on 12/10/2020; August 2021) with improvement in left cervical radiculitis symptoms. Previous surgery at this site: No shoulder surgeries. Previously saw Ortho for b/l shoulder RTC tears- per patient they did not recommend surgery due to chemo/radiation and osteoporosis -- unless pain unbearable --only then would consider shoulder replacement     Pascual Rivera  continues home exercises/stretches from therapy. The patient does perform regular exercise.        Past Medical History:   Diagnosis Date    Anemia associated with chemotherapy 11/16/2016    resolved    Anxiety     Cancer ([address]     tongue    Cancer of tongue ([address] 9/16/2016    treated with chemo and radiation / last treatment 11/2016    Cancer of tonsil ([address]     Cervical herniation     no limits on rom    Difficulty sleeping     Discoloration of skin of foot 6/25/2020    Dizziness 6/20/2018    GERD (gastroesophageal reflux disease)     History of therapeutic radiation     Hx antineoplastic chemo     Hx of cataract surgery 10/06/2020    Hyperlipidemia     Hypokalemia 11/16/2016    with chemo / resolved    Hypomagnesemia 11/16/2016    related to chemo / resloved    Hypothyroidism     Leg swelling 7/26/2017    Lumbar herniated disc     after fell 2/2017 / now has chronic back pain and numbnes at left foot and ankle    Lymphedema of both lower extremities 07/26/2017    wears support hose    Lymphedema of lower extremity     Osteopenia     PONV (postoperative nausea and vomiting)     Restless leg syndrome     Rotator cuff tear     bilateral    Sleep apnea     Thyroid disease     Toe cyanosis 6/25/2020       Past Surgical History:   Procedure Laterality Date    BLADDER REPAIR  1995    COLONOSCOPY  2008    neg    COLONOSCOPY  03/19/2014    ENDOSCOPY, COLON, DIAGNOSTIC  2012    EYE SURGERY      cataract surgery, bilateral.     FRACTURE SURGERY  2012    RIGHT ELBOW    HYSTERECTOMY  1993    lavh&BSO&A&P    [address]  09/08/2016    direct laryngoscopy right tonsil/tongue base biopsy cervical esophagoscopy fine needle aspiration     NERVE BLOCK      OTHER SURGICAL HISTORY  5/28/12    ORIF RIGHT ULNA    OTHER SURGICAL HISTORY  6/18/2012    orif  rivision right olecranon    OTHER SURGICAL HISTORY Right 01/16/2014    Right elbow hardware removal    ME OFFICE/OUTPT VISIT,PROCEDURE ONLY N/A 11/9/2018    PORT REMOVAL performed by Arben Terrazas MD at [address]   1MONTHS OF AGE    H/O ESOPHAGEAL WEB    TUNNELED VENOUS PORT PLACEMENT  10/04/2016    Dr. Amy Hubbard History     Socioeconomic History    Marital status:      Spouse name: Not on file    Number of children: Not on file    Years of education: Not on file    Highest education level: Not on file   Occupational History    Not on file   Tobacco Use    Smoking status: Never    Smokeless tobacco: Never   Vaping Use    Vaping Use: Never used   Substance and Sexual Activity    Alcohol use: No     Alcohol/week: 0.0 standard drinks    Drug use: No    Sexual activity: Not on file   Other Topics Concern    Not on file   Social History Narrative    Not on file     Social Determinants of Health     Financial Resource Strain: Low Risk     Difficulty of Paying Living Expenses: Not hard at all   Food Insecurity: No Food Insecurity    Worried About [employer] in the Last Year: Never true    [address] [address] [employer] in the Last Year: Never true   Transportation Needs: Not on file   Physical Activity: Not on file   Stress: Not on file   Social Connections: Not on file   Intimate Partner Violence: Not on file   Housing Stability: Not on file       The patient is retired. [facility]  Does not require an assistive device. Family History   Problem Relation Age of Onset    Hypertension Father     High Blood Pressure Father     Arthritis Father     Cancer Mother         uterine    Diabetes Mother     Hypertension Mother     High Blood Pressure Mother     Depression Mother     Other Mother         endocrine disorder       No Known Allergies    Current Outpatient Medications   Medication Sig Dispense Refill    temazepam (RESTORIL) 15 MG capsule Take 1 capsule by mouth nightly as needed for Sleep for up to 90 days. 90 capsule 0    Cream Base (VERSAPRO) CREA ONE GRAM (ONE PUMP) EXTERNALLY FOUR TIMES A  g 5    SYNTHROID 75 MCG tablet Take 1 tablet by mouth daily 90 tablet 3    gabapentin (NEURONTIN) 400 MG capsule Take 1 capsule by mouth 3 times daily for 30 days.  270 capsule 3    RABEprazole (ACIPHEX) 20 MG tablet 1 daily 90 tablet 3    venlafaxine (EFFEXOR XR) 37.5 MG extended release capsule Take 1 capsule by mouth daily 90 capsule 3    rosuvastatin (CRESTOR) 5 MG tablet Take 1 tablet by mouth daily 30 tablet 1    rOPINIRole (REQUIP) 0.5 MG tablet Take 1 tablet by mouth nightly as needed (restless leg) For restless legs 30 tablet 3    NONFORMULARY Indications: elderberry       sucralfate (CARAFATE) 1 GM/10ML suspension Take 10 mLs by mouth 4 times daily 1200 mL 3    B Complex Vitamins (B-COMPLEX/B-12 PO) Take by mouth daily      Cholecalciferol (VITAMIN D PO) Take 1 tablet by mouth 3000 units daily      Biotin 10 MG CAPS Take by mouth daily HOLD      Elastic Bandages & Supports (JOBST KNEE HIGH COMPRESSION SM) MISC Knee high with 20- 30 mmhg of compression 1 each 10    acetaminophen (TYLENOL) 500 MG tablet Take 500 mg by mouth every 6 hours as needed Instructed to take with sip water am of procedure if needs       Current Facility-Administered Medications   Medication Dose Route Frequency Provider Last Rate Last Admin    bupivacaine (MARCAINE) 0.25 % injection 15 mg  6 mL IntraDERmal Once Theo Poon MD        triamcinolone acetonide (KENALOG-40) injection 40 mg  40 mg IntraMUSCular Once Theo Poon MD        triamcinolone acetonide (KENALOG-40) injection 40 mg  40 mg IntraMUSCular Once Theo Poon MD        bupivacaine (PF) (MARCAINE) 0.25 % injection 15 mg  6 mL IntraDERmal Once Theo Poon MD           Review of Systems  General: No chills, fatigue, fever, malaise, night sweats, weight gain,  weight loss. Psychological: No anxiety, depression, suicidal ideation   Ophthalmic: No blurry vision, decreased vision, double vision, loss of vision  Ear Nose Throat: No hearing loss, tinnitus, phonophobia, sensitivity to smells, vertigo, or vocal changes. Allergy/Immunology: No watery eyes, itchy eyes, frequent infections. Hematological and Lymphatic: No bleeding problems, blood clots, bruising  Endocrine:  No polydypsia, polyuria, temperature intolerance. Respiratory: No cough, shortness of breath, wheezing. Cardiovascular: No syncope, chest pain, dyspnea on exertion,palpitations. Gastrointestinal: No abdominal pain, hematemesis, melena, nausea, vomiting, stool incontinence  Genito-Urinary: No dysuria, hematuria, incontinence   Musculoskeletal: Per HPI  Neurological: Per HPI  Dermatological:  No rash      Physical Exam:   Vitals:    09/27/22 1301   BP: 120/70   Pulse: 68   Temp: 98.7 °F (37.1 °C)   SpO2: 100%      General: The patient is in no apparent distress. HEENT: No rhinorrhea, sneezing, yawning, or lacrimation. No scleral icterus or conjunctival injection. SKIN: No piloerection. No track marks. No rash. Normal turgor. No erythema or ecchymosis. Psychological: Mood and affect are appropriate. Hygiene is appropriate. Cardiovascular: There is no edema. Respiratory: Respirations are regular and unlabored. There is no cyanosis. Gastrointestinal: No abdominal distension   MSK: Shoulder: No edema, erythema, ecchymosis, effusion, or mass of bilateral shoulders. Right shoulder + sulcus sign. Left shoulder slight + sulcus sign. Full passive flexion bilateral shoulders, mild pain at terminal abduction. Mildly decreased passive abduction of both shoulders. Cannot actively abduct beyond 90 deg when trapezius is eliminated from action. Decreased internal rotation b/l shoulders. No crepitus. No tenderness to palpation at the acromioclavicular joint or bicipital groove. There is tenderness at bilateral subacromial space. There is weakness with Empty can test bilaterally (3-/5). Weak with Speed's bilaterally. Minimal pain with bilateral Jadon-Land. Full painless cervical spine and elbow ROM. Negative Spurling. There is bilateral upper trapezius muscle spasm and trigger points. No upper extremity edema. Neurologic: Awake, alert and oriented in three planes. Speech is fluent. No facial weakness. Hearing is intact for conversation. Pupils are equal and round. Strength:   R  L  Deltoid   5-  5-  Biceps   5  5  Triceps  5  5  Wrist Ext  5  5  Finger Abd  5  5      Sensory:  Intact for light touch in all upper extremity dermatomes. Reflexes:   R  L  Biceps   2 2  Triceps  2 2  BR   2 2       No pathological reflexes     Gait is normal.       US guided Glenohumeral joint Injection Procedure: Bilateral  After explaining the indications, risks, benefits and alternatives of a bilateral glenohumeral injection, the patient agreed to proceed. A permit was signed and scanned to the media. The patient was placed in the seated position. Chloraprep was used to sterilize the skin.  Using an aseptic, no touch technique, a 22 gauge, 1.5\" needle with 1 cc of Kenalog 40mg/cc and 3 cc of bupivacaine 0.25% was directed, under ultrasound guidance, to the right glenohumeral joint. After negative aspiration the medication was injected. Adequate hemostasis was achieved and the injection site was covered with a bandage. The exact same procedure was performed on the left side. US images were scanned to media separately. The patient was observed for complication and left the office without incident. The patient tolerated the procedure well and was educated in post injection care. Impression:   -Bilateral shoulder pain   -Bilateral massive rotator cuff tears.   -Right shoulder subluxation  -Left cervical radiculitis -- radicular symptoms improved after cervical BINTA, patient is following with Dr. Georgia Jacinto  -Cervical myofascial pain  -Trigger points          Plan:   US guided bilateral glenohumeral joint injections today, see above for details. Resume PT to work on shoulder range of motion, new orders placed. Continue home exercises for bilateral shoulder ROM, gentle strengthening, pain reduction techniques. Reviewed importance of maintaining range of motion. Continue compound cream as needed   Left cervical radicular symptoms improved after prior epidural with Dr. Georgia Jacinto. The patient was educated about the diagnosis, prognosis, indications, risks and benefits of treatment. An opportunity to ask questions was given to the patient and questions were answered. The patient agreed to proceed with the recommended treatment as described above. Follow up 4 months       Jerri Poe M.D.   Physical Medicine and Rehabilitation

## 2022-09-28 ENCOUNTER — TELEPHONE (OUTPATIENT)
Dept: PRIMARY CARE CLINIC | Age: 72
End: 2022-09-28

## 2022-09-28 DIAGNOSIS — R79.9 ELEVATED BUN: Primary | ICD-10-CM

## 2022-09-28 NOTE — TELEPHONE ENCOUNTER
Pt called in needs a BUN and Creatine orders for ct scan Friday.  Pt wants to get lab work done today or tomorrow

## 2022-09-29 ENCOUNTER — HOSPITAL ENCOUNTER (OUTPATIENT)
Age: 72
Discharge: HOME | End: 2022-09-29
Payer: COMMERCIAL

## 2022-09-29 DIAGNOSIS — C02.9 CANCER OF TONGUE (HCC): ICD-10-CM

## 2022-09-29 LAB
BUN BLDV-MCNC: 36 MG/DL (ref 6–23)
CREAT SERPL-MCNC: 0.9 MG/DL (ref 0.5–1)
GFR AFRICAN AMERICAN: >60
GFR NON-AFRICAN AMERICAN: >60 ML/MIN/1.73

## 2022-09-29 PROCEDURE — 36415 COLL VENOUS BLD VENIPUNCTURE: CPT

## 2022-09-29 PROCEDURE — 82565 ASSAY OF CREATININE: CPT

## 2022-09-29 PROCEDURE — 84520 ASSAY OF UREA NITROGEN: CPT

## 2022-09-30 ENCOUNTER — HOSPITAL ENCOUNTER (OUTPATIENT)
Dept: CT IMAGING | Age: 72
Discharge: HOME | End: 2022-10-02
Payer: COMMERCIAL

## 2022-09-30 DIAGNOSIS — C76.0 HEAD AND NECK CANCER (HCC): ICD-10-CM

## 2022-09-30 PROCEDURE — G1010 CDSM STANSON: HCPCS

## 2022-09-30 PROCEDURE — 6360000004 HC RX CONTRAST MEDICATION: Performed by: RADIOLOGY

## 2022-09-30 RX ADMIN — IOPAMIDOL 75 ML: 755 INJECTION, SOLUTION INTRAVENOUS at 07:50

## 2022-11-02 ENCOUNTER — TELEPHONE (OUTPATIENT)
Dept: PRIMARY CARE CLINIC | Age: 72
End: 2022-11-02

## 2022-11-03 ENCOUNTER — OFFICE VISIT (OUTPATIENT)
Dept: PRIMARY CARE CLINIC | Age: 72
End: 2022-11-03
Payer: COMMERCIAL

## 2022-11-03 VITALS
SYSTOLIC BLOOD PRESSURE: 120 MMHG | HEART RATE: 73 BPM | TEMPERATURE: 97.6 F | WEIGHT: 114 LBS | OXYGEN SATURATION: 100 % | HEIGHT: 62 IN | BODY MASS INDEX: 20.98 KG/M2 | DIASTOLIC BLOOD PRESSURE: 75 MMHG | RESPIRATION RATE: 20 BRPM

## 2022-11-03 DIAGNOSIS — R11.0 NAUSEA: ICD-10-CM

## 2022-11-03 DIAGNOSIS — M62.838 CERVICAL PARASPINAL MUSCLE SPASM: Primary | ICD-10-CM

## 2022-11-03 PROCEDURE — 1036F TOBACCO NON-USER: CPT | Performed by: INTERNAL MEDICINE

## 2022-11-03 PROCEDURE — G8399 PT W/DXA RESULTS DOCUMENT: HCPCS | Performed by: INTERNAL MEDICINE

## 2022-11-03 PROCEDURE — G8420 CALC BMI NORM PARAMETERS: HCPCS | Performed by: INTERNAL MEDICINE

## 2022-11-03 PROCEDURE — 3017F COLORECTAL CA SCREEN DOC REV: CPT | Performed by: INTERNAL MEDICINE

## 2022-11-03 PROCEDURE — 1090F PRES/ABSN URINE INCON ASSESS: CPT | Performed by: INTERNAL MEDICINE

## 2022-11-03 PROCEDURE — 99213 OFFICE O/P EST LOW 20 MIN: CPT | Performed by: INTERNAL MEDICINE

## 2022-11-03 PROCEDURE — 1123F ACP DISCUSS/DSCN MKR DOCD: CPT | Performed by: INTERNAL MEDICINE

## 2022-11-03 PROCEDURE — G8427 DOCREV CUR MEDS BY ELIG CLIN: HCPCS | Performed by: INTERNAL MEDICINE

## 2022-11-03 PROCEDURE — G8484 FLU IMMUNIZE NO ADMIN: HCPCS | Performed by: INTERNAL MEDICINE

## 2022-11-03 RX ORDER — TIZANIDINE 2 MG/1
2 TABLET ORAL NIGHTLY PRN
Qty: 30 TABLET | Refills: 0 | Status: SHIPPED
Start: 2022-11-03 | End: 2022-12-01 | Stop reason: SDUPTHER

## 2022-11-03 RX ORDER — ONDANSETRON 4 MG/1
4 TABLET, ORALLY DISINTEGRATING ORAL 3 TIMES DAILY PRN
Qty: 21 TABLET | Refills: 2 | Status: SHIPPED | OUTPATIENT
Start: 2022-11-03

## 2022-11-03 SDOH — ECONOMIC STABILITY: FOOD INSECURITY: WITHIN THE PAST 12 MONTHS, YOU WORRIED THAT YOUR FOOD WOULD RUN OUT BEFORE YOU GOT MONEY TO BUY MORE.: NEVER TRUE

## 2022-11-03 SDOH — ECONOMIC STABILITY: FOOD INSECURITY: WITHIN THE PAST 12 MONTHS, THE FOOD YOU BOUGHT JUST DIDN'T LAST AND YOU DIDN'T HAVE MONEY TO GET MORE.: NEVER TRUE

## 2022-11-03 ASSESSMENT — SOCIAL DETERMINANTS OF HEALTH (SDOH): HOW HARD IS IT FOR YOU TO PAY FOR THE VERY BASICS LIKE FOOD, HOUSING, MEDICAL CARE, AND HEATING?: NOT HARD AT ALL

## 2022-11-03 NOTE — PROGRESS NOTES
Cm reviewed pt care coordination rounds  Pt is for a cath today  Tentative d/c tomorrow  Cm will f/u for final medical clearance and dcp  Yousif Check  11/3/22     Chief Complaint   Patient presents with    Headache    Loss of Consciousness     Pt feeling as if she needs to pass out         No Known Allergies     Current Outpatient Medications   Medication Sig Dispense Refill    ondansetron (ZOFRAN-ODT) 4 MG disintegrating tablet Take 1 tablet by mouth 3 times daily as needed for Nausea or Vomiting 21 tablet 2    tiZANidine (ZANAFLEX) 2 MG tablet Take 1 tablet by mouth nightly as needed (muscle spasms) 30 tablet 0    temazepam (RESTORIL) 15 MG capsule Take 1 capsule by mouth nightly as needed for Sleep for up to 90 days. 90 capsule 0    Cream Base (VERSAPRO) CREA ONE GRAM (ONE PUMP) EXTERNALLY FOUR TIMES A  g 5    RABEprazole (ACIPHEX) 20 MG tablet 1 daily 90 tablet 3    venlafaxine (EFFEXOR XR) 37.5 MG extended release capsule Take 1 capsule by mouth daily 90 capsule 3    rosuvastatin (CRESTOR) 5 MG tablet Take 1 tablet by mouth daily 30 tablet 1    rOPINIRole (REQUIP) 0.5 MG tablet Take 1 tablet by mouth nightly as needed (restless leg) For restless legs 30 tablet 3    NONFORMULARY Indications: elderberry       sucralfate (CARAFATE) 1 GM/10ML suspension Take 10 mLs by mouth 4 times daily 1200 mL 3    B Complex Vitamins (B-COMPLEX/B-12 PO) Take by mouth daily      Cholecalciferol (VITAMIN D PO) Take 1 tablet by mouth 3000 units daily      Biotin 10 MG CAPS Take by mouth daily HOLD      Elastic Bandages & Supports (JOBST KNEE HIGH COMPRESSION ) MISC Knee high with 20- 30 mmhg of compression 1 each 10    acetaminophen (TYLENOL) 500 MG tablet Take 500 mg by mouth every 6 hours as needed Instructed to take with sip water am of procedure if needs      SYNTHROID 75 MCG tablet Take 1 tablet by mouth daily 90 tablet 3    gabapentin (NEURONTIN) 400 MG capsule Take 1 capsule by mouth 3 times daily for 30 days.  270 capsule 3     Current Facility-Administered Medications   Medication Dose Route Frequency Provider Last Rate Last Admin    bupivacaine (MARCAINE) 0.25 % injection 15 mg  6 mL IntraDERmal Once Fern Lombard, MD        triamcinolone acetonide (KENALOG-40) injection 40 mg  40 mg IntraMUSCular Once Fern Lombard, MD        triamcinolone acetonide (KENALOG-40) injection 40 mg  40 mg IntraMUSCular Once Fern Lombard, MD        bupivacaine (PF) (MARCAINE) 0.25 % injection 15 mg  6 mL IntraDERmal Once Fern Lombard, MD            HPI: Patient comes in complaining of headaches originating in the back of the neck and occasionally radiating up over the top of her head to the frontal area. Also occasionally she becomes lightheaded when she has her neck in the flexed position. She recently had a CAT scan of her neck ordered by her radiation therapist and that revealed some mild soft tissue swelling on the right side and some degenerative changes of her cervical spine. Her blood pressure has been somewhat low lately. She has been trying to make an effort to stay hydrated. Also she complains with occasional episodes of nausea and is requesting a prescription for Zofran which she has taken in the past.    Review of Systems: as per HPI      Physical Exam:    Patient is a 71 y.o. female. Patient appears to be in no distress. Breathing comfortably. Ambulates without assistance. HEENT: normal.  Neck supple, no adenopathy or bruits. There is some tightness of her posterior cervical musculature. Heart RR, no MGR. Lungs clear. Abd: normal  Ext: no edema. Peripheral pulses: normal.  No neurologic deficits noted. Assessment:    Kyler Jung was seen today for headache and loss of consciousness. Diagnoses and all orders for this visit:    Cervical paraspinal muscle spasm  -     tiZANidine (ZANAFLEX) 2 MG tablet; Take 1 tablet by mouth nightly as needed (muscle spasms)    Nausea  -     ondansetron (ZOFRAN-ODT) 4 MG disintegrating tablet; Take 1 tablet by mouth 3 times daily as needed for Nausea or Vomiting        Discussion Notes:  We will try patient on tizanidine 2 mg at at bedtime as needed for cervical muscle spasm. Also she will follow-up with her physical medicine specialist Dr. Kevin Al, for possible trigger point injections. Also she is given a prescription for Zofran ODT tabs 4 mg to take as needed for nausea. She will follow-up with her oncologist and radiation oncologist for her history of tongue cancer as per their instructions. Otherwise she is due for her annual wellness visit in about 3 weeks.

## 2022-11-29 ENCOUNTER — OFFICE VISIT (OUTPATIENT)
Dept: PRIMARY CARE CLINIC | Age: 72
End: 2022-11-29

## 2022-11-29 VITALS
TEMPERATURE: 97.1 F | BODY MASS INDEX: 20.06 KG/M2 | RESPIRATION RATE: 18 BRPM | OXYGEN SATURATION: 98 % | HEART RATE: 84 BPM | WEIGHT: 109 LBS | DIASTOLIC BLOOD PRESSURE: 80 MMHG | SYSTOLIC BLOOD PRESSURE: 122 MMHG | HEIGHT: 62 IN

## 2022-11-29 DIAGNOSIS — I89.0 LYMPHEDEMA OF BOTH LOWER EXTREMITIES: ICD-10-CM

## 2022-11-29 DIAGNOSIS — F41.9 ANXIETY AND DEPRESSION: Chronic | ICD-10-CM

## 2022-11-29 DIAGNOSIS — I10 PRIMARY HYPERTENSION: ICD-10-CM

## 2022-11-29 DIAGNOSIS — C02.9 CANCER OF TONGUE (HCC): ICD-10-CM

## 2022-11-29 DIAGNOSIS — Z78.9 STATIN INTOLERANCE: ICD-10-CM

## 2022-11-29 DIAGNOSIS — T45.1X5A ANEMIA ASSOCIATED WITH CHEMOTHERAPY: ICD-10-CM

## 2022-11-29 DIAGNOSIS — E03.9 ACQUIRED HYPOTHYROIDISM: ICD-10-CM

## 2022-11-29 DIAGNOSIS — D64.81 ANEMIA ASSOCIATED WITH CHEMOTHERAPY: ICD-10-CM

## 2022-11-29 DIAGNOSIS — Z00.00 MEDICARE ANNUAL WELLNESS VISIT, SUBSEQUENT: Primary | ICD-10-CM

## 2022-11-29 DIAGNOSIS — M25.551 RIGHT HIP PAIN: ICD-10-CM

## 2022-11-29 DIAGNOSIS — R73.03 PREDIABETES: ICD-10-CM

## 2022-11-29 DIAGNOSIS — E78.00 PURE HYPERCHOLESTEROLEMIA: ICD-10-CM

## 2022-11-29 DIAGNOSIS — F32.A ANXIETY AND DEPRESSION: Chronic | ICD-10-CM

## 2022-11-29 ASSESSMENT — PATIENT HEALTH QUESTIONNAIRE - PHQ9
3. TROUBLE FALLING OR STAYING ASLEEP: 0
6. FEELING BAD ABOUT YOURSELF - OR THAT YOU ARE A FAILURE OR HAVE LET YOURSELF OR YOUR FAMILY DOWN: 0
4. FEELING TIRED OR HAVING LITTLE ENERGY: 1
8. MOVING OR SPEAKING SO SLOWLY THAT OTHER PEOPLE COULD HAVE NOTICED. OR THE OPPOSITE, BEING SO FIGETY OR RESTLESS THAT YOU HAVE BEEN MOVING AROUND A LOT MORE THAN USUAL: 0
7. TROUBLE CONCENTRATING ON THINGS, SUCH AS READING THE NEWSPAPER OR WATCHING TELEVISION: 0
SUM OF ALL RESPONSES TO PHQ QUESTIONS 1-9: 2
9. THOUGHTS THAT YOU WOULD BE BETTER OFF DEAD, OR OF HURTING YOURSELF: 0
SUM OF ALL RESPONSES TO PHQ QUESTIONS 1-9: 2
2. FEELING DOWN, DEPRESSED OR HOPELESS: 1
SUM OF ALL RESPONSES TO PHQ QUESTIONS 1-9: 2
SUM OF ALL RESPONSES TO PHQ QUESTIONS 1-9: 2
5. POOR APPETITE OR OVEREATING: 0
10. IF YOU CHECKED OFF ANY PROBLEMS, HOW DIFFICULT HAVE THESE PROBLEMS MADE IT FOR YOU TO DO YOUR WORK, TAKE CARE OF THINGS AT HOME, OR GET ALONG WITH OTHER PEOPLE: 0

## 2022-11-29 ASSESSMENT — LIFESTYLE VARIABLES
HOW OFTEN DO YOU HAVE A DRINK CONTAINING ALCOHOL: NEVER
HOW MANY STANDARD DRINKS CONTAINING ALCOHOL DO YOU HAVE ON A TYPICAL DAY: PATIENT DOES NOT DRINK

## 2022-11-29 NOTE — PATIENT INSTRUCTIONS
Personalized Preventive Plan for Ana Jones - 11/29/2022  Medicare offers a range of preventive health benefits. Some of the tests and screenings are paid in full while other may be subject to a deductible, co-insurance, and/or copay. Some of these benefits include a comprehensive review of your medical history including lifestyle, illnesses that may run in your family, and various assessments and screenings as appropriate. After reviewing your medical record and screening and assessments performed today your provider may have ordered immunizations, labs, imaging, and/or referrals for you. A list of these orders (if applicable) as well as your Preventive Care list are included within your After Visit Summary for your review. Other Preventive Recommendations:    A preventive eye exam performed by an eye specialist is recommended every 1-2 years to screen for glaucoma; cataracts, macular degeneration, and other eye disorders. A preventive dental visit is recommended every 6 months. Try to get at least 150 minutes of exercise per week or 10,000 steps per day on a pedometer . Order or download the FREE \"Exercise & Physical Activity: Your Everyday Guide\" from [employer]. Call 3-506.729.1339 or search [employer] online. You need 5748-9083 mg of calcium and 4456-0630 IU of vitamin D per day. It is possible to meet your calcium requirement with diet alone, but a vitamin D supplement is usually necessary to meet this goal.  When exposed to the sun, use a sunscreen that protects against both UVA and UVB radiation with an SPF of 30 or greater. Reapply every 2 to 3 hours or after sweating, drying off with a towel, or swimming. Always wear a seat belt when traveling in a car. Always wear a helmet when riding a bicycle or motorcycle.

## 2022-11-29 NOTE — PROGRESS NOTES
Medicare Annual Wellness Visit    Ba Martin is here for Medicare AWV (Subsequent ) and Hyperlipidemia (Check up w labs )    Assessment & Plan   Medicare annual wellness visit, subsequent  Anemia associated with chemotherapy  -     CBC; Future  Acquired hypothyroidism  -     TSH; Future  Pure hypercholesterolemia  -     Comprehensive Metabolic Panel; Future  -     Lipid Panel; Future  Prediabetes  -     Hemoglobin A1C; Future  Primary hypertension  -     Comprehensive Metabolic Panel; Future  Right hip pain  -     XR HIP RIGHT (2-3 VIEWS); Future  Statin intolerance  Anxiety and depression  Lymphedema of both lower extremities  Cancer of tongue ([address]    Recommendations for Preventive Services Due: see orders and patient instructions/AVS.  Recommended screening schedule for the next 5-10 years is provided to the patient in written form: see Patient Instructions/AVS.     Return in 6 months (on 5/29/2023) for Medicare Annual Wellness Visit in 1 year, follow up visit, labs. Subjective   Patient comes in for annual wellness visit. She is now 71years of age. Currently she feels well. 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 She follows up with her physical medicine specialist Dr. Keny Moya, for treatment of her cervical pain and muscle spasm. She remains on all her current meds and supplements the same as listed on her med list, which was reviewed with her. Her GERD has been stable on Aciphex 20 mg daily. She also complains of persistent pain over the posterior aspect of her right buttock. She denies any known injury. She is requesting x-rays of her right hip. Patient's complete Health Risk Assessment and screening values have been reviewed and are found in Flowsheets. The following problems were reviewed today and where indicated follow up appointments were made and/or referrals ordered.     Positive Risk Factor Screenings with Interventions:    Fall Risk:  Do you feel unsteady or are you worried about falling? : no  2 or more falls in past year?: (!) yes  Fall with injury in past year?: (!) yes   Fall Risk Interventions:    Home safety tips provided            General Health and ACP:  General  In general, how would you say your health is?: Good  In the past 7 days, have you experienced any of the following: New or Increased Pain, New or Increased Fatigue, Loneliness, Social Isolation, Stress or Anger?: (!) Yes  Select all that apply: (!) New or Increased Pain  Do you get the social and emotional support that you need?: Yes  Do you have a Living Will?: Yes    Advance Directives       Power of  Living Will ACP-Advance Directive ACP-Power of     Not on File Filed on 06/10/14 Filed Not on File        General Health Risk Interventions:  No additional health risk interventions. Hearing/Vision:  Do you or your family notice any trouble with your hearing that hasn't been managed with hearing aids?: (!) Yes  Do you have difficulty driving, watching TV, or doing any of your daily activities because of your eyesight?: No  Have you had an eye exam within the past year?: Yes  No results found.   Hearing/Vision Interventions:  She is advised to get a hearing evaluation and an annual eye exam.    Safety:  Do you have working smoke detectors?: Yes  Do you have any tripping hazards - loose or unsecured carpets or rugs?: No  Do you have any tripping hazards - clutter in doorways, halls, or stairs?: No  Do you have either shower bars, grab bars, non-slip mats or non-slip surfaces in your shower or bathtub?: (!) No  Do all of your stairways have a railing or banister?: Yes  Do you always fasten your seatbelt when you are in a car?: Yes  Safety Interventions:  Home safety tips provided           Objective   Vitals:    11/29/22 0805   BP: 122/80   Pulse: 84   Resp: 18   Temp: 97.1 °F (36.2 °C)   SpO2: 98%   Weight: 109 lb (49.4 kg)   Height: 5' 1.5\" (1.562 m)      Body mass index is 20.26 kg/m². Exam: Patient appears to be in no distress. She is alert and oriented and breathing comfortably. HEENT normal.  Neck supple adenopathy or bruits. Heart regular rhythm no murmurs gallops or rubs. Lungs clear. Abdomen normal.  Extremities: Mild chronic edema both lower extremities unchanged. Joints: Right hip appears normal with good range of motion. Peripheral pulses normal.  No neurologic deficits noted. No Known Allergies  Prior to Visit Medications    Medication Sig Taking?  Authorizing Provider   ondansetron (ZOFRAN-ODT) 4 MG disintegrating tablet Take 1 tablet by mouth 3 times daily as needed for Nausea or Vomiting Yes Jori Pino MD   Cream Base (VERSAPRO) CREA ONE GRAM (ONE PUMP) EXTERNALLY FOUR TIMES A DAY Yes Edna Pugh DO   RABEprazole (ACIPHEX) 20 MG tablet 1 daily Yes Jori Pino MD   venlafaxine (EFFEXOR XR) 37.5 MG extended release capsule Take 1 capsule by mouth daily Yes Jori Pino MD   rosuvastatin (CRESTOR) 5 MG tablet Take 1 tablet by mouth daily Yes Jori Pino MD   rOPINIRole (REQUIP) 0.5 MG tablet Take 1 tablet by mouth nightly as needed (restless leg) For restless legs Yes Jori Pino MD   NONFORMULARY Indications: elderberry  Yes Historical Provider, MD   sucralfate (CARAFATE) 1 GM/10ML suspension Take 10 mLs by mouth 4 times daily Yes Sherry Kenny III, MD   B Complex Vitamins (B-COMPLEX/B-12 PO) Take by mouth daily Yes Historical Provider, MD   Cholecalciferol (VITAMIN D PO) Take 1 tablet by mouth 3000 units daily Yes Historical Provider, MD   Biotin 10 MG CAPS Take by mouth daily HOLD Yes Historical Provider, MD   Elastic Bandages & Supports (JOBST KNEE HIGH COMPRESSION ) MISC Knee high with 20- 30 mmhg of compression Yes Kareen Gama MD   acetaminophen (TYLENOL) 500 MG tablet Take 500 mg by mouth every 6 hours as needed Instructed to take with sip water am of procedure if needs Yes Historical Provider, MD   tiZANidine (ZANAFLEX) 2 MG tablet Take 1 tablet by mouth nightly as needed (muscle spasms)  Suzan Reyes MD   SYNTHROID 75 MCG tablet Take 1 tablet by mouth daily  Suzan Reyes MD   gabapentin (NEURONTIN) 400 MG capsule Take 1 capsule by mouth 3 times daily for 30 days. Suzan Reyes MD       [facility] (Including outside providers/suppliers regularly involved in providing care):   Patient Care Team:  Suzan Reyes MD as PCP - General (Internal Medicine)  Suzan Reyes MD as PCP - [facility]  Yuliet Montoya MD as Surgeon (Colon and Rectal Surgery)  Gil Quintana MD as Consulting Physician (Hematology and Oncology)     Reviewed and updated this visit:  Tobacco  Allergies  Meds  Med Hx  Surg Hx  Soc Hx  Fam Hx      Discussion: She will continue all of her current meds and supplements the same as listed on her med list.  She will follow-up with her oncologist and radiation oncologist for her history of tongue cancer. We will get x-rays of her right hip and make further recommendations depending on the results. She will follow-up with her physical medicine specialist for her neck pain and return here as needed or in 6 months for routine follow-up visit.

## 2022-11-30 ENCOUNTER — HOSPITAL ENCOUNTER (OUTPATIENT)
Age: 72
Discharge: HOME | End: 2022-12-02

## 2022-11-30 DIAGNOSIS — M62.838 CERVICAL PARASPINAL MUSCLE SPASM: ICD-10-CM

## 2022-11-30 PROCEDURE — 87081 CULTURE SCREEN ONLY: CPT

## 2022-12-01 LAB — CLOTEST: NORMAL

## 2022-12-01 RX ORDER — TIZANIDINE 2 MG/1
2 TABLET ORAL NIGHTLY PRN
Qty: 30 TABLET | Refills: 0 | Status: SHIPPED | OUTPATIENT
Start: 2022-12-01

## 2022-12-04 PROBLEM — R06.09 DYSPNEA ON EXERTION: Status: RESOLVED | Noted: 2020-07-27 | Resolved: 2022-12-04

## 2022-12-06 ENCOUNTER — HOSPITAL ENCOUNTER (OUTPATIENT)
Age: 72
Discharge: HOME | End: 2022-12-08
Payer: COMMERCIAL

## 2022-12-06 ENCOUNTER — HOSPITAL ENCOUNTER (OUTPATIENT)
Dept: CT IMAGING | Age: 72
Discharge: HOME | End: 2022-12-08
Payer: COMMERCIAL

## 2022-12-06 DIAGNOSIS — M25.551 RIGHT HIP PAIN: ICD-10-CM

## 2022-12-06 DIAGNOSIS — C01 MALIGNANT NEOPLASM OF BASE OF TONGUE (HCC): ICD-10-CM

## 2022-12-06 DIAGNOSIS — E78.5 HYPERLIPIDEMIA, UNSPECIFIED HYPERLIPIDEMIA TYPE: ICD-10-CM

## 2022-12-06 PROCEDURE — 70470 CT HEAD/BRAIN W/O & W/DYE: CPT

## 2022-12-06 PROCEDURE — 73502 X-RAY EXAM HIP UNI 2-3 VIEWS: CPT

## 2022-12-06 PROCEDURE — 6360000004 HC RX CONTRAST MEDICATION: Performed by: RADIOLOGY

## 2022-12-06 RX ADMIN — IOPAMIDOL 75 ML: 755 INJECTION, SOLUTION INTRAVENOUS at 08:18

## 2022-12-07 DIAGNOSIS — M62.838 CERVICAL PARASPINAL MUSCLE SPASM: Primary | ICD-10-CM

## 2022-12-07 RX ORDER — TEMAZEPAM 15 MG/1
15 CAPSULE ORAL NIGHTLY PRN
Qty: 30 CAPSULE | Refills: 3 | Status: SHIPPED | OUTPATIENT
Start: 2022-12-07 | End: 2022-12-08

## 2022-12-19 RX ORDER — RABEPRAZOLE SODIUM 20 MG/1
TABLET, DELAYED RELEASE ORAL
Qty: 90 TABLET | Refills: 3 | Status: SHIPPED | OUTPATIENT
Start: 2022-12-19

## 2022-12-30 ENCOUNTER — HOSPITAL ENCOUNTER (OUTPATIENT)
Dept: GENERAL RADIOLOGY | Age: 72
Discharge: HOME | End: 2022-12-30
Payer: MEDICARE

## 2022-12-30 VITALS — BODY MASS INDEX: 19.83 KG/M2 | WEIGHT: 105 LBS | HEIGHT: 61 IN

## 2022-12-30 DIAGNOSIS — Z12.31 VISIT FOR SCREENING MAMMOGRAM: ICD-10-CM

## 2022-12-30 PROCEDURE — 77063 BREAST TOMOSYNTHESIS BI: CPT

## 2023-01-09 DIAGNOSIS — F41.9 ANXIETY AND DEPRESSION: ICD-10-CM

## 2023-01-09 DIAGNOSIS — F32.A ANXIETY AND DEPRESSION: ICD-10-CM

## 2023-01-09 RX ORDER — VENLAFAXINE HYDROCHLORIDE 37.5 MG/1
37.5 CAPSULE, EXTENDED RELEASE ORAL DAILY
Qty: 90 CAPSULE | Refills: 3 | Status: SHIPPED
Start: 2023-01-09 | End: 2023-01-10 | Stop reason: SDUPTHER

## 2023-01-10 DIAGNOSIS — F32.A ANXIETY AND DEPRESSION: ICD-10-CM

## 2023-01-10 DIAGNOSIS — F41.9 ANXIETY AND DEPRESSION: ICD-10-CM

## 2023-01-10 RX ORDER — VENLAFAXINE HYDROCHLORIDE 37.5 MG/1
37.5 CAPSULE, EXTENDED RELEASE ORAL DAILY
Qty: 90 CAPSULE | Refills: 3 | Status: SHIPPED
Start: 2023-01-10 | End: 2023-01-12 | Stop reason: SDUPTHER

## 2023-01-12 DIAGNOSIS — F41.9 ANXIETY AND DEPRESSION: ICD-10-CM

## 2023-01-12 DIAGNOSIS — F32.A ANXIETY AND DEPRESSION: ICD-10-CM

## 2023-01-12 RX ORDER — VENLAFAXINE HYDROCHLORIDE 37.5 MG/1
37.5 CAPSULE, EXTENDED RELEASE ORAL DAILY
Qty: 90 CAPSULE | Refills: 3 | Status: SHIPPED | OUTPATIENT
Start: 2023-01-12

## 2023-01-17 ENCOUNTER — TELEPHONE (OUTPATIENT)
Dept: PRIMARY CARE CLINIC | Age: 73
End: 2023-01-17

## 2023-01-17 RX ORDER — VENLAFAXINE HYDROCHLORIDE 75 MG/1
75 CAPSULE, EXTENDED RELEASE ORAL DAILY
COMMUNITY

## 2023-01-17 NOTE — TELEPHONE ENCOUNTER
FYI    [facility] phoned stating they got a rx for Effexor 37. 5. pt takes Effexor 75mg qd.    Please remove Effexor 37.5 off her med list .

## 2023-01-31 ENCOUNTER — OFFICE VISIT (OUTPATIENT)
Dept: PRIMARY CARE CLINIC | Age: 73
End: 2023-01-31
Payer: COMMERCIAL

## 2023-01-31 VITALS
SYSTOLIC BLOOD PRESSURE: 90 MMHG | OXYGEN SATURATION: 95 % | TEMPERATURE: 97.5 F | WEIGHT: 109 LBS | RESPIRATION RATE: 18 BRPM | DIASTOLIC BLOOD PRESSURE: 60 MMHG | HEIGHT: 61 IN | BODY MASS INDEX: 20.58 KG/M2 | HEART RATE: 85 BPM

## 2023-01-31 DIAGNOSIS — F32.A ANXIETY AND DEPRESSION: ICD-10-CM

## 2023-01-31 DIAGNOSIS — S30.0XXA CONTUSION OF COCCYX, INITIAL ENCOUNTER: ICD-10-CM

## 2023-01-31 DIAGNOSIS — F41.9 ANXIETY AND DEPRESSION: ICD-10-CM

## 2023-01-31 DIAGNOSIS — M25.512 CHRONIC PAIN OF BOTH SHOULDERS: ICD-10-CM

## 2023-01-31 DIAGNOSIS — G89.29 CHRONIC PAIN OF BOTH SHOULDERS: ICD-10-CM

## 2023-01-31 DIAGNOSIS — S20.229A CONTUSION OF THORACIC SPINE: ICD-10-CM

## 2023-01-31 DIAGNOSIS — G57.92 NEUROPATHY OF LEFT LOWER EXTREMITY: ICD-10-CM

## 2023-01-31 DIAGNOSIS — I95.1 SYNCOPE DUE TO ORTHOSTATIC HYPOTENSION: Primary | ICD-10-CM

## 2023-01-31 DIAGNOSIS — M54.12 CERVICAL RADICULOPATHY: ICD-10-CM

## 2023-01-31 DIAGNOSIS — M25.511 CHRONIC PAIN OF BOTH SHOULDERS: ICD-10-CM

## 2023-01-31 PROCEDURE — 1123F ACP DISCUSS/DSCN MKR DOCD: CPT | Performed by: INTERNAL MEDICINE

## 2023-01-31 PROCEDURE — G8427 DOCREV CUR MEDS BY ELIG CLIN: HCPCS | Performed by: INTERNAL MEDICINE

## 2023-01-31 PROCEDURE — 1036F TOBACCO NON-USER: CPT | Performed by: INTERNAL MEDICINE

## 2023-01-31 PROCEDURE — G8420 CALC BMI NORM PARAMETERS: HCPCS | Performed by: INTERNAL MEDICINE

## 2023-01-31 PROCEDURE — G8399 PT W/DXA RESULTS DOCUMENT: HCPCS | Performed by: INTERNAL MEDICINE

## 2023-01-31 PROCEDURE — 99214 OFFICE O/P EST MOD 30 MIN: CPT | Performed by: INTERNAL MEDICINE

## 2023-01-31 PROCEDURE — 1090F PRES/ABSN URINE INCON ASSESS: CPT | Performed by: INTERNAL MEDICINE

## 2023-01-31 PROCEDURE — 3017F COLORECTAL CA SCREEN DOC REV: CPT | Performed by: INTERNAL MEDICINE

## 2023-01-31 PROCEDURE — G8484 FLU IMMUNIZE NO ADMIN: HCPCS | Performed by: INTERNAL MEDICINE

## 2023-01-31 RX ORDER — GABAPENTIN 400 MG/1
400 CAPSULE ORAL 3 TIMES DAILY
Qty: 270 CAPSULE | Refills: 3 | Status: SHIPPED | OUTPATIENT
Start: 2023-01-31 | End: 2023-05-01

## 2023-01-31 RX ORDER — VENLAFAXINE HYDROCHLORIDE 75 MG/1
75 CAPSULE, EXTENDED RELEASE ORAL DAILY
Qty: 90 CAPSULE | Refills: 3 | Status: SHIPPED | OUTPATIENT
Start: 2023-01-31

## 2023-01-31 ASSESSMENT — PATIENT HEALTH QUESTIONNAIRE - PHQ9
SUM OF ALL RESPONSES TO PHQ QUESTIONS 1-9: 0
8. MOVING OR SPEAKING SO SLOWLY THAT OTHER PEOPLE COULD HAVE NOTICED. OR THE OPPOSITE, BEING SO FIGETY OR RESTLESS THAT YOU HAVE BEEN MOVING AROUND A LOT MORE THAN USUAL: 0
SUM OF ALL RESPONSES TO PHQ QUESTIONS 1-9: 0
2. FEELING DOWN, DEPRESSED OR HOPELESS: 0
10. IF YOU CHECKED OFF ANY PROBLEMS, HOW DIFFICULT HAVE THESE PROBLEMS MADE IT FOR YOU TO DO YOUR WORK, TAKE CARE OF THINGS AT HOME, OR GET ALONG WITH OTHER PEOPLE: 0
7. TROUBLE CONCENTRATING ON THINGS, SUCH AS READING THE NEWSPAPER OR WATCHING TELEVISION: 0
SUM OF ALL RESPONSES TO PHQ QUESTIONS 1-9: 0
SUM OF ALL RESPONSES TO PHQ QUESTIONS 1-9: 0
4. FEELING TIRED OR HAVING LITTLE ENERGY: 0
5. POOR APPETITE OR OVEREATING: 0
6. FEELING BAD ABOUT YOURSELF - OR THAT YOU ARE A FAILURE OR HAVE LET YOURSELF OR YOUR FAMILY DOWN: 0
1. LITTLE INTEREST OR PLEASURE IN DOING THINGS: 0
9. THOUGHTS THAT YOU WOULD BE BETTER OFF DEAD, OR OF HURTING YOURSELF: 0
3. TROUBLE FALLING OR STAYING ASLEEP: 0
SUM OF ALL RESPONSES TO PHQ9 QUESTIONS 1 & 2: 0

## 2023-01-31 NOTE — PROGRESS NOTES
Rhett Buster  1/31/23     Chief Complaint   Patient presents with    Tailbone Pain     On 1/10/23 Took Zanaflex 2 mg in the afternoon and passed out hit tailbone ,head ,back. Allergies   Allergen Reactions    Zanaflex [Tizanidine Hcl]         Current Outpatient Medications   Medication Sig Dispense Refill    gabapentin (NEURONTIN) 400 MG capsule Take 1 capsule by mouth 3 times daily for 90 days.  270 capsule 3    venlafaxine (EFFEXOR XR) 75 MG extended release capsule Take 1 capsule by mouth daily 90 capsule 3    RABEprazole (ACIPHEX) 20 MG tablet 1 daily 90 tablet 3    ondansetron (ZOFRAN-ODT) 4 MG disintegrating tablet Take 1 tablet by mouth 3 times daily as needed for Nausea or Vomiting 21 tablet 2    Cream Base (VERSAPRO) CREA ONE GRAM (ONE PUMP) EXTERNALLY FOUR TIMES A  g 5    rosuvastatin (CRESTOR) 5 MG tablet Take 1 tablet by mouth daily 30 tablet 1    rOPINIRole (REQUIP) 0.5 MG tablet Take 1 tablet by mouth nightly as needed (restless leg) For restless legs 30 tablet 3    NONFORMULARY Indications: elderberry       sucralfate (CARAFATE) 1 GM/10ML suspension Take 10 mLs by mouth 4 times daily 1200 mL 3    B Complex Vitamins (B-COMPLEX/B-12 PO) Take by mouth daily      Cholecalciferol (VITAMIN D PO) Take 1 tablet by mouth 3000 units daily      Biotin 10 MG CAPS Take by mouth daily HOLD      Elastic Bandages & Supports (JOBST KNEE HIGH COMPRESSION SM) MISC Knee high with 20- 30 mmhg of compression 1 each 10    acetaminophen (TYLENOL) 500 MG tablet Take 500 mg by mouth every 6 hours as needed Instructed to take with sip water am of procedure if needs      SYNTHROID 75 MCG tablet Take 1 tablet by mouth daily 90 tablet 3     Current Facility-Administered Medications   Medication Dose Route Frequency Provider Last Rate Last Admin    bupivacaine (MARCAINE) 0.25 % injection 15 mg  6 mL IntraDERmal Once Jaylyn Lu MD        triamcinolone acetonide (KENALOG-40) injection 40 mg  40 mg IntraMUSCular Once Mohini Rodriguez MD        triamcinolone acetonide (KENALOG-40) injection 40 mg  40 mg IntraMUSCular Once Mohini Rodriguez MD        bupivacaine (PF) (MARCAINE) 0.25 % injection 15 mg  6 mL IntraDERmal Once Mohini Rodriguez MD            HPI: Patient comes in for follow-up visit after an episode of syncope on 1/10/2023. She states she took a Zanaflex for cervical muscle spasm and when she got up from the couch she became lightheaded and passed out. She is not sure how long she was out because she was by herself. When she did wake up on the floor in the supine position she had pain in the back of her head thoracic area and tailbone. She has continued to have pain in the tailbone and takes Tylenol or ibuprofen as needed. She has not had any further episodes of dizziness or lightheadedness. She remains on all of her usual meds and supplements the same as listed on her med list.  She follows up with her physical medicine specialist for her chronic neck and back pain and neuropathy left leg. She remains on gabapentin 400 mg 3 times daily which does give her significant relief. She follows up with her oncologist for her history of tongue cancer. She denies any chest pain or shortness of breath. Review of Systems: as per HPI      Physical Exam:    Vitals:    01/31/23 0933   BP: 90/60   Pulse: 85   Resp: 18   Temp: 97.5 °F (36.4 °C)   SpO2: 95%       Patient is a 71 y.o. female. Patient appears to be in no distress. Breathing comfortably. Ambulates without assistance. HEENT: normal.  No evidence of head injury. Neck supple, no adenopathy or bruits. Heart RR, no MGR. Lungs clear. Abd: normal  Ext: no edema. Neck: There is mild tenderness to palpation over the mid thoracic spine and tenderness over the coccyx. No deformities noted. Peripheral pulses: normal.  No neurologic deficits noted. Assessment:    Willie De León was seen today for tailbone pain.     Diagnoses and all orders for this visit:    Syncope due to orthostatic hypotension    Contusion of coccyx, initial encounter    Contusion of thoracic spine    Cervical radiculopathy  -     gabapentin (NEURONTIN) 400 MG capsule; Take 1 capsule by mouth 3 times daily for 90 days. Chronic pain of both shoulders  -     External Referral To Physical Therapy    Peripheral neuropathy of left lower extremity, stable on gabapentin 400 mg 3 times daily. Chronic anxiety and depression  -     venlafaxine (EFFEXOR XR) 75 MG extended release capsule; Take 1 capsule by mouth daily        Discussion Notes: She will continue her usual meds and supplements the same as listed on her med list.  She should stay adequately hydrated. She will avoid taking Zanaflex in the future. She will follow-up with her physical medicine specialist as per her instructions for her chronic neck and back pain. She is given a prescription for physical therapy evaluation and treat for her chronic bilateral shoulder pain. Otherwise she will return as needed or in 5 months for her next regularly scheduled follow-up visit.

## 2023-02-27 ENCOUNTER — PROCEDURE VISIT (OUTPATIENT)
Dept: PHYSICAL MEDICINE AND REHAB | Age: 73
End: 2023-02-27

## 2023-02-27 VITALS — WEIGHT: 109 LBS | BODY MASS INDEX: 20.6 KG/M2 | SYSTOLIC BLOOD PRESSURE: 130 MMHG | DIASTOLIC BLOOD PRESSURE: 80 MMHG

## 2023-02-27 DIAGNOSIS — G89.29 CHRONIC PAIN OF BOTH SHOULDERS: Primary | ICD-10-CM

## 2023-02-27 DIAGNOSIS — S43.003D SUBLUXATION OF SHOULDER JOINT, UNSPECIFIED LATERALITY, SUBSEQUENT ENCOUNTER: ICD-10-CM

## 2023-02-27 DIAGNOSIS — M79.10 TRIGGER POINT: ICD-10-CM

## 2023-02-27 DIAGNOSIS — M54.12 RADICULITIS OF LEFT CERVICAL REGION: ICD-10-CM

## 2023-02-27 DIAGNOSIS — M25.512 CHRONIC PAIN OF BOTH SHOULDERS: Primary | ICD-10-CM

## 2023-02-27 DIAGNOSIS — M75.121 COMPLETE TEAR OF RIGHT ROTATOR CUFF, UNSPECIFIED WHETHER TRAUMATIC: ICD-10-CM

## 2023-02-27 DIAGNOSIS — M25.511 CHRONIC PAIN OF BOTH SHOULDERS: Primary | ICD-10-CM

## 2023-02-27 DIAGNOSIS — M75.122 COMPLETE TEAR OF LEFT ROTATOR CUFF, UNSPECIFIED WHETHER TRAUMATIC: ICD-10-CM

## 2023-02-27 DIAGNOSIS — M79.18 CERVICAL MYOFASCIAL PAIN SYNDROME: ICD-10-CM

## 2023-02-27 RX ORDER — TRIAMCINOLONE ACETONIDE 40 MG/ML
40 INJECTION, SUSPENSION INTRA-ARTICULAR; INTRAMUSCULAR ONCE
Status: COMPLETED | OUTPATIENT
Start: 2023-02-27 | End: 2023-02-27

## 2023-02-27 RX ORDER — BUPIVACAINE HYDROCHLORIDE 2.5 MG/ML
6 INJECTION, SOLUTION INFILTRATION; PERINEURAL ONCE
Status: COMPLETED | OUTPATIENT
Start: 2023-02-27 | End: 2023-02-27

## 2023-02-27 RX ADMIN — TRIAMCINOLONE ACETONIDE 40 MG: 40 INJECTION, SUSPENSION INTRA-ARTICULAR; INTRAMUSCULAR at 14:19

## 2023-02-27 RX ADMIN — TRIAMCINOLONE ACETONIDE 40 MG: 40 INJECTION, SUSPENSION INTRA-ARTICULAR; INTRAMUSCULAR at 14:18

## 2023-02-27 RX ADMIN — BUPIVACAINE HYDROCHLORIDE 15 MG: 2.5 INJECTION, SOLUTION INFILTRATION; PERINEURAL at 14:17

## 2023-02-27 NOTE — PROGRESS NOTES
Lymphedema of both lower extremities 07/26/2017    wears support hose    Lymphedema of lower extremity     Osteopenia     PONV (postoperative nausea and vomiting)     Restless leg syndrome     Rotator cuff tear     bilateral    Sleep apnea     Thyroid disease     Toe cyanosis 6/25/2020       Past Surgical History:   Procedure Laterality Date    BLADDER REPAIR  1995    COLONOSCOPY  2008    neg    COLONOSCOPY  03/19/2014    ENDOSCOPY, COLON, DIAGNOSTIC  2012    EYE SURGERY      cataract surgery, bilateral.     FRACTURE SURGERY  2012    RIGHT ELBOW    HYSTERECTOMY (CERVIX STATUS UNKNOWN)  1993    lavh&BSO&A&P    [address]  09/08/2016    direct laryngoscopy right tonsil/tongue base biopsy cervical esophagoscopy fine needle aspiration     NERVE BLOCK      OTHER SURGICAL HISTORY  05/28/2012    ORIF RIGHT ULNA    OTHER SURGICAL HISTORY  06/18/2012    orif  rivision right olecranon    OTHER SURGICAL HISTORY Right 01/16/2014    Right elbow hardware removal    DE OFFICE/OUTPT VISIT,PROCEDURE ONLY N/A 11/09/2018    PORT REMOVAL performed by Lizbeth Perez MD at [address]  1MONTHS OF AGE    H/O ESOPHAGEAL WEB    TUNNELED VENOUS PORT PLACEMENT  10/04/2016    Dr. Derrick Finch History     Socioeconomic History    Marital status:      Spouse name: Not on file    Number of children: Not on file    Years of education: Not on file    Highest education level: Not on file   Occupational History    Not on file   Tobacco Use    Smoking status: Never    Smokeless tobacco: Never   Vaping Use    Vaping Use: Never used   Substance and Sexual Activity    Alcohol use: No     Alcohol/week: 0.0 standard drinks    Drug use: No    Sexual activity: Not on file   Other Topics Concern    Not on file   Social History Narrative    Not on file     Social Determinants of Health     Financial Resource Strain: Low Risk     Difficulty of

## 2023-03-24 ENCOUNTER — TELEPHONE (OUTPATIENT)
Dept: PHYSICAL MEDICINE AND REHAB | Age: 73
End: 2023-03-24

## 2023-03-24 ENCOUNTER — HOSPITAL ENCOUNTER (EMERGENCY)
Age: 73
Discharge: HOME HEALTH | End: 2023-03-24
Attending: EMERGENCY MEDICINE
Payer: COMMERCIAL

## 2023-03-24 ENCOUNTER — APPOINTMENT (OUTPATIENT)
Dept: ULTRASOUND IMAGING | Age: 73
End: 2023-03-24
Payer: COMMERCIAL

## 2023-03-24 VITALS
TEMPERATURE: 98.3 F | HEART RATE: 93 BPM | RESPIRATION RATE: 16 BRPM | OXYGEN SATURATION: 98 % | BODY MASS INDEX: 19.84 KG/M2 | WEIGHT: 105 LBS | SYSTOLIC BLOOD PRESSURE: 126 MMHG | DIASTOLIC BLOOD PRESSURE: 82 MMHG

## 2023-03-24 DIAGNOSIS — L03.113 RIGHT ARM CELLULITIS: Primary | ICD-10-CM

## 2023-03-24 LAB
ALBUMIN SERPL-MCNC: 3.8 G/DL (ref 3.5–5.2)
ALP SERPL-CCNC: 67 U/L (ref 35–104)
ALT SERPL-CCNC: <5 U/L (ref 0–32)
ANION GAP SERPL CALCULATED.3IONS-SCNC: 12 MMOL/L (ref 7–16)
APTT: 28.8 SEC (ref 24.5–35.1)
AST SERPL-CCNC: 20 U/L (ref 0–31)
BASOPHILS # BLD: 0.05 E9/L (ref 0–0.2)
BASOPHILS NFR BLD: 1.2 % (ref 0–2)
BILIRUB SERPL-MCNC: 0.6 MG/DL (ref 0–1.2)
BUN SERPL-MCNC: 19 MG/DL (ref 6–23)
CALCIUM SERPL-MCNC: 9.5 MG/DL (ref 8.6–10.2)
CHLORIDE SERPL-SCNC: 101 MMOL/L (ref 98–107)
CO2 SERPL-SCNC: 24 MMOL/L (ref 22–29)
CREAT SERPL-MCNC: 0.7 MG/DL (ref 0.5–1)
EOSINOPHIL # BLD: 0.03 E9/L (ref 0.05–0.5)
EOSINOPHIL NFR BLD: 0.7 % (ref 0–6)
ERYTHROCYTE [DISTWIDTH] IN BLOOD BY AUTOMATED COUNT: 12.9 FL (ref 11.5–15)
ERYTHROCYTE [SEDIMENTATION RATE] IN BLOOD BY WESTERGREN METHOD: 22 MM/HR (ref 0–20)
GLUCOSE SERPL-MCNC: 84 MG/DL (ref 74–99)
HCT VFR BLD AUTO: 40 % (ref 34–48)
HGB BLD-MCNC: 12.8 G/DL (ref 11.5–15.5)
IMM GRANULOCYTES # BLD: 0.01 E9/L
IMM GRANULOCYTES NFR BLD: 0.2 % (ref 0–5)
INR BLD: 1
LYMPHOCYTES # BLD: 0.77 E9/L (ref 1.5–4)
LYMPHOCYTES NFR BLD: 18.3 % (ref 20–42)
MCH RBC QN AUTO: 29.1 PG (ref 26–35)
MCHC RBC AUTO-ENTMCNC: 32 % (ref 32–34.5)
MCV RBC AUTO: 90.9 FL (ref 80–99.9)
MONOCYTES # BLD: 0.49 E9/L (ref 0.1–0.95)
MONOCYTES NFR BLD: 11.6 % (ref 2–12)
NEUTROPHILS # BLD: 2.86 E9/L (ref 1.8–7.3)
NEUTS SEG NFR BLD: 68 % (ref 43–80)
PLATELET # BLD AUTO: 213 E9/L (ref 130–450)
PMV BLD AUTO: 10.4 FL (ref 7–12)
POTASSIUM SERPL-SCNC: 3.8 MMOL/L (ref 3.5–5)
PROT SERPL-MCNC: 6.5 G/DL (ref 6.4–8.3)
PROTHROMBIN TIME: 11.1 SEC (ref 9.3–12.4)
RBC # BLD AUTO: 4.4 E12/L (ref 3.5–5.5)
REASON FOR REJECTION: NORMAL
REJECTED TEST: NORMAL
SODIUM SERPL-SCNC: 137 MMOL/L (ref 132–146)
WBC # BLD: 4.2 E9/L (ref 4.5–11.5)

## 2023-03-24 PROCEDURE — 36415 COLL VENOUS BLD VENIPUNCTURE: CPT

## 2023-03-24 PROCEDURE — 85025 COMPLETE CBC W/AUTO DIFF WBC: CPT

## 2023-03-24 PROCEDURE — 85651 RBC SED RATE NONAUTOMATED: CPT

## 2023-03-24 PROCEDURE — 80053 COMPREHEN METABOLIC PANEL: CPT

## 2023-03-24 PROCEDURE — 6370000000 HC RX 637 (ALT 250 FOR IP): Performed by: EMERGENCY MEDICINE

## 2023-03-24 PROCEDURE — 99284 EMERGENCY DEPT VISIT MOD MDM: CPT

## 2023-03-24 PROCEDURE — 85730 THROMBOPLASTIN TIME PARTIAL: CPT

## 2023-03-24 PROCEDURE — 93971 EXTREMITY STUDY: CPT

## 2023-03-24 PROCEDURE — 85610 PROTHROMBIN TIME: CPT

## 2023-03-24 RX ORDER — CEPHALEXIN 500 MG/1
500 CAPSULE ORAL ONCE
Status: COMPLETED | OUTPATIENT
Start: 2023-03-24 | End: 2023-03-24

## 2023-03-24 RX ORDER — CEPHALEXIN 500 MG/1
500 CAPSULE ORAL 4 TIMES DAILY
Qty: 40 CAPSULE | Refills: 0 | Status: SHIPPED | OUTPATIENT
Start: 2023-03-24 | End: 2023-04-03

## 2023-03-24 RX ORDER — LEVOTHYROXINE SODIUM 0.07 MG/1
75 TABLET ORAL DAILY
COMMUNITY

## 2023-03-24 RX ADMIN — CEPHALEXIN 500 MG: 500 CAPSULE ORAL at 19:09

## 2023-03-24 ASSESSMENT — PAIN SCALES - GENERAL: PAINLEVEL_OUTOF10: 10

## 2023-03-24 ASSESSMENT — PAIN DESCRIPTION - ORIENTATION: ORIENTATION: RIGHT;UPPER

## 2023-03-24 ASSESSMENT — PAIN DESCRIPTION - ONSET: ONSET: ON-GOING

## 2023-03-24 ASSESSMENT — PAIN DESCRIPTION - PAIN TYPE: TYPE: ACUTE PAIN

## 2023-03-24 ASSESSMENT — PAIN - FUNCTIONAL ASSESSMENT
PAIN_FUNCTIONAL_ASSESSMENT: 0-10
PAIN_FUNCTIONAL_ASSESSMENT: NONE - DENIES PAIN

## 2023-03-24 ASSESSMENT — PAIN DESCRIPTION - DESCRIPTORS: DESCRIPTORS: DISCOMFORT;SORE;ACHING

## 2023-03-24 ASSESSMENT — PAIN DESCRIPTION - LOCATION: LOCATION: ARM

## 2023-03-24 ASSESSMENT — LIFESTYLE VARIABLES
HOW MANY STANDARD DRINKS CONTAINING ALCOHOL DO YOU HAVE ON A TYPICAL DAY: PATIENT DOES NOT DRINK
HOW OFTEN DO YOU HAVE A DRINK CONTAINING ALCOHOL: NEVER

## 2023-03-24 ASSESSMENT — PAIN DESCRIPTION - FREQUENCY: FREQUENCY: CONTINUOUS

## 2023-03-24 NOTE — TELEPHONE ENCOUNTER
Patient called today asking if Dr Shayy Del Angel was in the office. She wanted Dr Shayy Del Angel to see her today to and check her arm due to swelling. I recommended she see her primary care doctor to see what  is causing her arm to swell.  She was agreeable  Dane Torrez MA

## 2023-03-27 NOTE — ED PROVIDER NOTES
LARYNGOSCOPY  09/08/2016    direct laryngoscopy right tonsil/tongue base biopsy cervical esophagoscopy fine needle aspiration     NERVE BLOCK      OTHER SURGICAL HISTORY  05/28/2012    ORIF RIGHT ULNA    OTHER SURGICAL HISTORY  06/18/2012    orif  rivision right olecranon    OTHER SURGICAL HISTORY Right 01/16/2014    Right elbow hardware removal    [place] OFFICE/OUTPT VISIT,PROCEDURE ONLY N/A 11/09/2018    PORT REMOVAL performed by Lupe Monk MD at [address]  1MONTHS OF AGE    H/O ESOPHAGEAL WEB    TUNNELED VENOUS PORT PLACEMENT  10/04/2016    Dr. Rebecca Blair       Discharge Medication List as of 3/24/2023  6:48 PM        CONTINUE these medications which have NOT CHANGED    Details   levothyroxine (SYNTHROID) 75 MCG tablet Take 75 mcg by mouth DailyHistorical Med      gabapentin (NEURONTIN) 400 MG capsule Take 1 capsule by mouth 3 times daily for 90 days. , Disp-270 capsule, R-3Normal      venlafaxine (EFFEXOR XR) 75 MG extended release capsule Take 1 capsule by mouth daily, Disp-90 capsule, R-3Normal      RABEprazole (ACIPHEX) 20 MG tablet 1 daily, Disp-90 tablet, R-3Normal      ondansetron (ZOFRAN-ODT) 4 MG disintegrating tablet Take 1 tablet by mouth 3 times daily as needed for Nausea or Vomiting, Disp-21 tablet, R-2Normal      Cream Base (VERSAPRO) CREA Disp-100 g, R-5, NormalONE GRAM (ONE PUMP) EXTERNALLY FOUR TIMES A DAY      SYNTHROID 75 MCG tablet Take 1 tablet by mouth daily, Disp-90 tablet, R-3, DAWNormal      rosuvastatin (CRESTOR) 5 MG tablet Take 1 tablet by mouth daily, Disp-30 tablet, R-1Normal      rOPINIRole (REQUIP) 0.5 MG tablet Take 1 tablet by mouth nightly as needed (restless leg) For restless legs, Disp-30 tablet, R-3Normal      NONFORMULARY Indications: elderberry Historical Med      sucralfate (CARAFATE) 1 GM/10ML suspension Take 10 mLs by mouth 4 times daily, Disp-1200 mL, R-3Normal      B Complex Vitamins

## 2023-04-03 DIAGNOSIS — F51.01 PRIMARY INSOMNIA: Primary | ICD-10-CM

## 2023-04-03 RX ORDER — TEMAZEPAM 15 MG/1
15 CAPSULE ORAL NIGHTLY PRN
Qty: 90 CAPSULE | Refills: 0 | Status: SHIPPED | OUTPATIENT
Start: 2023-04-03 | End: 2023-07-02

## 2023-04-03 RX ORDER — ROSUVASTATIN CALCIUM 5 MG/1
TABLET, COATED ORAL
Qty: 90 TABLET | Refills: 1 | Status: SHIPPED | OUTPATIENT
Start: 2023-04-03

## 2023-04-03 RX ORDER — TEMAZEPAM 15 MG/1
15 CAPSULE ORAL NIGHTLY PRN
COMMUNITY
End: 2023-04-03 | Stop reason: SDUPTHER

## 2023-04-04 DIAGNOSIS — F51.01 PRIMARY INSOMNIA: ICD-10-CM

## 2023-04-04 RX ORDER — TEMAZEPAM 15 MG/1
CAPSULE ORAL
Qty: 30 CAPSULE | Refills: 0 | OUTPATIENT
Start: 2023-04-04

## 2023-04-24 ENCOUNTER — HOSPITAL ENCOUNTER (OUTPATIENT)
Age: 73
Discharge: HOME | End: 2023-04-26

## 2023-04-24 PROCEDURE — 88305 TISSUE EXAM BY PATHOLOGIST: CPT

## 2023-04-24 PROCEDURE — 87081 CULTURE SCREEN ONLY: CPT

## 2023-04-25 LAB — UREASE TISS QL: NORMAL

## 2023-05-10 RX ORDER — CREAM BASE NO.39
CREAM (GRAM) TOPICAL
Qty: 100 G | Refills: 5 | Status: SHIPPED | OUTPATIENT
Start: 2023-05-10

## 2023-05-21 DIAGNOSIS — E03.9 ACQUIRED HYPOTHYROIDISM: ICD-10-CM

## 2023-05-22 RX ORDER — LEVOTHYROXINE SODIUM 75 MCG
TABLET ORAL
Qty: 90 TABLET | Refills: 3 | Status: SHIPPED | OUTPATIENT
Start: 2023-05-22

## 2023-05-30 ENCOUNTER — HOSPITAL ENCOUNTER (OUTPATIENT)
Age: 73
Discharge: HOME | End: 2023-05-30
Payer: COMMERCIAL

## 2023-05-30 LAB
ALBUMIN SERPL-MCNC: 4.1 G/DL (ref 3.5–5.2)
ALP SERPL-CCNC: 70 U/L (ref 35–104)
ALT SERPL-CCNC: 11 U/L (ref 0–32)
ANION GAP SERPL CALCULATED.3IONS-SCNC: 9 MMOL/L (ref 7–16)
AST SERPL-CCNC: 20 U/L (ref 0–31)
BILIRUB SERPL-MCNC: 1.1 MG/DL (ref 0–1.2)
BUN SERPL-MCNC: 17 MG/DL (ref 6–23)
CALCIUM SERPL-MCNC: 10.2 MG/DL (ref 8.6–10.2)
CHLORIDE SERPL-SCNC: 104 MMOL/L (ref 98–107)
CHOLESTEROL, TOTAL: 155 MG/DL (ref 0–199)
CO2 SERPL-SCNC: 31 MMOL/L (ref 22–29)
CREAT SERPL-MCNC: 0.7 MG/DL (ref 0.5–1)
ERYTHROCYTE [DISTWIDTH] IN BLOOD BY AUTOMATED COUNT: 13.3 FL (ref 11.5–15)
GLUCOSE SERPL-MCNC: 89 MG/DL (ref 74–99)
HBA1C MFR BLD: 5.7 % (ref 4–5.6)
HCT VFR BLD AUTO: 40.5 % (ref 34–48)
HDLC SERPL-MCNC: 65 MG/DL
HGB BLD-MCNC: 13 G/DL (ref 11.5–15.5)
LDLC SERPL CALC-MCNC: 75 MG/DL (ref 0–99)
MCH RBC QN AUTO: 28.7 PG (ref 26–35)
MCHC RBC AUTO-ENTMCNC: 32.1 % (ref 32–34.5)
MCV RBC AUTO: 89.4 FL (ref 80–99.9)
PLATELET # BLD AUTO: 231 E9/L (ref 130–450)
PMV BLD AUTO: 10.7 FL (ref 7–12)
POTASSIUM SERPL-SCNC: 4.6 MMOL/L (ref 3.5–5)
PROT SERPL-MCNC: 6.7 G/DL (ref 6.4–8.3)
RBC # BLD AUTO: 4.53 E12/L (ref 3.5–5.5)
SODIUM SERPL-SCNC: 144 MMOL/L (ref 132–146)
TRIGL SERPL-MCNC: 73 MG/DL (ref 0–149)
TSH SERPL-MCNC: 0.19 UIU/ML (ref 0.27–4.2)
VLDLC SERPL CALC-MCNC: 15 MG/DL
WBC # BLD: 5.3 E9/L (ref 4.5–11.5)

## 2023-05-30 PROCEDURE — 83036 HEMOGLOBIN GLYCOSYLATED A1C: CPT

## 2023-05-30 PROCEDURE — 84443 ASSAY THYROID STIM HORMONE: CPT

## 2023-05-30 PROCEDURE — 80053 COMPREHEN METABOLIC PANEL: CPT

## 2023-05-30 PROCEDURE — 80061 LIPID PANEL: CPT

## 2023-05-30 PROCEDURE — 36415 COLL VENOUS BLD VENIPUNCTURE: CPT

## 2023-05-30 PROCEDURE — 85027 COMPLETE CBC AUTOMATED: CPT

## 2023-06-01 ENCOUNTER — OFFICE VISIT (OUTPATIENT)
Dept: PRIMARY CARE CLINIC | Age: 73
End: 2023-06-01
Payer: MEDICARE

## 2023-06-01 VITALS
OXYGEN SATURATION: 98 % | DIASTOLIC BLOOD PRESSURE: 60 MMHG | BODY MASS INDEX: 21.03 KG/M2 | SYSTOLIC BLOOD PRESSURE: 100 MMHG | RESPIRATION RATE: 17 BRPM | WEIGHT: 111.4 LBS | TEMPERATURE: 98 F | HEIGHT: 61 IN | HEART RATE: 86 BPM

## 2023-06-01 DIAGNOSIS — F41.9 ANXIETY AND DEPRESSION: Chronic | ICD-10-CM

## 2023-06-01 DIAGNOSIS — F51.04 CHRONIC INSOMNIA: ICD-10-CM

## 2023-06-01 DIAGNOSIS — I10 PRIMARY HYPERTENSION: ICD-10-CM

## 2023-06-01 DIAGNOSIS — I89.0 LYMPHEDEMA OF BOTH LOWER EXTREMITIES: ICD-10-CM

## 2023-06-01 DIAGNOSIS — E03.9 ACQUIRED HYPOTHYROIDISM: ICD-10-CM

## 2023-06-01 DIAGNOSIS — K21.9 GASTROESOPHAGEAL REFLUX DISEASE WITHOUT ESOPHAGITIS: ICD-10-CM

## 2023-06-01 DIAGNOSIS — F32.A ANXIETY AND DEPRESSION: Chronic | ICD-10-CM

## 2023-06-01 DIAGNOSIS — E78.00 PURE HYPERCHOLESTEROLEMIA: Primary | ICD-10-CM

## 2023-06-01 DIAGNOSIS — R73.03 PREDIABETES: ICD-10-CM

## 2023-06-01 PROCEDURE — 3017F COLORECTAL CA SCREEN DOC REV: CPT | Performed by: INTERNAL MEDICINE

## 2023-06-01 PROCEDURE — 1036F TOBACCO NON-USER: CPT | Performed by: INTERNAL MEDICINE

## 2023-06-01 PROCEDURE — 3078F DIAST BP <80 MM HG: CPT | Performed by: INTERNAL MEDICINE

## 2023-06-01 PROCEDURE — G8399 PT W/DXA RESULTS DOCUMENT: HCPCS | Performed by: INTERNAL MEDICINE

## 2023-06-01 PROCEDURE — 1123F ACP DISCUSS/DSCN MKR DOCD: CPT | Performed by: INTERNAL MEDICINE

## 2023-06-01 PROCEDURE — G8427 DOCREV CUR MEDS BY ELIG CLIN: HCPCS | Performed by: INTERNAL MEDICINE

## 2023-06-01 PROCEDURE — 99214 OFFICE O/P EST MOD 30 MIN: CPT | Performed by: INTERNAL MEDICINE

## 2023-06-01 PROCEDURE — G8420 CALC BMI NORM PARAMETERS: HCPCS | Performed by: INTERNAL MEDICINE

## 2023-06-01 PROCEDURE — 1090F PRES/ABSN URINE INCON ASSESS: CPT | Performed by: INTERNAL MEDICINE

## 2023-06-01 PROCEDURE — 3074F SYST BP LT 130 MM HG: CPT | Performed by: INTERNAL MEDICINE

## 2023-06-01 RX ORDER — TEMAZEPAM 15 MG/1
15 CAPSULE ORAL NIGHTLY PRN
Qty: 90 CAPSULE | Refills: 0 | Status: SHIPPED | OUTPATIENT
Start: 2023-06-01 | End: 2023-08-30

## 2023-06-01 SDOH — ECONOMIC STABILITY: HOUSING INSECURITY
IN THE LAST 12 MONTHS, WAS THERE A TIME WHEN YOU DID NOT HAVE A STEADY PLACE TO SLEEP OR SLEPT IN A SHELTER (INCLUDING NOW)?: NO

## 2023-06-01 SDOH — ECONOMIC STABILITY: FOOD INSECURITY: WITHIN THE PAST 12 MONTHS, THE FOOD YOU BOUGHT JUST DIDN'T LAST AND YOU DIDN'T HAVE MONEY TO GET MORE.: NEVER TRUE

## 2023-06-01 SDOH — ECONOMIC STABILITY: INCOME INSECURITY: HOW HARD IS IT FOR YOU TO PAY FOR THE VERY BASICS LIKE FOOD, HOUSING, MEDICAL CARE, AND HEATING?: NOT HARD AT ALL

## 2023-06-01 SDOH — ECONOMIC STABILITY: FOOD INSECURITY: WITHIN THE PAST 12 MONTHS, YOU WORRIED THAT YOUR FOOD WOULD RUN OUT BEFORE YOU GOT MONEY TO BUY MORE.: NEVER TRUE

## 2023-06-01 NOTE — PROGRESS NOTES
Emmy Lovely  6/1/23     Chief Complaint   Patient presents with    6 Month Follow-Up    Foot Problem     Left foot Discoloration, pt states seeing a vascular specialist with no answers for her issue         Allergies   Allergen Reactions    Zanaflex [Tizanidine Hcl]         Current Outpatient Medications   Medication Sig Dispense Refill    temazepam (RESTORIL) 15 MG capsule Take 1 capsule by mouth nightly as needed for Sleep for up to 90 days. Max Daily Amount: 15 mg 90 capsule 0    SYNTHROID 75 MCG tablet TAKE 1 TABLET DAILY 90 tablet 3    Cream Base (VERSAPRO) CREA ONE GRAM (ONE PUMP) EXTERNALLY FOUR TIMES A  g 5    rosuvastatin (CRESTOR) 5 MG tablet Take 1 tablet by mouth daily 90 tablet 1    levothyroxine (SYNTHROID) 75 MCG tablet Take 1 tablet by mouth Daily      venlafaxine (EFFEXOR XR) 75 MG extended release capsule Take 1 capsule by mouth daily 90 capsule 3    RABEprazole (ACIPHEX) 20 MG tablet 1 daily 90 tablet 3    ondansetron (ZOFRAN-ODT) 4 MG disintegrating tablet Take 1 tablet by mouth 3 times daily as needed for Nausea or Vomiting 21 tablet 2    rOPINIRole (REQUIP) 0.5 MG tablet Take 1 tablet by mouth nightly as needed (restless leg) For restless legs 30 tablet 3    NONFORMULARY Indications: elderberry       sucralfate (CARAFATE) 1 GM/10ML suspension Take 10 mLs by mouth 4 times daily 1200 mL 3    B Complex Vitamins (B-COMPLEX/B-12 PO) Take by mouth daily      Cholecalciferol (VITAMIN D PO) Take 1 tablet by mouth 3000 units daily      Biotin 10 MG CAPS Take by mouth daily HOLD      Elastic Bandages & Supports (JOBST KNEE HIGH COMPRESSION ) MISC Knee high with 20- 30 mmhg of compression 1 each 10    acetaminophen (TYLENOL) 500 MG tablet Take 1 tablet by mouth every 6 hours as needed Instructed to take with sip water am of procedure if needs      gabapentin (NEURONTIN) 400 MG capsule Take 1 capsule by mouth 3 times daily for 90 days.  270 capsule 3     Current Facility-Administered

## 2023-06-06 ENCOUNTER — TELEPHONE (OUTPATIENT)
Dept: PRIMARY CARE CLINIC | Age: 73
End: 2023-06-06

## 2023-06-06 DIAGNOSIS — G57.92 NEUROPATHY OF LEFT LOWER EXTREMITY: Primary | ICD-10-CM

## 2023-06-06 NOTE — TELEPHONE ENCOUNTER
Wheel chair   Disabled parking identification permit   Dx neuropathy of left leg     Pt will  prescription.

## 2023-06-12 ENCOUNTER — PROCEDURE VISIT (OUTPATIENT)
Dept: PHYSICAL MEDICINE AND REHAB | Age: 73
End: 2023-06-12
Payer: MEDICARE

## 2023-06-12 VITALS
HEIGHT: 61 IN | DIASTOLIC BLOOD PRESSURE: 68 MMHG | SYSTOLIC BLOOD PRESSURE: 122 MMHG | WEIGHT: 111 LBS | BODY MASS INDEX: 20.96 KG/M2 | OXYGEN SATURATION: 98 %

## 2023-06-12 DIAGNOSIS — G89.29 CHRONIC PAIN OF BOTH SHOULDERS: Primary | ICD-10-CM

## 2023-06-12 DIAGNOSIS — M79.18 CERVICAL MYOFASCIAL PAIN SYNDROME: ICD-10-CM

## 2023-06-12 DIAGNOSIS — M75.122 COMPLETE TEAR OF LEFT ROTATOR CUFF, UNSPECIFIED WHETHER TRAUMATIC: ICD-10-CM

## 2023-06-12 DIAGNOSIS — M54.12 RADICULITIS OF LEFT CERVICAL REGION: ICD-10-CM

## 2023-06-12 DIAGNOSIS — M25.511 CHRONIC PAIN OF BOTH SHOULDERS: Primary | ICD-10-CM

## 2023-06-12 DIAGNOSIS — M79.10 TRIGGER POINT: ICD-10-CM

## 2023-06-12 DIAGNOSIS — S43.003D SUBLUXATION OF SHOULDER JOINT, UNSPECIFIED LATERALITY, SUBSEQUENT ENCOUNTER: ICD-10-CM

## 2023-06-12 DIAGNOSIS — M25.512 CHRONIC PAIN OF BOTH SHOULDERS: Primary | ICD-10-CM

## 2023-06-12 DIAGNOSIS — M75.121 COMPLETE TEAR OF RIGHT ROTATOR CUFF, UNSPECIFIED WHETHER TRAUMATIC: ICD-10-CM

## 2023-06-12 PROCEDURE — 99214 OFFICE O/P EST MOD 30 MIN: CPT | Performed by: PHYSICAL MEDICINE & REHABILITATION

## 2023-06-12 PROCEDURE — G8427 DOCREV CUR MEDS BY ELIG CLIN: HCPCS | Performed by: PHYSICAL MEDICINE & REHABILITATION

## 2023-06-12 PROCEDURE — 20611 DRAIN/INJ JOINT/BURSA W/US: CPT | Performed by: PHYSICAL MEDICINE & REHABILITATION

## 2023-06-12 PROCEDURE — 3017F COLORECTAL CA SCREEN DOC REV: CPT | Performed by: PHYSICAL MEDICINE & REHABILITATION

## 2023-06-12 PROCEDURE — 1036F TOBACCO NON-USER: CPT | Performed by: PHYSICAL MEDICINE & REHABILITATION

## 2023-06-12 PROCEDURE — 1090F PRES/ABSN URINE INCON ASSESS: CPT | Performed by: PHYSICAL MEDICINE & REHABILITATION

## 2023-06-12 PROCEDURE — G8399 PT W/DXA RESULTS DOCUMENT: HCPCS | Performed by: PHYSICAL MEDICINE & REHABILITATION

## 2023-06-12 PROCEDURE — G8420 CALC BMI NORM PARAMETERS: HCPCS | Performed by: PHYSICAL MEDICINE & REHABILITATION

## 2023-06-12 PROCEDURE — 1123F ACP DISCUSS/DSCN MKR DOCD: CPT | Performed by: PHYSICAL MEDICINE & REHABILITATION

## 2023-06-12 RX ORDER — BUPIVACAINE HYDROCHLORIDE 2.5 MG/ML
30 INJECTION, SOLUTION INFILTRATION; PERINEURAL ONCE
Status: CANCELLED | OUTPATIENT
Start: 2023-06-12 | End: 2023-06-12

## 2023-06-12 RX ORDER — TRIAMCINOLONE ACETONIDE 40 MG/ML
40 INJECTION, SUSPENSION INTRA-ARTICULAR; INTRAMUSCULAR ONCE
Status: COMPLETED | OUTPATIENT
Start: 2023-06-12 | End: 2023-06-12

## 2023-06-12 RX ORDER — BUPIVACAINE HYDROCHLORIDE 2.5 MG/ML
6 INJECTION, SOLUTION INFILTRATION; PERINEURAL ONCE
Status: COMPLETED | OUTPATIENT
Start: 2023-06-12 | End: 2023-06-12

## 2023-06-12 RX ADMIN — TRIAMCINOLONE ACETONIDE 40 MG: 40 INJECTION, SUSPENSION INTRA-ARTICULAR; INTRAMUSCULAR at 15:10

## 2023-06-12 RX ADMIN — BUPIVACAINE HYDROCHLORIDE 15 MG: 2.5 INJECTION, SOLUTION INFILTRATION; PERINEURAL at 15:08

## 2023-06-12 RX ADMIN — TRIAMCINOLONE ACETONIDE 40 MG: 40 INJECTION, SUSPENSION INTRA-ARTICULAR; INTRAMUSCULAR at 15:09

## 2023-07-11 NOTE — PROGRESS NOTES
constant and aching, 3-4/10 in severity. Pain is worse with overhead activity. Physical therapy has helped with range of motion and function. The prior workup has included:  Bilateral shoulder MRI - May 2018  -Right shoulder MRI: massive full thickness rotator cuff tear involving the supraspinatus and infraspinatus with retraction and uncovering of the humeral head which is high riding and articulating with the undersurface of the acromian. There is OA of the AC joint.   -Left shoulder MRI: massive full thickness rotator cuff tear involving the supraspinatus and infraspinatus with retraction and uncovering of the humeral head which is high riding and articulating with the undersurface of the acromian. There is OA of the AC joint. The prior treatment has included:  PT: Completed PT in April 2018 - improvement in bilateral shoulder ROM and function, but not pain. History of lymphedema therapy with improvement. Chiropractic: None  Modalities: Biofreeze with partial relief. Eucedrin cream  with partial relief. Has not tried heat/ice. OTC Tylenol: Takes occasionally with partial relief   NSAIDS: Mobic 7.5mg with relief, stopped taking due to bruising   Opioids: None  Membrane stabilizers: gabapentin 400mg QHS and 300mg qam for numbness in left leg  Muscle relaxers: None  Previous injections: Bilateral shoulder injections January 2018 -- ?? Unsure of relief as she was not having much pain at the time  Previous surgery at this site: No shoulder surgeries. Saw Ortho - per patient they did not recommend surgery due to chemo/radiation and osteoporosis -- unless pain unbearable --only then would consider shoulder replacement     Dyllan Leigh  continues home exercises/stretches from therapy. She is still continuing lymphedema therapy. The patient does perform regular exercise.        Past Medical History:   Diagnosis Date    Anemia associated with chemotherapy 11/16/2016    resolved    Cancer ([address] narrative on file       The patient is retired. Sharyle Kief  Does not require an assistive device. Family History   Problem Relation Age of Onset    Hypertension Father     High Blood Pressure Father     Cancer Mother         uterine    Diabetes Mother     Hypertension Mother     High Blood Pressure Mother     Depression Mother        No Known Allergies    Current Outpatient Prescriptions   Medication Sig Dispense Refill    gabapentin (NEURONTIN) 400 MG capsule Take 400 mg by mouth nightly. .  3    Biotin 10 MG CAPS Take by mouth      vitamin D (CHOLECALCIFEROL) 1000 UNIT TABS tablet Take 1,000 Units by mouth daily      lidocaine 1 % injection Inject 5 mLs into the skin every 12 hours as needed (Pain) 150 mL 0    gabapentin (NEURONTIN) 300 MG capsule Take 300 mg by mouth daily Instructed to take with sip water am of procedure      Elastic Bandages & Supports (JOBST KNEE HIGH COMPRESSION SM) MISC Knee high with 20- 30 mmhg of compression 1 each 10    Temazepam (RESTORIL PO) Take 15 mg by mouth nightly Sleep aide      levothyroxine (SYNTHROID) 50 MCG tablet Take 75 mcg by mouth daily       RABEprazole Sodium (ACIPHEX PO) Take 20 mg by mouth 2 times daily Instructed to take am of procedure      furosemide (LASIX) 20 MG tablet Take 20 mg by mouth daily as needed      potassium chloride (KLOR-CON) 20 MEQ packet Take 20 mEq by mouth 2 times daily Patient not sure of frequency  As needed with lasix      aspirin 81 MG chewable tablet Take 81 mg by mouth daily LD 7/21/2017 per Dr. Azeb De La Cruz      sucralfate (CARAFATE) 1 GM tablet Take 1 g by mouth daily as needed       ROPINIRole HCl (REQUIP PO) Take by mouth nightly as needed For restless legs      ALPRAZolam (XANAX) 0.25 MG tablet Take 0.25 mg by mouth nightly as needed for Sleep      acetaminophen (TYLENOL) 500 MG tablet Take 500 mg by mouth every 6 hours as needed Instructed to take with sip water am of procedure if needs       No current injected. Adequate hemostasis was achieved and the injection site was covered with a bandage. The exact same procedure was performed on the left side. The patient was observed for complication and left the office without incident. The patient tolerated the procedure well and was educated in post injection care. Impression:   Bilateral shoulder pain -- exam most consistent with RTC etiology of pain  Bilateral massive rotator cuff tears. Lymphedema     Plan:   · Continue home exercise program from therapy. Reviewed importance of maintaining range of motion. · Topical compound cream PRN  · May use NSAID PRN if ok with primary care   · Discussed bilateral shoulder steroid injections for pain relief, including risks/benefits. Patient wishes to proceed. Completed bilateral shoulder injections, see above. The patient was educated about the diagnosis, prognosis, indications, risks and benefits of treatment. An opportunity to ask questions was given to the patient and questions were answered. The patient agreed to proceed with the recommended treatment as described above. Follow up 2 months      Thank you for the consultation and for allowing me to participate in the care of this patient. Sincerely,         Blondell Boast, M.D.   Physical Medicine and Rehabilitation Suturegard Intro: Intraoperative tissue expansion was performed, utilizing the SUTUREGARD device, in order to reduce wound tension.

## 2023-08-01 ENCOUNTER — HOSPITAL ENCOUNTER (OUTPATIENT)
Dept: PHYSICAL THERAPY | Age: 73
Setting detail: THERAPIES SERIES
Discharge: HOME | End: 2023-08-01

## 2023-08-04 NOTE — PROGRESS NOTES
Physical Therapy  Initial Assessment  Date: 23  Patient Name: Jil English  MRN: [id number]  : 1950    Referring Physician: Jaya Segura MD     PCP: Gino Reddy MD     Medical Diagnosis: Pain in right shoulder [M25.511]  Other chronic pain [G89.29]  Pain in left shoulder [M25.512]  Complete rotator cuff tear or rupture of right shoulder, not specified as traumatic [M75.121]  Complete rotator cuff tear or rupture of left shoulder, not specified as traumatic [M75.122]  Unspecified subluxation of unspecified shoulder joint, subsequent encounter [S43.003D]  Radiculopathy, cervical region [M54.12]  Myalgia, other site [M79.18]  Myalgia, unspecified site [M79.10] Bilateral Shoulder Pain  No data recorded    Insurance: Payor: MEDICARE / Plan: MEDICARE PART A AND B / Product Type: *No Product type* /   Insurance ID: [id number] - (Medicare)      Restrictions:       Subjective:   General  Chart Reviewed: Yes  Patient Assessed for Rehabilitation Services: Yes  Additional Pertinent Hx: Patient presents to PT to assess and treat issues of persistent pain , deficits of strength and limited mobility in bilateral shoulders. Patient has a hx of Bilateral Rotaotr cuff tears . Injuries occureed seperately but both injuries occurred in 2017. Patient was not a candiate for surgery due to other medical issues. Patient receives injections for the shoulder pain on a periodic basis  History obtained from[de-identified] Chart Review, Patient  Family/Caregiver Present: No  Diagnosis: Bilateral Shoulder Pain  Follows Commands: Within Functional Limits  PT Visit Information  Onset Date: 23  PT Insurance Information: [place]  Subjective  Subjective: Limited Bilateral [address] [place] mobility Right more affected than the left.  Deficits of strength especially related to reaching, overhead activity and stablization weith the UE's  Prior diagnostic testing[de-identified] MRI, X-ray  Previous treatments prior to current episode?: Injections, Outpatient

## 2023-08-04 NOTE — PLAN OF CARE
[address] [facility] PHYSICAL THERAPY  [address] [facility]  [place] [address]  Dept: [address]: [phone]    PHYSICAL THERAPY PLAN OF CARE: INITIAL EVALUATION    Patient: Kayla Rolle (44 y.o. female)   Examination Date:   Plan of Care Certification Period: 2023 to  23      :  1950  MRN: [id number]  CSN: [id number]   Insurance: Payor: Chris Ayon / Plan: MEDICARE PART A AND B / Product Type: *No Product type* /   Insurance ID: [id number] - (Medicare) Secondary Insurance (if applicable): [id number]   Referring Physician: Rexann Carrel, MD     PCP: Brendon Lee MD Visits to Date/Visits Approved:   /      No Show/Cancelled Appts:   /       Medical Diagnosis: Pain in right shoulder [M25.511]  Other chronic pain [G89.29]  Pain in left shoulder [M25.512]  Complete rotator cuff tear or rupture of right shoulder, not specified as traumatic [M75.121]  Complete rotator cuff tear or rupture of left shoulder, not specified as traumatic [M75.122]  Unspecified subluxation of unspecified shoulder joint, subsequent encounter [S43.003D]  Radiculopathy, cervical region [M54.12]  Myalgia, other site [M79.18]  Myalgia, unspecified site [M79.10] Bilateral Shoulder Pain  No data recorded     SUBJECTIVE EXAMINATION      History obtained from[de-identified] Chart Review, Patient,      Family/Caregiver Present: No     Subjective History: Onset Date: 23  Subjective: Limited Bilateral [address] mobility Right more affected than the left. Deficits of strength especially related to reaching, overhead activity and stablization weith the UE's  Additional Pertinent Hx (if applicable): Patient presents to PT to assess and treat issues of persistent pain , deficits of strength and limited mobility in bilateral shoulders. Patient has a hx of Bilateral Rotaotr cuff tears . Injuries occureed seperately but both injuries occurred in 2017. Patient was not a candiate for surgery due to other medical issues.  Patient

## 2023-08-08 ENCOUNTER — HOSPITAL ENCOUNTER (OUTPATIENT)
Dept: PHYSICAL THERAPY | Age: 73
Setting detail: THERAPIES SERIES
Discharge: HOME | End: 2023-08-08
Payer: MEDICARE

## 2023-08-08 PROCEDURE — 97113 AQUATIC THERAPY/EXERCISES: CPT

## 2023-08-08 NOTE — PROGRESS NOTES
Joshua Ricardo  Phone: [phone] Fax: [phone]        Physical Therapy Aquatic Flow Sheet   Date:  2023    Patient Name:  Galindo Urbina    :  1950  Restrictions/Precautions:    Diagnosis:  : Pain in right shoulder [M25.511]  Other chronic pain [G89.29]  Pain in left shoulder [M25.512]  Complete rotator cuff tear or rupture of right shoulder, not specified as traumatic [M75.121]  Complete rotator cuff tear or rupture of left shoulder, not specified as traumatic [M75.122]  Unspecified subluxation of unspecified shoulder joint, subsequent encounter [S43.003D]  Radiculopathy, cervical region [M54.12]  Myalgia, other site [M79.18]  Myalgia, unspecified site [M79.10] Bilateral Shoulder Pain  Treatment Diagnosis:    Insurance/Certification information:  Yulia Su MD     PCP: Renzo Calzada MD  Referring Physician:    Plan of care signed (Y/N):    Visit# / total visits:  Pain level: 8/10   Electronically signed by:  Brendon Iniguez PTA  Time In:0900  Time Out:1000    Subjective:  Limited Bilateral [address] [place] mobility Right more affected than the left. Deficits of strength especially related to reaching, overhead activity and stablization weith the UE's  Additional Pertinent Hx (if applicable): Patient presents to PT to assess and treat issues of persistent pain , deficits of strength and limited mobility in bilateral shoulders. Patient has a hx of Bilateral Rotaotr cuff tears . Injuries occureed seperately but both injuries occurred in . Patient was not a candiate for surgery due to other medical issues.  Patient receives injections for the shoulder pain on a periodic basis   Prior diagnostic testing[de-identified] MRI, X-ray  Previous treatments prior to current episode?: Injections, Outpatient PT, Medicatio         Key  B= Belt DB= Dumbells T= Theratube   H= Hydrotone N= Noodles W= Weights   P= Paddles S= Speedo equipment K= Kickboard     Exercises/Activities   Warm-up/Amb

## 2023-08-10 ENCOUNTER — HOSPITAL ENCOUNTER (OUTPATIENT)
Dept: PHYSICAL THERAPY | Age: 73
Setting detail: THERAPIES SERIES
Discharge: HOME | End: 2023-08-10
Payer: MEDICARE

## 2023-08-15 ENCOUNTER — APPOINTMENT (OUTPATIENT)
Dept: PHYSICAL THERAPY | Age: 73
End: 2023-08-15
Payer: MEDICARE

## 2023-08-17 ENCOUNTER — HOSPITAL ENCOUNTER (OUTPATIENT)
Dept: PHYSICAL THERAPY | Age: 73
Setting detail: THERAPIES SERIES
Discharge: HOME | End: 2023-08-17
Payer: MEDICARE

## 2023-09-05 ENCOUNTER — HOSPITAL ENCOUNTER (OUTPATIENT)
Dept: PHYSICAL THERAPY | Age: 73
Setting detail: THERAPIES SERIES
End: 2023-09-05
Payer: COMMERCIAL

## 2023-09-07 ENCOUNTER — HOSPITAL ENCOUNTER (OUTPATIENT)
Dept: PHYSICAL THERAPY | Age: 73
Setting detail: THERAPIES SERIES
Discharge: HOME | End: 2023-09-07
Payer: MEDICARE

## 2023-09-07 PROCEDURE — 97113 AQUATIC THERAPY/EXERCISES: CPT

## 2023-09-12 ENCOUNTER — APPOINTMENT (OUTPATIENT)
Dept: PHYSICAL THERAPY | Age: 73
End: 2023-09-12
Payer: COMMERCIAL

## 2023-09-14 ENCOUNTER — APPOINTMENT (OUTPATIENT)
Dept: PHYSICAL THERAPY | Age: 73
End: 2023-09-14
Payer: COMMERCIAL

## 2023-09-23 DIAGNOSIS — F51.04 CHRONIC INSOMNIA: Primary | ICD-10-CM

## 2023-09-25 ENCOUNTER — OFFICE VISIT (OUTPATIENT)
Dept: PHYSICAL MEDICINE AND REHAB | Age: 73
End: 2023-09-25
Payer: COMMERCIAL

## 2023-09-25 VITALS — SYSTOLIC BLOOD PRESSURE: 130 MMHG | HEIGHT: 61 IN | BODY MASS INDEX: 20.97 KG/M2 | DIASTOLIC BLOOD PRESSURE: 80 MMHG

## 2023-09-25 DIAGNOSIS — G89.29 CHRONIC PAIN OF BOTH SHOULDERS: Primary | ICD-10-CM

## 2023-09-25 DIAGNOSIS — M25.512 CHRONIC PAIN OF BOTH SHOULDERS: Primary | ICD-10-CM

## 2023-09-25 DIAGNOSIS — M75.122 COMPLETE TEAR OF LEFT ROTATOR CUFF, UNSPECIFIED WHETHER TRAUMATIC: ICD-10-CM

## 2023-09-25 DIAGNOSIS — M79.10 TRIGGER POINT: ICD-10-CM

## 2023-09-25 DIAGNOSIS — M75.121 COMPLETE TEAR OF RIGHT ROTATOR CUFF, UNSPECIFIED WHETHER TRAUMATIC: ICD-10-CM

## 2023-09-25 DIAGNOSIS — M25.511 CHRONIC PAIN OF BOTH SHOULDERS: Primary | ICD-10-CM

## 2023-09-25 DIAGNOSIS — M79.18 CERVICAL MYOFASCIAL PAIN SYNDROME: ICD-10-CM

## 2023-09-25 DIAGNOSIS — S43.003D SUBLUXATION OF SHOULDER JOINT, UNSPECIFIED LATERALITY, SUBSEQUENT ENCOUNTER: ICD-10-CM

## 2023-09-25 DIAGNOSIS — M54.12 RADICULITIS OF LEFT CERVICAL REGION: ICD-10-CM

## 2023-09-25 PROCEDURE — 1036F TOBACCO NON-USER: CPT | Performed by: PHYSICAL MEDICINE & REHABILITATION

## 2023-09-25 PROCEDURE — 99214 OFFICE O/P EST MOD 30 MIN: CPT | Performed by: PHYSICAL MEDICINE & REHABILITATION

## 2023-09-25 PROCEDURE — 3017F COLORECTAL CA SCREEN DOC REV: CPT | Performed by: PHYSICAL MEDICINE & REHABILITATION

## 2023-09-25 PROCEDURE — 1090F PRES/ABSN URINE INCON ASSESS: CPT | Performed by: PHYSICAL MEDICINE & REHABILITATION

## 2023-09-25 PROCEDURE — 1123F ACP DISCUSS/DSCN MKR DOCD: CPT | Performed by: PHYSICAL MEDICINE & REHABILITATION

## 2023-09-25 PROCEDURE — 20553 NJX 1/MLT TRIGGER POINTS 3/>: CPT | Performed by: PHYSICAL MEDICINE & REHABILITATION

## 2023-09-25 PROCEDURE — G8420 CALC BMI NORM PARAMETERS: HCPCS | Performed by: PHYSICAL MEDICINE & REHABILITATION

## 2023-09-25 PROCEDURE — G8399 PT W/DXA RESULTS DOCUMENT: HCPCS | Performed by: PHYSICAL MEDICINE & REHABILITATION

## 2023-09-25 PROCEDURE — G8427 DOCREV CUR MEDS BY ELIG CLIN: HCPCS | Performed by: PHYSICAL MEDICINE & REHABILITATION

## 2023-09-25 RX ORDER — BUPIVACAINE HYDROCHLORIDE 2.5 MG/ML
5 INJECTION, SOLUTION INFILTRATION; PERINEURAL ONCE
Status: COMPLETED | OUTPATIENT
Start: 2023-09-25 | End: 2023-09-25

## 2023-09-25 RX ORDER — TEMAZEPAM 15 MG/1
CAPSULE ORAL
Qty: 90 CAPSULE | Refills: 0 | Status: SHIPPED | OUTPATIENT
Start: 2023-09-25 | End: 2023-10-25

## 2023-09-25 RX ADMIN — BUPIVACAINE HYDROCHLORIDE 12.5 MG: 2.5 INJECTION, SOLUTION INFILTRATION; PERINEURAL at 14:58

## 2023-09-25 NOTE — PROGRESS NOTES
Raeann Kim M.D. [address] [place] [address] PHYSICAL MEDICINE AND REHABILITAION  [address] [facility] [address] [address]  Dept: [phone]  Dept Fax: [phone]: [phone]    PCP: Rakan Sadler MD  Date of visit: 9/25/23    Chief Complaint   Patient presents with    Pain      The left shoulder pain for couple  months. She is using topical cream Neurontin with some relief. Silverio Tucker is a 68 y.o.  right hand dominant woman who presents for follow up of upper extremity pain and bilateral rotator cuff tears. 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 Chemotherapy was started on 10/6/2016 with Dr. Sofi Sethi. November 2016 she completed head and neck irradiation for management of right base of tongue SCC. She reports she finished all treatment (chemo/radiation) November 2016. She denies any arm pain or numbness/tingling after her chemo/radiation, until her more recent injuries with rotator cuff tears. Her primary complaint is bilateral shoulder pain, which [facility]. She had massive full thickness bilateral supraspinatus and infraspinatus tears-- the right rotator cuff in December 2017 and left rotator cuff in January 2018. She saw Ortho--they did not recommend surgery due to chemo/radiation and osteoporosis -- unless pain unbearable --only then would consider shoulder replacement. Patient was cleared for shoulder surgery from radiation oncology standpoint IF indicated/recommended by Ortho. Patient does not wish for surgery at this time. Interval history:   Since last visit patient has been doing her home exercises to maintain shoulder ROM sporadically.  She tried Aquatic PT, but felt she lost her progress when the pool

## 2023-10-04 ENCOUNTER — HOSPITAL ENCOUNTER (OUTPATIENT)
Age: 73
Discharge: HOME | End: 2023-10-06

## 2023-10-04 PROCEDURE — 87077 CULTURE AEROBIC IDENTIFY: CPT

## 2023-10-05 LAB
HELIOBACTER PYLORI ID: NEGATIVE
SOURCE, 60200063: NORMAL

## 2023-10-09 LAB — SURGICAL PATHOLOGY REPORT: NORMAL

## 2023-10-23 ENCOUNTER — PROCEDURE VISIT (OUTPATIENT)
Dept: PHYSICAL MEDICINE AND REHAB | Age: 73
End: 2023-10-23

## 2023-10-23 VITALS
BODY MASS INDEX: 20.94 KG/M2 | TEMPERATURE: 98.4 F | DIASTOLIC BLOOD PRESSURE: 62 MMHG | WEIGHT: 110.89 LBS | OXYGEN SATURATION: 98 % | HEART RATE: 70 BPM | HEIGHT: 61 IN | SYSTOLIC BLOOD PRESSURE: 104 MMHG

## 2023-10-23 DIAGNOSIS — M25.511 CHRONIC PAIN OF BOTH SHOULDERS: Primary | ICD-10-CM

## 2023-10-23 DIAGNOSIS — M79.10 TRIGGER POINT: ICD-10-CM

## 2023-10-23 DIAGNOSIS — S43.003D SUBLUXATION OF SHOULDER JOINT, UNSPECIFIED LATERALITY, SUBSEQUENT ENCOUNTER: ICD-10-CM

## 2023-10-23 DIAGNOSIS — M75.121 COMPLETE TEAR OF RIGHT ROTATOR CUFF, UNSPECIFIED WHETHER TRAUMATIC: ICD-10-CM

## 2023-10-23 DIAGNOSIS — M54.12 RADICULITIS OF LEFT CERVICAL REGION: ICD-10-CM

## 2023-10-23 DIAGNOSIS — M75.122 COMPLETE TEAR OF LEFT ROTATOR CUFF, UNSPECIFIED WHETHER TRAUMATIC: ICD-10-CM

## 2023-10-23 DIAGNOSIS — G89.29 CHRONIC PAIN OF BOTH SHOULDERS: Primary | ICD-10-CM

## 2023-10-23 DIAGNOSIS — M79.18 CERVICAL MYOFASCIAL PAIN SYNDROME: ICD-10-CM

## 2023-10-23 DIAGNOSIS — M25.512 CHRONIC PAIN OF BOTH SHOULDERS: Primary | ICD-10-CM

## 2023-10-23 RX ORDER — TRIAMCINOLONE ACETONIDE 40 MG/ML
40 INJECTION, SUSPENSION INTRA-ARTICULAR; INTRAMUSCULAR ONCE
Status: COMPLETED | OUTPATIENT
Start: 2023-10-23 | End: 2023-10-24

## 2023-10-23 RX ORDER — ROPINIROLE 0.5 MG/1
TABLET, FILM COATED ORAL
Qty: 30 TABLET | Refills: 0 | Status: SHIPPED | OUTPATIENT
Start: 2023-10-23

## 2023-10-23 RX ORDER — BUPIVACAINE HYDROCHLORIDE 2.5 MG/ML
6 INJECTION, SOLUTION INFILTRATION; PERINEURAL ONCE
Status: COMPLETED | OUTPATIENT
Start: 2023-10-23 | End: 2023-10-24

## 2023-10-24 RX ADMIN — TRIAMCINOLONE ACETONIDE 40 MG: 40 INJECTION, SUSPENSION INTRA-ARTICULAR; INTRAMUSCULAR at 13:07

## 2023-10-24 RX ADMIN — BUPIVACAINE HYDROCHLORIDE 15 MG: 2.5 INJECTION, SOLUTION INFILTRATION; PERINEURAL at 13:06

## 2023-10-24 RX ADMIN — TRIAMCINOLONE ACETONIDE 40 MG: 40 INJECTION, SUSPENSION INTRA-ARTICULAR; INTRAMUSCULAR at 13:08

## 2023-11-20 ENCOUNTER — HOSPITAL ENCOUNTER (OUTPATIENT)
Dept: NUCLEAR MEDICINE | Age: 73
Discharge: HOME | End: 2023-11-20
Attending: OTOLARYNGOLOGY
Payer: COMMERCIAL

## 2023-11-20 DIAGNOSIS — Z85.818 PERSONAL HISTORY OF MALIGNANT NEOPLASM OF OTHER SITES OF LIP, ORAL CAVITY, AND PHARYNX: ICD-10-CM

## 2023-11-20 DIAGNOSIS — C01 MALIGNANT NEOPLASM OF BASE OF TONGUE (HCC): ICD-10-CM

## 2023-11-20 PROCEDURE — 78815 PET IMAGE W/CT SKULL-THIGH: CPT

## 2023-11-20 PROCEDURE — 3430000000 HC RX DIAGNOSTIC RADIOPHARMACEUTICAL: Performed by: RADIOLOGY

## 2023-11-20 PROCEDURE — A9552 F18 FDG: HCPCS | Performed by: RADIOLOGY

## 2023-11-20 RX ORDER — FLUDEOXYGLUCOSE F 18 200 MCI/ML
15 INJECTION, SOLUTION INTRAVENOUS
Status: COMPLETED | OUTPATIENT
Start: 2023-11-20 | End: 2023-11-20

## 2023-11-20 RX ADMIN — FLUDEOXYGLUCOSE F 18 15 MILLICURIE: 200 INJECTION, SOLUTION INTRAVENOUS at 21:50

## 2023-11-27 ENCOUNTER — HOSPITAL ENCOUNTER (OUTPATIENT)
Age: 73
Discharge: HOME | End: 2023-11-27
Payer: COMMERCIAL

## 2023-11-27 DIAGNOSIS — R73.03 PREDIABETES: ICD-10-CM

## 2023-11-27 DIAGNOSIS — I10 PRIMARY HYPERTENSION: ICD-10-CM

## 2023-11-27 DIAGNOSIS — E03.9 ACQUIRED HYPOTHYROIDISM: ICD-10-CM

## 2023-11-27 DIAGNOSIS — E78.00 PURE HYPERCHOLESTEROLEMIA: ICD-10-CM

## 2023-11-27 LAB
ALBUMIN SERPL-MCNC: 3.9 G/DL (ref 3.5–5.2)
ALP SERPL-CCNC: 61 U/L (ref 35–104)
ALT SERPL-CCNC: 12 U/L (ref 0–32)
ANION GAP SERPL CALCULATED.3IONS-SCNC: 7 MMOL/L (ref 7–16)
AST SERPL-CCNC: 18 U/L (ref 0–31)
BILIRUB SERPL-MCNC: 0.8 MG/DL (ref 0–1.2)
BUN SERPL-MCNC: 20 MG/DL (ref 6–23)
CALCIUM SERPL-MCNC: 9.9 MG/DL (ref 8.6–10.2)
CHLORIDE SERPL-SCNC: 103 MMOL/L (ref 98–107)
CHOLEST SERPL-MCNC: 253 MG/DL
CO2 SERPL-SCNC: 31 MMOL/L (ref 22–29)
CREAT SERPL-MCNC: 0.8 MG/DL (ref 0.5–1)
ERYTHROCYTE [DISTWIDTH] IN BLOOD BY AUTOMATED COUNT: 14.1 % (ref 11.5–15)
GFR SERPL CREATININE-BSD FRML MDRD: >60 ML/MIN/1.73M2
GLUCOSE SERPL-MCNC: 90 MG/DL (ref 74–99)
HBA1C MFR BLD: 5.9 % (ref 4–5.6)
HCT VFR BLD AUTO: 39.1 % (ref 34–48)
HDLC SERPL-MCNC: 71 MG/DL
HGB BLD-MCNC: 12.5 G/DL (ref 11.5–15.5)
LDLC SERPL CALC-MCNC: 160 MG/DL
MCH RBC QN AUTO: 29.8 PG (ref 26–35)
MCHC RBC AUTO-ENTMCNC: 32 G/DL (ref 32–34.5)
MCV RBC AUTO: 93.3 FL (ref 80–99.9)
PLATELET # BLD AUTO: 255 K/UL (ref 130–450)
PMV BLD AUTO: 10.2 FL (ref 7–12)
POTASSIUM SERPL-SCNC: 4.2 MMOL/L (ref 3.5–5)
PROT SERPL-MCNC: 6.5 G/DL (ref 6.4–8.3)
RBC # BLD AUTO: 4.19 M/UL (ref 3.5–5.5)
SODIUM SERPL-SCNC: 141 MMOL/L (ref 132–146)
T4 FREE SERPL-MCNC: 1.5 NG/DL (ref 0.9–1.7)
TRIGL SERPL-MCNC: 109 MG/DL
TSH SERPL DL<=0.05 MIU/L-ACNC: 0.29 UIU/ML (ref 0.27–4.2)
VLDLC SERPL CALC-MCNC: 22 MG/DL
WBC OTHER # BLD: 4.8 K/UL (ref 4.5–11.5)

## 2023-11-27 PROCEDURE — 36415 COLL VENOUS BLD VENIPUNCTURE: CPT

## 2023-11-27 PROCEDURE — 80061 LIPID PANEL: CPT

## 2023-11-27 PROCEDURE — 80053 COMPREHEN METABOLIC PANEL: CPT

## 2023-11-27 PROCEDURE — 83036 HEMOGLOBIN GLYCOSYLATED A1C: CPT

## 2023-11-27 PROCEDURE — 85027 COMPLETE CBC AUTOMATED: CPT

## 2023-11-27 PROCEDURE — 84439 ASSAY OF FREE THYROXINE: CPT

## 2023-11-27 PROCEDURE — 84443 ASSAY THYROID STIM HORMONE: CPT

## 2023-12-05 ENCOUNTER — OFFICE VISIT (OUTPATIENT)
Dept: PRIMARY CARE CLINIC | Age: 73
End: 2023-12-05
Payer: COMMERCIAL

## 2023-12-05 VITALS
BODY MASS INDEX: 20.77 KG/M2 | RESPIRATION RATE: 16 BRPM | HEART RATE: 50 BPM | OXYGEN SATURATION: 91 % | SYSTOLIC BLOOD PRESSURE: 118 MMHG | WEIGHT: 110 LBS | TEMPERATURE: 98.1 F | HEIGHT: 61 IN | DIASTOLIC BLOOD PRESSURE: 78 MMHG

## 2023-12-05 DIAGNOSIS — E03.9 ACQUIRED HYPOTHYROIDISM: ICD-10-CM

## 2023-12-05 DIAGNOSIS — E55.9 VITAMIN D DEFICIENCY: ICD-10-CM

## 2023-12-05 DIAGNOSIS — F32.A ANXIETY AND DEPRESSION: Chronic | ICD-10-CM

## 2023-12-05 DIAGNOSIS — E78.00 PURE HYPERCHOLESTEROLEMIA: ICD-10-CM

## 2023-12-05 DIAGNOSIS — F41.9 ANXIETY AND DEPRESSION: Chronic | ICD-10-CM

## 2023-12-05 DIAGNOSIS — I89.0 LYMPHEDEMA OF BOTH LOWER EXTREMITIES: ICD-10-CM

## 2023-12-05 DIAGNOSIS — G25.81 RESTLESS LEG SYNDROME: ICD-10-CM

## 2023-12-05 DIAGNOSIS — D64.81 ANEMIA ASSOCIATED WITH CHEMOTHERAPY: ICD-10-CM

## 2023-12-05 DIAGNOSIS — T45.1X5A ANEMIA ASSOCIATED WITH CHEMOTHERAPY: ICD-10-CM

## 2023-12-05 DIAGNOSIS — Z00.00 MEDICARE ANNUAL WELLNESS VISIT, SUBSEQUENT: Primary | ICD-10-CM

## 2023-12-05 DIAGNOSIS — I10 PRIMARY HYPERTENSION: ICD-10-CM

## 2023-12-05 DIAGNOSIS — F51.04 CHRONIC INSOMNIA: ICD-10-CM

## 2023-12-05 DIAGNOSIS — Z78.9 STATIN INTOLERANCE: ICD-10-CM

## 2023-12-05 DIAGNOSIS — K21.9 GASTROESOPHAGEAL REFLUX DISEASE WITHOUT ESOPHAGITIS: ICD-10-CM

## 2023-12-05 DIAGNOSIS — C02.9 CANCER OF TONGUE (HCC): ICD-10-CM

## 2023-12-05 DIAGNOSIS — R73.03 PREDIABETES: ICD-10-CM

## 2023-12-05 PROCEDURE — 3074F SYST BP LT 130 MM HG: CPT | Performed by: INTERNAL MEDICINE

## 2023-12-05 PROCEDURE — 3017F COLORECTAL CA SCREEN DOC REV: CPT | Performed by: INTERNAL MEDICINE

## 2023-12-05 PROCEDURE — 3078F DIAST BP <80 MM HG: CPT | Performed by: INTERNAL MEDICINE

## 2023-12-05 PROCEDURE — G0439 PPPS, SUBSEQ VISIT: HCPCS | Performed by: INTERNAL MEDICINE

## 2023-12-05 PROCEDURE — 1123F ACP DISCUSS/DSCN MKR DOCD: CPT | Performed by: INTERNAL MEDICINE

## 2023-12-05 PROCEDURE — G8484 FLU IMMUNIZE NO ADMIN: HCPCS | Performed by: INTERNAL MEDICINE

## 2023-12-05 RX ORDER — LEVOTHYROXINE SODIUM 75 MCG
75 TABLET ORAL DAILY
Qty: 90 TABLET | Refills: 3 | Status: SHIPPED | OUTPATIENT
Start: 2023-12-05 | End: 2024-11-29

## 2023-12-05 ASSESSMENT — COLUMBIA-SUICIDE SEVERITY RATING SCALE - C-SSRS
7. DID THIS OCCUR IN THE LAST THREE MONTHS: NO
4. HAVE YOU HAD THESE THOUGHTS AND HAD SOME INTENTION OF ACTING ON THEM?: NO
5. HAVE YOU STARTED TO WORK OUT OR WORKED OUT THE DETAILS OF HOW TO KILL YOURSELF? DO YOU INTEND TO CARRY OUT THIS PLAN?: NO
3. HAVE YOU BEEN THINKING ABOUT HOW YOU MIGHT KILL YOURSELF?: NO

## 2023-12-05 ASSESSMENT — PATIENT HEALTH QUESTIONNAIRE - PHQ9
SUM OF ALL RESPONSES TO PHQ QUESTIONS 1-9: 0
SUM OF ALL RESPONSES TO PHQ QUESTIONS 1-9: 0
9. THOUGHTS THAT YOU WOULD BE BETTER OFF DEAD, OR OF HURTING YOURSELF: 0
6. FEELING BAD ABOUT YOURSELF - OR THAT YOU ARE A FAILURE OR HAVE LET YOURSELF OR YOUR FAMILY DOWN: 0
SUM OF ALL RESPONSES TO PHQ QUESTIONS 1-9: 0
1. LITTLE INTEREST OR PLEASURE IN DOING THINGS: 0
2. FEELING DOWN, DEPRESSED OR HOPELESS: 0
SUM OF ALL RESPONSES TO PHQ QUESTIONS 1-9: 0
5. POOR APPETITE OR OVEREATING: 0
10. IF YOU CHECKED OFF ANY PROBLEMS, HOW DIFFICULT HAVE THESE PROBLEMS MADE IT FOR YOU TO DO YOUR WORK, TAKE CARE OF THINGS AT HOME, OR GET ALONG WITH OTHER PEOPLE: 0
8. MOVING OR SPEAKING SO SLOWLY THAT OTHER PEOPLE COULD HAVE NOTICED. OR THE OPPOSITE, BEING SO FIGETY OR RESTLESS THAT YOU HAVE BEEN MOVING AROUND A LOT MORE THAN USUAL: 0
SUM OF ALL RESPONSES TO PHQ9 QUESTIONS 1 & 2: 0
4. FEELING TIRED OR HAVING LITTLE ENERGY: 0
7. TROUBLE CONCENTRATING ON THINGS, SUCH AS READING THE NEWSPAPER OR WATCHING TELEVISION: 0
3. TROUBLE FALLING OR STAYING ASLEEP: 0

## 2023-12-05 NOTE — PATIENT INSTRUCTIONS
Reapply every 2 to 3 hours or after sweating, drying off with a towel, or swimming. Always wear a seat belt when traveling in a car. Always wear a helmet when riding a bicycle or motorcycle.

## 2023-12-05 NOTE — PROGRESS NOTES
Medicare Annual Wellness Visit    Amanda Hooper is here for Medicare AWV and Hypothyroidism (W LABS )    Assessment & Plan   Medicare annual wellness visit, subsequent  Acquired hypothyroidism  -     SYNTHROID 75 MCG tablet; Take 1 tablet by mouth daily, Disp-90 tablet, R-3, DAWNormal  -     T4, Free; Future  -     TSH; Future  Vitamin D deficiency  -     Vitamin D 25 Hydroxy; Future  Pure hypercholesterolemia  -     Lipid Panel; Future  Prediabetes  -     Hemoglobin A1C; Future  Primary hypertension  -     Comprehensive Metabolic Panel; Future  -     CBC with Auto Differential; Future  Gastroesophageal reflux disease without esophagitis  Cancer of tongue (HCC)  Statin intolerance  Restless leg syndrome  Lymphedema of both lower extremities  Chronic insomnia  Anxiety and depression  Anemia associated with chemotherapy    Recommendations for Preventive Services Due: see orders and patient instructions/AVS.  Recommended screening schedule for the next 5-10 years is provided to the patient in written form: see Patient Instructions/AVS.     Return in 5 months (on 5/5/2024) for labs, follow up visit. Subjective   Patient comes in for annual wellness visit. She is now 68years of age. Currently she feels well. She denies any chest pain or shortness of breath. She remains on all of her usual meds and supplements the same as listed on her med list, which was reviewed with her. Follows up with her oncologist and ENT specialist for her history of squamous cell cancer of the tonsil. Patient's complete Health Risk Assessment and screening values have been reviewed and are found in Flowsheets. The following problems were reviewed today and where indicated follow up appointments were made and/or referrals ordered.     Positive Risk Factor Screenings with Interventions:    Fall Risk:  Do you feel unsteady or are you worried about falling? : no  2 or more falls in past year?: (!) yes  Fall with injury in past year?:

## 2024-01-02 DIAGNOSIS — F51.04 CHRONIC INSOMNIA: ICD-10-CM

## 2024-01-02 DIAGNOSIS — F51.04 CHRONIC INSOMNIA: Primary | ICD-10-CM

## 2024-01-02 RX ORDER — TEMAZEPAM 15 MG/1
CAPSULE ORAL
Qty: 90 CAPSULE | Refills: 0 | OUTPATIENT
Start: 2024-01-02

## 2024-01-02 RX ORDER — TEMAZEPAM 15 MG/1
15 CAPSULE ORAL NIGHTLY PRN
COMMUNITY
End: 2024-01-02 | Stop reason: SDUPTHER

## 2024-01-02 RX ORDER — TEMAZEPAM 15 MG/1
15 CAPSULE ORAL NIGHTLY PRN
Qty: 90 CAPSULE | Refills: 0 | Status: SHIPPED | OUTPATIENT
Start: 2024-01-02 | End: 2024-04-01

## 2024-01-11 RX ORDER — RABEPRAZOLE SODIUM 20 MG/1
TABLET, DELAYED RELEASE ORAL
Qty: 90 TABLET | Refills: 0 | Status: SHIPPED | OUTPATIENT
Start: 2024-01-11

## 2024-01-17 ENCOUNTER — TELEPHONE (OUTPATIENT)
Dept: PRIMARY CARE CLINIC | Age: 74
End: 2024-01-17

## 2024-01-17 NOTE — TELEPHONE ENCOUNTER
Tell patient that we will call to get Dr. Perez's PA's note so I can review it.  Sounds like an EMG is a good idea so I would go ahead and get that test and go from there.  She may also want to get Dr. Varma's input as well.

## 2024-01-17 NOTE — TELEPHONE ENCOUNTER
Pt calling she has a foot drop issue that has been going on for 3-4 weeks, she has went to an  [facility] orthopedic PA for Dr Perez. She was advised to have an EMG to see if its a nerve issue and follow up

## 2024-01-18 RX ORDER — RABEPRAZOLE SODIUM 20 MG/1
TABLET, DELAYED RELEASE ORAL
Qty: 90 TABLET | Refills: 0 | OUTPATIENT
Start: 2024-01-18

## 2024-01-31 ENCOUNTER — HOSPITAL ENCOUNTER (OUTPATIENT)
Dept: GENERAL RADIOLOGY | Age: 74
Discharge: HOME | End: 2024-02-02
Payer: COMMERCIAL

## 2024-01-31 DIAGNOSIS — Z12.31 VISIT FOR SCREENING MAMMOGRAM: ICD-10-CM

## 2024-01-31 PROCEDURE — 77063 BREAST TOMOSYNTHESIS BI: CPT

## 2024-02-06 DIAGNOSIS — F32.A ANXIETY AND DEPRESSION: ICD-10-CM

## 2024-02-06 DIAGNOSIS — F41.9 ANXIETY AND DEPRESSION: ICD-10-CM

## 2024-02-07 RX ORDER — VENLAFAXINE HYDROCHLORIDE 75 MG/1
75 CAPSULE, EXTENDED RELEASE ORAL DAILY
Qty: 90 CAPSULE | Refills: 0 | Status: SHIPPED | OUTPATIENT
Start: 2024-02-07

## 2024-02-07 RX ORDER — ROPINIROLE 0.5 MG/1
TABLET, FILM COATED ORAL
Qty: 30 TABLET | Refills: 0 | Status: SHIPPED | OUTPATIENT
Start: 2024-02-07

## 2024-02-23 ENCOUNTER — TELEPHONE (OUTPATIENT)
Dept: PRIMARY CARE CLINIC | Age: 74
End: 2024-02-23

## 2024-02-23 NOTE — TELEPHONE ENCOUNTER
Pt calling with SOB she was advised to go to ER for eval, she declines and requesting an appt for next week.  She could not come Monday morning , but scheduled for wed. She again is advised to go to ER for eval today if possible

## 2024-02-28 ENCOUNTER — OFFICE VISIT (OUTPATIENT)
Dept: PRIMARY CARE CLINIC | Age: 74
End: 2024-02-28
Payer: COMMERCIAL

## 2024-02-28 VITALS
OXYGEN SATURATION: 99 % | RESPIRATION RATE: 16 BRPM | SYSTOLIC BLOOD PRESSURE: 120 MMHG | WEIGHT: 118 LBS | BODY MASS INDEX: 22.28 KG/M2 | HEIGHT: 61 IN | TEMPERATURE: 98.1 F | HEART RATE: 83 BPM | DIASTOLIC BLOOD PRESSURE: 80 MMHG

## 2024-02-28 DIAGNOSIS — R42 DIZZINESS: ICD-10-CM

## 2024-02-28 DIAGNOSIS — R06.02 SOB (SHORTNESS OF BREATH): ICD-10-CM

## 2024-02-28 DIAGNOSIS — E03.9 ACQUIRED HYPOTHYROIDISM: Primary | ICD-10-CM

## 2024-02-28 DIAGNOSIS — I35.1 NONRHEUMATIC AORTIC VALVE INSUFFICIENCY: ICD-10-CM

## 2024-02-28 DIAGNOSIS — C02.9 CANCER OF TONGUE (HCC): ICD-10-CM

## 2024-02-28 LAB
ABSOLUTE IMMATURE GRANULOCYTE: <0.03 K/UL (ref 0–0.58)
ALBUMIN SERPL-MCNC: 4.1 G/DL (ref 3.5–5.2)
ALP BLD-CCNC: 72 U/L (ref 35–104)
ALT SERPL-CCNC: 8 U/L (ref 0–32)
ANION GAP SERPL CALCULATED.3IONS-SCNC: 13 MMOL/L (ref 7–16)
AST SERPL-CCNC: 17 U/L (ref 0–31)
BASOPHILS ABSOLUTE: 0.06 K/UL (ref 0–0.2)
BASOPHILS RELATIVE PERCENT: 1 % (ref 0–2)
BILIRUB SERPL-MCNC: 0.8 MG/DL (ref 0–1.2)
BUN BLDV-MCNC: 16 MG/DL (ref 6–23)
CALCIUM SERPL-MCNC: 9.2 MG/DL (ref 8.6–10.2)
CHLORIDE BLD-SCNC: 105 MMOL/L (ref 98–107)
CO2: 25 MMOL/L (ref 22–29)
CREAT SERPL-MCNC: 0.7 MG/DL (ref 0.5–1)
EOSINOPHILS ABSOLUTE: 0.09 K/UL (ref 0.05–0.5)
EOSINOPHILS RELATIVE PERCENT: 1 % (ref 0–6)
GFR SERPL CREATININE-BSD FRML MDRD: >60 ML/MIN/1.73M2
GLUCOSE BLD-MCNC: 105 MG/DL (ref 74–99)
HCT VFR BLD CALC: 37.7 % (ref 34–48)
HEMOGLOBIN: 11.8 G/DL (ref 11.5–15.5)
IMMATURE GRANULOCYTES: 0 % (ref 0–5)
LYMPHOCYTES ABSOLUTE: 1.39 K/UL (ref 1.5–4)
LYMPHOCYTES RELATIVE PERCENT: 19 % (ref 20–42)
MAGNESIUM: 2.3 MG/DL (ref 1.6–2.6)
MCH RBC QN AUTO: 28.6 PG (ref 26–35)
MCHC RBC AUTO-ENTMCNC: 31.3 G/DL (ref 32–34.5)
MCV RBC AUTO: 91.3 FL (ref 80–99.9)
MONOCYTES ABSOLUTE: 0.69 K/UL (ref 0.1–0.95)
MONOCYTES RELATIVE PERCENT: 9 % (ref 2–12)
NEUTROPHILS ABSOLUTE: 5.24 K/UL (ref 1.8–7.3)
NEUTROPHILS RELATIVE PERCENT: 70 % (ref 43–80)
PDW BLD-RTO: 12.6 % (ref 11.5–15)
PLATELET # BLD: 276 K/UL (ref 130–450)
PMV BLD AUTO: 11 FL (ref 7–12)
POTASSIUM SERPL-SCNC: 3.9 MMOL/L (ref 3.5–5)
RBC # BLD: 4.13 M/UL (ref 3.5–5.5)
SODIUM BLD-SCNC: 143 MMOL/L (ref 132–146)
T4 FREE: 1.6 NG/DL (ref 0.9–1.7)
TOTAL PROTEIN: 6.6 G/DL (ref 6.4–8.3)
TSH SERPL DL<=0.05 MIU/L-ACNC: 0.23 UIU/ML (ref 0.27–4.2)
WBC # BLD: 7.5 K/UL (ref 4.5–11.5)

## 2024-02-28 PROCEDURE — 1123F ACP DISCUSS/DSCN MKR DOCD: CPT | Performed by: PHYSICIAN ASSISTANT

## 2024-02-28 PROCEDURE — 1036F TOBACCO NON-USER: CPT | Performed by: PHYSICIAN ASSISTANT

## 2024-02-28 PROCEDURE — 3017F COLORECTAL CA SCREEN DOC REV: CPT | Performed by: PHYSICIAN ASSISTANT

## 2024-02-28 PROCEDURE — G8399 PT W/DXA RESULTS DOCUMENT: HCPCS | Performed by: PHYSICIAN ASSISTANT

## 2024-02-28 PROCEDURE — 99214 OFFICE O/P EST MOD 30 MIN: CPT | Performed by: PHYSICIAN ASSISTANT

## 2024-02-28 PROCEDURE — G8420 CALC BMI NORM PARAMETERS: HCPCS | Performed by: PHYSICIAN ASSISTANT

## 2024-02-28 PROCEDURE — 1090F PRES/ABSN URINE INCON ASSESS: CPT | Performed by: PHYSICIAN ASSISTANT

## 2024-02-28 PROCEDURE — G8427 DOCREV CUR MEDS BY ELIG CLIN: HCPCS | Performed by: PHYSICIAN ASSISTANT

## 2024-02-28 PROCEDURE — 93000 ELECTROCARDIOGRAM COMPLETE: CPT | Performed by: PHYSICIAN ASSISTANT

## 2024-02-28 PROCEDURE — G8484 FLU IMMUNIZE NO ADMIN: HCPCS | Performed by: PHYSICIAN ASSISTANT

## 2024-02-28 ASSESSMENT — PATIENT HEALTH QUESTIONNAIRE - PHQ9
1. LITTLE INTEREST OR PLEASURE IN DOING THINGS: 0
3. TROUBLE FALLING OR STAYING ASLEEP: 0
SUM OF ALL RESPONSES TO PHQ9 QUESTIONS 1 & 2: 0
9. THOUGHTS THAT YOU WOULD BE BETTER OFF DEAD, OR OF HURTING YOURSELF: 0
6. FEELING BAD ABOUT YOURSELF - OR THAT YOU ARE A FAILURE OR HAVE LET YOURSELF OR YOUR FAMILY DOWN: 0
SUM OF ALL RESPONSES TO PHQ QUESTIONS 1-9: 0
4. FEELING TIRED OR HAVING LITTLE ENERGY: 0
2. FEELING DOWN, DEPRESSED OR HOPELESS: 0
10. IF YOU CHECKED OFF ANY PROBLEMS, HOW DIFFICULT HAVE THESE PROBLEMS MADE IT FOR YOU TO DO YOUR WORK, TAKE CARE OF THINGS AT HOME, OR GET ALONG WITH OTHER PEOPLE: 0
5. POOR APPETITE OR OVEREATING: 0
7. TROUBLE CONCENTRATING ON THINGS, SUCH AS READING THE NEWSPAPER OR WATCHING TELEVISION: 0
8. MOVING OR SPEAKING SO SLOWLY THAT OTHER PEOPLE COULD HAVE NOTICED. OR THE OPPOSITE, BEING SO FIGETY OR RESTLESS THAT YOU HAVE BEEN MOVING AROUND A LOT MORE THAN USUAL: 0
SUM OF ALL RESPONSES TO PHQ QUESTIONS 1-9: 0

## 2024-02-28 NOTE — PROGRESS NOTES
Chief Complaint   Patient presents with    Shortness of Breath       HPI:  Patient is here for follow-up of SOB.  Last week, she was coming up the steps and she felt weak and faint. She was sob. Declines cp. No diaphoresis, n/v. No palpitations. Pulse ox was 25-50. Sx lasted a half hour. A similar episode did not occur after. She has been Sob for a couple of months. Previous labs reviewed. Her most recent  .18 tsh. Meds were not adjusted.   Anemia with chemo in 2016, hgb stable recently.    Patient's past medical, surgical, social and/or family history reviewed, updated in chart, and are non-contributory (unless otherwise stated).  Medications and allergies also reviewed and updated in chart.    Review of Systems:  Constitutional:  No fever, no fatigue, no chills, no headaches, no weight change  Dermatology:  No rash, no mole, no dry or sensitive skin  ENT:  No cough, no sore throat, no sinus pain, no runny nose, no ear pain  Cardiology:  No chest pain, no palpitations, no leg edema,+shortness of breath, no PND  Gastroenterology:  No dysphagia, no abdominal pain, no nausea, no vomiting, no constipation, no diarrhea, no heartburn  Musculoskeletal:  No joint pain, no leg cramps, no back pain, no muscle aches  Respiratory:  No shortness of breath, no orthopnea, no wheezing, no SIMPSON, no hemoptysis  Urology:  No blood in the urine, no urinary frequency, no urinary incontinence, no urinary urgency, no nocturia, no dysuria    Vitals:    02/28/24 1255   BP: 120/80   Pulse: 83   Resp: 16   Temp: 98.1 °F (36.7 °C)   SpO2: 99%   Weight: 53.5 kg (118 lb)   Height: 1.549 m (5' 0.98\")       Physical Exam  Constitutional:       General: She is not in acute distress.     Appearance: Normal appearance. She is well-developed.   HENT:      Head: Normocephalic and atraumatic.      Right Ear: External ear normal.      Left Ear: External ear normal.      Nose: Nose normal.   Eyes:      General: No scleral icterus.

## 2024-02-29 RX ORDER — LEVOTHYROXINE SODIUM 0.05 MG/1
50 TABLET ORAL DAILY
Qty: 30 TABLET | Refills: 5 | Status: SHIPPED | OUTPATIENT
Start: 2024-02-29

## 2024-03-04 ENCOUNTER — PROCEDURE VISIT (OUTPATIENT)
Dept: PHYSICAL MEDICINE AND REHAB | Age: 74
End: 2024-03-04

## 2024-03-04 VITALS
HEIGHT: 61 IN | WEIGHT: 118 LBS | BODY MASS INDEX: 22.28 KG/M2 | SYSTOLIC BLOOD PRESSURE: 120 MMHG | HEART RATE: 83 BPM | DIASTOLIC BLOOD PRESSURE: 77 MMHG | OXYGEN SATURATION: 100 %

## 2024-03-04 DIAGNOSIS — M75.121 COMPLETE TEAR OF RIGHT ROTATOR CUFF, UNSPECIFIED WHETHER TRAUMATIC: ICD-10-CM

## 2024-03-04 DIAGNOSIS — M75.122 COMPLETE TEAR OF LEFT ROTATOR CUFF, UNSPECIFIED WHETHER TRAUMATIC: ICD-10-CM

## 2024-03-04 DIAGNOSIS — M79.10 TRIGGER POINT: ICD-10-CM

## 2024-03-04 DIAGNOSIS — G89.29 CHRONIC PAIN OF BOTH SHOULDERS: Primary | ICD-10-CM

## 2024-03-04 DIAGNOSIS — M79.18 CERVICAL MYOFASCIAL PAIN SYNDROME: ICD-10-CM

## 2024-03-04 DIAGNOSIS — M25.512 CHRONIC PAIN OF BOTH SHOULDERS: Primary | ICD-10-CM

## 2024-03-04 DIAGNOSIS — S43.003D SUBLUXATION OF SHOULDER JOINT, UNSPECIFIED LATERALITY, SUBSEQUENT ENCOUNTER: ICD-10-CM

## 2024-03-04 DIAGNOSIS — M54.12 RADICULITIS OF LEFT CERVICAL REGION: ICD-10-CM

## 2024-03-04 DIAGNOSIS — M25.511 CHRONIC PAIN OF BOTH SHOULDERS: Primary | ICD-10-CM

## 2024-03-04 RX ORDER — BUPIVACAINE HYDROCHLORIDE 2.5 MG/ML
30 INJECTION, SOLUTION INFILTRATION; PERINEURAL ONCE
Status: CANCELLED | OUTPATIENT
Start: 2024-03-04 | End: 2024-03-04

## 2024-03-04 NOTE — PROGRESS NOTES
Summer Varma M.D.  [facility] PHYSICIANS [place] [facility] PHYSICAL MEDICINE AND REHABILITAION  [address] [facility]  [address]  [place] [address]  Dept: [phone]  Dept Fax: [phone]  Loc: [phone]    PCP: Jes Souza PA-C  Date of visit: 3/4/24    Chief Complaint   Patient presents with    Pain     Shoulder pain, last injections helped. She currently in formal PT which help with pain and stiffness.          Flor Lawrence is a 73 y.o.  right hand dominant woman who presents for follow up of upper extremity pain and bilateral rotator cuff tears.       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 Chemotherapy was started on 10/6/2016 with Dr. Pacheco. November 2016 she completed head and neck irradiation for management of right base of tongue SCC. She reports she finished all treatment (chemo/radiation) November 2016. She denies any arm pain or numbness/tingling after her chemo/radiation, until her more recent injuries with rotator cuff tears.     Her primary complaint is bilateral shoulder pain, which she attributes [facility]. She had massive full thickness bilateral supraspinatus and infraspinatus tears-- the right rotator cuff in December 2017 and left rotator cuff in January 2018. She saw Ortho--they did not recommend surgery due to chemo/radiation and osteoporosis -- unless pain unbearable --only then would consider shoulder replacement.  Patient was cleared for shoulder surgery from radiation oncology standpoint IF indicated/recommended by Ortho. Patient does not wish for surgery at this time.         Interval history:   Since last visit patient reports that she has been attending land based PT and has noticed good improvement in her shoulder range of motion and overall function. She

## 2024-03-05 RX ORDER — TRIAMCINOLONE ACETONIDE 40 MG/ML
40 INJECTION, SUSPENSION INTRA-ARTICULAR; INTRAMUSCULAR ONCE
Status: COMPLETED | OUTPATIENT
Start: 2024-03-05 | End: 2024-03-05

## 2024-03-05 RX ORDER — BUPIVACAINE HYDROCHLORIDE 2.5 MG/ML
6 INJECTION, SOLUTION INFILTRATION; PERINEURAL ONCE
Status: COMPLETED | OUTPATIENT
Start: 2024-03-05 | End: 2024-03-05

## 2024-03-05 RX ADMIN — TRIAMCINOLONE ACETONIDE 40 MG: 40 INJECTION, SUSPENSION INTRA-ARTICULAR; INTRAMUSCULAR at 16:41

## 2024-03-05 RX ADMIN — TRIAMCINOLONE ACETONIDE 40 MG: 40 INJECTION, SUSPENSION INTRA-ARTICULAR; INTRAMUSCULAR at 16:42

## 2024-03-05 RX ADMIN — BUPIVACAINE HYDROCHLORIDE 15 MG: 2.5 INJECTION, SOLUTION INFILTRATION; PERINEURAL at 16:40

## 2024-03-18 ENCOUNTER — HOSPITAL ENCOUNTER (OUTPATIENT)
Age: 74
Discharge: HOME | End: 2024-03-20

## 2024-03-18 PROCEDURE — 88305 TISSUE EXAM BY PATHOLOGIST: CPT

## 2024-03-18 PROCEDURE — 87077 CULTURE AEROBIC IDENTIFY: CPT

## 2024-03-19 LAB
HELIOBACTER PYLORI ID: NEGATIVE
SOURCE, 60200063: NORMAL

## 2024-03-20 LAB — SURGICAL PATHOLOGY REPORT: NORMAL

## 2024-03-26 ENCOUNTER — TELEPHONE (OUTPATIENT)
Dept: PRIMARY CARE CLINIC | Age: 74
End: 2024-03-26

## 2024-03-26 NOTE — TELEPHONE ENCOUNTER
Pt calling stating you had switched her synthroid and ever since she feels worst than she did . she states she is very tired fatigue and having a lot of depression. She not sure if it is from the synthroid being changes or not.   Please advise?

## 2024-03-27 DIAGNOSIS — F51.04 CHRONIC INSOMNIA: ICD-10-CM

## 2024-03-27 RX ORDER — TEMAZEPAM 15 MG/1
15 CAPSULE ORAL NIGHTLY PRN
Qty: 90 CAPSULE | Refills: 0 | Status: SHIPPED | OUTPATIENT
Start: 2024-03-27 | End: 2024-06-25

## 2024-04-08 ENCOUNTER — NURSE ONLY (OUTPATIENT)
Dept: PRIMARY CARE CLINIC | Age: 74
End: 2024-04-08
Payer: COMMERCIAL

## 2024-04-08 DIAGNOSIS — E03.9 ACQUIRED HYPOTHYROIDISM: Primary | ICD-10-CM

## 2024-04-08 LAB
T4 FREE: 1.1 NG/DL (ref 0.9–1.7)
TSH SERPL DL<=0.05 MIU/L-ACNC: 4.57 UIU/ML (ref 0.27–4.2)

## 2024-04-08 PROCEDURE — 36415 COLL VENOUS BLD VENIPUNCTURE: CPT | Performed by: PHYSICIAN ASSISTANT

## 2024-04-10 RX ORDER — LEVOTHYROXINE SODIUM 75 MCG
75 TABLET ORAL DAILY
Qty: 90 TABLET | Refills: 3 | Status: SHIPPED | OUTPATIENT
Start: 2024-04-10 | End: 2025-04-05

## 2024-04-15 RX ORDER — RABEPRAZOLE SODIUM 20 MG/1
TABLET, DELAYED RELEASE ORAL
Qty: 90 TABLET | Refills: 0 | Status: SHIPPED | OUTPATIENT
Start: 2024-04-15

## 2024-04-26 DIAGNOSIS — E03.9 ACQUIRED HYPOTHYROIDISM: ICD-10-CM

## 2024-04-26 DIAGNOSIS — R73.03 PREDIABETES: ICD-10-CM

## 2024-04-26 DIAGNOSIS — E55.9 VITAMIN D DEFICIENCY: ICD-10-CM

## 2024-04-26 DIAGNOSIS — E78.00 PURE HYPERCHOLESTEROLEMIA: ICD-10-CM

## 2024-04-26 DIAGNOSIS — I10 PRIMARY HYPERTENSION: ICD-10-CM

## 2024-04-26 DIAGNOSIS — F51.04 CHRONIC INSOMNIA: Primary | ICD-10-CM

## 2024-04-26 RX ORDER — RABEPRAZOLE SODIUM 20 MG/1
20 TABLET, DELAYED RELEASE ORAL DAILY
Qty: 90 TABLET | Refills: 0 | OUTPATIENT
Start: 2024-04-26

## 2024-04-26 NOTE — TELEPHONE ENCOUNTER
Pt calling for refill on aciphex, and she is also requesting labs to be ordered before her next appt on 5/6/24 she would like cbc,vit d,iron binding capacity,thyroid labs and any other test Jes would want. She will go to [facility] lab    advise

## 2024-05-02 ENCOUNTER — HOSPITAL ENCOUNTER (OUTPATIENT)
Age: 74
Discharge: HOME | End: 2024-05-02
Payer: COMMERCIAL

## 2024-05-02 DIAGNOSIS — F51.04 CHRONIC INSOMNIA: ICD-10-CM

## 2024-05-02 DIAGNOSIS — E78.00 PURE HYPERCHOLESTEROLEMIA: ICD-10-CM

## 2024-05-02 DIAGNOSIS — I10 PRIMARY HYPERTENSION: ICD-10-CM

## 2024-05-02 DIAGNOSIS — R73.03 PREDIABETES: ICD-10-CM

## 2024-05-02 DIAGNOSIS — E55.9 VITAMIN D DEFICIENCY: ICD-10-CM

## 2024-05-02 DIAGNOSIS — E03.9 ACQUIRED HYPOTHYROIDISM: ICD-10-CM

## 2024-05-02 LAB
25(OH)D3 SERPL-MCNC: 32.8 NG/ML (ref 30–100)
ALBUMIN SERPL-MCNC: 4.3 G/DL (ref 3.5–5.2)
ALP SERPL-CCNC: 77 U/L (ref 35–104)
ALT SERPL-CCNC: 9 U/L (ref 0–32)
ANION GAP SERPL CALCULATED.3IONS-SCNC: 10 MMOL/L (ref 7–16)
AST SERPL-CCNC: 18 U/L (ref 0–31)
BASOPHILS # BLD: 0.06 K/UL (ref 0–0.2)
BASOPHILS NFR BLD: 1 % (ref 0–2)
BILIRUB SERPL-MCNC: 0.9 MG/DL (ref 0–1.2)
BUN SERPL-MCNC: 18 MG/DL (ref 6–23)
CALCIUM SERPL-MCNC: 11.3 MG/DL (ref 8.6–10.2)
CHLORIDE SERPL-SCNC: 101 MMOL/L (ref 98–107)
CHOLEST SERPL-MCNC: 236 MG/DL
CO2 SERPL-SCNC: 31 MMOL/L (ref 22–29)
CREAT SERPL-MCNC: 0.8 MG/DL (ref 0.5–1)
EOSINOPHIL # BLD: 0.13 K/UL (ref 0.05–0.5)
EOSINOPHILS RELATIVE PERCENT: 2 % (ref 0–6)
ERYTHROCYTE [DISTWIDTH] IN BLOOD BY AUTOMATED COUNT: 13.7 % (ref 11.5–15)
FOLATE SERPL-MCNC: 13.5 NG/ML (ref 4.8–24.2)
GFR, ESTIMATED: 77 ML/MIN/1.73M2
GLUCOSE SERPL-MCNC: 103 MG/DL (ref 74–99)
HBA1C MFR BLD: 6 % (ref 4–5.6)
HCT VFR BLD AUTO: 38.4 % (ref 34–48)
HDLC SERPL-MCNC: 79 MG/DL
HGB BLD-MCNC: 12.2 G/DL (ref 11.5–15.5)
IMM GRANULOCYTES # BLD AUTO: <0.03 K/UL (ref 0–0.58)
IMM GRANULOCYTES NFR BLD: 0 % (ref 0–5)
IRON SATN MFR SERPL: 12 % (ref 15–50)
IRON SERPL-MCNC: 52 UG/DL (ref 37–145)
LDLC SERPL CALC-MCNC: 147 MG/DL
LYMPHOCYTES NFR BLD: 1.22 K/UL (ref 1.5–4)
LYMPHOCYTES RELATIVE PERCENT: 19 % (ref 20–42)
MCH RBC QN AUTO: 27.2 PG (ref 26–35)
MCHC RBC AUTO-ENTMCNC: 31.8 G/DL (ref 32–34.5)
MCV RBC AUTO: 85.7 FL (ref 80–99.9)
MONOCYTES NFR BLD: 0.64 K/UL (ref 0.1–0.95)
MONOCYTES NFR BLD: 10 % (ref 2–12)
NEUTROPHILS NFR BLD: 68 % (ref 43–80)
NEUTS SEG NFR BLD: 4.3 K/UL (ref 1.8–7.3)
PLATELET # BLD AUTO: 272 K/UL (ref 130–450)
PMV BLD AUTO: 9.6 FL (ref 7–12)
POTASSIUM SERPL-SCNC: 3.9 MMOL/L (ref 3.5–5)
PROT SERPL-MCNC: 7.3 G/DL (ref 6.4–8.3)
RBC # BLD AUTO: 4.48 M/UL (ref 3.5–5.5)
SODIUM SERPL-SCNC: 142 MMOL/L (ref 132–146)
T4 FREE SERPL-MCNC: 1.4 NG/DL (ref 0.9–1.7)
TIBC SERPL-MCNC: 422 UG/DL (ref 250–450)
TRIGL SERPL-MCNC: 52 MG/DL
TSH SERPL DL<=0.05 MIU/L-ACNC: 0.98 UIU/ML (ref 0.27–4.2)
VIT B12 SERPL-MCNC: 345 PG/ML (ref 211–946)
VLDLC SERPL CALC-MCNC: 10 MG/DL
WBC OTHER # BLD: 6.4 K/UL (ref 4.5–11.5)

## 2024-05-02 PROCEDURE — 36415 COLL VENOUS BLD VENIPUNCTURE: CPT

## 2024-05-02 PROCEDURE — 82306 VITAMIN D 25 HYDROXY: CPT

## 2024-05-02 PROCEDURE — 84443 ASSAY THYROID STIM HORMONE: CPT

## 2024-05-02 PROCEDURE — 83540 ASSAY OF IRON: CPT

## 2024-05-02 PROCEDURE — 85025 COMPLETE CBC W/AUTO DIFF WBC: CPT

## 2024-05-02 PROCEDURE — 84439 ASSAY OF FREE THYROXINE: CPT

## 2024-05-02 PROCEDURE — 83550 IRON BINDING TEST: CPT

## 2024-05-02 PROCEDURE — 80053 COMPREHEN METABOLIC PANEL: CPT

## 2024-05-02 PROCEDURE — 80061 LIPID PANEL: CPT

## 2024-05-02 PROCEDURE — 82746 ASSAY OF FOLIC ACID SERUM: CPT

## 2024-05-02 PROCEDURE — 82607 VITAMIN B-12: CPT

## 2024-05-02 PROCEDURE — 83036 HEMOGLOBIN GLYCOSYLATED A1C: CPT

## 2024-05-06 ENCOUNTER — PATIENT MESSAGE (OUTPATIENT)
Dept: PRIMARY CARE CLINIC | Age: 74
End: 2024-05-06

## 2024-05-06 ENCOUNTER — OFFICE VISIT (OUTPATIENT)
Dept: PRIMARY CARE CLINIC | Age: 74
End: 2024-05-06
Payer: COMMERCIAL

## 2024-05-06 VITALS
SYSTOLIC BLOOD PRESSURE: 138 MMHG | DIASTOLIC BLOOD PRESSURE: 80 MMHG | OXYGEN SATURATION: 97 % | RESPIRATION RATE: 17 BRPM | BODY MASS INDEX: 22.84 KG/M2 | WEIGHT: 121 LBS | HEIGHT: 61 IN | TEMPERATURE: 97 F | HEART RATE: 71 BPM

## 2024-05-06 DIAGNOSIS — K21.9 GASTROESOPHAGEAL REFLUX DISEASE WITHOUT ESOPHAGITIS: ICD-10-CM

## 2024-05-06 DIAGNOSIS — F41.9 ANXIETY AND DEPRESSION: ICD-10-CM

## 2024-05-06 DIAGNOSIS — F32.A ANXIETY AND DEPRESSION: ICD-10-CM

## 2024-05-06 DIAGNOSIS — G25.81 RESTLESS LEG SYNDROME: ICD-10-CM

## 2024-05-06 DIAGNOSIS — E03.9 ACQUIRED HYPOTHYROIDISM: Primary | ICD-10-CM

## 2024-05-06 DIAGNOSIS — Z79.899 MEDICATION MANAGEMENT: ICD-10-CM

## 2024-05-06 DIAGNOSIS — R11.0 NAUSEA: ICD-10-CM

## 2024-05-06 DIAGNOSIS — E78.00 PURE HYPERCHOLESTEROLEMIA: ICD-10-CM

## 2024-05-06 DIAGNOSIS — E53.8 B12 DEFICIENCY: ICD-10-CM

## 2024-05-06 DIAGNOSIS — M54.12 CERVICAL RADICULOPATHY: ICD-10-CM

## 2024-05-06 DIAGNOSIS — F51.04 CHRONIC INSOMNIA: ICD-10-CM

## 2024-05-06 PROCEDURE — G8427 DOCREV CUR MEDS BY ELIG CLIN: HCPCS | Performed by: PHYSICIAN ASSISTANT

## 2024-05-06 PROCEDURE — 99214 OFFICE O/P EST MOD 30 MIN: CPT | Performed by: PHYSICIAN ASSISTANT

## 2024-05-06 PROCEDURE — 3017F COLORECTAL CA SCREEN DOC REV: CPT | Performed by: PHYSICIAN ASSISTANT

## 2024-05-06 PROCEDURE — G8399 PT W/DXA RESULTS DOCUMENT: HCPCS | Performed by: PHYSICIAN ASSISTANT

## 2024-05-06 PROCEDURE — 1123F ACP DISCUSS/DSCN MKR DOCD: CPT | Performed by: PHYSICIAN ASSISTANT

## 2024-05-06 PROCEDURE — 96372 THER/PROPH/DIAG INJ SC/IM: CPT | Performed by: PHYSICIAN ASSISTANT

## 2024-05-06 PROCEDURE — 1090F PRES/ABSN URINE INCON ASSESS: CPT | Performed by: PHYSICIAN ASSISTANT

## 2024-05-06 PROCEDURE — G8420 CALC BMI NORM PARAMETERS: HCPCS | Performed by: PHYSICIAN ASSISTANT

## 2024-05-06 PROCEDURE — 1036F TOBACCO NON-USER: CPT | Performed by: PHYSICIAN ASSISTANT

## 2024-05-06 RX ORDER — VENLAFAXINE HYDROCHLORIDE 75 MG/1
75 CAPSULE, EXTENDED RELEASE ORAL DAILY
Qty: 90 CAPSULE | Refills: 1 | Status: SHIPPED | OUTPATIENT
Start: 2024-05-06

## 2024-05-06 RX ORDER — CYANOCOBALAMIN 1000 UG/ML
1000 INJECTION, SOLUTION INTRAMUSCULAR; SUBCUTANEOUS ONCE
Status: COMPLETED | OUTPATIENT
Start: 2024-05-06 | End: 2024-05-06

## 2024-05-06 RX ORDER — TEMAZEPAM 15 MG/1
15 CAPSULE ORAL NIGHTLY PRN
Qty: 90 CAPSULE | Refills: 0 | Status: CANCELLED | OUTPATIENT
Start: 2024-05-06 | End: 2024-08-04

## 2024-05-06 RX ORDER — LEVOTHYROXINE SODIUM 0.07 MG/1
75 TABLET ORAL DAILY
Qty: 90 TABLET | Refills: 3 | Status: SHIPPED | OUTPATIENT
Start: 2024-05-06 | End: 2025-05-01

## 2024-05-06 RX ORDER — ROSUVASTATIN CALCIUM 5 MG/1
TABLET, COATED ORAL
Qty: 90 TABLET | Refills: 1 | Status: SHIPPED | OUTPATIENT
Start: 2024-05-06

## 2024-05-06 RX ORDER — RABEPRAZOLE SODIUM 20 MG/1
20 TABLET, DELAYED RELEASE ORAL DAILY
Qty: 90 TABLET | Refills: 2 | Status: SHIPPED | OUTPATIENT
Start: 2024-05-06

## 2024-05-06 RX ORDER — ROPINIROLE 0.5 MG/1
TABLET, FILM COATED ORAL
Qty: 30 TABLET | Refills: 2 | Status: SHIPPED | OUTPATIENT
Start: 2024-05-06

## 2024-05-06 RX ORDER — TEMAZEPAM 15 MG/1
15 CAPSULE ORAL NIGHTLY PRN
Qty: 90 CAPSULE | Refills: 0 | Status: SHIPPED | OUTPATIENT
Start: 2024-05-06 | End: 2024-08-04

## 2024-05-06 RX ORDER — GABAPENTIN 400 MG/1
400 CAPSULE ORAL 3 TIMES DAILY
Qty: 270 CAPSULE | Refills: 0 | Status: SHIPPED | OUTPATIENT
Start: 2024-05-06 | End: 2024-08-04

## 2024-05-06 RX ADMIN — CYANOCOBALAMIN 1000 MCG: 1000 INJECTION, SOLUTION INTRAMUSCULAR; SUBCUTANEOUS at 10:13

## 2024-05-06 NOTE — PROGRESS NOTES
well-developed.   HENT:      Head: Normocephalic and atraumatic.      Right Ear: External ear normal.      Left Ear: External ear normal.      Nose: Nose normal.   Eyes:      General: No scleral icterus.     Conjunctiva/sclera: Conjunctivae normal.      Pupils: Pupils are equal, round, and reactive to light.   Neck:      Thyroid: No thyromegaly.   Cardiovascular:      Rate and Rhythm: Normal rate and regular rhythm.      Heart sounds: Normal heart sounds. No murmur heard.  Pulmonary:      Effort: Pulmonary effort is normal. No accessory muscle usage or respiratory distress.      Breath sounds: Normal breath sounds. No wheezing.   Musculoskeletal:         General: Normal range of motion.      Cervical back: Normal range of motion and neck supple.   Skin:     General: Skin is warm and dry.      Findings: No rash.   Neurological:      Mental Status: She is alert and oriented to person, place, and time.      Deep Tendon Reflexes: Reflexes are normal and symmetric.   Psychiatric:         Mood and Affect: Mood and affect normal.         Speech: Speech normal.         Behavior: Behavior normal.         Assessment/Plan:      Flor was seen today for hypothyroidism and medication refill.    Diagnoses and all orders for this visit:    Acquired hypothyroidism  -     levothyroxine (SYNTHROID) 75 MCG tablet; Take 1 tablet by mouth daily    Pure hypercholesterolemia  -     rosuvastatin (CRESTOR) 5 MG tablet; Take 1 tablet by mouth daily    Chronic insomnia  -     temazepam (RESTORIL) 15 MG capsule; Take 1 capsule by mouth nightly as needed for Sleep for up to 90 days. Max Daily Amount: 15 mg    Restless leg syndrome  -     rOPINIRole (REQUIP) 0.5 MG tablet; TAKE 1 TABLET BY MOUTH NIGHTLY AS NEEDED FOR RESTLESS LEGS    Anxiety and depression  -     venlafaxine (EFFEXOR XR) 75 MG extended release capsule; Take 1 capsule by mouth daily    Medication management    Cervical radiculopathy  -     gabapentin (NEURONTIN) 400 MG

## 2024-05-06 NOTE — TELEPHONE ENCOUNTER
From: Flor Lawrence  To: Jes Souza  Sent: 5/6/2024 11:21 AM EDT  Subject: Labs    Will there b labs ordered prior to my next visit? What is the charge for today’s visit?

## 2024-05-07 LAB
6-MONOACETYLMORPHINE, URINE: NEGATIVE
ABNORMAL SPECIMEN VALIDITY TEST: NORMAL
ALCOHOL URINE: NOT DETECTED MG/DL
AMPHETAMINE SCREEN URINE: NEGATIVE
BARBITURATE SCREEN URINE: NEGATIVE
BENZODIAZEPINE SCREEN, URINE: NEGATIVE
BUPRENORPHINE URINE: NEGATIVE
CANNABINOID SCREEN URINE: NEGATIVE
COCAINE METABOLITE, URINE: NEGATIVE
COMPLIANCE DRUG ANALYSIS, URINE: NORMAL
FENTANYL URINE: NEGATIVE
INTEGRITY CHECK, CREATININE, URINE: 44 MG/DL (ref 22–250)
INTEGRITY CHECK, OXIDANT, URINE: <40 MG/L
INTEGRITY CHECK, PH, URINE: 5.8 (ref 4.5–9)
INTEGRITY CHECK, SPECIFIC GRAVITY, URINE: 1.01 (ref 1–1.03)
METHADONE SCREEN, URINE: NEGATIVE
OPIATES, URINE: NEGATIVE
OXAZEPAM URINE QUANT: >1000 NG/ML
OXYCODONE SCREEN URINE: NEGATIVE
PHENCYCLIDINE, URINE: NEGATIVE
TEMAZEPAM, QUANTITATIVE, URINE: >1000 NG/ML
TEST INFORMATION: NORMAL
TRAMADOL, URINE: NEGATIVE

## 2024-05-09 ENCOUNTER — TELEPHONE (OUTPATIENT)
Dept: PRIMARY CARE CLINIC | Age: 74
End: 2024-05-09

## 2024-05-09 RX ORDER — GABAPENTIN 100 MG/1
100 CAPSULE ORAL 3 TIMES DAILY
COMMUNITY

## 2024-05-09 NOTE — TELEPHONE ENCOUNTER
----- Message from Kemi Ojeda sent at 5/9/2024 11:58 AM EDT -----  Subject: Message to Provider    QUESTIONS  Information for Provider? Patient is calling because she would like to   speak with either Jes Souza or her nurse regarding something faxed out   from her Dermatologist about increasing her medication gabapentin   (NEURONTIN) 400 MG capsule. Please advise.  ---------------------------------------------------------------------------  --------------  CALL BACK INFO  [phone]; OK to leave message on voicemail  ---------------------------------------------------------------------------  --------------  SCRIPT ANSWERS  Relationship to Patient? Self

## 2024-05-09 NOTE — TELEPHONE ENCOUNTER
For your info..Dalia from Dr Ale Roberts's office (dermatologist) wanted you to know she increased the patinet's gabapentin to 500mg TID.  The patient called and so did the provider's office to let you know this.  Patient also notified from this message that we received the call from her dermatologist.

## 2024-05-29 ENCOUNTER — HOSPITAL ENCOUNTER (OUTPATIENT)
Dept: CARDIOLOGY | Age: 74
Discharge: HOME | End: 2024-05-31
Payer: COMMERCIAL

## 2024-05-29 VITALS
WEIGHT: 110 LBS | BODY MASS INDEX: 20.77 KG/M2 | SYSTOLIC BLOOD PRESSURE: 140 MMHG | HEIGHT: 61 IN | DIASTOLIC BLOOD PRESSURE: 80 MMHG

## 2024-05-29 DIAGNOSIS — I35.1 NONRHEUMATIC AORTIC VALVE INSUFFICIENCY: ICD-10-CM

## 2024-05-29 DIAGNOSIS — R06.02 SOB (SHORTNESS OF BREATH): ICD-10-CM

## 2024-05-29 DIAGNOSIS — R42 DIZZINESS: ICD-10-CM

## 2024-05-29 LAB
ECHO AR MAX VEL PISA: 5.1 M/S
ECHO AV AREA PEAK VELOCITY: 2.9 CM2
ECHO AV AREA VTI: 3.5 CM2
ECHO AV AREA/BSA PEAK VELOCITY: 2 CM2/M2
ECHO AV AREA/BSA VTI: 2.4 CM2/M2
ECHO AV CUSP MM: 1.5 CM
ECHO AV MEAN GRADIENT: 2 MMHG
ECHO AV MEAN VELOCITY: 0.7 M/S
ECHO AV PEAK GRADIENT: 5 MMHG
ECHO AV PEAK VELOCITY: 1.2 M/S
ECHO AV REGURGITANT PHT: 434 MILLISECOND
ECHO AV VELOCITY RATIO: 0.75
ECHO AV VTI: 18.6 CM
ECHO BSA: 1.47 M2
ECHO EST RA PRESSURE: 3 MMHG
ECHO LA DIAMETER INDEX: 2.31 CM/M2
ECHO LA DIAMETER: 3.4 CM
ECHO LA VOL A-L A2C: 38 ML (ref 22–52)
ECHO LA VOL A-L A4C: 36 ML (ref 22–52)
ECHO LA VOL MOD A2C: 36 ML (ref 22–52)
ECHO LA VOL MOD A4C: 35 ML (ref 22–52)
ECHO LA VOLUME AREA LENGTH: 38 ML
ECHO LA VOLUME INDEX A-L A2C: 26 ML/M2 (ref 16–34)
ECHO LA VOLUME INDEX A-L A4C: 24 ML/M2 (ref 16–34)
ECHO LA VOLUME INDEX AREA LENGTH: 26 ML/M2 (ref 16–34)
ECHO LA VOLUME INDEX MOD A2C: 24 ML/M2 (ref 16–34)
ECHO LA VOLUME INDEX MOD A4C: 24 ML/M2 (ref 16–34)
ECHO LV FRACTIONAL SHORTENING: 26 % (ref 28–44)
ECHO LV INTERNAL DIMENSION DIASTOLE INDEX: 2.31 CM/M2
ECHO LV INTERNAL DIMENSION DIASTOLIC: 3.4 CM (ref 3.9–5.3)
ECHO LV INTERNAL DIMENSION SYSTOLIC INDEX: 1.7 CM/M2
ECHO LV INTERNAL DIMENSION SYSTOLIC: 2.5 CM
ECHO LV IVSD: 1.2 CM (ref 0.6–0.9)
ECHO LV MASS 2D: 122 G (ref 67–162)
ECHO LV MASS INDEX 2D: 83 G/M2 (ref 43–95)
ECHO LV POSTERIOR WALL DIASTOLIC: 1.1 CM (ref 0.6–0.9)
ECHO LV RELATIVE WALL THICKNESS RATIO: 0.65
ECHO LVOT AREA: 3.8 CM2
ECHO LVOT AV VTI INDEX: 0.92
ECHO LVOT DIAM: 2.2 CM
ECHO LVOT MEAN GRADIENT: 2 MMHG
ECHO LVOT PEAK GRADIENT: 3 MMHG
ECHO LVOT PEAK VELOCITY: 0.9 M/S
ECHO LVOT SV: 65 ML
ECHO LVOT VTI: 17.1 CM
ECHO MV A VELOCITY: 0.73 M/S
ECHO MV AREA PHT: 4.1 CM2
ECHO MV AREA VTI: 4.7 CM2
ECHO MV E DECELERATION TIME (DT): 163.3 MS
ECHO MV E VELOCITY: 0.58 M/S
ECHO MV E/A RATIO: 0.79
ECHO MV EROA PISA: 0.1 CM2
ECHO MV LVOT VTI INDEX: 0.81
ECHO MV MAX VELOCITY: 0.8 M/S
ECHO MV MEAN GRADIENT: 1 MMHG
ECHO MV MEAN VELOCITY: 0.4 M/S
ECHO MV PEAK GRADIENT: 3 MMHG
ECHO MV PRESSURE HALF TIME (PHT): 53.7 MS
ECHO MV REGURGITANT ALIASING (NYQUIST) VELOCITY: 25 CM/S
ECHO MV REGURGITANT RADIUS PISA: 0.45 CM
ECHO MV REGURGITANT VELOCITY PISA: 5.2 M/S
ECHO MV REGURGITANT VOLUME PISA: 10.56 ML
ECHO MV REGURGITANT VTIA: 172.7 CM
ECHO MV VTI: 13.8 CM
ECHO PV MAX VELOCITY: 0.7 M/S
ECHO PV MEAN GRADIENT: 1 MMHG
ECHO PV MEAN VELOCITY: 0.4 M/S
ECHO PV PEAK GRADIENT: 2 MMHG
ECHO PV VTI: 11.8 CM
ECHO RIGHT VENTRICULAR SYSTOLIC PRESSURE (RVSP): 20 MMHG
ECHO RV INTERNAL DIMENSION: 2.5 CM
ECHO TV REGURGITANT MAX VELOCITY: 2.07 M/S
ECHO TV REGURGITANT PEAK GRADIENT: 17 MMHG

## 2024-05-29 PROCEDURE — 93306 TTE W/DOPPLER COMPLETE: CPT

## 2024-05-29 PROCEDURE — 93306 TTE W/DOPPLER COMPLETE: CPT | Performed by: INTERNAL MEDICINE

## 2024-06-01 ENCOUNTER — APPOINTMENT (OUTPATIENT)
Dept: GENERAL RADIOLOGY | Age: 74
DRG: 176 | End: 2024-06-01
Payer: COMMERCIAL

## 2024-06-01 ENCOUNTER — HOSPITAL ENCOUNTER (INPATIENT)
Age: 74
LOS: 3 days | Discharge: HOME | DRG: 176 | End: 2024-06-04
Attending: STUDENT IN AN ORGANIZED HEALTH CARE EDUCATION/TRAINING PROGRAM | Admitting: FAMILY MEDICINE
Payer: COMMERCIAL

## 2024-06-01 ENCOUNTER — APPOINTMENT (OUTPATIENT)
Dept: CT IMAGING | Age: 74
DRG: 176 | End: 2024-06-01
Payer: COMMERCIAL

## 2024-06-01 DIAGNOSIS — R31.9 URINARY TRACT INFECTION WITH HEMATURIA, SITE UNSPECIFIED: ICD-10-CM

## 2024-06-01 DIAGNOSIS — I26.99 PULMONARY EMBOLISM ON RIGHT (HCC): ICD-10-CM

## 2024-06-01 DIAGNOSIS — I16.0 HYPERTENSIVE URGENCY: ICD-10-CM

## 2024-06-01 DIAGNOSIS — F41.1 ANXIETY STATE: ICD-10-CM

## 2024-06-01 DIAGNOSIS — R07.9 ACUTE CHEST PAIN: Primary | ICD-10-CM

## 2024-06-01 DIAGNOSIS — N39.0 URINARY TRACT INFECTION WITH HEMATURIA, SITE UNSPECIFIED: ICD-10-CM

## 2024-06-01 LAB
ALBUMIN SERPL-MCNC: 4.3 G/DL (ref 3.5–5.2)
ALP SERPL-CCNC: 79 U/L (ref 35–104)
ALT SERPL-CCNC: 10 U/L (ref 0–32)
ANION GAP SERPL CALCULATED.3IONS-SCNC: 12 MMOL/L (ref 7–16)
AST SERPL-CCNC: 18 U/L (ref 0–31)
BACTERIA URNS QL MICRO: ABNORMAL
BASOPHILS # BLD: 0.11 K/UL (ref 0–0.2)
BASOPHILS NFR BLD: 1 % (ref 0–2)
BILIRUB SERPL-MCNC: 1 MG/DL (ref 0–1.2)
BILIRUB UR QL STRIP: ABNORMAL
BUN SERPL-MCNC: 20 MG/DL (ref 6–23)
CALCIUM SERPL-MCNC: 9.7 MG/DL (ref 8.6–10.2)
CHLORIDE SERPL-SCNC: 100 MMOL/L (ref 98–107)
CLARITY UR: CLEAR
CO2 SERPL-SCNC: 27 MMOL/L (ref 22–29)
COLOR UR: YELLOW
CREAT SERPL-MCNC: 1.2 MG/DL (ref 0.5–1)
D-DIMER QUANTITATIVE: 896 NG/ML DDU (ref 0–230)
EOSINOPHIL # BLD: 0.07 K/UL (ref 0.05–0.5)
EOSINOPHILS RELATIVE PERCENT: 1 % (ref 0–6)
ERYTHROCYTE [DISTWIDTH] IN BLOOD BY AUTOMATED COUNT: 14.4 % (ref 11.5–15)
GFR, ESTIMATED: 50 ML/MIN/1.73M2
GLUCOSE SERPL-MCNC: 123 MG/DL (ref 74–99)
GLUCOSE UR STRIP-MCNC: NEGATIVE MG/DL
HCT VFR BLD AUTO: 38.4 % (ref 34–48)
HGB BLD-MCNC: 12.2 G/DL (ref 11.5–15.5)
HGB UR QL STRIP.AUTO: ABNORMAL
IMM GRANULOCYTES # BLD AUTO: 0.04 K/UL (ref 0–0.58)
IMM GRANULOCYTES NFR BLD: 0 % (ref 0–5)
KETONES UR STRIP-MCNC: ABNORMAL MG/DL
LEUKOCYTE ESTERASE UR QL STRIP: ABNORMAL
LIPASE SERPL-CCNC: 42 U/L (ref 13–60)
LYMPHOCYTES NFR BLD: 1 K/UL (ref 1.5–4)
LYMPHOCYTES RELATIVE PERCENT: 9 % (ref 20–42)
MCH RBC QN AUTO: 27.2 PG (ref 26–35)
MCHC RBC AUTO-ENTMCNC: 31.8 G/DL (ref 32–34.5)
MCV RBC AUTO: 85.7 FL (ref 80–99.9)
MONOCYTES NFR BLD: 0.75 K/UL (ref 0.1–0.95)
MONOCYTES NFR BLD: 7 % (ref 2–12)
NEUTROPHILS NFR BLD: 83 % (ref 43–80)
NEUTS SEG NFR BLD: 9.58 K/UL (ref 1.8–7.3)
NITRITE UR QL STRIP: NEGATIVE
PH UR STRIP: 7 [PH] (ref 5–9)
PLATELET # BLD AUTO: 329 K/UL (ref 130–450)
PMV BLD AUTO: 10.5 FL (ref 7–12)
POTASSIUM SERPL-SCNC: 4 MMOL/L (ref 3.5–5)
PROT SERPL-MCNC: 7.3 G/DL (ref 6.4–8.3)
PROT UR STRIP-MCNC: 30 MG/DL
RBC # BLD AUTO: 4.48 M/UL (ref 3.5–5.5)
RBC #/AREA URNS HPF: ABNORMAL /HPF
SODIUM SERPL-SCNC: 139 MMOL/L (ref 132–146)
SP GR UR STRIP: 1.01 (ref 1–1.03)
T4 FREE SERPL-MCNC: 1.4 NG/DL (ref 0.9–1.7)
TROPONIN I SERPL HS-MCNC: 10 NG/L (ref 0–9)
TROPONIN I SERPL HS-MCNC: 12 NG/L (ref 0–9)
TSH SERPL DL<=0.05 MIU/L-ACNC: 1.72 UIU/ML (ref 0.27–4.2)
UROBILINOGEN UR STRIP-ACNC: 1 EU/DL (ref 0–1)
WBC #/AREA URNS HPF: ABNORMAL /HPF
WBC OTHER # BLD: 11.6 K/UL (ref 4.5–11.5)

## 2024-06-01 PROCEDURE — 85379 FIBRIN DEGRADATION QUANT: CPT

## 2024-06-01 PROCEDURE — 96361 HYDRATE IV INFUSION ADD-ON: CPT

## 2024-06-01 PROCEDURE — 96374 THER/PROPH/DIAG INJ IV PUSH: CPT

## 2024-06-01 PROCEDURE — 2060000000 HC ICU INTERMEDIATE R&B

## 2024-06-01 PROCEDURE — 85025 COMPLETE CBC W/AUTO DIFF WBC: CPT

## 2024-06-01 PROCEDURE — 99285 EMERGENCY DEPT VISIT HI MDM: CPT

## 2024-06-01 PROCEDURE — 6370000000 HC RX 637 (ALT 250 FOR IP): Performed by: STUDENT IN AN ORGANIZED HEALTH CARE EDUCATION/TRAINING PROGRAM

## 2024-06-01 PROCEDURE — A4216 STERILE WATER/SALINE, 10 ML: HCPCS | Performed by: STUDENT IN AN ORGANIZED HEALTH CARE EDUCATION/TRAINING PROGRAM

## 2024-06-01 PROCEDURE — 6360000004 HC RX CONTRAST MEDICATION: Performed by: RADIOLOGY

## 2024-06-01 PROCEDURE — 71275 CT ANGIOGRAPHY CHEST: CPT

## 2024-06-01 PROCEDURE — 99222 1ST HOSP IP/OBS MODERATE 55: CPT | Performed by: FAMILY MEDICINE

## 2024-06-01 PROCEDURE — 83690 ASSAY OF LIPASE: CPT

## 2024-06-01 PROCEDURE — 80053 COMPREHEN METABOLIC PANEL: CPT

## 2024-06-01 PROCEDURE — 84439 ASSAY OF FREE THYROXINE: CPT

## 2024-06-01 PROCEDURE — 84484 ASSAY OF TROPONIN QUANT: CPT

## 2024-06-01 PROCEDURE — 87086 URINE CULTURE/COLONY COUNT: CPT

## 2024-06-01 PROCEDURE — 2500000003 HC RX 250 WO HCPCS: Performed by: STUDENT IN AN ORGANIZED HEALTH CARE EDUCATION/TRAINING PROGRAM

## 2024-06-01 PROCEDURE — 6360000002 HC RX W HCPCS: Performed by: FAMILY MEDICINE

## 2024-06-01 PROCEDURE — 96372 THER/PROPH/DIAG INJ SC/IM: CPT

## 2024-06-01 PROCEDURE — 71046 X-RAY EXAM CHEST 2 VIEWS: CPT

## 2024-06-01 PROCEDURE — 93005 ELECTROCARDIOGRAM TRACING: CPT | Performed by: STUDENT IN AN ORGANIZED HEALTH CARE EDUCATION/TRAINING PROGRAM

## 2024-06-01 PROCEDURE — 81001 URINALYSIS AUTO W/SCOPE: CPT

## 2024-06-01 PROCEDURE — 84443 ASSAY THYROID STIM HORMONE: CPT

## 2024-06-01 PROCEDURE — 6360000002 HC RX W HCPCS: Performed by: STUDENT IN AN ORGANIZED HEALTH CARE EDUCATION/TRAINING PROGRAM

## 2024-06-01 PROCEDURE — 2580000003 HC RX 258: Performed by: STUDENT IN AN ORGANIZED HEALTH CARE EDUCATION/TRAINING PROGRAM

## 2024-06-01 PROCEDURE — 87077 CULTURE AEROBIC IDENTIFY: CPT

## 2024-06-01 PROCEDURE — 96375 TX/PRO/DX INJ NEW DRUG ADDON: CPT

## 2024-06-01 RX ORDER — ACETAMINOPHEN 325 MG/1
650 TABLET ORAL EVERY 6 HOURS PRN
Status: DISCONTINUED | OUTPATIENT
Start: 2024-06-01 | End: 2024-06-04 | Stop reason: HOSPADM

## 2024-06-01 RX ORDER — MIDAZOLAM HYDROCHLORIDE 2 MG/2ML
0.5 INJECTION, SOLUTION INTRAMUSCULAR; INTRAVENOUS ONCE
Status: COMPLETED | OUTPATIENT
Start: 2024-06-01 | End: 2024-06-01

## 2024-06-01 RX ORDER — SODIUM CHLORIDE 9 MG/ML
INJECTION, SOLUTION INTRAVENOUS PRN
Status: DISCONTINUED | OUTPATIENT
Start: 2024-06-01 | End: 2024-06-04 | Stop reason: HOSPADM

## 2024-06-01 RX ORDER — POLYETHYLENE GLYCOL 3350 17 G/17G
17 POWDER, FOR SOLUTION ORAL DAILY PRN
Status: DISCONTINUED | OUTPATIENT
Start: 2024-06-01 | End: 2024-06-04 | Stop reason: HOSPADM

## 2024-06-01 RX ORDER — POTASSIUM CHLORIDE 20 MEQ/1
40 TABLET, EXTENDED RELEASE ORAL PRN
Status: DISCONTINUED | OUTPATIENT
Start: 2024-06-01 | End: 2024-06-04 | Stop reason: HOSPADM

## 2024-06-01 RX ORDER — SODIUM CHLORIDE 0.9 % (FLUSH) 0.9 %
5-40 SYRINGE (ML) INJECTION EVERY 12 HOURS SCHEDULED
Status: DISCONTINUED | OUTPATIENT
Start: 2024-06-01 | End: 2024-06-04 | Stop reason: HOSPADM

## 2024-06-01 RX ORDER — HYDROXYZINE PAMOATE 25 MG/1
25 CAPSULE ORAL ONCE
Status: COMPLETED | OUTPATIENT
Start: 2024-06-01 | End: 2024-06-01

## 2024-06-01 RX ORDER — ENOXAPARIN SODIUM 100 MG/ML
1 INJECTION SUBCUTANEOUS ONCE
Status: COMPLETED | OUTPATIENT
Start: 2024-06-01 | End: 2024-06-01

## 2024-06-01 RX ORDER — SODIUM CHLORIDE 0.9 % (FLUSH) 0.9 %
5-40 SYRINGE (ML) INJECTION PRN
Status: DISCONTINUED | OUTPATIENT
Start: 2024-06-01 | End: 2024-06-04 | Stop reason: HOSPADM

## 2024-06-01 RX ORDER — ONDANSETRON 2 MG/ML
4 INJECTION INTRAMUSCULAR; INTRAVENOUS EVERY 6 HOURS PRN
Status: DISCONTINUED | OUTPATIENT
Start: 2024-06-01 | End: 2024-06-04 | Stop reason: HOSPADM

## 2024-06-01 RX ORDER — ONDANSETRON 4 MG/1
4 TABLET, ORALLY DISINTEGRATING ORAL EVERY 8 HOURS PRN
Status: DISCONTINUED | OUTPATIENT
Start: 2024-06-01 | End: 2024-06-04 | Stop reason: HOSPADM

## 2024-06-01 RX ORDER — 0.9 % SODIUM CHLORIDE 0.9 %
1000 INTRAVENOUS SOLUTION INTRAVENOUS ONCE
Status: COMPLETED | OUTPATIENT
Start: 2024-06-01 | End: 2024-06-01

## 2024-06-01 RX ORDER — MAGNESIUM SULFATE IN WATER 40 MG/ML
2000 INJECTION, SOLUTION INTRAVENOUS PRN
Status: DISCONTINUED | OUTPATIENT
Start: 2024-06-01 | End: 2024-06-04 | Stop reason: HOSPADM

## 2024-06-01 RX ORDER — ENOXAPARIN SODIUM 100 MG/ML
1 INJECTION SUBCUTANEOUS 2 TIMES DAILY
Status: DISCONTINUED | OUTPATIENT
Start: 2024-06-01 | End: 2024-06-03

## 2024-06-01 RX ORDER — SODIUM CHLORIDE 0.9 % (FLUSH) 0.9 %
10 SYRINGE (ML) INJECTION PRN
Status: DISCONTINUED | OUTPATIENT
Start: 2024-06-01 | End: 2024-06-04 | Stop reason: HOSPADM

## 2024-06-01 RX ORDER — HYDRALAZINE HYDROCHLORIDE 20 MG/ML
5 INJECTION INTRAMUSCULAR; INTRAVENOUS EVERY 6 HOURS PRN
Status: DISCONTINUED | OUTPATIENT
Start: 2024-06-01 | End: 2024-06-04 | Stop reason: HOSPADM

## 2024-06-01 RX ORDER — POTASSIUM CHLORIDE 7.45 MG/ML
10 INJECTION INTRAVENOUS PRN
Status: DISCONTINUED | OUTPATIENT
Start: 2024-06-01 | End: 2024-06-04 | Stop reason: HOSPADM

## 2024-06-01 RX ORDER — LABETALOL HYDROCHLORIDE 5 MG/ML
10 INJECTION, SOLUTION INTRAVENOUS ONCE
Status: COMPLETED | OUTPATIENT
Start: 2024-06-01 | End: 2024-06-01

## 2024-06-01 RX ORDER — ACETAMINOPHEN 650 MG/1
650 SUPPOSITORY RECTAL EVERY 6 HOURS PRN
Status: DISCONTINUED | OUTPATIENT
Start: 2024-06-01 | End: 2024-06-04 | Stop reason: HOSPADM

## 2024-06-01 RX ADMIN — ONDANSETRON 4 MG: 2 INJECTION INTRAMUSCULAR; INTRAVENOUS at 23:18

## 2024-06-01 RX ADMIN — LABETALOL HYDROCHLORIDE 10 MG: 5 INJECTION INTRAVENOUS at 21:17

## 2024-06-01 RX ADMIN — ENOXAPARIN SODIUM 50 MG: 100 INJECTION SUBCUTANEOUS at 21:18

## 2024-06-01 RX ADMIN — SODIUM CHLORIDE 1000 ML: 9 INJECTION, SOLUTION INTRAVENOUS at 18:37

## 2024-06-01 RX ADMIN — MIDAZOLAM 0.5 MG: 1 INJECTION INTRAMUSCULAR; INTRAVENOUS at 21:35

## 2024-06-01 RX ADMIN — FAMOTIDINE 20 MG: 10 INJECTION, SOLUTION INTRAVENOUS at 18:28

## 2024-06-01 RX ADMIN — CEFTRIAXONE SODIUM 2000 MG: 2 INJECTION, POWDER, FOR SOLUTION INTRAMUSCULAR; INTRAVENOUS at 21:17

## 2024-06-01 RX ADMIN — IOPAMIDOL 70 ML: 755 INJECTION, SOLUTION INTRAVENOUS at 19:27

## 2024-06-01 RX ADMIN — HYDROXYZINE PAMOATE 25 MG: 25 CAPSULE ORAL at 18:18

## 2024-06-01 NOTE — ED PROVIDER NOTES
Emergency Medicine      6/2/2024 2:06 AM      NOTE: This report was transcribed using voice recognition software. Every effort was made to ensure accuracy; however, inadvertent computerized transcription errors may be present           Vianey Clemens DO  06/02/24 0206

## 2024-06-02 LAB
ANION GAP SERPL CALCULATED.3IONS-SCNC: 14 MMOL/L (ref 7–16)
BASOPHILS # BLD: 0.09 K/UL (ref 0–0.2)
BASOPHILS NFR BLD: 1 % (ref 0–2)
BUN SERPL-MCNC: 18 MG/DL (ref 6–23)
CALCIUM SERPL-MCNC: 8.9 MG/DL (ref 8.6–10.2)
CHLORIDE SERPL-SCNC: 101 MMOL/L (ref 98–107)
CO2 SERPL-SCNC: 21 MMOL/L (ref 22–29)
CREAT SERPL-MCNC: 1 MG/DL (ref 0.5–1)
EOSINOPHIL # BLD: 0.23 K/UL (ref 0.05–0.5)
EOSINOPHILS RELATIVE PERCENT: 3 % (ref 0–6)
ERYTHROCYTE [DISTWIDTH] IN BLOOD BY AUTOMATED COUNT: 14.6 % (ref 11.5–15)
GFR, ESTIMATED: 63 ML/MIN/1.73M2
GLUCOSE SERPL-MCNC: 151 MG/DL (ref 74–99)
HCT VFR BLD AUTO: 35.4 % (ref 34–48)
HGB BLD-MCNC: 10.7 G/DL (ref 11.5–15.5)
IMM GRANULOCYTES # BLD AUTO: 0.03 K/UL (ref 0–0.58)
IMM GRANULOCYTES NFR BLD: 0 % (ref 0–5)
LYMPHOCYTES NFR BLD: 1.31 K/UL (ref 1.5–4)
LYMPHOCYTES RELATIVE PERCENT: 17 % (ref 20–42)
MCH RBC QN AUTO: 26.6 PG (ref 26–35)
MCHC RBC AUTO-ENTMCNC: 30.2 G/DL (ref 32–34.5)
MCV RBC AUTO: 88.1 FL (ref 80–99.9)
MONOCYTES NFR BLD: 0.65 K/UL (ref 0.1–0.95)
MONOCYTES NFR BLD: 8 % (ref 2–12)
NEUTROPHILS NFR BLD: 70 % (ref 43–80)
NEUTS SEG NFR BLD: 5.5 K/UL (ref 1.8–7.3)
PLATELET # BLD AUTO: 297 K/UL (ref 130–450)
PMV BLD AUTO: 10.7 FL (ref 7–12)
POTASSIUM SERPL-SCNC: 3.9 MMOL/L (ref 3.5–5)
RBC # BLD AUTO: 4.02 M/UL (ref 3.5–5.5)
SODIUM SERPL-SCNC: 136 MMOL/L (ref 132–146)
WBC OTHER # BLD: 7.8 K/UL (ref 4.5–11.5)

## 2024-06-02 PROCEDURE — 2580000003 HC RX 258: Performed by: FAMILY MEDICINE

## 2024-06-02 PROCEDURE — 36415 COLL VENOUS BLD VENIPUNCTURE: CPT

## 2024-06-02 PROCEDURE — 80048 BASIC METABOLIC PNL TOTAL CA: CPT

## 2024-06-02 PROCEDURE — 2060000000 HC ICU INTERMEDIATE R&B

## 2024-06-02 PROCEDURE — 85025 COMPLETE CBC W/AUTO DIFF WBC: CPT

## 2024-06-02 PROCEDURE — 6370000000 HC RX 637 (ALT 250 FOR IP): Performed by: FAMILY MEDICINE

## 2024-06-02 PROCEDURE — 6360000002 HC RX W HCPCS: Performed by: FAMILY MEDICINE

## 2024-06-02 PROCEDURE — 99232 SBSQ HOSP IP/OBS MODERATE 35: CPT | Performed by: INTERNAL MEDICINE

## 2024-06-02 RX ORDER — CHOLECALCIFEROL (VITAMIN D3) 50 MCG
2000 TABLET ORAL DAILY
Status: DISCONTINUED | OUTPATIENT
Start: 2024-06-02 | End: 2024-06-04 | Stop reason: HOSPADM

## 2024-06-02 RX ORDER — GABAPENTIN 100 MG/1
100 CAPSULE ORAL 3 TIMES DAILY
Status: DISCONTINUED | OUTPATIENT
Start: 2024-06-02 | End: 2024-06-02 | Stop reason: SDUPTHER

## 2024-06-02 RX ORDER — GABAPENTIN 400 MG/1
400 CAPSULE ORAL 3 TIMES DAILY
Status: DISCONTINUED | OUTPATIENT
Start: 2024-06-02 | End: 2024-06-04 | Stop reason: HOSPADM

## 2024-06-02 RX ORDER — VENLAFAXINE HYDROCHLORIDE 75 MG/1
75 CAPSULE, EXTENDED RELEASE ORAL DAILY
Status: DISCONTINUED | OUTPATIENT
Start: 2024-06-02 | End: 2024-06-04 | Stop reason: HOSPADM

## 2024-06-02 RX ORDER — LEVOTHYROXINE SODIUM 0.07 MG/1
75 TABLET ORAL DAILY
Status: DISCONTINUED | OUTPATIENT
Start: 2024-06-02 | End: 2024-06-04 | Stop reason: HOSPADM

## 2024-06-02 RX ORDER — TEMAZEPAM 15 MG/1
15 CAPSULE ORAL NIGHTLY PRN
Status: DISCONTINUED | OUTPATIENT
Start: 2024-06-02 | End: 2024-06-04 | Stop reason: HOSPADM

## 2024-06-02 RX ORDER — PANTOPRAZOLE SODIUM 40 MG/1
40 TABLET, DELAYED RELEASE ORAL
Status: DISCONTINUED | OUTPATIENT
Start: 2024-06-02 | End: 2024-06-04 | Stop reason: HOSPADM

## 2024-06-02 RX ORDER — ROPINIROLE 0.5 MG/1
0.5 TABLET, FILM COATED ORAL NIGHTLY
Status: DISCONTINUED | OUTPATIENT
Start: 2024-06-02 | End: 2024-06-02 | Stop reason: ALTCHOICE

## 2024-06-02 RX ORDER — ROSUVASTATIN CALCIUM 10 MG/1
5 TABLET, COATED ORAL NIGHTLY
Status: DISCONTINUED | OUTPATIENT
Start: 2024-06-02 | End: 2024-06-04 | Stop reason: HOSPADM

## 2024-06-02 RX ADMIN — ENOXAPARIN SODIUM 50 MG: 100 INJECTION SUBCUTANEOUS at 20:42

## 2024-06-02 RX ADMIN — ACETAMINOPHEN 650 MG: 325 TABLET ORAL at 17:35

## 2024-06-02 RX ADMIN — SODIUM CHLORIDE, PRESERVATIVE FREE 10 ML: 5 INJECTION INTRAVENOUS at 01:50

## 2024-06-02 RX ADMIN — ENOXAPARIN SODIUM 50 MG: 100 INJECTION SUBCUTANEOUS at 08:13

## 2024-06-02 RX ADMIN — GABAPENTIN 400 MG: 400 CAPSULE ORAL at 12:56

## 2024-06-02 RX ADMIN — GABAPENTIN 400 MG: 400 CAPSULE ORAL at 08:14

## 2024-06-02 RX ADMIN — GABAPENTIN 400 MG: 400 CAPSULE ORAL at 20:42

## 2024-06-02 RX ADMIN — ROSUVASTATIN 5 MG: 10 TABLET, FILM COATED ORAL at 20:42

## 2024-06-02 RX ADMIN — PANTOPRAZOLE SODIUM 40 MG: 40 TABLET, DELAYED RELEASE ORAL at 06:29

## 2024-06-02 RX ADMIN — WATER 1000 MG: 1 INJECTION INTRAMUSCULAR; INTRAVENOUS; SUBCUTANEOUS at 01:49

## 2024-06-02 RX ADMIN — Medication 2000 UNITS: at 08:14

## 2024-06-02 RX ADMIN — LEVOTHYROXINE SODIUM 75 MCG: 0.07 TABLET ORAL at 06:29

## 2024-06-02 RX ADMIN — ROSUVASTATIN 5 MG: 10 TABLET, FILM COATED ORAL at 01:48

## 2024-06-02 RX ADMIN — TEMAZEPAM 15 MG: 15 CAPSULE ORAL at 01:48

## 2024-06-02 RX ADMIN — ENOXAPARIN SODIUM 50 MG: 100 INJECTION SUBCUTANEOUS at 01:49

## 2024-06-02 RX ADMIN — SODIUM CHLORIDE, PRESERVATIVE FREE 10 ML: 5 INJECTION INTRAVENOUS at 20:42

## 2024-06-02 RX ADMIN — VENLAFAXINE HYDROCHLORIDE 75 MG: 75 CAPSULE, EXTENDED RELEASE ORAL at 08:14

## 2024-06-02 RX ADMIN — SODIUM CHLORIDE, PRESERVATIVE FREE 10 ML: 5 INJECTION INTRAVENOUS at 08:14

## 2024-06-02 ASSESSMENT — PAIN DESCRIPTION - LOCATION: LOCATION: BACK

## 2024-06-02 ASSESSMENT — PAIN DESCRIPTION - ORIENTATION: ORIENTATION: LOWER

## 2024-06-02 ASSESSMENT — PAIN SCALES - GENERAL
PAINLEVEL_OUTOF10: 0
PAINLEVEL_OUTOF10: 2
PAINLEVEL_OUTOF10: 3

## 2024-06-02 NOTE — PLAN OF CARE
Problem: Discharge Planning  Goal: Discharge to home or other facility with appropriate resources  6/2/2024 0844 by Anamaria Charles RN  Outcome: Progressing  6/2/2024 0121 by Johanna Grande RN  Outcome: Progressing  Flowsheets (Taken 6/2/2024 0100)  Discharge to home or other facility with appropriate resources:   Identify barriers to discharge with patient and caregiver   Identify discharge learning needs (meds, wound care, etc)     Problem: Pain  Goal: Verbalizes/displays adequate comfort level or baseline comfort level  6/2/2024 0844 by Anamaria Charles RN  Outcome: Progressing  6/2/2024 0121 by Johanna Grande RN  Outcome: Progressing     Problem: Skin/Tissue Integrity  Goal: Absence of new skin breakdown  Description: 1.  Monitor for areas of redness and/or skin breakdown  2.  Assess vascular access sites hourly  3.  Every 4-6 hours minimum:  Change oxygen saturation probe site  4.  Every 4-6 hours:  If on nasal continuous positive airway pressure, respiratory therapy assess nares and determine need for appliance change or resting period.  Outcome: Progressing     Problem: Safety - Adult  Goal: Free from fall injury  Outcome: Progressing

## 2024-06-02 NOTE — H&P
Hospital Medicine History & Physical      PCP: Jes Souza PA-C    Date of Admission: 6/1/2024    Date of Service: Pt seen/examined on 6/1/2024 and Admitted to Inpatient with expected LOS greater than two midnights due to medical therapy.     Chief Complaint: Pulmonary embolism      History Of Present Illness:    73 y.o. female who presented to [facility] with chest pain radiates to her neck as of about a minute.  Also felt dizzy with her symptoms.  Patient has a history of oral cancer status post chemo and radiation back in 2016.  Symptoms started all of a sudden while she was at [employer] and also persisted this morning while she was gardening.  She denies any history of heart disease or hypertension.  Blood pressure on presentation was significantly elevated at 2 3/101 and as high as 230/126.  Stationary chest was obtained which showed pulm embolism in the right lung.  She was started on weight-based Lovenox.  Given IV labetalol for blood pressure control. Patient denies any history of blood clots.  She mentioned that she has completed her treatment for oral cancer last chemo was back in 2016.  She denies any pain or swelling of the lower extremities.  Concern elevated blood pressure she does not take any meds for BP and has never been diagnosed with elevated blood pressure.    Past Medical History:          Diagnosis Date    Anemia associated with chemotherapy 11/16/2016    resolved    Anxiety     Cancer (HCC)     tongue    Cancer of tongue (HCC) 09/16/2016    treated with chemo and radiation / last treatment 11/2016    Cancer of tonsil (HCC)     Cervical herniation     no limits on rom    Difficulty sleeping     Discoloration of skin of foot 06/25/2020    Dizziness 06/20/2018    GERD (gastroesophageal reflux disease)     Hearing loss 2018    History of therapeutic radiation     Hx antineoplastic chemo     Hx of cataract surgery 10/06/2020    Hyperlipidemia     Hypokalemia 11/16/2016    with

## 2024-06-03 LAB
EKG ATRIAL RATE: 108 BPM
EKG P AXIS: 25 DEGREES
EKG P-R INTERVAL: 164 MS
EKG Q-T INTERVAL: 330 MS
EKG QRS DURATION: 68 MS
EKG QTC CALCULATION (BAZETT): 442 MS
EKG R AXIS: 5 DEGREES
EKG T AXIS: 19 DEGREES
EKG VENTRICULAR RATE: 108 BPM

## 2024-06-03 PROCEDURE — 2060000000 HC ICU INTERMEDIATE R&B

## 2024-06-03 PROCEDURE — 6370000000 HC RX 637 (ALT 250 FOR IP): Performed by: INTERNAL MEDICINE

## 2024-06-03 PROCEDURE — 6370000000 HC RX 637 (ALT 250 FOR IP): Performed by: FAMILY MEDICINE

## 2024-06-03 PROCEDURE — 93010 ELECTROCARDIOGRAM REPORT: CPT | Performed by: INTERNAL MEDICINE

## 2024-06-03 PROCEDURE — 6360000002 HC RX W HCPCS: Performed by: INTERNAL MEDICINE

## 2024-06-03 PROCEDURE — 2580000003 HC RX 258: Performed by: INTERNAL MEDICINE

## 2024-06-03 PROCEDURE — 99232 SBSQ HOSP IP/OBS MODERATE 35: CPT | Performed by: INTERNAL MEDICINE

## 2024-06-03 PROCEDURE — 2580000003 HC RX 258: Performed by: FAMILY MEDICINE

## 2024-06-03 RX ADMIN — TEMAZEPAM 15 MG: 15 CAPSULE ORAL at 00:28

## 2024-06-03 RX ADMIN — APIXABAN 10 MG: 5 TABLET, FILM COATED ORAL at 10:38

## 2024-06-03 RX ADMIN — Medication 2000 UNITS: at 10:38

## 2024-06-03 RX ADMIN — APIXABAN 10 MG: 5 TABLET, FILM COATED ORAL at 22:12

## 2024-06-03 RX ADMIN — PANTOPRAZOLE SODIUM 40 MG: 40 TABLET, DELAYED RELEASE ORAL at 05:56

## 2024-06-03 RX ADMIN — VENLAFAXINE HYDROCHLORIDE 75 MG: 75 CAPSULE, EXTENDED RELEASE ORAL at 10:38

## 2024-06-03 RX ADMIN — ROSUVASTATIN 5 MG: 10 TABLET, FILM COATED ORAL at 22:12

## 2024-06-03 RX ADMIN — SODIUM CHLORIDE, PRESERVATIVE FREE 10 ML: 5 INJECTION INTRAVENOUS at 10:39

## 2024-06-03 RX ADMIN — ACETAMINOPHEN 650 MG: 325 TABLET ORAL at 00:28

## 2024-06-03 RX ADMIN — SODIUM CHLORIDE, PRESERVATIVE FREE 10 ML: 5 INJECTION INTRAVENOUS at 22:12

## 2024-06-03 RX ADMIN — WATER 1000 MG: 1 INJECTION INTRAMUSCULAR; INTRAVENOUS; SUBCUTANEOUS at 00:28

## 2024-06-03 RX ADMIN — GABAPENTIN 400 MG: 400 CAPSULE ORAL at 14:33

## 2024-06-03 RX ADMIN — ACETAMINOPHEN 650 MG: 325 TABLET ORAL at 22:12

## 2024-06-03 RX ADMIN — LEVOTHYROXINE SODIUM 75 MCG: 0.07 TABLET ORAL at 05:56

## 2024-06-03 RX ADMIN — GABAPENTIN 400 MG: 400 CAPSULE ORAL at 22:12

## 2024-06-03 RX ADMIN — GABAPENTIN 400 MG: 400 CAPSULE ORAL at 10:37

## 2024-06-03 ASSESSMENT — PAIN SCALES - GENERAL
PAINLEVEL_OUTOF10: 0
PAINLEVEL_OUTOF10: 5

## 2024-06-03 NOTE — ACP (ADVANCE CARE PLANNING)
Advance Care Planning   Healthcare Decision Maker:    Primary Decision Maker: Michael Lawrence - Spouse - [phone]    Secondary Decision Maker: Nyasia Lawrence - Child - [phone]    Click here to complete Healthcare Decision Makers including selection of the Healthcare Decision Maker Relationship (ie \"Primary\").

## 2024-06-03 NOTE — PLAN OF CARE
Problem: Discharge Planning  Goal: Discharge to home or other facility with appropriate resources  6/2/2024 2225 by Johanna Grande, RN  Outcome: Progressing  Flowsheets (Taken 6/2/2024 2000)  Discharge to home or other facility with appropriate resources:   Identify barriers to discharge with patient and caregiver   Identify discharge learning needs (meds, wound care, etc)  6/2/2024 0844 by Anamaria Charles, RN  Outcome: Progressing

## 2024-06-03 NOTE — CARE COORDINATION
Transition of Care: Met with pt at bedside to discuss transition of care. Pt lives with her spouse in a 1 level home. No DME. Independent with ADL's. PCP . Pharmacy [facility]. Pt seen ambulating in the ge independently, no assistive device, tolerating well. Plan is home at dc with no needs. She was admitted with Rt PE. Lovenox to transition to PO Eliquis. CM to follow(TF)      YUNI Montoya,RN  Case Management  [phone]        Case Management Assessment  Initial Evaluation    Date/Time of Evaluation: 6/3/2024 3:05 PM  Assessment Completed by: PRAVEEN RODRIGUEZ RN    If patient is discharged prior to next notation, then this note serves as note for discharge by case management.    Patient Name: Flor Lawrence                   YOB: 1950  Diagnosis: Anxiety state [F41.1]  Acute chest pain [R07.9]  Pulmonary embolism on right (HCC) [I26.99]  Hypertensive urgency [I16.0]  Urinary tract infection with hematuria, site unspecified [N39.0, R31.9]                   Date / Time: 6/1/2024  5:37 PM    Patient Admission Status: Inpatient   Readmission Risk (Low < 19, Mod (19-27), High > 27): Readmission Risk Score: 6.6    Current PCP: Jes Souza PA-C  PCP verified by CM? Yes    Chart Reviewed: Yes      History Provided by: Patient  Patient Orientation: Alert and Oriented    Patient Cognition: Alert    Hospitalization in the last 30 days (Readmission):  No    If yes, Readmission Assessment in  Navigator will be completed.    Advance Directives:      Code Status: Full Code   Patient's Primary Decision Maker is: Legal Next of Kin    Primary Decision Maker: Michael Lawrence - Spouse - [phone]    Secondary Decision Maker: Nyasia Lawrence - Child - [phone]    Discharge Planning:    Patient lives with: Spouse/Significant Other Type of Home: House  Primary Care Giver: Self  Patient Support Systems include: Children, Family Members, Spouse/Significant Other   Current Financial

## 2024-06-04 VITALS
SYSTOLIC BLOOD PRESSURE: 146 MMHG | TEMPERATURE: 97.2 F | HEART RATE: 73 BPM | RESPIRATION RATE: 18 BRPM | DIASTOLIC BLOOD PRESSURE: 96 MMHG | WEIGHT: 116 LBS | OXYGEN SATURATION: 97 % | BODY MASS INDEX: 21.9 KG/M2 | HEIGHT: 61 IN

## 2024-06-04 DIAGNOSIS — I35.1 NONRHEUMATIC AORTIC VALVE INSUFFICIENCY: Primary | ICD-10-CM

## 2024-06-04 LAB
ERYTHROCYTE [DISTWIDTH] IN BLOOD BY AUTOMATED COUNT: 14.4 % (ref 11.5–15)
HCT VFR BLD AUTO: 36.5 % (ref 34–48)
HGB BLD-MCNC: 11.5 G/DL (ref 11.5–15.5)
MCH RBC QN AUTO: 27.6 PG (ref 26–35)
MCHC RBC AUTO-ENTMCNC: 31.5 G/DL (ref 32–34.5)
MCV RBC AUTO: 87.5 FL (ref 80–99.9)
MICROORGANISM SPEC CULT: ABNORMAL
MICROORGANISM SPEC CULT: ABNORMAL
PLATELET # BLD AUTO: 280 K/UL (ref 130–450)
PMV BLD AUTO: 10.9 FL (ref 7–12)
RBC # BLD AUTO: 4.17 M/UL (ref 3.5–5.5)
SERVICE CMNT-IMP: ABNORMAL
SPECIMEN DESCRIPTION: ABNORMAL
WBC OTHER # BLD: 5.3 K/UL (ref 4.5–11.5)

## 2024-06-04 PROCEDURE — 36415 COLL VENOUS BLD VENIPUNCTURE: CPT

## 2024-06-04 PROCEDURE — 6360000002 HC RX W HCPCS: Performed by: INTERNAL MEDICINE

## 2024-06-04 PROCEDURE — 2580000003 HC RX 258: Performed by: INTERNAL MEDICINE

## 2024-06-04 PROCEDURE — 85027 COMPLETE CBC AUTOMATED: CPT

## 2024-06-04 PROCEDURE — 6370000000 HC RX 637 (ALT 250 FOR IP): Performed by: INTERNAL MEDICINE

## 2024-06-04 PROCEDURE — 99239 HOSP IP/OBS DSCHRG MGMT >30: CPT | Performed by: INTERNAL MEDICINE

## 2024-06-04 PROCEDURE — 2580000003 HC RX 258: Performed by: FAMILY MEDICINE

## 2024-06-04 PROCEDURE — 6370000000 HC RX 637 (ALT 250 FOR IP): Performed by: FAMILY MEDICINE

## 2024-06-04 RX ORDER — LEVOFLOXACIN 750 MG/1
375 TABLET, FILM COATED ORAL DAILY
Qty: 2 TABLET | Refills: 0 | Status: SHIPPED | OUTPATIENT
Start: 2024-06-04 | End: 2024-06-08

## 2024-06-04 RX ADMIN — Medication 2000 UNITS: at 07:58

## 2024-06-04 RX ADMIN — PANTOPRAZOLE SODIUM 40 MG: 40 TABLET, DELAYED RELEASE ORAL at 06:46

## 2024-06-04 RX ADMIN — APIXABAN 10 MG: 5 TABLET, FILM COATED ORAL at 07:58

## 2024-06-04 RX ADMIN — WATER 1000 MG: 1 INJECTION INTRAMUSCULAR; INTRAVENOUS; SUBCUTANEOUS at 07:59

## 2024-06-04 RX ADMIN — GABAPENTIN 400 MG: 400 CAPSULE ORAL at 07:58

## 2024-06-04 RX ADMIN — LEVOTHYROXINE SODIUM 75 MCG: 0.07 TABLET ORAL at 06:46

## 2024-06-04 RX ADMIN — VENLAFAXINE HYDROCHLORIDE 75 MG: 75 CAPSULE, EXTENDED RELEASE ORAL at 07:58

## 2024-06-04 RX ADMIN — GABAPENTIN 400 MG: 400 CAPSULE ORAL at 12:58

## 2024-06-04 RX ADMIN — SODIUM CHLORIDE, PRESERVATIVE FREE 10 ML: 5 INJECTION INTRAVENOUS at 07:59

## 2024-06-04 NOTE — PROGRESS NOTES
4 Eyes Skin Assessment     NAME:  Flor Lawrence  YOB: 1950  MEDICAL RECORD NUMBER:  [id number]    The patient is being assessed for  Admission    I agree that at least one RN has performed a thorough Head to Toe Skin Assessment on the patient. ALL assessment sites listed below have been assessed.      Areas assessed by both nurses:    Head, Face, Ears, Shoulders, Back, Chest, Arms, Elbows, Hands, Sacrum. Buttock, Coccyx, Ischium, and Legs. Feet and Heels        Does the Patient have a Wound? No noted wound(s)       Sudeep Prevention initiated by RN: Yes  Wound Care Orders initiated by RN: No    Pressure Injury (Stage 3,4, Unstageable, DTI, NWPT, and Complex wounds) if present, place Wound referral order by RN under : No    New Ostomies, if present place, Ostomy referral order under : No     Nurse 1 eSignature: Electronically signed by Johanna Grande RN on 6/2/24 at 1:41 AM EDT    **SHARE this note so that the co-signing nurse can place an eSignature**    Nurse 2 eSignature: Electronically signed by Helen Pichardo RN on 6/2/24 at 4:40 AM EDT  
CLINICAL PHARMACY NOTE: MEDS TO BEDS    Total # of Prescriptions Filled: 2   The following medications were delivered to the patient:  Eliquis dvt/pe starter pack 5 mg  Levofloxacin 750 mg    Additional Documentation:    
Lab called to check timing of CBC. They were in the room at 1245 but patient was out walking. Lab coming to draw CBC now for discharge. Electronically signed by Sarah Beth Pepe RN on 6/4/2024 at 1:05 PM    
Patient irate about still being here, stating \"there is no reason for her to be taking up a bed\". Attempted to explain to patient about urine culture and growth process but she will not entertain any conversation. She wants to speak to Dr. Boswell and to be discharged. Dr. Boswell messaged. Electronically signed by Sarah Beth Pepe RN on 6/3/2024 at 2:33 PM    
Patient requesting CBC prior to discharge, Dr. Jewell notified  
    Objective:    /72   Pulse 99   Temp 97.6 °F (36.4 °C) (Temporal)   Resp 18   Ht 1.549 m (5' 1\")   Wt 52.6 kg (116 lb)   SpO2 96%   BMI 21.92 kg/m²     General Appearance: alert and oriented to person, place and time and in no acute distress  Skin: warm and dry  Head: normocephalic and atraumatic  Eyes: pupils equal, round, and reactive to light, extraocular eye movements intact, conjunctivae normal  Neck: neck supple and non tender without mass   Pulmonary/Chest: clear to auscultation bilaterally- no wheezes, rales or rhonchi, normal air movement, no respiratory distress  Cardiovascular: normal rate, normal S1 and S2 and no carotid bruits  Abdomen: soft, non-tender, non-distended, normal bowel sounds, no masses or organomegaly  Extremities: no cyanosis, no clubbing and no edema  Neurologic: no cranial nerve deficit and speech normal        Recent Labs     06/01/24  1754 06/02/24  0917    136   K 4.0 3.9    101   CO2 27 21*   BUN 20 18   CREATININE 1.2* 1.0   GLUCOSE 123* 151*   CALCIUM 9.7 8.9         Recent Labs     06/01/24  1754 06/02/24  0917   WBC 11.6* 7.8   RBC 4.48 4.02   HGB 12.2 10.7*   HCT 38.4 35.4   MCV 85.7 88.1   MCH 27.2 26.6   MCHC 31.8* 30.2*   RDW 14.4 14.6    297   MPV 10.5 10.7         Radiology:   CTA PULMONARY W CONTRAST   Final Result   1.  Positive acute pulmonary embolus to a distal subsegmental artery for the   posterior basal segment of the right lower.      2.  There is dilatation of the ascending aorta to 3.9 x 3.8 cm but there is   no evidence aneurysm formation or intramural hematoma.  No conspicuous   dissection is seen but there is no contrast density in the descending   thoracic aorta.      3.  Discrete intra lobular areas of scattered ground-glass density in the   right upper lobe and in the right lower lobe could represent discrete areas   of focal alveolitis.      Preliminary report given to Dr. Clemens,  physician [facility], at   the 
Problem:    Pulmonary embolism on right (HCC)  Resolved Problems:    * No resolved hospital problems. *      Plan:  Acute pulmonary embolism -possibly provoked since she had road trip a week ago.  On therapeutic Lovenox, plan to transition to Eliquis tomorrow.  Hypertensive urgency -now improved, continue to monitor, PRNs in place  Urinary tract infection -continue on Rocephin, wait for urine culture  History of oral cancer post chemotherapy  Hypothyroidism -on Synthroid  Peripheral neuropathy on gabapentin      NOTE: This report was transcribed using voice recognition software. Every effort was made to ensure accuracy; however, inadvertent computerized transcription errors may be present.  Electronically signed by Shruthi Mendoza MD on 6/2/2024 at 12:38 PM

## 2024-06-04 NOTE — CARE COORDINATION
CM Update: Discharge order noted. Pt returning home with spouse, no needs. Eliquis card given to pt. Spouse to provide transport(TF)        JAKE MontoyaN,RN  Case Management  [phone]

## 2024-06-04 NOTE — DISCHARGE SUMMARY
Discharge Summary    Admit date: 6/1/2024    Discharge date and time: 6/4/24    Admitting Physician: Santos Coon MD     Consultants: None    Admission Diagnoses:  PE    Discharge Diagnoses AND Hospital Course:  Acute pulmonary embolism- possibly provoked since she had road trip a week ago. Transitioned to eliquis   Hypertensive urgency- now improved  Urinary tract infection - , Urine culture growing pseudomonas, and e. Coli, sensitive to levaquin  History of oral cancer post chemotherapy  Hypothyroidism -on Synthroid  Peripheral neuropathy on gabapentin    Discharge Exam:  Vitals:    06/04/24 0727   BP: 119/84   Pulse: 70   Resp: 17   Temp: 97 °F (36.1 °C)   SpO2: 97%       General Appearance: alert and oriented to person, place and time and in no acute distress  Skin: warm and dry  Head: normocephalic and atraumatic  Eyes: pupils equal, round, and reactive to light, extraocular eye movements intact, conjunctivae normal  Neck: neck supple and non tender without mass   Pulmonary/Chest: clear to auscultation bilaterally- no wheezes, rales or rhonchi, normal air movement, no respiratory distress  Cardiovascular: normal rate, normal S1 and S2 and no carotid bruits  Abdomen: soft, non-tender, non-distended, normal bowel sounds, no masses or organomegaly  Extremities: no cyanosis, no clubbing and no edema  Neurologic: no cranial nerve deficit and speech normal    Disposition: home  The patient's condition is fair.  At this time the patient is without objective evidence of an acute process requiring continuing hospitalization or inpatient management.  They are stable for discharge with outpatient follow-up.     I have spoken with the patient and discussed the results of the current hospitalization, in addition to providing specific details for the plan of care and counseling regarding the diagnosis and prognosis.  The plan has been discussed in detail and they are aware of the specific conditions for emergent

## 2024-06-05 ENCOUNTER — CARE COORDINATION (OUTPATIENT)
Dept: CARE COORDINATION | Age: 74
End: 2024-06-05

## 2024-06-05 ENCOUNTER — TELEPHONE (OUTPATIENT)
Dept: PRIMARY CARE CLINIC | Age: 74
End: 2024-06-05

## 2024-06-05 NOTE — TELEPHONE ENCOUNTER
Care Transitions Initial Follow Up Call    Outreach made within 2 business days of discharge: Yes    Patient: Flor Lawrence Patient : 1950   MRN: [id number]  Reason for Admission: There are no discharge diagnoses documented for the most recent discharge.  Discharge Date: 24       Spoke with:flor    Discharge department/facility: [facility] Interactive Patient Contact:  Was patient able to fill all prescriptions: Yes  Was patient instructed to bring all medications to the follow-up visit: Yes  Is patient taking all medications as directed in the discharge summary? Yes  Does patient understand their discharge instructions: Yes  Does patient have questions or concerns that need addressed prior to 7-14 day follow up office visit: no    Scheduled appointment with PCP within 7-14 days    Follow Up  Future Appointments   Date Time Provider Department Center   2024  9:00 AM Summer Varma MD BD PMR 2 [facility]   10/21/2024  8:20 AM Summer Varma MD BDM PMR 2 [facility]   2024  8:15 AM Jes Souza PA-C TRAIL [facility]       Linnea Bassett

## 2024-06-05 NOTE — CARE COORDINATION
Care Transitions Note    Initial Call - Call within 2 business days of discharge: Yes     First attempt to reach patient for Initial transitions of care follow up. Unable to reach patient.    Outreach Attempts:   HIPAA compliant voicemail left for patient.     Patient: Flor Lawrence    Patient : 1950   MRN: [id number]    Reason for Admission: [facility] /-24 PE  Discharge Date: 24  RURS: Readmission Risk Score: 6.9    Last Discharge Facility       Date Complaint Diagnosis Description Type Department Provider    24 Chest Pain Acute chest pain ... ED to Hosp-Admission (Discharged) (ADMITTED) [facility] P[facility] Treasure Jewell, DO; Sarath,...          Was this an external facility discharge? No    Follow Up Appointment:   Patient does not have a follow up appointment scheduled at time of call.     CTN routed message High Priority to PCP Clinical Staff to schedule 7 day Hosp F/U.    Future Appointments         Provider Specialty Dept Phone    2024 9:00 AM Summer Varma MD Physical Medicine and Rehab [phone]    10/21/2024 8:20 AM Summer Varma MD [facility] [phone]    2024 8:15 AM Jes Souza PAJigneshC Primary Care [phone]          Plan for follow-up call in 2-5 days     Concetta Sanz LPN

## 2024-06-06 ENCOUNTER — CARE COORDINATION (OUTPATIENT)
Dept: CARE COORDINATION | Age: 74
End: 2024-06-06

## 2024-06-06 NOTE — CARE COORDINATION
Care Transitions Note    Initial Call - Call within 2 business days of discharge: Yes     Second attempt made to reach patient for Initial transitions of care follow up. Unable to reach patient. If no response from patient by end of day, CTN will sign off.    Outreach Attempts:   Multiple attempts to contact patient, spouse/partner  at phone numbers on file.   HIPAA compliant voicemail left for patient, spouse/partner .   [facility] message sent.   Attempted to  on HIPAA form.     Patient: Flor Lawrence Patient : 1950  MRN: [id number]    Reason for Admission:  [facility] -24 PE   Discharge Date: 24  RURS: Readmission Risk Score: 6.9    Last Discharge Facility       Date Complaint Diagnosis Description Type Department Provider    24 Chest Pain Acute chest pain ... ED to Hosp-Admission (Discharged) (ADMITTED) [facility] P[facility] Treasure Jewell, ; Sarath,...        Was this an external facility discharge? No    Follow Up Appointment:   Patient has hospital follow up appointment scheduled within 14 days of discharge.    Future Appointments         Provider Specialty Dept Phone    2024 10:15 AM Jes Souza PA-C Primary Care [phone]    2024 9:00 AM Summer Varma MD Physical Medicine and Rehab [phone]    10/21/2024 8:20 AM Summer Varma MD [facility] Rehab [phone]    2024 8:15 AM Jes Souza PA-C Primary Care [phone]            Concetta Sanz LPN

## 2024-06-16 SDOH — ECONOMIC STABILITY: FOOD INSECURITY: WITHIN THE PAST 12 MONTHS, YOU WORRIED THAT YOUR FOOD WOULD RUN OUT BEFORE YOU GOT MONEY TO BUY MORE.: PATIENT DECLINED

## 2024-06-16 SDOH — ECONOMIC STABILITY: INCOME INSECURITY: HOW HARD IS IT FOR YOU TO PAY FOR THE VERY BASICS LIKE FOOD, HOUSING, MEDICAL CARE, AND HEATING?: NOT VERY HARD

## 2024-06-16 SDOH — ECONOMIC STABILITY: TRANSPORTATION INSECURITY
IN THE PAST 12 MONTHS, HAS LACK OF TRANSPORTATION KEPT YOU FROM MEETINGS, WORK, OR FROM GETTING THINGS NEEDED FOR DAILY LIVING?: PATIENT DECLINED

## 2024-06-17 ENCOUNTER — OFFICE VISIT (OUTPATIENT)
Dept: PRIMARY CARE CLINIC | Age: 74
End: 2024-06-17

## 2024-06-17 ENCOUNTER — TELEPHONE (OUTPATIENT)
Dept: PRIMARY CARE CLINIC | Age: 74
End: 2024-06-17

## 2024-06-17 VITALS
BODY MASS INDEX: 23.03 KG/M2 | TEMPERATURE: 97.6 F | HEART RATE: 98 BPM | RESPIRATION RATE: 16 BRPM | HEIGHT: 61 IN | SYSTOLIC BLOOD PRESSURE: 142 MMHG | DIASTOLIC BLOOD PRESSURE: 100 MMHG | WEIGHT: 122 LBS | OXYGEN SATURATION: 100 %

## 2024-06-17 DIAGNOSIS — Z79.01 ANTICOAGULATED: ICD-10-CM

## 2024-06-17 DIAGNOSIS — I26.99 PULMONARY EMBOLISM ON RIGHT (HCC): Primary | ICD-10-CM

## 2024-06-17 DIAGNOSIS — Z09 HOSPITAL DISCHARGE FOLLOW-UP: ICD-10-CM

## 2024-06-17 DIAGNOSIS — I16.0 HYPERTENSIVE URGENCY: ICD-10-CM

## 2024-06-17 DIAGNOSIS — I35.1 NONRHEUMATIC AORTIC VALVE INSUFFICIENCY: ICD-10-CM

## 2024-06-17 NOTE — PROGRESS NOTES
reviewed EF of 60% with moderate aortic insufficiency with mild concentric left ventricular hypertrophy.   Inpatient course: Discharge summary reviewed- see chart.    Interval history/Current status: stable    Patient Active Problem List   Diagnosis    Postmenopausal atrophic vaginitis    Cancer of tongue (HCC)    Anemia associated with chemotherapy    Lymphedema of both lower extremities    Discoloration of skin of foot    Pure hypercholesterolemia    Acquired hypothyroidism    Chronic insomnia    Anxiety and depression    Restless leg syndrome    Gastroesophageal reflux disease without esophagitis    Statin intolerance    Prediabetes    Neuropathy of left lower extremity    Pulmonary embolism on right (HCC)    Nonrheumatic aortic valve insufficiency       Medications listed as ordered at the time of discharge from hospital     Medication List            Accurate as of June 17, 2024 11:08 AM. If you have any questions, ask your nurse or doctor.                CONTINUE taking these medications      * apixaban starter pack 5 MG Tbpk tablet  Commonly known as: Eliquis DVT/PE Starter Pack  Take 1 tablet by mouth See Admin Instructions     * apixaban 5 MG Tabs tablet  Commonly known as: ELIQUIS  Take 1 tablet by mouth 2 times daily     Biotin 10 MG Caps     gabapentin 400 MG capsule  Commonly known as: NEURONTIN  Take 1 capsule by mouth 3 times daily for 90 days.     levothyroxine 75 MCG tablet  Commonly known as: Synthroid  Take 1 tablet by mouth daily     RABEprazole 20 MG tablet  Commonly known as: ACIPHEX  Take 1 tablet by mouth daily     rosuvastatin 5 MG tablet  Commonly known as: CRESTOR  Take 1 tablet by mouth daily     sucralfate 1 GM/10ML suspension  Commonly known as: Carafate  Take 10 mLs by mouth 4 times daily     temazepam 15 MG capsule  Commonly known as: RESTORIL  Take 1 capsule by mouth nightly as needed for Sleep for up to 90 days. Max Daily Amount: 15 mg     venlafaxine 75 MG extended release

## 2024-06-17 NOTE — TELEPHONE ENCOUNTER
Patient called back to the office after visit with you today.  She would like you to RX a BP bill first and cancel Dr. Laughlin consult.  GABBY/ Jessica.  Please advise.

## 2024-06-18 ENCOUNTER — TELEPHONE (OUTPATIENT)
Dept: ADMINISTRATIVE | Age: 74
End: 2024-06-18

## 2024-06-18 ENCOUNTER — TELEPHONE (OUTPATIENT)
Dept: PRIMARY CARE CLINIC | Age: 74
End: 2024-06-18

## 2024-06-18 NOTE — TELEPHONE ENCOUNTER
Patient Appointment Form:      PCP: Jes Souza  Referring: Jes Souza    Has the Patient:    Seen a Cardiologist? no    Had a heart catheterization? no    Had heart surgery? no    Had a stress test or nuclear stress test? no    Had an echocardiogram? yes   date: 5/29/24   facility name:  [facility]    Had a vascular ultrasound? no    Had a 24/48 heart monitor or extended cardiac event monitor? no    Had recent blood work in the last 6 months? no    Had a pacemaker/ICD/ILR implant? no    Seen an Electrophysiologist? no        Will send records via: in Epic      Date & time of appointment:  6/25/24@11:45

## 2024-06-20 DIAGNOSIS — F51.04 CHRONIC INSOMNIA: ICD-10-CM

## 2024-06-21 RX ORDER — TEMAZEPAM 15 MG/1
15 CAPSULE ORAL NIGHTLY PRN
Qty: 90 CAPSULE | Refills: 0 | Status: SHIPPED | OUTPATIENT
Start: 2024-06-21 | End: 2024-09-19

## 2024-06-24 ENCOUNTER — OFFICE VISIT (OUTPATIENT)
Dept: PHYSICAL MEDICINE AND REHAB | Age: 74
End: 2024-06-24
Payer: COMMERCIAL

## 2024-06-24 VITALS
TEMPERATURE: 98.1 F | BODY MASS INDEX: 23.03 KG/M2 | DIASTOLIC BLOOD PRESSURE: 86 MMHG | HEIGHT: 61 IN | OXYGEN SATURATION: 100 % | SYSTOLIC BLOOD PRESSURE: 134 MMHG | WEIGHT: 122 LBS

## 2024-06-24 DIAGNOSIS — M25.511 CHRONIC PAIN OF BOTH SHOULDERS: Primary | ICD-10-CM

## 2024-06-24 DIAGNOSIS — M79.10 TRIGGER POINT: ICD-10-CM

## 2024-06-24 DIAGNOSIS — M54.12 RADICULITIS OF LEFT CERVICAL REGION: ICD-10-CM

## 2024-06-24 DIAGNOSIS — M75.121 COMPLETE TEAR OF RIGHT ROTATOR CUFF, UNSPECIFIED WHETHER TRAUMATIC: ICD-10-CM

## 2024-06-24 DIAGNOSIS — M79.18 CERVICAL MYOFASCIAL PAIN SYNDROME: ICD-10-CM

## 2024-06-24 DIAGNOSIS — G89.29 CHRONIC PAIN OF BOTH SHOULDERS: Primary | ICD-10-CM

## 2024-06-24 DIAGNOSIS — S43.003D SUBLUXATION OF SHOULDER JOINT, UNSPECIFIED LATERALITY, SUBSEQUENT ENCOUNTER: ICD-10-CM

## 2024-06-24 DIAGNOSIS — M25.512 CHRONIC PAIN OF BOTH SHOULDERS: Primary | ICD-10-CM

## 2024-06-24 DIAGNOSIS — M75.122 COMPLETE TEAR OF LEFT ROTATOR CUFF, UNSPECIFIED WHETHER TRAUMATIC: ICD-10-CM

## 2024-06-24 PROCEDURE — G8399 PT W/DXA RESULTS DOCUMENT: HCPCS | Performed by: PHYSICAL MEDICINE & REHABILITATION

## 2024-06-24 PROCEDURE — 1036F TOBACCO NON-USER: CPT | Performed by: PHYSICAL MEDICINE & REHABILITATION

## 2024-06-24 PROCEDURE — 20611 DRAIN/INJ JOINT/BURSA W/US: CPT | Performed by: PHYSICAL MEDICINE & REHABILITATION

## 2024-06-24 PROCEDURE — G8427 DOCREV CUR MEDS BY ELIG CLIN: HCPCS | Performed by: PHYSICAL MEDICINE & REHABILITATION

## 2024-06-24 PROCEDURE — 3017F COLORECTAL CA SCREEN DOC REV: CPT | Performed by: PHYSICAL MEDICINE & REHABILITATION

## 2024-06-24 PROCEDURE — 3079F DIAST BP 80-89 MM HG: CPT | Performed by: PHYSICAL MEDICINE & REHABILITATION

## 2024-06-24 PROCEDURE — 1123F ACP DISCUSS/DSCN MKR DOCD: CPT | Performed by: PHYSICAL MEDICINE & REHABILITATION

## 2024-06-24 PROCEDURE — 1090F PRES/ABSN URINE INCON ASSESS: CPT | Performed by: PHYSICAL MEDICINE & REHABILITATION

## 2024-06-24 PROCEDURE — 3075F SYST BP GE 130 - 139MM HG: CPT | Performed by: PHYSICAL MEDICINE & REHABILITATION

## 2024-06-24 PROCEDURE — G8420 CALC BMI NORM PARAMETERS: HCPCS | Performed by: PHYSICAL MEDICINE & REHABILITATION

## 2024-06-24 PROCEDURE — 1111F DSCHRG MED/CURRENT MED MERGE: CPT | Performed by: PHYSICAL MEDICINE & REHABILITATION

## 2024-06-24 PROCEDURE — 99213 OFFICE O/P EST LOW 20 MIN: CPT | Performed by: PHYSICAL MEDICINE & REHABILITATION

## 2024-06-24 NOTE — PROGRESS NOTES
endocrine disorder       Allergies   Allergen Reactions    Zanaflex [Tizanidine Hcl]        Current Outpatient Medications   Medication Sig Dispense Refill    temazepam (RESTORIL) 15 MG capsule Take 1 capsule by mouth nightly as needed for Sleep for up to 90 days. Max Daily Amount: 15 mg 90 capsule 0    apixaban (ELIQUIS) 5 MG TABS tablet Take 1 tablet by mouth 2 times daily 60 tablet 5    apixaban starter pack (ELIQUIS DVT/PE STARTER PACK) 5 MG TBPK tablet Take 1 tablet by mouth See Admin Instructions 74 tablet 0    gabapentin (NEURONTIN) 400 MG capsule Take 1 capsule by mouth 3 times daily for 90 days. 270 capsule 0    RABEprazole (ACIPHEX) 20 MG tablet Take 1 tablet by mouth daily 90 tablet 2    rosuvastatin (CRESTOR) 5 MG tablet Take 1 tablet by mouth daily 90 tablet 1    levothyroxine (SYNTHROID) 75 MCG tablet Take 1 tablet by mouth daily 90 tablet 3    venlafaxine (EFFEXOR XR) 75 MG extended release capsule Take 1 capsule by mouth daily 90 capsule 1    Cream Base (VERSAPRO) CREA ONE GRAM (ONE PUMP) EXTERNALLY FOUR TIMES A  g 5    sucralfate (CARAFATE) 1 GM/10ML suspension Take 10 mLs by mouth 4 times daily 1200 mL 3    Cholecalciferol (VITAMIN D PO) Take 1 tablet by mouth 3000 units daily      Biotin 10 MG CAPS Take by mouth daily HOLD       No current facility-administered medications for this visit.       Review of Systems  General: No chills, fatigue, fever, malaise, night sweats, weight gain,  weight loss.   Psychological: No anxiety, depression, suicidal ideation   Ophthalmic: No blurry vision, decreased vision, double vision, loss of vision  Ear Nose Throat: No hearing loss, tinnitus, phonophobia, sensitivity to smells, vertigo, or vocal changes.  Allergy/Immunology: No watery eyes, itchy eyes, frequent infections.    Hematological and Lymphatic: No bleeding problems, blood clots, bruising  Endocrine:  No polydypsia, polyuria, temperature intolerance.   Respiratory: No cough, shortness of

## 2024-06-25 ENCOUNTER — OFFICE VISIT (OUTPATIENT)
Dept: CARDIOLOGY CLINIC | Age: 74
End: 2024-06-25
Payer: COMMERCIAL

## 2024-06-25 VITALS
HEART RATE: 75 BPM | HEIGHT: 61 IN | DIASTOLIC BLOOD PRESSURE: 107 MMHG | WEIGHT: 120.8 LBS | RESPIRATION RATE: 18 BRPM | BODY MASS INDEX: 22.81 KG/M2 | SYSTOLIC BLOOD PRESSURE: 171 MMHG

## 2024-06-25 DIAGNOSIS — I10 HYPERTENSION, UNSPECIFIED TYPE: ICD-10-CM

## 2024-06-25 DIAGNOSIS — I35.1 NONRHEUMATIC AORTIC VALVE INSUFFICIENCY: Primary | ICD-10-CM

## 2024-06-25 PROBLEM — K21.9 GASTROESOPHAGEAL REFLUX DISEASE WITHOUT ESOPHAGITIS: Status: RESOLVED | Noted: 2020-11-10 | Resolved: 2024-06-25

## 2024-06-25 PROBLEM — L81.9 DISCOLORATION OF SKIN OF FOOT: Status: RESOLVED | Noted: 2020-06-25 | Resolved: 2024-06-25

## 2024-06-25 PROBLEM — F51.04 CHRONIC INSOMNIA: Status: RESOLVED | Noted: 2020-11-10 | Resolved: 2024-06-25

## 2024-06-25 PROCEDURE — 3080F DIAST BP >= 90 MM HG: CPT | Performed by: INTERNAL MEDICINE

## 2024-06-25 PROCEDURE — 1111F DSCHRG MED/CURRENT MED MERGE: CPT | Performed by: INTERNAL MEDICINE

## 2024-06-25 PROCEDURE — 3017F COLORECTAL CA SCREEN DOC REV: CPT | Performed by: INTERNAL MEDICINE

## 2024-06-25 PROCEDURE — G8420 CALC BMI NORM PARAMETERS: HCPCS | Performed by: INTERNAL MEDICINE

## 2024-06-25 PROCEDURE — 1123F ACP DISCUSS/DSCN MKR DOCD: CPT | Performed by: INTERNAL MEDICINE

## 2024-06-25 PROCEDURE — G8399 PT W/DXA RESULTS DOCUMENT: HCPCS | Performed by: INTERNAL MEDICINE

## 2024-06-25 PROCEDURE — 1090F PRES/ABSN URINE INCON ASSESS: CPT | Performed by: INTERNAL MEDICINE

## 2024-06-25 PROCEDURE — G8427 DOCREV CUR MEDS BY ELIG CLIN: HCPCS | Performed by: INTERNAL MEDICINE

## 2024-06-25 PROCEDURE — 93000 ELECTROCARDIOGRAM COMPLETE: CPT | Performed by: INTERNAL MEDICINE

## 2024-06-25 PROCEDURE — 99204 OFFICE O/P NEW MOD 45 MIN: CPT | Performed by: INTERNAL MEDICINE

## 2024-06-25 PROCEDURE — 1036F TOBACCO NON-USER: CPT | Performed by: INTERNAL MEDICINE

## 2024-06-25 PROCEDURE — 3077F SYST BP >= 140 MM HG: CPT | Performed by: INTERNAL MEDICINE

## 2024-06-25 NOTE — PROGRESS NOTES
Chief Complaint   Patient presents with    Valvular Heart Disease       Patient Active Problem List    Diagnosis Date Noted    Neuropathy of left lower extremity 01/31/2023     Priority: Medium    HTN (hypertension) 06/25/2024    Nonrheumatic aortic valve insufficiency 06/04/2024    Pulmonary embolism on right (HCC) 06/01/2024     Overview Note:     A. Subsegmental 6/2024       Prediabetes 11/13/2021    Statin intolerance 02/01/2021    Anxiety and depression 11/10/2020    Restless leg syndrome 11/10/2020    Pure hypercholesterolemia 07/27/2020    Acquired hypothyroidism 07/27/2020    Lymphedema of both lower extremities 07/26/2017    Cancer of tongue (HCC) 09/16/2016       Current Outpatient Medications   Medication Sig Dispense Refill    temazepam (RESTORIL) 15 MG capsule Take 1 capsule by mouth nightly as needed for Sleep for up to 90 days. Max Daily Amount: 15 mg 90 capsule 0    apixaban (ELIQUIS) 5 MG TABS tablet Take 1 tablet by mouth 2 times daily 60 tablet 5    apixaban starter pack (ELIQUIS DVT/PE STARTER PACK) 5 MG TBPK tablet Take 1 tablet by mouth See Admin Instructions 74 tablet 0    gabapentin (NEURONTIN) 400 MG capsule Take 1 capsule by mouth 3 times daily for 90 days. 270 capsule 0    RABEprazole (ACIPHEX) 20 MG tablet Take 1 tablet by mouth daily 90 tablet 2    rosuvastatin (CRESTOR) 5 MG tablet Take 1 tablet by mouth daily 90 tablet 1    levothyroxine (SYNTHROID) 75 MCG tablet Take 1 tablet by mouth daily 90 tablet 3    venlafaxine (EFFEXOR XR) 75 MG extended release capsule Take 1 capsule by mouth daily 90 capsule 1    Cream Base (VERSAPRO) CREA ONE GRAM (ONE PUMP) EXTERNALLY FOUR TIMES A  g 5    Cholecalciferol (VITAMIN D PO) Take 1 tablet by mouth 3000 units daily       No current facility-administered medications for this visit.        Allergies   Allergen Reactions    Zanaflex [Tizanidine Hcl]        Vitals:    06/25/24 1143   BP: (!) 171/107   Pulse: 75   Resp: 18   Weight: 54.8 kg (120

## 2024-06-26 RX ORDER — TRIAMCINOLONE ACETONIDE 40 MG/ML
40 INJECTION, SUSPENSION INTRA-ARTICULAR; INTRAMUSCULAR ONCE
Status: COMPLETED | OUTPATIENT
Start: 2024-06-26 | End: 2024-06-26

## 2024-06-26 RX ORDER — BUPIVACAINE HYDROCHLORIDE 2.5 MG/ML
30 INJECTION, SOLUTION INFILTRATION; PERINEURAL ONCE
Status: COMPLETED | OUTPATIENT
Start: 2024-06-26 | End: 2024-06-26

## 2024-06-26 RX ADMIN — BUPIVACAINE HYDROCHLORIDE 75 MG: 2.5 INJECTION, SOLUTION INFILTRATION; PERINEURAL at 15:55

## 2024-06-26 RX ADMIN — TRIAMCINOLONE ACETONIDE 40 MG: 40 INJECTION, SUSPENSION INTRA-ARTICULAR; INTRAMUSCULAR at 15:56

## 2024-06-26 RX ADMIN — TRIAMCINOLONE ACETONIDE 40 MG: 40 INJECTION, SUSPENSION INTRA-ARTICULAR; INTRAMUSCULAR at 16:04

## 2024-07-23 ENCOUNTER — TELEPHONE (OUTPATIENT)
Dept: PRIMARY CARE CLINIC | Age: 74
End: 2024-07-23

## 2024-07-23 RX ORDER — LISINOPRIL 5 MG/1
5 TABLET ORAL DAILY
Qty: 30 TABLET | Refills: 0 | Status: SHIPPED | OUTPATIENT
Start: 2024-07-23

## 2024-07-23 NOTE — TELEPHONE ENCOUNTER
Pt states her blood pressures are running high to normal   150/90's between 126/83   Pt states dr denis is aware of all of this but did not advise medication for pt that he recommended pt see her pcp for this.       Pt would like to make a note that she has been having significant memory issues    Pt scheduled for 8/5/2024

## 2024-07-23 NOTE — TELEPHONE ENCOUNTER
Per Dr Laughlin's note, start ACE/ARB if readings still high after following at home. I'll send her a low dose to start and she can have it checked at her 8/5 appt. GABBY Lopez

## 2024-07-29 RX ORDER — CREAM BASE NO.39
CREAM (GRAM) TOPICAL
Qty: 100 G | Refills: 5 | Status: SHIPPED | OUTPATIENT
Start: 2024-07-29

## 2024-07-31 RX ORDER — CREAM BASE NO.39
CREAM (GRAM) TOPICAL
Qty: 100 G | Refills: 5 | OUTPATIENT
Start: 2024-07-31

## 2024-08-05 ENCOUNTER — OFFICE VISIT (OUTPATIENT)
Dept: PRIMARY CARE CLINIC | Age: 74
End: 2024-08-05
Payer: MEDICARE

## 2024-08-05 VITALS
HEIGHT: 61 IN | OXYGEN SATURATION: 99 % | BODY MASS INDEX: 22.47 KG/M2 | SYSTOLIC BLOOD PRESSURE: 100 MMHG | DIASTOLIC BLOOD PRESSURE: 60 MMHG | HEART RATE: 68 BPM | RESPIRATION RATE: 16 BRPM | TEMPERATURE: 97.9 F | WEIGHT: 119 LBS

## 2024-08-05 DIAGNOSIS — Z00.00 MEDICARE ANNUAL WELLNESS VISIT, SUBSEQUENT: Primary | ICD-10-CM

## 2024-08-05 DIAGNOSIS — F41.9 ANXIETY AND DEPRESSION: Chronic | ICD-10-CM

## 2024-08-05 DIAGNOSIS — R42 DIZZINESS: ICD-10-CM

## 2024-08-05 DIAGNOSIS — I10 PRIMARY HYPERTENSION: ICD-10-CM

## 2024-08-05 DIAGNOSIS — F32.A ANXIETY AND DEPRESSION: Chronic | ICD-10-CM

## 2024-08-05 DIAGNOSIS — I26.99 PULMONARY EMBOLISM ON RIGHT (HCC): ICD-10-CM

## 2024-08-05 PROCEDURE — G8427 DOCREV CUR MEDS BY ELIG CLIN: HCPCS | Performed by: PHYSICIAN ASSISTANT

## 2024-08-05 PROCEDURE — G0439 PPPS, SUBSEQ VISIT: HCPCS | Performed by: PHYSICIAN ASSISTANT

## 2024-08-05 PROCEDURE — 3078F DIAST BP <80 MM HG: CPT | Performed by: PHYSICIAN ASSISTANT

## 2024-08-05 PROCEDURE — G8399 PT W/DXA RESULTS DOCUMENT: HCPCS | Performed by: PHYSICIAN ASSISTANT

## 2024-08-05 PROCEDURE — 3074F SYST BP LT 130 MM HG: CPT | Performed by: PHYSICIAN ASSISTANT

## 2024-08-05 PROCEDURE — 1090F PRES/ABSN URINE INCON ASSESS: CPT | Performed by: PHYSICIAN ASSISTANT

## 2024-08-05 PROCEDURE — G8420 CALC BMI NORM PARAMETERS: HCPCS | Performed by: PHYSICIAN ASSISTANT

## 2024-08-05 PROCEDURE — 3017F COLORECTAL CA SCREEN DOC REV: CPT | Performed by: PHYSICIAN ASSISTANT

## 2024-08-05 PROCEDURE — 1036F TOBACCO NON-USER: CPT | Performed by: PHYSICIAN ASSISTANT

## 2024-08-05 PROCEDURE — 99214 OFFICE O/P EST MOD 30 MIN: CPT | Performed by: PHYSICIAN ASSISTANT

## 2024-08-05 PROCEDURE — 1123F ACP DISCUSS/DSCN MKR DOCD: CPT | Performed by: PHYSICIAN ASSISTANT

## 2024-08-05 RX ORDER — LISINOPRIL 5 MG/1
2.5 TABLET ORAL DAILY
Qty: 30 TABLET | Refills: 0 | Status: SHIPPED | OUTPATIENT
Start: 2024-08-05

## 2024-08-05 ASSESSMENT — PATIENT HEALTH QUESTIONNAIRE - PHQ9
2. FEELING DOWN, DEPRESSED OR HOPELESS: NOT AT ALL
1. LITTLE INTEREST OR PLEASURE IN DOING THINGS: NOT AT ALL
SUM OF ALL RESPONSES TO PHQ9 QUESTIONS 1 & 2: 0
SUM OF ALL RESPONSES TO PHQ QUESTIONS 1-9: 0
2. FEELING DOWN, DEPRESSED OR HOPELESS: NOT AT ALL
SUM OF ALL RESPONSES TO PHQ QUESTIONS 1-9: 0
4. FEELING TIRED OR HAVING LITTLE ENERGY: NOT AT ALL
6. FEELING BAD ABOUT YOURSELF - OR THAT YOU ARE A FAILURE OR HAVE LET YOURSELF OR YOUR FAMILY DOWN: NOT AT ALL
SUM OF ALL RESPONSES TO PHQ QUESTIONS 1-9: 0
1. LITTLE INTEREST OR PLEASURE IN DOING THINGS: NOT AT ALL
SUM OF ALL RESPONSES TO PHQ9 QUESTIONS 1 & 2: 0
8. MOVING OR SPEAKING SO SLOWLY THAT OTHER PEOPLE COULD HAVE NOTICED. OR THE OPPOSITE, BEING SO FIGETY OR RESTLESS THAT YOU HAVE BEEN MOVING AROUND A LOT MORE THAN USUAL: NOT AT ALL
10. IF YOU CHECKED OFF ANY PROBLEMS, HOW DIFFICULT HAVE THESE PROBLEMS MADE IT FOR YOU TO DO YOUR WORK, TAKE CARE OF THINGS AT HOME, OR GET ALONG WITH OTHER PEOPLE: NOT DIFFICULT AT ALL
SUM OF ALL RESPONSES TO PHQ QUESTIONS 1-9: 0
3. TROUBLE FALLING OR STAYING ASLEEP: NOT AT ALL
7. TROUBLE CONCENTRATING ON THINGS, SUCH AS READING THE NEWSPAPER OR WATCHING TELEVISION: NOT AT ALL
5. POOR APPETITE OR OVEREATING: NOT AT ALL
SUM OF ALL RESPONSES TO PHQ QUESTIONS 1-9: 0
9. THOUGHTS THAT YOU WOULD BE BETTER OFF DEAD, OR OF HURTING YOURSELF: NOT AT ALL
SUM OF ALL RESPONSES TO PHQ QUESTIONS 1-9: 0

## 2024-08-05 NOTE — PROGRESS NOTES
Medicare Annual Wellness Visit    Flor Lawrence is here for Medicare AWV, Dizziness (When walking she states ''walks like a drunk''), Referral - General (), and Other (Wants a carotid ultra sound done  )    Assessment & Plan   Medicare annual wellness visit, subsequent  Dizziness  -     Vascular duplex carotid bilateral; Future  Pulmonary embolism on right (HCC)  -continue eliquis for 6 months  Primary hypertension  --decrease lisinopril to 2.5 mg  Anxiety and depression  -likely contributing to sx    Recommendations for Preventive Services Due: see orders and patient instructions/AVS.  Recommended screening schedule for the next 5-10 years is provided to the patient in written form: see Patient Instructions/AVS.     Return for Medicare Annual Wellness Visit in 1 year.     Subjective   The following acute and/or chronic problems were also addressed today:  Positional dizziness. She wants to see Dr Petersen. She wants him to do a \"check up. Carotids.\" I explained that is something we can order to determine the need for vascular.    doesn't believe that she has cognitive impairment. He feels the forgetfulness is not abnormal or any more than he has.   Anxiety likely plays a role.  She recently saw Cardiology. We started her on acei based on home bp readings.     Patient's complete Health Risk Assessment and screening values have been reviewed and are found in Flowsheets. The following problems were reviewed today and where indicated follow up appointments were made and/or referrals ordered.    Positive Risk Factor Screenings with Interventions:    Fall Risk:  Do you feel unsteady or are you worried about falling? : no  2 or more falls in past year?: (!) yes  Fall with injury in past year?: no     Interventions:    Reviewed medications, home hazards, visual acuity, and co-morbidities that can increase risk for falls                               Objective   Vitals:    08/05/24 0924   BP: 100/60

## 2024-08-13 ENCOUNTER — HOSPITAL ENCOUNTER (OUTPATIENT)
Dept: ULTRASOUND IMAGING | Age: 74
Discharge: HOME | End: 2024-08-15
Payer: MEDICARE

## 2024-08-13 DIAGNOSIS — R42 DIZZINESS: ICD-10-CM

## 2024-08-13 PROCEDURE — 93880 EXTRACRANIAL BILAT STUDY: CPT

## 2024-08-30 RX ORDER — LISINOPRIL 5 MG/1
2.5 TABLET ORAL DAILY
Qty: 30 TABLET | Refills: 3 | Status: SHIPPED | OUTPATIENT
Start: 2024-08-30

## 2024-09-05 ENCOUNTER — TELEPHONE (OUTPATIENT)
Dept: PRIMARY CARE CLINIC | Age: 74
End: 2024-09-05

## 2024-09-05 DIAGNOSIS — E03.9 ACQUIRED HYPOTHYROIDISM: Primary | ICD-10-CM

## 2024-09-08 DIAGNOSIS — K21.9 GASTROESOPHAGEAL REFLUX DISEASE WITHOUT ESOPHAGITIS: ICD-10-CM

## 2024-09-09 RX ORDER — RABEPRAZOLE SODIUM 20 MG/1
20 TABLET, DELAYED RELEASE ORAL DAILY
Qty: 90 TABLET | Refills: 2 | Status: SHIPPED | OUTPATIENT
Start: 2024-09-09

## 2024-10-04 ENCOUNTER — TELEPHONE (OUTPATIENT)
Dept: PHYSICAL MEDICINE AND REHAB | Age: 74
End: 2024-10-04

## 2024-10-04 DIAGNOSIS — M75.121 COMPLETE TEAR OF RIGHT ROTATOR CUFF, UNSPECIFIED WHETHER TRAUMATIC: ICD-10-CM

## 2024-10-04 DIAGNOSIS — G89.29 CHRONIC PAIN OF BOTH SHOULDERS: Primary | ICD-10-CM

## 2024-10-04 DIAGNOSIS — M25.512 CHRONIC PAIN OF BOTH SHOULDERS: Primary | ICD-10-CM

## 2024-10-04 DIAGNOSIS — M75.122 COMPLETE TEAR OF LEFT ROTATOR CUFF, UNSPECIFIED WHETHER TRAUMATIC: ICD-10-CM

## 2024-10-04 DIAGNOSIS — S43.003D SUBLUXATION OF SHOULDER JOINT, UNSPECIFIED LATERALITY, SUBSEQUENT ENCOUNTER: ICD-10-CM

## 2024-10-04 DIAGNOSIS — M25.511 CHRONIC PAIN OF BOTH SHOULDERS: Primary | ICD-10-CM

## 2024-10-11 DIAGNOSIS — M54.12 CERVICAL RADICULOPATHY: ICD-10-CM

## 2024-10-11 RX ORDER — GABAPENTIN 400 MG/1
400 CAPSULE ORAL 3 TIMES DAILY
Qty: 270 CAPSULE | Refills: 0 | OUTPATIENT
Start: 2024-10-11

## 2024-10-11 RX ORDER — GABAPENTIN 400 MG/1
400 CAPSULE ORAL 3 TIMES DAILY
Qty: 270 CAPSULE | Refills: 0 | Status: SHIPPED | OUTPATIENT
Start: 2024-10-11 | End: 2025-01-09

## 2024-10-16 ENCOUNTER — HOSPITAL ENCOUNTER (OUTPATIENT)
Age: 74
Discharge: HOME | End: 2024-10-18

## 2024-10-16 PROCEDURE — 88305 TISSUE EXAM BY PATHOLOGIST: CPT

## 2024-10-16 PROCEDURE — 87077 CULTURE AEROBIC IDENTIFY: CPT

## 2024-10-17 LAB
HELIOBACTER PYLORI ID: NEGATIVE
SOURCE, 60200063: NORMAL

## 2024-10-18 LAB — SURGICAL PATHOLOGY REPORT: NORMAL

## 2024-11-02 ENCOUNTER — HOSPITAL ENCOUNTER (OUTPATIENT)
Age: 74
Discharge: HOME | End: 2024-11-02
Payer: MEDICARE

## 2024-11-02 DIAGNOSIS — E53.8 B12 DEFICIENCY: ICD-10-CM

## 2024-11-02 DIAGNOSIS — E03.9 ACQUIRED HYPOTHYROIDISM: ICD-10-CM

## 2024-11-02 DIAGNOSIS — E78.00 PURE HYPERCHOLESTEROLEMIA: ICD-10-CM

## 2024-11-02 LAB
ALBUMIN SERPL-MCNC: 3.9 G/DL (ref 3.5–5.2)
ALP SERPL-CCNC: 87 U/L (ref 35–104)
ALT SERPL-CCNC: 8 U/L (ref 0–32)
ANION GAP SERPL CALCULATED.3IONS-SCNC: 12 MMOL/L (ref 7–16)
AST SERPL-CCNC: 17 U/L (ref 0–31)
BASOPHILS # BLD: 0.06 K/UL (ref 0–0.2)
BASOPHILS NFR BLD: 1 % (ref 0–2)
BILIRUB SERPL-MCNC: 0.5 MG/DL (ref 0–1.2)
BUN SERPL-MCNC: 13 MG/DL (ref 6–23)
CALCIUM SERPL-MCNC: 9.7 MG/DL (ref 8.6–10.2)
CHLORIDE SERPL-SCNC: 98 MMOL/L (ref 98–107)
CHOLEST SERPL-MCNC: 217 MG/DL
CO2 SERPL-SCNC: 27 MMOL/L (ref 22–29)
CREAT SERPL-MCNC: 0.8 MG/DL (ref 0.5–1)
EOSINOPHIL # BLD: 0.14 K/UL (ref 0.05–0.5)
EOSINOPHILS RELATIVE PERCENT: 2 % (ref 0–6)
ERYTHROCYTE [DISTWIDTH] IN BLOOD BY AUTOMATED COUNT: 15.6 % (ref 11.5–15)
FOLATE SERPL-MCNC: 8.7 NG/ML (ref 4.8–24.2)
GFR, ESTIMATED: 75 ML/MIN/1.73M2
GLUCOSE SERPL-MCNC: 121 MG/DL (ref 74–99)
HCT VFR BLD AUTO: 32.3 % (ref 34–48)
HDLC SERPL-MCNC: 61 MG/DL
HGB BLD-MCNC: 10.2 G/DL (ref 11.5–15.5)
IMM GRANULOCYTES # BLD AUTO: <0.03 K/UL (ref 0–0.58)
IMM GRANULOCYTES NFR BLD: 0 % (ref 0–5)
LDLC SERPL CALC-MCNC: 141 MG/DL
LYMPHOCYTES NFR BLD: 1.14 K/UL (ref 1.5–4)
LYMPHOCYTES RELATIVE PERCENT: 18 % (ref 20–42)
MCH RBC QN AUTO: 25.6 PG (ref 26–35)
MCHC RBC AUTO-ENTMCNC: 31.6 G/DL (ref 32–34.5)
MCV RBC AUTO: 81 FL (ref 80–99.9)
MONOCYTES NFR BLD: 0.7 K/UL (ref 0.1–0.95)
MONOCYTES NFR BLD: 11 % (ref 2–12)
NEUTROPHILS NFR BLD: 68 % (ref 43–80)
NEUTS SEG NFR BLD: 4.45 K/UL (ref 1.8–7.3)
PLATELET # BLD AUTO: 352 K/UL (ref 130–450)
PMV BLD AUTO: 9.1 FL (ref 7–12)
POTASSIUM SERPL-SCNC: 3.8 MMOL/L (ref 3.5–5)
PROT SERPL-MCNC: 7 G/DL (ref 6.4–8.3)
RBC # BLD AUTO: 3.99 M/UL (ref 3.5–5.5)
SODIUM SERPL-SCNC: 137 MMOL/L (ref 132–146)
T4 FREE SERPL-MCNC: 1.4 NG/DL (ref 0.9–1.7)
TRIGL SERPL-MCNC: 73 MG/DL
TSH SERPL DL<=0.05 MIU/L-ACNC: 1.79 UIU/ML (ref 0.27–4.2)
VIT B12 SERPL-MCNC: 434 PG/ML (ref 211–946)
VLDLC SERPL CALC-MCNC: 15 MG/DL
WBC OTHER # BLD: 6.5 K/UL (ref 4.5–11.5)

## 2024-11-02 PROCEDURE — 85025 COMPLETE CBC W/AUTO DIFF WBC: CPT

## 2024-11-02 PROCEDURE — 84439 ASSAY OF FREE THYROXINE: CPT

## 2024-11-02 PROCEDURE — 84443 ASSAY THYROID STIM HORMONE: CPT

## 2024-11-02 PROCEDURE — 36415 COLL VENOUS BLD VENIPUNCTURE: CPT

## 2024-11-02 PROCEDURE — 82607 VITAMIN B-12: CPT

## 2024-11-02 PROCEDURE — 80061 LIPID PANEL: CPT

## 2024-11-02 PROCEDURE — 80053 COMPREHEN METABOLIC PANEL: CPT

## 2024-11-02 PROCEDURE — 82746 ASSAY OF FOLIC ACID SERUM: CPT

## 2024-11-06 ENCOUNTER — OFFICE VISIT (OUTPATIENT)
Dept: PRIMARY CARE CLINIC | Age: 74
End: 2024-11-06

## 2024-11-06 VITALS
DIASTOLIC BLOOD PRESSURE: 70 MMHG | SYSTOLIC BLOOD PRESSURE: 110 MMHG | BODY MASS INDEX: 22.84 KG/M2 | OXYGEN SATURATION: 100 % | TEMPERATURE: 97.1 F | WEIGHT: 121 LBS | HEART RATE: 73 BPM | HEIGHT: 61 IN | RESPIRATION RATE: 16 BRPM

## 2024-11-06 DIAGNOSIS — E03.9 ACQUIRED HYPOTHYROIDISM: Primary | ICD-10-CM

## 2024-11-06 DIAGNOSIS — R73.03 PREDIABETES: ICD-10-CM

## 2024-11-06 DIAGNOSIS — I10 PRIMARY HYPERTENSION: ICD-10-CM

## 2024-11-06 DIAGNOSIS — Z23 ENCOUNTER FOR IMMUNIZATION: ICD-10-CM

## 2024-11-06 DIAGNOSIS — I26.99 PULMONARY EMBOLISM ON RIGHT (HCC): ICD-10-CM

## 2024-11-06 DIAGNOSIS — D50.8 IRON DEFICIENCY ANEMIA SECONDARY TO INADEQUATE DIETARY IRON INTAKE: ICD-10-CM

## 2024-11-06 DIAGNOSIS — I77.810 MILD DILATION OF ASCENDING AORTA (HCC): ICD-10-CM

## 2024-11-06 NOTE — PROGRESS NOTES
Hypothyroidism:  Patient is here today to follow up chronic hypothyroidism.  This is   generally controlled on current medication regimen.  Takes medication as directed and tolerates well.  Symptoms from thyroid standpoint include none.  Most recent labs reviewed with patient and are remarkable.  Hgb did fall slightly. She is on eliquis for the next month. She is concerned and would like to repeat ct bc of the pe, but also the ascending aorta dilation.     Lab Results   Component Value Date    TSH 1.79 11/02/2024    T4FREE 1.4 11/02/2024        Patient's past medical, surgical, social and/or family history reviewed, updated in chart, and are non-contributory (unless otherwise stated).  Medications and allergies also reviewed and updated in chart.      Review of Systems:  Constitutional:  No fever, no fatigue, no chills, no headaches, no weight change  Dermatology:  No rash, no mole, no dry or sensitive skin  ENT:  No cough, no sore throat, no sinus pain, no runny nose, no ear pain  Cardiology:  No chest pain, no palpitations, no leg edema, no shortness of breath, no PND  Gastroenterology:  No dysphagia, no abdominal pain, no nausea, no vomiting, no constipation, no diarrhea, no heartburn  Musculoskeletal:  No joint pain, no leg cramps, no back pain, no muscle aches  Respiratory:  No shortness of breath, no orthopnea, no wheezing, no SIMPSON, no hemoptysis  Urology:  No blood in the urine, no urinary frequency, no urinary incontinence, no urinary urgency, no nocturia, no dysuria    Vitals:    11/06/24 0810   BP: 110/70   Pulse: 73   Resp: 16   Temp: 97.1 °F (36.2 °C)   SpO2: 100%   Weight: 54.9 kg (121 lb)   Height: 1.549 m (5' 0.98\")       Physical Exam  Constitutional:       General: She is not in acute distress.     Appearance: Normal appearance. She is well-developed.   HENT:      Head: Normocephalic and atraumatic.      Right Ear: External ear normal.      Left Ear: External ear normal.      Nose: Nose normal.

## 2024-12-16 ENCOUNTER — PROCEDURE VISIT (OUTPATIENT)
Dept: PHYSICAL MEDICINE AND REHAB | Age: 74
End: 2024-12-16

## 2024-12-16 VITALS
BODY MASS INDEX: 22.87 KG/M2 | SYSTOLIC BLOOD PRESSURE: 129 MMHG | DIASTOLIC BLOOD PRESSURE: 84 MMHG | TEMPERATURE: 97.8 F | HEART RATE: 86 BPM | OXYGEN SATURATION: 98 % | WEIGHT: 121 LBS

## 2024-12-16 DIAGNOSIS — M79.10 TRIGGER POINT: ICD-10-CM

## 2024-12-16 DIAGNOSIS — M79.18 CERVICAL MYOFASCIAL PAIN SYNDROME: ICD-10-CM

## 2024-12-16 DIAGNOSIS — S43.003D SUBLUXATION OF SHOULDER JOINT, UNSPECIFIED LATERALITY, SUBSEQUENT ENCOUNTER: ICD-10-CM

## 2024-12-16 DIAGNOSIS — M75.122 COMPLETE TEAR OF LEFT ROTATOR CUFF, UNSPECIFIED WHETHER TRAUMATIC: ICD-10-CM

## 2024-12-16 DIAGNOSIS — M54.12 RADICULITIS OF LEFT CERVICAL REGION: ICD-10-CM

## 2024-12-16 DIAGNOSIS — M25.511 CHRONIC PAIN OF BOTH SHOULDERS: Primary | ICD-10-CM

## 2024-12-16 DIAGNOSIS — M75.121 COMPLETE TEAR OF RIGHT ROTATOR CUFF, UNSPECIFIED WHETHER TRAUMATIC: ICD-10-CM

## 2024-12-16 DIAGNOSIS — G89.29 CHRONIC PAIN OF BOTH SHOULDERS: Primary | ICD-10-CM

## 2024-12-16 DIAGNOSIS — M25.512 CHRONIC PAIN OF BOTH SHOULDERS: Primary | ICD-10-CM

## 2024-12-16 RX ORDER — BUPIVACAINE HYDROCHLORIDE 2.5 MG/ML
6 INJECTION, SOLUTION INFILTRATION; PERINEURAL ONCE
Status: COMPLETED | OUTPATIENT
Start: 2024-12-16 | End: 2024-12-16

## 2024-12-16 RX ORDER — TRIAMCINOLONE ACETONIDE 40 MG/ML
40 INJECTION, SUSPENSION INTRA-ARTICULAR; INTRAMUSCULAR ONCE
Status: COMPLETED | OUTPATIENT
Start: 2024-12-16 | End: 2024-12-16

## 2024-12-16 RX ADMIN — BUPIVACAINE HYDROCHLORIDE 125 MG: 2.5 INJECTION, SOLUTION INFILTRATION; PERINEURAL at 12:30

## 2024-12-16 RX ADMIN — TRIAMCINOLONE ACETONIDE 40 MG: 40 INJECTION, SUSPENSION INTRA-ARTICULAR; INTRAMUSCULAR at 12:31

## 2024-12-16 RX ADMIN — TRIAMCINOLONE ACETONIDE 40 MG: 40 INJECTION, SUSPENSION INTRA-ARTICULAR; INTRAMUSCULAR at 12:33

## 2025-01-05 DIAGNOSIS — F51.04 CHRONIC INSOMNIA: ICD-10-CM

## 2025-01-06 RX ORDER — TEMAZEPAM 15 MG/1
CAPSULE ORAL
Qty: 90 CAPSULE | Refills: 0 | OUTPATIENT
Start: 2025-01-06 | End: 2025-04-06

## 2025-01-06 NOTE — TELEPHONE ENCOUNTER
Name of Medication(s) Requested:  Requested Prescriptions     Pending Prescriptions Disp Refills    temazepam (RESTORIL) 15 MG capsule [Pharmacy Med Name: Temazepam Oral Capsule 15 MG] 90 capsule 0     Sig: TAKE ONE CAPSULE BY MOUTH NIGHTLY AS NEEDED FOR SLEEP FOR UP TO 90 DAYS. MAX DAILY AMOUNT: 15 MG       Medication is on current medication list Yes    Dosage and directions were verified? Yes    Quantity verified: 90 day supply     Pharmacy Verified?  Yes    Last Appointment:  11/6/2024    Future appts:  Future Appointments   Date Time Provider Department Center   4/15/2025  1:20 PM Summer Varma MD [facility] PMR 2 [facility]   5/14/2025  8:15 AM Jes Souza PA-C TRAIL PC [facility] [place]   9/16/2025  2:00 PM Summer Varma MD [facility] PMR 2 [facility]        (If no appt send self scheduling link. .REFILLAPPT)  Scheduling request sent?     [] Yes  [] No    Does patient need updated?  [] Yes  [] No

## 2025-01-07 DIAGNOSIS — F51.01 PRIMARY INSOMNIA: Primary | ICD-10-CM

## 2025-01-07 RX ORDER — TEMAZEPAM 15 MG/1
15 CAPSULE ORAL NIGHTLY PRN
COMMUNITY
End: 2025-01-07 | Stop reason: SDUPTHER

## 2025-01-08 ENCOUNTER — TELEPHONE (OUTPATIENT)
Dept: PHYSICAL MEDICINE AND REHAB | Age: 75
End: 2025-01-08

## 2025-01-08 RX ORDER — TEMAZEPAM 15 MG/1
15 CAPSULE ORAL NIGHTLY PRN
Qty: 30 CAPSULE | Refills: 0 | Status: SHIPPED | OUTPATIENT
Start: 2025-01-08 | End: 2025-02-07

## 2025-01-08 NOTE — TELEPHONE ENCOUNTER
Patient called office and states that she received message to return call, office staff not in, please return call with more information    2. The status of comorbities. (See ED/admit documents)

## 2025-02-04 DIAGNOSIS — F51.01 PRIMARY INSOMNIA: ICD-10-CM

## 2025-02-04 NOTE — TELEPHONE ENCOUNTER
Name of Medication(s) Requested:  Requested Prescriptions     Pending Prescriptions Disp Refills    temazepam (RESTORIL) 15 MG capsule 90 capsule 0     Sig: Take 1 capsule by mouth nightly as needed for Sleep for up to 90 days. Max Daily Amount: 15 mg       Medication is on current medication list Yes    Dosage and directions were verified? Yes    Quantity verified: 90 day supply     Pharmacy Verified?  Yes    Last Appointment:  11/6/2024    Future appts:  Future Appointments   Date Time Provider Department Center   4/21/2025 12:00 PM Summer Varma MD BD PMR 2 [facility]   5/14/2025  8:15 AM Jes Souza PA-C TRAIL PC [facility]   9/22/2025 12:00 PM Summer Varma MD BD PMR 2 [facility]        (If no appt send self scheduling link. .REFILLAPPT)  Scheduling request sent?     [] Yes  [] No    Does patient need updated?  [] Yes  [] No

## 2025-02-05 RX ORDER — TEMAZEPAM 15 MG/1
15 CAPSULE ORAL NIGHTLY PRN
Qty: 90 CAPSULE | Refills: 0 | Status: SHIPPED | OUTPATIENT
Start: 2025-02-05 | End: 2025-05-06

## 2025-02-06 DIAGNOSIS — M81.0 OSTEOPOROSIS, UNSPECIFIED OSTEOPOROSIS TYPE, UNSPECIFIED PATHOLOGICAL FRACTURE PRESENCE: Primary | ICD-10-CM

## 2025-02-12 ENCOUNTER — HOSPITAL ENCOUNTER (OUTPATIENT)
Age: 75
Discharge: HOME | End: 2025-02-14

## 2025-02-12 PROCEDURE — 88305 TISSUE EXAM BY PATHOLOGIST: CPT

## 2025-02-12 PROCEDURE — 87077 CULTURE AEROBIC IDENTIFY: CPT

## 2025-02-13 LAB
HELIOBACTER PYLORI ID: NEGATIVE
SOURCE, 60200063: NORMAL

## 2025-02-14 LAB — SURGICAL PATHOLOGY REPORT: NORMAL

## 2025-02-21 NOTE — PROGRESS NOTES
[facility] faxed request to office for recent labs. I called and spoke with patient and she said had labs done notified her we did not get copy and need results to give her the shot she said she will stop at doctor office on way here Monday to see if she can get a copy of the labs

## 2025-02-24 ENCOUNTER — HOSPITAL ENCOUNTER (OUTPATIENT)
Dept: INFUSION THERAPY | Age: 75
Setting detail: INFUSION SERIES
Discharge: HOME | End: 2025-02-24

## 2025-02-24 ENCOUNTER — HOSPITAL ENCOUNTER (OUTPATIENT)
Dept: GENERAL RADIOLOGY | Age: 75
Discharge: HOME | End: 2025-02-26
Payer: MEDICARE

## 2025-02-24 VITALS — BODY MASS INDEX: 22.12 KG/M2 | WEIGHT: 117 LBS

## 2025-02-24 DIAGNOSIS — Z12.31 VISIT FOR SCREENING MAMMOGRAM: ICD-10-CM

## 2025-02-24 PROCEDURE — 77067 SCR MAMMO BI INCL CAD: CPT

## 2025-03-03 ENCOUNTER — HOSPITAL ENCOUNTER (OUTPATIENT)
Age: 75
Discharge: HOME | End: 2025-03-03
Payer: MEDICARE

## 2025-03-03 ENCOUNTER — OFFICE VISIT (OUTPATIENT)
Dept: PRIMARY CARE CLINIC | Age: 75
End: 2025-03-03
Payer: MEDICARE

## 2025-03-03 ENCOUNTER — HOSPITAL ENCOUNTER (OUTPATIENT)
Dept: INFUSION THERAPY | Age: 75
Setting detail: INFUSION SERIES
Discharge: HOME | End: 2025-03-03
Payer: MEDICARE

## 2025-03-03 VITALS
SYSTOLIC BLOOD PRESSURE: 122 MMHG | TEMPERATURE: 97 F | WEIGHT: 120 LBS | DIASTOLIC BLOOD PRESSURE: 77 MMHG | HEIGHT: 60 IN | OXYGEN SATURATION: 97 % | HEART RATE: 77 BPM | BODY MASS INDEX: 23.56 KG/M2 | RESPIRATION RATE: 18 BRPM

## 2025-03-03 VITALS
SYSTOLIC BLOOD PRESSURE: 128 MMHG | TEMPERATURE: 98.5 F | DIASTOLIC BLOOD PRESSURE: 76 MMHG | RESPIRATION RATE: 16 BRPM | WEIGHT: 115 LBS | HEIGHT: 60 IN | OXYGEN SATURATION: 100 % | BODY MASS INDEX: 22.58 KG/M2 | HEART RATE: 68 BPM

## 2025-03-03 DIAGNOSIS — E03.9 ACQUIRED HYPOTHYROIDISM: ICD-10-CM

## 2025-03-03 DIAGNOSIS — E78.00 PURE HYPERCHOLESTEROLEMIA: ICD-10-CM

## 2025-03-03 DIAGNOSIS — E53.8 B12 DEFICIENCY: ICD-10-CM

## 2025-03-03 DIAGNOSIS — M81.0 OSTEOPOROSIS, UNSPECIFIED OSTEOPOROSIS TYPE, UNSPECIFIED PATHOLOGICAL FRACTURE PRESENCE: Primary | ICD-10-CM

## 2025-03-03 DIAGNOSIS — D50.8 IRON DEFICIENCY ANEMIA SECONDARY TO INADEQUATE DIETARY IRON INTAKE: ICD-10-CM

## 2025-03-03 DIAGNOSIS — M19.012 PRIMARY OSTEOARTHRITIS OF LEFT SHOULDER: Primary | ICD-10-CM

## 2025-03-03 DIAGNOSIS — I77.810 MILD DILATION OF ASCENDING AORTA: ICD-10-CM

## 2025-03-03 DIAGNOSIS — I10 PRIMARY HYPERTENSION: ICD-10-CM

## 2025-03-03 DIAGNOSIS — Z01.818 PRE-OP EXAM: ICD-10-CM

## 2025-03-03 DIAGNOSIS — R73.03 PREDIABETES: ICD-10-CM

## 2025-03-03 LAB
ANION GAP SERPL CALCULATED.3IONS-SCNC: 11 MMOL/L (ref 7–16)
CHLORIDE SERPL-SCNC: 100 MMOL/L (ref 98–107)
CO2 SERPL-SCNC: 27 MMOL/L (ref 22–29)
ERYTHROCYTE [DISTWIDTH] IN BLOOD BY AUTOMATED COUNT: 15.5 % (ref 11.5–15)
HCT VFR BLD AUTO: 32.7 % (ref 34–48)
HGB BLD-MCNC: 9.8 G/DL (ref 11.5–15.5)
MCH RBC QN AUTO: 25.7 PG (ref 26–35)
MCHC RBC AUTO-ENTMCNC: 30 G/DL (ref 32–34.5)
MCV RBC AUTO: 85.8 FL (ref 80–99.9)
PLATELET # BLD AUTO: 371 K/UL (ref 130–450)
PMV BLD AUTO: 10.4 FL (ref 7–12)
POTASSIUM SERPL-SCNC: 3.8 MMOL/L (ref 3.5–5)
RBC # BLD AUTO: 3.81 M/UL (ref 3.5–5.5)
SODIUM SERPL-SCNC: 138 MMOL/L (ref 132–146)
WBC OTHER # BLD: 8.2 K/UL (ref 4.5–11.5)

## 2025-03-03 PROCEDURE — 99214 OFFICE O/P EST MOD 30 MIN: CPT | Performed by: PHYSICIAN ASSISTANT

## 2025-03-03 PROCEDURE — G8427 DOCREV CUR MEDS BY ELIG CLIN: HCPCS | Performed by: PHYSICIAN ASSISTANT

## 2025-03-03 PROCEDURE — 96372 THER/PROPH/DIAG INJ SC/IM: CPT | Performed by: PHYSICIAN ASSISTANT

## 2025-03-03 PROCEDURE — 96372 THER/PROPH/DIAG INJ SC/IM: CPT

## 2025-03-03 PROCEDURE — 93000 ELECTROCARDIOGRAM COMPLETE: CPT | Performed by: PHYSICIAN ASSISTANT

## 2025-03-03 PROCEDURE — 1036F TOBACCO NON-USER: CPT | Performed by: PHYSICIAN ASSISTANT

## 2025-03-03 PROCEDURE — 1159F MED LIST DOCD IN RCRD: CPT | Performed by: PHYSICIAN ASSISTANT

## 2025-03-03 PROCEDURE — 1160F RVW MEDS BY RX/DR IN RCRD: CPT | Performed by: PHYSICIAN ASSISTANT

## 2025-03-03 PROCEDURE — 1090F PRES/ABSN URINE INCON ASSESS: CPT | Performed by: PHYSICIAN ASSISTANT

## 2025-03-03 PROCEDURE — 1123F ACP DISCUSS/DSCN MKR DOCD: CPT | Performed by: PHYSICIAN ASSISTANT

## 2025-03-03 PROCEDURE — 3017F COLORECTAL CA SCREEN DOC REV: CPT | Performed by: PHYSICIAN ASSISTANT

## 2025-03-03 PROCEDURE — 3078F DIAST BP <80 MM HG: CPT | Performed by: PHYSICIAN ASSISTANT

## 2025-03-03 PROCEDURE — 36415 COLL VENOUS BLD VENIPUNCTURE: CPT

## 2025-03-03 PROCEDURE — G8420 CALC BMI NORM PARAMETERS: HCPCS | Performed by: PHYSICIAN ASSISTANT

## 2025-03-03 PROCEDURE — 6360000002 HC RX W HCPCS: Performed by: NURSE PRACTITIONER

## 2025-03-03 PROCEDURE — 80051 ELECTROLYTE PANEL: CPT

## 2025-03-03 PROCEDURE — G8399 PT W/DXA RESULTS DOCUMENT: HCPCS | Performed by: PHYSICIAN ASSISTANT

## 2025-03-03 PROCEDURE — 3074F SYST BP LT 130 MM HG: CPT | Performed by: PHYSICIAN ASSISTANT

## 2025-03-03 PROCEDURE — 85027 COMPLETE CBC AUTOMATED: CPT

## 2025-03-03 RX ORDER — CYANOCOBALAMIN 1000 UG/ML
1000 INJECTION, SOLUTION INTRAMUSCULAR; SUBCUTANEOUS ONCE
Status: COMPLETED | OUTPATIENT
Start: 2025-03-03 | End: 2025-03-03

## 2025-03-03 RX ORDER — ESTRADIOL 0.1 MG/G
CREAM VAGINAL
COMMUNITY
Start: 2025-01-28

## 2025-03-03 RX ORDER — ONDANSETRON 4 MG/1
TABLET, ORALLY DISINTEGRATING ORAL
COMMUNITY
Start: 2024-11-20

## 2025-03-03 RX ADMIN — DENOSUMAB 60 MG: 60 INJECTION SUBCUTANEOUS at 08:42

## 2025-03-03 RX ADMIN — CYANOCOBALAMIN 1000 MCG: 1000 INJECTION, SOLUTION INTRAMUSCULAR; SUBCUTANEOUS at 14:21

## 2025-03-03 SDOH — ECONOMIC STABILITY: FOOD INSECURITY: WITHIN THE PAST 12 MONTHS, THE FOOD YOU BOUGHT JUST DIDN'T LAST AND YOU DIDN'T HAVE MONEY TO GET MORE.: NEVER TRUE

## 2025-03-03 SDOH — ECONOMIC STABILITY: FOOD INSECURITY: WITHIN THE PAST 12 MONTHS, YOU WORRIED THAT YOUR FOOD WOULD RUN OUT BEFORE YOU GOT MONEY TO BUY MORE.: NEVER TRUE

## 2025-03-03 ASSESSMENT — PATIENT HEALTH QUESTIONNAIRE - PHQ9
6. FEELING BAD ABOUT YOURSELF - OR THAT YOU ARE A FAILURE OR HAVE LET YOURSELF OR YOUR FAMILY DOWN: NOT AT ALL
2. FEELING DOWN, DEPRESSED OR HOPELESS: NOT AT ALL
5. POOR APPETITE OR OVEREATING: NOT AT ALL
4. FEELING TIRED OR HAVING LITTLE ENERGY: NOT AT ALL
SUM OF ALL RESPONSES TO PHQ QUESTIONS 1-9: 0
3. TROUBLE FALLING OR STAYING ASLEEP: NOT AT ALL
7. TROUBLE CONCENTRATING ON THINGS, SUCH AS READING THE NEWSPAPER OR WATCHING TELEVISION: NOT AT ALL
SUM OF ALL RESPONSES TO PHQ QUESTIONS 1-9: 0
1. LITTLE INTEREST OR PLEASURE IN DOING THINGS: NOT AT ALL
SUM OF ALL RESPONSES TO PHQ QUESTIONS 1-9: 0
SUM OF ALL RESPONSES TO PHQ QUESTIONS 1-9: 0
10. IF YOU CHECKED OFF ANY PROBLEMS, HOW DIFFICULT HAVE THESE PROBLEMS MADE IT FOR YOU TO DO YOUR WORK, TAKE CARE OF THINGS AT HOME, OR GET ALONG WITH OTHER PEOPLE: NOT DIFFICULT AT ALL
8. MOVING OR SPEAKING SO SLOWLY THAT OTHER PEOPLE COULD HAVE NOTICED. OR THE OPPOSITE, BEING SO FIGETY OR RESTLESS THAT YOU HAVE BEEN MOVING AROUND A LOT MORE THAN USUAL: NOT AT ALL
9. THOUGHTS THAT YOU WOULD BE BETTER OFF DEAD, OR OF HURTING YOURSELF: NOT AT ALL

## 2025-03-03 NOTE — PROGRESS NOTES
Chief Complaint   Patient presents with    Pre-op Exam     Left shoulder total replacement march 24th with dr yen at [facility]        HPI:  Patient is here for preoperative clearance.    Pre-surgical evaluation:  Patient is a 74 y.o.  female here by request of Dr. Yen who has upcoming left total shoulder surgery scheduled with patient.  Patient will be undergoing general anaesthesia.  She has no complaints or concerns today.  She is  generally healthy.  Chronic medical problems include HTN, hypothyroidism, hyperlipidemia, prediabetes, anxiety and depression.  These are generally controlled and stable at this time. She had mild dilation of ascending aorta. She has the f/u cta tomorrow.  /77 today.  Most recent labs reviewed with patient and  are not remarkable.  Patient does not smoke.  Patient does not drink alcohol.  Patient  does not use drugs.  Overall doing well.  Patient does not have chest pain, palpitations, shortness of breath, or orthopnea.  No known heart, kidney or liver issue to date to the best of patient's knowledge, my knowledge, or of that recorded in patient's current medical record.  Patient has had cardiac testing in the past including EKG, stress test, and/or catheterization.  If available, these records have been reviewed today. Patient does not have FABBY.      Past Medical History:   Diagnosis Date    Anemia associated with chemotherapy 11/16/2016    resolved    Anxiety     Cancer (HCC)     tongue    Cancer of tongue (HCC) 09/16/2016    treated with chemo and radiation / last treatment 11/2016    Cancer of tonsil (HCC)     Cervical herniation     no limits on rom    Difficulty sleeping     Discoloration of skin of foot 06/25/2020    Dizziness 06/20/2018    GERD (gastroesophageal reflux disease)     Hearing loss 2018    History of therapeutic radiation     Hx antineoplastic chemo     Hx of cataract surgery 10/06/2020    Hyperlipidemia     Hypokalemia

## 2025-03-04 ENCOUNTER — HOSPITAL ENCOUNTER (OUTPATIENT)
Dept: CT IMAGING | Age: 75
Discharge: HOME | End: 2025-03-06
Payer: MEDICARE

## 2025-03-04 DIAGNOSIS — I26.99 PULMONARY EMBOLISM ON RIGHT (HCC): ICD-10-CM

## 2025-03-04 DIAGNOSIS — I77.810 MILD DILATION OF ASCENDING AORTA: ICD-10-CM

## 2025-03-04 PROCEDURE — 71275 CT ANGIOGRAPHY CHEST: CPT

## 2025-03-04 PROCEDURE — 2500000003 HC RX 250 WO HCPCS: Performed by: RADIOLOGY

## 2025-03-04 PROCEDURE — 6360000004 HC RX CONTRAST MEDICATION: Performed by: RADIOLOGY

## 2025-03-04 RX ORDER — SODIUM CHLORIDE 0.9 % (FLUSH) 0.9 %
10 SYRINGE (ML) INJECTION PRN
Status: DISCONTINUED | OUTPATIENT
Start: 2025-03-04 | End: 2025-03-07 | Stop reason: HOSPADM

## 2025-03-04 RX ORDER — IOPAMIDOL 755 MG/ML
75 INJECTION, SOLUTION INTRAVASCULAR
Status: COMPLETED | OUTPATIENT
Start: 2025-03-04 | End: 2025-03-04

## 2025-03-04 RX ADMIN — IOPAMIDOL 75 ML: 755 INJECTION, SOLUTION INTRAVENOUS at 14:05

## 2025-03-04 RX ADMIN — Medication 10 ML: at 14:03

## 2025-03-19 DIAGNOSIS — M54.12 CERVICAL RADICULOPATHY: ICD-10-CM

## 2025-03-19 DIAGNOSIS — F32.A ANXIETY AND DEPRESSION: ICD-10-CM

## 2025-03-19 DIAGNOSIS — F41.9 ANXIETY AND DEPRESSION: ICD-10-CM

## 2025-03-19 RX ORDER — VENLAFAXINE HYDROCHLORIDE 75 MG/1
75 CAPSULE, EXTENDED RELEASE ORAL DAILY
Qty: 90 CAPSULE | Refills: 1 | Status: SHIPPED | OUTPATIENT
Start: 2025-03-19

## 2025-03-19 RX ORDER — GABAPENTIN 400 MG/1
400 CAPSULE ORAL 3 TIMES DAILY
Qty: 270 CAPSULE | Refills: 0 | Status: SHIPPED | OUTPATIENT
Start: 2025-03-19 | End: 2025-06-17

## 2025-03-19 NOTE — TELEPHONE ENCOUNTER
Name of Medication(s) Requested:  Requested Prescriptions     Pending Prescriptions Disp Refills    venlafaxine (EFFEXOR XR) 75 MG extended release capsule 90 capsule 1     Sig: Take 1 capsule by mouth daily       Medication is on current medication list Yes    Dosage and directions were verified? Yes    Quantity verified: 90 day supply     Pharmacy Verified?  Yes    Last Appointment:  3/3/2025    Future appts:  Future Appointments   Date Time Provider Department Center   4/21/2025 12:00 PM Summer Varma MD [facility] PMR 2 [facility]   5/14/2025  8:15 AM Jes Souza PA-C TRAIL PC [facility]   9/3/2025  9:00 AM [facility]  12 [facility]   9/22/2025 12:00 PM Summer Varma MD BD PMR 2 [facility]        (If no appt send self scheduling link. .REFILLAPPT)  Scheduling request sent?     [] Yes  [] No    Does patient need updated?  [] Yes  [] No

## 2025-03-19 NOTE — TELEPHONE ENCOUNTER
Name of Medication(s) Requested:  Requested Prescriptions     Pending Prescriptions Disp Refills    gabapentin (NEURONTIN) 400 MG capsule 270 capsule 0     Sig: Take 1 capsule by mouth 3 times daily for 90 days.       Medication is on current medication list Yes    Dosage and directions were verified? Yes    Quantity verified: 90 day supply     Pharmacy Verified?  Yes    Last Appointment:  3/3/2025    Future appts:  Future Appointments   Date Time Provider Department Center   4/21/2025 12:00 PM Summer Varma MD [facility] PMR 2 [facility]   5/14/2025  8:15 AM Jes Souza PA-C TRAIL Replaced by [facility]   9/3/2025  9:00 AM [facility] RM 12 [facility]   9/22/2025 12:00 PM Summer Varma MD BD PMR 2 [facility]        (If no appt send self scheduling link. .REFILLAPPT)  Scheduling request sent?     [] Yes  [] No    Does patient need updated?  [] Yes  [] No

## 2025-04-30 ENCOUNTER — PATIENT MESSAGE (OUTPATIENT)
Dept: PRIMARY CARE CLINIC | Age: 75
End: 2025-04-30

## 2025-04-30 DIAGNOSIS — D50.8 IRON DEFICIENCY ANEMIA SECONDARY TO INADEQUATE DIETARY IRON INTAKE: Primary | ICD-10-CM

## 2025-04-30 DIAGNOSIS — E53.8 B12 DEFICIENCY: ICD-10-CM

## 2025-04-30 DIAGNOSIS — E55.9 VITAMIN D DEFICIENCY: ICD-10-CM

## 2025-04-30 DIAGNOSIS — F51.01 PRIMARY INSOMNIA: ICD-10-CM

## 2025-04-30 RX ORDER — TEMAZEPAM 15 MG/1
15 CAPSULE ORAL NIGHTLY PRN
Qty: 90 CAPSULE | Refills: 0 | Status: SHIPPED | OUTPATIENT
Start: 2025-04-30 | End: 2025-07-29

## 2025-04-30 NOTE — TELEPHONE ENCOUNTER
Name of Medication(s) Requested:  Requested Prescriptions     Pending Prescriptions Disp Refills    temazepam (RESTORIL) 15 MG capsule 90 capsule 0     Sig: Take 1 capsule by mouth nightly as needed for Sleep for up to 90 days. Max Daily Amount: 15 mg       Medication is on current medication list Yes    Dosage and directions were verified? Yes    Quantity verified: 90 day supply     Pharmacy Verified?  Yes    Last Appointment:  3/3/2025    Future appts:  Future Appointments   Date Time Provider Department Center   5/14/2025  8:15 AM Jes Souza PA-C TRAIL PC [facility]   9/3/2025  9:00 AM [facility] RM 12 [facility]   9/22/2025 12:00 PM Summer Varma MD [facility] PMR [facility]        (If no appt send self scheduling link. .REFILLAPPT)  Scheduling request sent?     [] Yes  [] No    Does patient need updated?  [] Yes  [] No

## 2025-05-06 ENCOUNTER — HOSPITAL ENCOUNTER (OUTPATIENT)
Age: 75
Discharge: HOME | End: 2025-05-06
Payer: MEDICARE

## 2025-05-06 DIAGNOSIS — E55.9 VITAMIN D DEFICIENCY: ICD-10-CM

## 2025-05-06 DIAGNOSIS — E03.9 ACQUIRED HYPOTHYROIDISM: ICD-10-CM

## 2025-05-06 DIAGNOSIS — R73.03 PREDIABETES: ICD-10-CM

## 2025-05-06 DIAGNOSIS — I10 PRIMARY HYPERTENSION: ICD-10-CM

## 2025-05-06 DIAGNOSIS — D50.8 IRON DEFICIENCY ANEMIA SECONDARY TO INADEQUATE DIETARY IRON INTAKE: ICD-10-CM

## 2025-05-06 LAB
25(OH)D3 SERPL-MCNC: 18.5 NG/ML (ref 30–100)
ALBUMIN SERPL-MCNC: 4.1 G/DL (ref 3.5–5.2)
ALP SERPL-CCNC: 108 U/L (ref 35–104)
ALT SERPL-CCNC: 7 U/L (ref 0–32)
ANION GAP SERPL CALCULATED.3IONS-SCNC: 11 MMOL/L (ref 7–16)
AST SERPL-CCNC: 15 U/L (ref 0–31)
BASOPHILS # BLD: 0.08 K/UL (ref 0–0.2)
BASOPHILS NFR BLD: 1 % (ref 0–2)
BILIRUB SERPL-MCNC: 0.6 MG/DL (ref 0–1.2)
BUN SERPL-MCNC: 18 MG/DL (ref 6–23)
CALCIUM SERPL-MCNC: 9.6 MG/DL (ref 8.6–10.2)
CHLORIDE SERPL-SCNC: 102 MMOL/L (ref 98–107)
CHOLEST SERPL-MCNC: 277 MG/DL
CO2 SERPL-SCNC: 28 MMOL/L (ref 22–29)
CREAT SERPL-MCNC: 0.8 MG/DL (ref 0.5–1)
EOSINOPHIL # BLD: 0.13 K/UL (ref 0.05–0.5)
EOSINOPHILS RELATIVE PERCENT: 2 % (ref 0–6)
ERYTHROCYTE [DISTWIDTH] IN BLOOD BY AUTOMATED COUNT: 15.8 % (ref 11.5–15)
GFR, ESTIMATED: 80 ML/MIN/1.73M2
GLUCOSE SERPL-MCNC: 98 MG/DL (ref 74–99)
HBA1C MFR BLD: 5.5 % (ref 4–5.6)
HCT VFR BLD AUTO: 34.9 % (ref 34–48)
HDLC SERPL-MCNC: 51 MG/DL
HGB BLD-MCNC: 10.2 G/DL (ref 11.5–15.5)
IMM GRANULOCYTES # BLD AUTO: <0.03 K/UL (ref 0–0.58)
IMM GRANULOCYTES NFR BLD: 0 % (ref 0–5)
IRON SATN MFR SERPL: 7 % (ref 15–50)
IRON SERPL-MCNC: 26 UG/DL (ref 37–145)
LDLC SERPL CALC-MCNC: 203 MG/DL
LYMPHOCYTES NFR BLD: 1.25 K/UL (ref 1.5–4)
LYMPHOCYTES RELATIVE PERCENT: 17 % (ref 20–42)
MCH RBC QN AUTO: 25.2 PG (ref 26–35)
MCHC RBC AUTO-ENTMCNC: 29.2 G/DL (ref 32–34.5)
MCV RBC AUTO: 86.2 FL (ref 80–99.9)
MONOCYTES NFR BLD: 0.61 K/UL (ref 0.1–0.95)
MONOCYTES NFR BLD: 8 % (ref 2–12)
NEUTROPHILS NFR BLD: 71 % (ref 43–80)
NEUTS SEG NFR BLD: 5.2 K/UL (ref 1.8–7.3)
PLATELET # BLD AUTO: 412 K/UL (ref 130–450)
PMV BLD AUTO: 10.2 FL (ref 7–12)
POTASSIUM SERPL-SCNC: 3.9 MMOL/L (ref 3.5–5)
PROT SERPL-MCNC: 7.1 G/DL (ref 6.4–8.3)
RBC # BLD AUTO: 4.05 M/UL (ref 3.5–5.5)
SODIUM SERPL-SCNC: 141 MMOL/L (ref 132–146)
T4 FREE SERPL-MCNC: 1.5 NG/DL (ref 0.9–1.7)
TIBC SERPL-MCNC: 357 UG/DL (ref 250–450)
TRIGL SERPL-MCNC: 114 MG/DL
TSH SERPL DL<=0.05 MIU/L-ACNC: 0.14 UIU/ML (ref 0.27–4.2)
VLDLC SERPL CALC-MCNC: 23 MG/DL
WBC OTHER # BLD: 7.3 K/UL (ref 4.5–11.5)

## 2025-05-06 PROCEDURE — 83540 ASSAY OF IRON: CPT

## 2025-05-06 PROCEDURE — 80053 COMPREHEN METABOLIC PANEL: CPT

## 2025-05-06 PROCEDURE — 82306 VITAMIN D 25 HYDROXY: CPT

## 2025-05-06 PROCEDURE — 80061 LIPID PANEL: CPT

## 2025-05-06 PROCEDURE — 36415 COLL VENOUS BLD VENIPUNCTURE: CPT

## 2025-05-06 PROCEDURE — 84439 ASSAY OF FREE THYROXINE: CPT

## 2025-05-06 PROCEDURE — 84443 ASSAY THYROID STIM HORMONE: CPT

## 2025-05-06 PROCEDURE — 83550 IRON BINDING TEST: CPT

## 2025-05-06 PROCEDURE — 85025 COMPLETE CBC W/AUTO DIFF WBC: CPT

## 2025-05-06 PROCEDURE — 83036 HEMOGLOBIN GLYCOSYLATED A1C: CPT

## 2025-05-14 ENCOUNTER — OFFICE VISIT (OUTPATIENT)
Dept: PRIMARY CARE CLINIC | Age: 75
End: 2025-05-14

## 2025-05-14 VITALS
DIASTOLIC BLOOD PRESSURE: 60 MMHG | SYSTOLIC BLOOD PRESSURE: 100 MMHG | BODY MASS INDEX: 23.16 KG/M2 | HEIGHT: 60 IN | WEIGHT: 118 LBS | RESPIRATION RATE: 18 BRPM | HEART RATE: 100 BPM | OXYGEN SATURATION: 97 % | TEMPERATURE: 97 F

## 2025-05-14 DIAGNOSIS — M25.50 ARTHRALGIA, UNSPECIFIED JOINT: ICD-10-CM

## 2025-05-14 DIAGNOSIS — F41.9 ANXIETY AND DEPRESSION: Chronic | ICD-10-CM

## 2025-05-14 DIAGNOSIS — I10 PRIMARY HYPERTENSION: Primary | ICD-10-CM

## 2025-05-14 DIAGNOSIS — F32.A ANXIETY AND DEPRESSION: Chronic | ICD-10-CM

## 2025-05-14 DIAGNOSIS — G57.92 NEUROPATHY OF LEFT LOWER EXTREMITY: ICD-10-CM

## 2025-05-14 DIAGNOSIS — D50.8 IRON DEFICIENCY ANEMIA SECONDARY TO INADEQUATE DIETARY IRON INTAKE: ICD-10-CM

## 2025-05-14 DIAGNOSIS — Z79.899 MEDICATION MANAGEMENT: ICD-10-CM

## 2025-05-14 DIAGNOSIS — E78.00 PURE HYPERCHOLESTEROLEMIA: ICD-10-CM

## 2025-05-14 DIAGNOSIS — R73.03 PREDIABETES: ICD-10-CM

## 2025-05-14 DIAGNOSIS — E03.9 ACQUIRED HYPOTHYROIDISM: ICD-10-CM

## 2025-05-14 DIAGNOSIS — M54.2 CERVICALGIA: ICD-10-CM

## 2025-05-14 DIAGNOSIS — E55.9 VITAMIN D DEFICIENCY: ICD-10-CM

## 2025-05-14 RX ORDER — MELOXICAM 7.5 MG/1
7.5 TABLET ORAL DAILY PRN
Qty: 30 TABLET | Refills: 3 | Status: SHIPPED | OUTPATIENT
Start: 2025-05-14

## 2025-05-14 NOTE — PROGRESS NOTES
Hypertension:  Patient is here for follow up chronic hypertension.  This is  generally controlled on current medication regimen. Takes meds as directed and tolerates them well.  Most recent labs reviewed with patient and are remarkable.  No symptoms from htn standpoint per ROS.  Patient is  compliant with lifestyle modifications.  Patient does not smoke.  Comorbid conditions include hyperlipidemia, prediabetes, hypothyroidism.      Hyperlipidemia:  Patient is here to follow up regarding chronic hyperlipidemia.  This is not generally controlled.  Treatment includes Crestor \"but I dont take it.\"  Patient is not compliant with lifestyle modifications.  Patient is not a smoker.  Most recent labs reviewed with patient today and are remarkable.    C/o neck pain and \"all major joints.\" Needs left knee replaced. She questions mobic or celebrex.     Lab Results   Component Value Date     (H) 05/06/2025      Controlled Substance Monitoring:    Acute and Chronic Pain Monitoring:   RX Monitoring Periodic Controlled Substance Monitoring   5/14/2025   8:29 AM No signs of potential drug abuse or diversion identified.;Random urine drug screen sent today.        Patient's past medical, surgical, social and/or family history reviewed, updated in chart, and are non-contributory (unless otherwise stated).  Medications and allergies also reviewed and updated in chart.        Review of Systems:  Constitutional:  No fever, no fatigue, no chills, no headaches, no weight change  Dermatology:  No rash, no mole, no dry or sensitive skin  ENT:  No cough, no sore throat, no sinus pain, no runny nose, no ear pain  Cardiology:  No chest pain, no palpitations, no leg edema, no shortness of breath, no PND  Gastroenterology:  No dysphagia, no abdominal pain, no nausea, no vomiting, no constipation, no diarrhea, no heartburn  Musculoskeletal: + joint pain, no leg cramps, no back pain, no muscle aches  Respiratory:  No shortness of

## 2025-05-15 LAB
6-MONOACETYLMORPHINE, URINE: NEGATIVE
7-AMINOCLONAZEPAM, URINE QUANT: <50 NG/ML
ABNORMAL SPECIMEN VALIDITY TEST: ABNORMAL
ALCOHOL URINE: NOT DETECTED MG/DL
ALPHA-HYDROXYALPRAZOLAM, QUANTITATIVE, URINE: <50 NG/ML
ALPHA-HYDROXYMIDAZOLAM, QUANTITATIVE, URINE: <50 NG/ML
ALPHA-HYDROXYTRIAZOLAM, QUANTITATIVE, URINE: <50 NG/ML
ALPRAZOLAM URINE QUANT: <50 NG/ML
AMPHETAMINE SCREEN URINE: NEGATIVE
BARBITURATE SCREEN URINE: NEGATIVE
BENZODIAZEPINE SCREEN, URINE: POSITIVE
BUPRENORPHINE URINE: NEGATIVE
CANNABINOID SCREEN URINE: NEGATIVE
CHLORDIAZEPOXIDE, URINE QUANT: <50 NG/ML
CLONAZEPAM, URINE QUANT: <50 NG/ML
COCAINE METABOLITE, URINE: NEGATIVE
COMPLIANCE DRUG ANALYSIS, URINE: NORMAL
DIAZEPAM URINE QUANT: <50 NG/ML
FENTANYL URINE: NEGATIVE
FLUNITRAZEPAM, QUANTITATIVE, URINE: <50 NG/ML
FLURAZEPAM, QUANTITATIVE, URINE: <50 NG/ML
INTEGRITY CHECK, CREATININE, URINE: 159 MG/DL (ref 22–250)
INTEGRITY CHECK, OXIDANT, URINE: 38 MG/L
INTEGRITY CHECK, PH, URINE: 5.4 (ref 4.5–8)
INTEGRITY CHECK, SPECIFIC GRAVITY, URINE: 1.02 (ref 1–1.03)
LORAZEPAM URINE QUANT: <50 NG/ML
METHADONE SCREEN, URINE: NEGATIVE
MIDAZOLAM URINE QUANT: <50 NG/ML
NORDIAZEPAM URINE QUANT: <50 NG/ML
OPIATES, URINE: NEGATIVE
OXAZEPAM URINE QUANT: >1000 NG/ML
OXYCODONE SCREEN URINE: NEGATIVE
PCP,URINE: NEGATIVE
TEMAZEPAM, QUANTITATIVE, URINE: >1000 NG/ML
TEST INFORMATION: ABNORMAL
TRAMADOL, URINE: NEGATIVE

## 2025-05-21 PROCEDURE — 88342 IMHCHEM/IMCYTCHM 1ST ANTB: CPT | Performed by: DERMATOLOGY

## 2025-05-21 PROCEDURE — 88341 IMHCHEM/IMCYTCHM EA ADD ANTB: CPT | Performed by: DERMATOLOGY

## 2025-05-21 PROCEDURE — 88305 TISSUE EXAM BY PATHOLOGIST: CPT | Performed by: DERMATOLOGY

## 2025-05-22 ENCOUNTER — LAB REQUISITION (OUTPATIENT)
Dept: DERMATOPATHOLOGY | Facility: CLINIC | Age: 75
End: 2025-05-22
Payer: MEDICARE

## 2025-05-22 DIAGNOSIS — L98.9 DISORDER OF THE SKIN AND SUBCUTANEOUS TISSUE, UNSPECIFIED: ICD-10-CM

## 2025-05-27 DIAGNOSIS — E03.9 ACQUIRED HYPOTHYROIDISM: ICD-10-CM

## 2025-05-27 RX ORDER — LEVOTHYROXINE SODIUM 75 UG/1
75 TABLET ORAL DAILY
Qty: 90 TABLET | Refills: 3 | Status: SHIPPED | OUTPATIENT
Start: 2025-05-27 | End: 2026-05-22

## 2025-05-30 LAB
LAB AP ASR DISCLAIMER: NORMAL
LABORATORY COMMENT REPORT: NORMAL
PATH REPORT.FINAL DX SPEC: NORMAL
PATH REPORT.GROSS SPEC: NORMAL
PATH REPORT.RELEVANT HX SPEC: NORMAL
PATH REPORT.TOTAL CANCER: NORMAL
PATHOLOGY SYNOPTIC REPORT: NORMAL

## 2025-06-04 ENCOUNTER — OFFICE VISIT (OUTPATIENT)
Dept: SURGERY | Age: 75
End: 2025-06-04
Payer: MEDICARE

## 2025-06-04 VITALS
BODY MASS INDEX: 22.99 KG/M2 | RESPIRATION RATE: 20 BRPM | SYSTOLIC BLOOD PRESSURE: 134 MMHG | DIASTOLIC BLOOD PRESSURE: 100 MMHG | OXYGEN SATURATION: 100 % | WEIGHT: 117.1 LBS | TEMPERATURE: 98.1 F | HEIGHT: 60 IN | HEART RATE: 79 BPM

## 2025-06-04 DIAGNOSIS — C43.4 MALIGNANT MELANOMA OF NECK (HCC): Primary | ICD-10-CM

## 2025-06-04 PROCEDURE — G8420 CALC BMI NORM PARAMETERS: HCPCS | Performed by: PLASTIC SURGERY

## 2025-06-04 PROCEDURE — 1123F ACP DISCUSS/DSCN MKR DOCD: CPT | Performed by: PLASTIC SURGERY

## 2025-06-04 PROCEDURE — 1036F TOBACCO NON-USER: CPT | Performed by: PLASTIC SURGERY

## 2025-06-04 PROCEDURE — 99203 OFFICE O/P NEW LOW 30 MIN: CPT | Performed by: PLASTIC SURGERY

## 2025-06-04 PROCEDURE — 1159F MED LIST DOCD IN RCRD: CPT | Performed by: PLASTIC SURGERY

## 2025-06-04 PROCEDURE — 1090F PRES/ABSN URINE INCON ASSESS: CPT | Performed by: PLASTIC SURGERY

## 2025-06-04 PROCEDURE — 3075F SYST BP GE 130 - 139MM HG: CPT | Performed by: PLASTIC SURGERY

## 2025-06-04 PROCEDURE — G8427 DOCREV CUR MEDS BY ELIG CLIN: HCPCS | Performed by: PLASTIC SURGERY

## 2025-06-04 PROCEDURE — 3080F DIAST BP >= 90 MM HG: CPT | Performed by: PLASTIC SURGERY

## 2025-06-04 PROCEDURE — G8399 PT W/DXA RESULTS DOCUMENT: HCPCS | Performed by: PLASTIC SURGERY

## 2025-06-04 PROCEDURE — 3017F COLORECTAL CA SCREEN DOC REV: CPT | Performed by: PLASTIC SURGERY

## 2025-06-04 NOTE — PROGRESS NOTES
Pathology reviewed                            IMPRESSION/RECOMMENDATIONS:        Diagnosis  -) skin melanoma of right neck      -The patient was counseled on their pathology results and the need for surgical   intervention.   -We will plan to proceed and have the lesion formely excised with margins in the operating room.  -The patient will  require frozen sections in the operating room  -The patient will  require pre-op clearance from their PCP.    -The risks, benefits and options were discussed with the pt. The risks included but not limited to pain, bleeding, infection, heavy scarring, damage to surrounding structures, fluid collections, asymmetry, and need for further procedures. All of Her questions were answered to their satisfaction and She agrees to proceed with the procedure.      Margin of resection planned - 10mm    Photos obtained    Attending Physician Statement  I have discussed the case, including pertinent history and exam findings with the resident. I have seen and examined the patient and the key elements of all parts of the encounter have been performed by me. I agree with the assessment, exam, plan and orders as documented by the resident.    Surgical plan excision of right neck melanoma with possible local tissue rearrangement    I attest that the patient was seen and examined by me, and concur with the documentation above. I agree with the assessment and the plan outlined.    This document is generated, in part, by voice recognition software and thus  syntax and grammatical errors are possible.    Luis Trevino

## 2025-06-05 ENCOUNTER — TELEPHONE (OUTPATIENT)
Dept: SURGERY | Age: 75
End: 2025-06-05

## 2025-06-05 NOTE — TELEPHONE ENCOUNTER
Outpatient excision melanoma right neck with 1cm margin    Need medical clearance patient notified scheduled 6/16/2025    Surgery has been scheduled at [address] on 6/19/2025, Pre-Admission Testing will call you prior to surgery to inform you arrival time and any other additional directions,if they are unable to reach you,please call them two days prior at [phone].  If taking Fish Oil, Vitamins, two weeks prior to surgery stop taking. If taking NSAIDS (such as Aspirin, Ibuprofen) anticoagulants please consult with your prescribing physician to get further instructions on when to stop medication prior to surgery that is scheduled, patient understood.    Pre-Auth #:  CPT Codes:   ICD 10:

## 2025-06-06 ENCOUNTER — HOSPITAL ENCOUNTER (OUTPATIENT)
Age: 75
Discharge: HOME | End: 2025-06-08

## 2025-06-06 PROCEDURE — 88305 TISSUE EXAM BY PATHOLOGIST: CPT

## 2025-06-06 PROCEDURE — 88342 IMHCHEM/IMCYTCHM 1ST ANTB: CPT

## 2025-06-11 ENCOUNTER — PREP FOR PROCEDURE (OUTPATIENT)
Dept: SURGERY | Age: 75
End: 2025-06-11

## 2025-06-11 ENCOUNTER — OFFICE VISIT (OUTPATIENT)
Dept: PRIMARY CARE CLINIC | Age: 75
End: 2025-06-11

## 2025-06-11 VITALS
HEIGHT: 60 IN | SYSTOLIC BLOOD PRESSURE: 170 MMHG | WEIGHT: 116 LBS | RESPIRATION RATE: 16 BRPM | BODY MASS INDEX: 22.78 KG/M2 | HEART RATE: 100 BPM | OXYGEN SATURATION: 99 % | TEMPERATURE: 97.4 F | DIASTOLIC BLOOD PRESSURE: 102 MMHG

## 2025-06-11 DIAGNOSIS — C43.4 MELANOMA OF RIGHT SIDE OF NECK (HCC): Primary | ICD-10-CM

## 2025-06-11 DIAGNOSIS — R19.03 ABDOMINAL MASS, RIGHT LOWER QUADRANT: ICD-10-CM

## 2025-06-11 DIAGNOSIS — R03.0 ELEVATED BLOOD PRESSURE READING: ICD-10-CM

## 2025-06-11 DIAGNOSIS — Z01.818 PRE-OP EXAM: ICD-10-CM

## 2025-06-11 DIAGNOSIS — I10 PRIMARY HYPERTENSION: ICD-10-CM

## 2025-06-11 PROBLEM — C43.9 MELANOMA (HCC): Status: ACTIVE | Noted: 2025-06-11

## 2025-06-11 NOTE — PROGRESS NOTES
breath, no orthopnea, no wheezing, no SIMPSON, no hemoptysis  Urology:  No blood in the urine, no urinary frequency, no urinary incontinence, no urinary urgency, no nocturia, no dysuria    Vitals:    06/11/25 0822 06/11/25 0901   BP: (!) 180/100 (!) 170/102   Pulse: 100    Resp: 16    Temp: 97.4 °F (36.3 °C)    SpO2: 99%    Weight: 52.6 kg (116 lb)    Height: 1.524 m (5')        Physical Exam  Constitutional:       General: She is not in acute distress.     Appearance: Normal appearance. She is well-developed.   HENT:      Head: Normocephalic and atraumatic.      Right Ear: External ear normal.      Left Ear: External ear normal.      Nose: Nose normal.   Eyes:      General: No scleral icterus.     Conjunctiva/sclera: Conjunctivae normal.      Pupils: Pupils are equal, round, and reactive to light.   Neck:      Thyroid: No thyromegaly.   Cardiovascular:      Rate and Rhythm: Normal rate and regular rhythm.      Heart sounds: Normal heart sounds. No murmur heard.  Pulmonary:      Effort: Pulmonary effort is normal. No accessory muscle usage or respiratory distress.      Breath sounds: Normal breath sounds. No wheezing.   Abdominal:      General: Bowel sounds are normal. There is no distension.      Palpations: Abdomen is soft. There is no hepatomegaly or splenomegaly.      Tenderness: There is no abdominal tenderness.      Hernia: A hernia (vs mass rlq) is present.   Musculoskeletal:         General: Normal range of motion.      Cervical back: Normal range of motion and neck supple.      Right lower leg: No edema.      Left lower leg: No edema.   Skin:     General: Skin is warm and dry.      Findings: No rash.   Neurological:      Mental Status: She is alert and oriented to person, place, and time.      Deep Tendon Reflexes: Reflexes are normal and symmetric.   Psychiatric:         Mood and Affect: Mood and affect normal.         Speech: Speech normal.         Behavior: Behavior normal.         Assessment/Plan:

## 2025-06-12 ENCOUNTER — TELEPHONE (OUTPATIENT)
Dept: PRIMARY CARE CLINIC | Age: 75
End: 2025-06-12

## 2025-06-12 NOTE — TELEPHONE ENCOUNTER
Patient stopped in office today stating she was told to get a BP check.      noon  138/90 right arm sitting    130/84 left arm sitting    Patient came back at 2pm for another recheck    130/74 right arm sitting    110/68 left arm sitting        Please advise.

## 2025-06-13 LAB — SURGICAL PATHOLOGY REPORT: NORMAL

## 2025-06-16 NOTE — PROGRESS NOTES
[facility]   PRE-ADMISSION TESTING GENERAL INSTRUCTIONS  PAT Phone Number: [phone]      GENERAL INSTRUCTIONS:    [x] Antibacterial Soap Shower Night before AND the Morning of procedure.  [x] Do not wear makeup, lotions, powders, deodorant the morning of surgery.  [x] No solid food after midnight. You may have SIPS of clear liquids up until 2 hours before your arrival time to the hospital.   [x] You may brush your teeth, gargle, but do not swallow water.   [x] No tobacco products, illegal drugs, or alcohol within 24 hours of your surgery.  [x] Jewelry or valuables should not be brought to the hospital. All body and/or tongue piercing's must be removed prior to arriving to hospital. No contact lens or hair pins.   [x] Arrange transportation with a responsible adult  to and from the hospital. Arrange for someone to be with you for the remainder of the day and for 24 hours after your procedure due to having had anesthesia.          -Who will be your  for transportation?          -Who will be staying with you for 24 hrs after your procedure?    [x] Bring insurance card and photo ID.  [x] Bring copy of living will or healthcare power of  papers to be placed in your electronic record.  [] Urine Pregnancy test will be preformed the day of surgery. A specimen sample may be brought from home.  [] Transfusion (Green) Bracelet: Please bring with you to hospital, day of surgery.     PARKING INSTRUCTIONS:     [x] ARRIVAL DATE & TIME:  6/19  @  0900   [x] Times are subject to change. We will contact you the business day before surgery if that were to occur.  [x] Enter into the [facility] Entrance. Two people may accompany you. Masks are not required.  [x] Parking Lot \"I\" is where you will park. It is located on the corner of [address] and [address]. The entrance is on [address].   Only one vehicle - per patient, is permitted in parking lot.

## 2025-06-16 NOTE — H&P
Department of Plastic Surgery - Adult  Attending Consult Note        CHIEF COMPLAINT:   Melanoma       History Obtained From:  patient     HISTORY OF PRESENT ILLNESS:                 The patient is a 74 y.o. female who presents with biopsy proven melanoma .  The patient states that they first noticed the lesion several years ago.  It has not grown in size since they first noticed the lesion.  The lesion has not changed in color and has had discharge or bleeding.  The pt has had the lesion biopsied previously.  The patient has not had the lesion removed previously. The patient states the lesion is not painfull.  The patient denies any recent unexpected weight loss.  Patient denies any palpable masses to their axilla or cervical masses .  The pt denies any other associated symptoms.       Past Medical History:    Past Medical History        Past Medical History:   Diagnosis Date    Anemia associated with chemotherapy 11/16/2016     resolved    Anxiety      Cancer (HCC)       tongue    Cancer of tongue (HCC) 09/16/2016     treated with chemo and radiation / last treatment 11/2016    Cancer of tonsil (HCC)      Cervical herniation       no limits on rom    Difficulty sleeping      Discoloration of skin of foot 06/25/2020    Dizziness 06/20/2018    GERD (gastroesophageal reflux disease)      Hearing loss 2018    History of therapeutic radiation      Hx antineoplastic chemo      Hx of cataract surgery 10/06/2020    Hyperlipidemia      Hypokalemia 11/16/2016     with chemo / resolved    Hypomagnesemia 11/16/2016     related to chemo / resloved    Hypothyroidism      Leg swelling 07/26/2017    Lumbar herniated disc       after fell 2/2017 / now has chronic back pain and numbnes at left foot and ankle    Lymphedema of both lower extremities 07/26/2017     wears support hose    Lymphedema of lower extremity      Neuropathy 2017    Osteopenia      PONV (postoperative nausea and vomiting)      Restless leg syndrome      Rotator  diarrhea, constipation and abdominal pain  EXTREMITIES: negative for edema  MUSCULOSKELETAL: negative for muscle weakness  SKIN: positive for lesionnegative for itching or rashes.  HEME: negative for easy brusing or bleeding  BEHAVIOR/PSYCH:  negative for poor appetite, increased appetite, decreased sleep and poor concentration  All other systems negative        PHYSICAL EXAM:    VITALS:  BP (!) 134/100 (BP Site: Right Upper Arm, Patient Position: Sitting, BP Cuff Size: Medium Adult)   Pulse 79   Temp 98.1 °F (36.7 °C) (Infrared)   Resp 20   Ht 1.524 m (5')   Wt 53.1 kg (117 lb 1.6 oz)   SpO2 100%   BMI 22.87 kg/m²   CONSTITUTIONAL:  awake, alert, cooperative, no apparent distress, and appears stated age  EYES: PERRLA, EOMI, no signs of occular infection  LUNGS:  No increased work of breathing, good air exchange,   CARDIOVASCULAR:  Normal apical impulse, regular rate and rhythm,   EXTREMITIES: no signs of clubbing or cyanosis.  MUSCULOSKELETAL: negative for flaccid muscle tone or spastic movements.  NEURO: Cranial nerves II-XII grossly intact. No signs of agitated mood.   SKIN: neck melanoma -  3mm x 3mm, previous biopsy site noted, no signs of bleeding,drainage or infection. Non tender to palpation.      DATA:    Labs: CBC:         Lab Results   Component Value Date/Time     WBC 7.3 05/06/2025 11:35 AM     RBC 4.05 05/06/2025 11:35 AM     HGB 10.2 05/06/2025 11:35 AM     HCT 34.9 05/06/2025 11:35 AM     MCV 86.2 05/06/2025 11:35 AM     MCH 25.2 05/06/2025 11:35 AM     MCHC 29.2 05/06/2025 11:35 AM     RDW 15.8 05/06/2025 11:35 AM      05/06/2025 11:35 AM     MPV 10.2 05/06/2025 11:35 AM      BMP:          Lab Results   Component Value Date/Time      05/06/2025 11:35 AM     K 3.9 05/06/2025 11:35 AM     K 4.3 11/09/2018 06:05 AM      05/06/2025 11:35 AM     CO2 28 05/06/2025 11:35 AM     BUN 18 05/06/2025 11:35 AM     CREATININE 0.8 05/06/2025 11:35 AM     CALCIUM 9.6 05/06/2025 11:35 AM

## 2025-06-19 ENCOUNTER — ANESTHESIA (OUTPATIENT)
Dept: OPERATING ROOM | Age: 75
End: 2025-06-19
Payer: MEDICARE

## 2025-06-19 ENCOUNTER — HOSPITAL ENCOUNTER (OUTPATIENT)
Age: 75
Setting detail: OUTPATIENT SURGERY
Discharge: HOME | End: 2025-06-19
Attending: PLASTIC SURGERY | Admitting: PLASTIC SURGERY
Payer: MEDICARE

## 2025-06-19 ENCOUNTER — ANESTHESIA EVENT (OUTPATIENT)
Dept: OPERATING ROOM | Age: 75
End: 2025-06-19
Payer: MEDICARE

## 2025-06-19 VITALS
DIASTOLIC BLOOD PRESSURE: 78 MMHG | WEIGHT: 116 LBS | TEMPERATURE: 97 F | HEART RATE: 77 BPM | HEIGHT: 60 IN | OXYGEN SATURATION: 93 % | RESPIRATION RATE: 16 BRPM | SYSTOLIC BLOOD PRESSURE: 142 MMHG | BODY MASS INDEX: 22.78 KG/M2

## 2025-06-19 DIAGNOSIS — G89.18 POST-OP PAIN: Primary | ICD-10-CM

## 2025-06-19 DIAGNOSIS — C43.9 MELANOMA (HCC): ICD-10-CM

## 2025-06-19 PROCEDURE — 6360000002 HC RX W HCPCS: Performed by: ANESTHESIOLOGY

## 2025-06-19 PROCEDURE — 7100000010 HC PHASE II RECOVERY - FIRST 15 MIN: Performed by: PLASTIC SURGERY

## 2025-06-19 PROCEDURE — 6370000000 HC RX 637 (ALT 250 FOR IP): Performed by: PLASTIC SURGERY

## 2025-06-19 PROCEDURE — 7100000011 HC PHASE II RECOVERY - ADDTL 15 MIN: Performed by: PLASTIC SURGERY

## 2025-06-19 PROCEDURE — 11623 EXC S/N/H/F/G MAL+MRG 2.1-3: CPT | Performed by: PLASTIC SURGERY

## 2025-06-19 PROCEDURE — 2580000003 HC RX 258: Performed by: PLASTIC SURGERY

## 2025-06-19 PROCEDURE — 2500000003 HC RX 250 WO HCPCS: Performed by: PLASTIC SURGERY

## 2025-06-19 PROCEDURE — 3700000000 HC ANESTHESIA ATTENDED CARE: Performed by: PLASTIC SURGERY

## 2025-06-19 PROCEDURE — 3700000001 HC ADD 15 MINUTES (ANESTHESIA): Performed by: PLASTIC SURGERY

## 2025-06-19 PROCEDURE — 3600000004 HC SURGERY LEVEL 4 BASE: Performed by: PLASTIC SURGERY

## 2025-06-19 PROCEDURE — 13132 CMPLX RPR F/C/C/M/N/AX/G/H/F: CPT | Performed by: PLASTIC SURGERY

## 2025-06-19 PROCEDURE — 6360000002 HC RX W HCPCS: Performed by: PLASTIC SURGERY

## 2025-06-19 PROCEDURE — 6360000002 HC RX W HCPCS: Performed by: NURSE ANESTHETIST, CERTIFIED REGISTERED

## 2025-06-19 PROCEDURE — 3600000014 HC SURGERY LEVEL 4 ADDTL 15MIN: Performed by: PLASTIC SURGERY

## 2025-06-19 PROCEDURE — 2709999900 HC NON-CHARGEABLE SUPPLY: Performed by: PLASTIC SURGERY

## 2025-06-19 RX ORDER — DEXAMETHASONE SODIUM PHOSPHATE 10 MG/ML
INJECTION, SOLUTION INTRA-ARTICULAR; INTRALESIONAL; INTRAMUSCULAR; INTRAVENOUS; SOFT TISSUE
Status: DISCONTINUED | OUTPATIENT
Start: 2025-06-19 | End: 2025-06-19 | Stop reason: SDUPTHER

## 2025-06-19 RX ORDER — LIDOCAINE HYDROCHLORIDE 20 MG/ML
INJECTION, SOLUTION INTRAVENOUS
Status: DISCONTINUED | OUTPATIENT
Start: 2025-06-19 | End: 2025-06-19 | Stop reason: SDUPTHER

## 2025-06-19 RX ORDER — BACITRACIN ZINC 500 [USP'U]/G
OINTMENT TOPICAL PRN
Status: DISCONTINUED | OUTPATIENT
Start: 2025-06-19 | End: 2025-06-19 | Stop reason: ALTCHOICE

## 2025-06-19 RX ORDER — LIDOCAINE HYDROCHLORIDE AND EPINEPHRINE 10; 10 MG/ML; UG/ML
INJECTION, SOLUTION INFILTRATION; PERINEURAL PRN
Status: DISCONTINUED | OUTPATIENT
Start: 2025-06-19 | End: 2025-06-19 | Stop reason: ALTCHOICE

## 2025-06-19 RX ORDER — SODIUM CHLORIDE 9 MG/ML
INJECTION, SOLUTION INTRAVENOUS CONTINUOUS
Status: DISCONTINUED | OUTPATIENT
Start: 2025-06-19 | End: 2025-06-19 | Stop reason: HOSPADM

## 2025-06-19 RX ORDER — MIDAZOLAM HYDROCHLORIDE 2 MG/2ML
2 INJECTION, SOLUTION INTRAMUSCULAR; INTRAVENOUS ONCE
Status: COMPLETED | OUTPATIENT
Start: 2025-06-19 | End: 2025-06-19

## 2025-06-19 RX ORDER — PROPOFOL 10 MG/ML
INJECTION, EMULSION INTRAVENOUS
Status: DISCONTINUED | OUTPATIENT
Start: 2025-06-19 | End: 2025-06-19 | Stop reason: SDUPTHER

## 2025-06-19 RX ORDER — GINSENG 100 MG
CAPSULE ORAL
Qty: 14 G | Refills: 1 | Status: SHIPPED | OUTPATIENT
Start: 2025-06-19

## 2025-06-19 RX ORDER — HYDROCODONE BITARTRATE AND ACETAMINOPHEN 5; 325 MG/1; MG/1
1 TABLET ORAL EVERY 4 HOURS PRN
Qty: 10 TABLET | Refills: 0 | Status: SHIPPED | OUTPATIENT
Start: 2025-06-19 | End: 2025-06-24

## 2025-06-19 RX ORDER — FENTANYL CITRATE 50 UG/ML
INJECTION, SOLUTION INTRAMUSCULAR; INTRAVENOUS
Status: DISCONTINUED | OUTPATIENT
Start: 2025-06-19 | End: 2025-06-19 | Stop reason: SDUPTHER

## 2025-06-19 RX ORDER — SODIUM CHLORIDE 0.9 % (FLUSH) 0.9 %
5-40 SYRINGE (ML) INJECTION EVERY 12 HOURS SCHEDULED
Status: DISCONTINUED | OUTPATIENT
Start: 2025-06-19 | End: 2025-06-19 | Stop reason: HOSPADM

## 2025-06-19 RX ORDER — ONDANSETRON 2 MG/ML
INJECTION INTRAMUSCULAR; INTRAVENOUS
Status: DISCONTINUED | OUTPATIENT
Start: 2025-06-19 | End: 2025-06-19 | Stop reason: SDUPTHER

## 2025-06-19 RX ORDER — PHENYLEPHRINE HCL IN 0.9% NACL 1 MG/10 ML
SYRINGE (ML) INTRAVENOUS
Status: DISCONTINUED | OUTPATIENT
Start: 2025-06-19 | End: 2025-06-19 | Stop reason: SDUPTHER

## 2025-06-19 RX ORDER — SODIUM CHLORIDE 0.9 % (FLUSH) 0.9 %
5-40 SYRINGE (ML) INJECTION PRN
Status: DISCONTINUED | OUTPATIENT
Start: 2025-06-19 | End: 2025-06-19 | Stop reason: HOSPADM

## 2025-06-19 RX ORDER — CEPHALEXIN 500 MG/1
500 CAPSULE ORAL 4 TIMES DAILY
Qty: 20 CAPSULE | Refills: 0 | Status: SHIPPED | OUTPATIENT
Start: 2025-06-19 | End: 2025-06-24

## 2025-06-19 RX ORDER — SODIUM CHLORIDE 9 MG/ML
INJECTION, SOLUTION INTRAVENOUS PRN
Status: DISCONTINUED | OUTPATIENT
Start: 2025-06-19 | End: 2025-06-19 | Stop reason: HOSPADM

## 2025-06-19 RX ADMIN — SODIUM CHLORIDE: 0.9 INJECTION, SOLUTION INTRAVENOUS at 09:33

## 2025-06-19 RX ADMIN — Medication 100 MCG: at 12:25

## 2025-06-19 RX ADMIN — PROPOFOL 100 MG: 10 INJECTION, EMULSION INTRAVENOUS at 12:07

## 2025-06-19 RX ADMIN — MIDAZOLAM 2 MG: 1 INJECTION INTRAMUSCULAR; INTRAVENOUS at 10:31

## 2025-06-19 RX ADMIN — Medication 100 MCG: at 12:36

## 2025-06-19 RX ADMIN — Medication 100 MCG: at 12:42

## 2025-06-19 RX ADMIN — SODIUM CHLORIDE: 9 INJECTION, SOLUTION INTRAVENOUS at 12:01

## 2025-06-19 RX ADMIN — PROPOFOL 100 MCG/KG/MIN: 10 INJECTION, EMULSION INTRAVENOUS at 12:08

## 2025-06-19 RX ADMIN — CEFAZOLIN 1000 MG: 1 INJECTION, POWDER, FOR SOLUTION INTRAMUSCULAR; INTRAVENOUS at 12:14

## 2025-06-19 RX ADMIN — DEXAMETHASONE SODIUM PHOSPHATE 10 MG: 10 INJECTION INTRAMUSCULAR; INTRAVENOUS at 12:07

## 2025-06-19 RX ADMIN — Medication 100 MCG: at 12:50

## 2025-06-19 RX ADMIN — ONDANSETRON 4 MG: 2 INJECTION, SOLUTION INTRAMUSCULAR; INTRAVENOUS at 12:07

## 2025-06-19 RX ADMIN — LIDOCAINE HYDROCHLORIDE 50 MG: 20 INJECTION, SOLUTION INTRAVENOUS at 12:07

## 2025-06-19 RX ADMIN — FENTANYL CITRATE 50 MCG: 50 INJECTION, SOLUTION INTRAMUSCULAR; INTRAVENOUS at 12:28

## 2025-06-19 RX ADMIN — FENTANYL CITRATE 25 MCG: 50 INJECTION, SOLUTION INTRAMUSCULAR; INTRAVENOUS at 12:07

## 2025-06-19 ASSESSMENT — PAIN - FUNCTIONAL ASSESSMENT: PAIN_FUNCTIONAL_ASSESSMENT: 0-10

## 2025-06-19 NOTE — DISCHARGE INSTRUCTIONS
Discharge Home.   Call office to schedule a follow-up appointment at [phone]  Please call to schedule an appointment to be seen In our office on Wednesday 7-2-25   Diet: regular diet  Activity: ambulate in house and no Strenuous exercise for 1 week  Shower/Bathing:  You can shower in 48 hours over the incision site.  At that time you can allow soap and water to rinse off the incision site while showering.  Once you are done in the shower you can pat dry the incision site with a clean paper towel.  No baths, hot tubs or soaking of the wound site at this time.   Dressings /Splint /Wound Care:Keep wound clean and dry.  Apply bacitracin  to the wound site two times daily.  Driving: It is OK to drive if the following criteria are met:   1- Not taking narcotic pain medication   2- Not distracted by pain or limited ROM   3- Not a hazard to self or others on the road  Medications: As prescribed.  Educated to contact physician at [phone] with concerns or signs of infection (redness, increasing pain, discharge, odor, fever).

## 2025-06-19 NOTE — ANESTHESIA PRE PROCEDURE
Department of Anesthesiology  Preprocedure Note       Name:  Flor Lawrence   Age:  74 y.o.  :  1950                                          MRN:  [id number]         Date:  2025      Surgeon: Surgeon(s):  Luis Trevino MD    Procedure: Excision of melanoma right neck with 1cm margins (Right)    Medications prior to admission:   Prior to Admission medications    Medication Sig Start Date End Date Taking? Authorizing Provider   levothyroxine (SYNTHROID) 75 MCG tablet Take 1 tablet by mouth daily 25 Yes Jes Souza PA-C   temazepam (RESTORIL) 15 MG capsule Take 1 capsule by mouth nightly as needed for Sleep for up to 90 days. Max Daily Amount: 15 mg 25 Yes Jes Souza PA-C   gabapentin (NEURONTIN) 400 MG capsule Take 1 capsule by mouth 3 times daily for 90 days. 3/19/25 6/19/25 Yes Jes Souza PA-C   venlafaxine (EFFEXOR XR) 75 MG extended release capsule Take 1 capsule by mouth daily 3/19/25  Yes Jes Souza PA-C   RABEprazole (ACIPHEX) 20 MG tablet Take 1 tablet by mouth daily 24  Yes Jes Souza PA-C   lisinopril (PRINIVIL;ZESTRIL) 5 MG tablet Take 0.5 tablets by mouth daily 24  Yes Jes Souza PA-C   Cream Base (VERSAPRO) CREA ONE GRAM (ONE PUMP) EXTERNALLY FOUR TIMES A DAY 24  Yes Summer Varma MD   Cholecalciferol (VITAMIN D PO) Take 1 tablet by mouth 3000 units daily   Yes ProviderShady MD   meloxicam (MOBIC) 7.5 MG tablet Take 1 tablet by mouth daily as needed for Pain 25   Jes Souza PA-C   estradiol (ESTRACE) 0.1 MG/GM vaginal cream  25   Shady Jimenez MD   ondansetron (ZOFRAN-ODT) 4 MG disintegrating tablet Take by mouth prn  Patient not taking: Reported on 24   Shady Jimenez MD   rosuvastatin (CRESTOR) 5 MG tablet Take 1 tablet by mouth daily 24   Jes Souza PA-C       Current medications:    Current Facility-Administered Medications   Medication Dose Route

## 2025-06-19 NOTE — H&P
Flor Lawrence was seen and re-examined preoperatively today, June 19, 2025.  There was no substantial change in her physical and medical status.  Patient is fit for the proposed surgical procedure.  All questions were appropriately addressed and had no further questions regarding the risks, benefits, and alternatives of the procedure.  Flor Lawrecne and family wished to proceed.    Weston Hunter DO  Resident Physician  [facility]  Otolaryngology Residency  6/19/2025  11:50 AM

## 2025-06-19 NOTE — OP NOTE
Operative Note      Patient: Flor Lawrence  YOB: 1950  MRN: [id number]    Date of Procedure: 6/19/2025    Pre-Op Diagnosis Codes:      * Melanoma (HCC) [C43.9]    Post-Op Diagnosis: Same       Procedure(s):  Excision of melanoma right neck with complex closure    Surgeon(s):  Luis Trevino MD    Assistant:   Resident: Weston Hunter DO    Anesthesia: General    Estimated Blood Loss (mL): Minimal    Complications: None    Specimens:   ID Type Source Tests Collected by Time Destination   A : right neck melanoma, short lateral long caudal Tissue Tissue SURGICAL PATHOLOGY Luis Trevino MD 6/19/2025 1231        Implants:  * No implants in log *      Drains: * No LDAs found *    Findings:  Infection Present At Time Of Surgery (PATOS) (choose all levels that have infection present):  No infection present    Wide Local Excision for Primary Cutaneous Melanoma - Excision 1 (Neck)  Operation performed with curative intent Yes   Original Breslow thickness of the lesion  7 mm (to the tenth of a millimeter)   Clinical margin width  (measured from the edge of the lesion or the prior excision scar) 1 cm   Depth of excision Full-thickness skin/subcutaneous tissue down to fascia (melanoma)       Detailed Description of Procedure:     MEASUREMENTS:     Lesion: 6 mm x 6 mm  Margin: 10 mm  Defect: 26 mm x 26 mm  Scar: 70 mm           PREOPERATIVE INDICATIONS:       The patient is an 74 y.o. female with history of biopsy proven melanoma of right neck. The patient and medical decision makers selected to have this excised in the operating room secondary to size, location and age.  Risks, benefits, and alternatives were explained to the patient including but not limited to bleeding, scarring, infection, recurrent lesion, anesthesia related complications, and need for additional surgery.  They understood and elected to proceed.     OPERATIVE PROCEDURE:       The patient was seen the day of surgery, marked, and all

## 2025-06-19 NOTE — PROGRESS NOTES
Patient tolerating oral intake     Persons waiting for patient called to bedside    Discharge instructions provided to patient, written and verbal, with significant other at bedside, patient verbalized understanding.      Iv site removed.     Daughter Assisted patient in getting dressed.     Transport requested via wheelchair.

## 2025-06-23 ENCOUNTER — TELEPHONE (OUTPATIENT)
Dept: PRIMARY CARE CLINIC | Age: 75
End: 2025-06-23

## 2025-06-23 DIAGNOSIS — I10 PRIMARY HYPERTENSION: Primary | ICD-10-CM

## 2025-06-23 NOTE — TELEPHONE ENCOUNTER
Imaging dept called in states they will need this blood work ordered to continue with getting imaging done

## 2025-06-24 ENCOUNTER — HOSPITAL ENCOUNTER (OUTPATIENT)
Age: 75
Discharge: HOME | End: 2025-06-24
Payer: MEDICARE

## 2025-06-24 DIAGNOSIS — I10 PRIMARY HYPERTENSION: ICD-10-CM

## 2025-06-24 LAB
ANION GAP SERPL CALCULATED.3IONS-SCNC: 11 MMOL/L (ref 7–16)
BUN SERPL-MCNC: 24 MG/DL (ref 8–23)
CALCIUM SERPL-MCNC: 10.5 MG/DL (ref 8.8–10.2)
CHLORIDE SERPL-SCNC: 102 MMOL/L (ref 98–107)
CO2 SERPL-SCNC: 25 MMOL/L (ref 22–29)
CREAT SERPL-MCNC: 0.7 MG/DL (ref 0.5–1)
GFR, ESTIMATED: >90 ML/MIN/1.73M2
GLUCOSE SERPL-MCNC: 106 MG/DL (ref 74–99)
POTASSIUM SERPL-SCNC: 4.7 MMOL/L (ref 3.5–5.1)
SODIUM SERPL-SCNC: 138 MMOL/L (ref 136–145)

## 2025-06-24 PROCEDURE — 36415 COLL VENOUS BLD VENIPUNCTURE: CPT

## 2025-06-24 PROCEDURE — 80048 BASIC METABOLIC PNL TOTAL CA: CPT

## 2025-06-24 RX ORDER — LISINOPRIL 5 MG/1
2.5 TABLET ORAL DAILY
Qty: 30 TABLET | Refills: 3 | Status: SHIPPED | OUTPATIENT
Start: 2025-06-24

## 2025-06-24 NOTE — TELEPHONE ENCOUNTER
Name of Medication(s) Requested:  Requested Prescriptions     Pending Prescriptions Disp Refills    lisinopril (PRINIVIL;ZESTRIL) 5 MG tablet 30 tablet 3     Sig: Take 0.5 tablets by mouth daily       Medication is on current medication list Yes    Dosage and directions were verified? Yes    Quantity verified: 30 day supply     Pharmacy Verified?  Yes    Last Appointment:  6/11/2025    Future appts:  Future Appointments   Date Time Provider Department Center   6/25/2025 10:00 AM [facility] CT SCAN 3 [facility]   6/30/2025  2:00 PM Emmett Roberts PA Plastics [facility]   7/8/2025 10:30 AM Gianfranco Mack DPM [facility] [place] [facility]   9/3/2025  9:00 AM [facility]   9/22/2025 12:00 PM Summer Varma MD B[facility]   11/24/2025  1:30 PM Jes Souza PA-C [facility]        (If no appt send self scheduling link. .REFILLAPPT)  Scheduling request sent?     [] Yes  [] No    Does patient need updated?  [] Yes  [] No

## 2025-06-24 NOTE — PROGRESS NOTES
02:50 PM         Pathology Report- pending    Plan:     SP Excision of melanoma right neck - Right 6/19/2025        Educated the pt on their pathology report.  Pt voices understanding      Dressing - Sutures removed today, No dressing   Showering at this time -okay to shower over the wound site.    I informed the patient that he can begin scar massage to the area in another 2 weeks.    Pt was instructed on scar massage  Scar Care Instructions      Sunscreen Recommendations for Scars  We recommend that all patients use a sunscreen when outside but especially on new scars (that are exposed to sunlight) for a year after their procedure.   The SPF should be at least 35. Follow the application directions on the bottle.   Why is it so Important to Use Sunscreen on Scars?  A scar is new and more fragile than the surrounding skin.   If you do not use sunscreen, the scar line will react differently to the sun than the surrounding skin.   If you don't use sunscreen, the scar tissue will become darker than surrounding skin. This is a hyper-pigmented scar and will remain darker than the other skin.   After one year, the scar and surrounding skin should react equally to sun.   Superficial Scar Massage  Scar massage desensitizes and reduces scar adhesions so skin glides freely.   Rub in a circular motion on and around the scar with firm, even pressure for 5 minutes four times per day   You can start scar massage once incision is completely healed and strong enough to handle the motion (usually 10 - 14 days post operatively).   You use lotion to do the scar massage to allow ease with motion over the scar and prevent friction at the area.     Patient had no further questions.    Patient understands they will need to continue follow up care with dermatology as recommended    F/U PRN  Call office with concerns or signs of infection.    Luis Trevino MD

## 2025-06-25 ENCOUNTER — HOSPITAL ENCOUNTER (OUTPATIENT)
Dept: CT IMAGING | Age: 75
End: 2025-06-25
Payer: MEDICARE

## 2025-06-25 ENCOUNTER — OFFICE VISIT (OUTPATIENT)
Dept: SURGERY | Age: 75
End: 2025-06-25

## 2025-06-25 VITALS
OXYGEN SATURATION: 98 % | DIASTOLIC BLOOD PRESSURE: 86 MMHG | TEMPERATURE: 98.6 F | SYSTOLIC BLOOD PRESSURE: 126 MMHG | HEART RATE: 81 BPM | RESPIRATION RATE: 22 BRPM

## 2025-06-25 DIAGNOSIS — R19.03 ABDOMINAL MASS, RIGHT LOWER QUADRANT: ICD-10-CM

## 2025-06-25 DIAGNOSIS — C43.4 MALIGNANT MELANOMA OF NECK (HCC): Primary | ICD-10-CM

## 2025-06-25 PROCEDURE — 6360000004 HC RX CONTRAST MEDICATION: Performed by: SPECIALIST

## 2025-06-25 PROCEDURE — 99024 POSTOP FOLLOW-UP VISIT: CPT | Performed by: PLASTIC SURGERY

## 2025-06-25 PROCEDURE — 74177 CT ABD & PELVIS W/CONTRAST: CPT

## 2025-06-25 RX ORDER — IOPAMIDOL 755 MG/ML
75 INJECTION, SOLUTION INTRAVASCULAR
Status: COMPLETED | OUTPATIENT
Start: 2025-06-25 | End: 2025-06-25

## 2025-06-25 RX ORDER — SODIUM CHLORIDE 0.9 % (FLUSH) 0.9 %
10 SYRINGE (ML) INJECTION
Status: ACTIVE | OUTPATIENT
Start: 2025-06-25

## 2025-06-25 RX ADMIN — IOPAMIDOL 75 ML: 755 INJECTION, SOLUTION INTRAVENOUS at 09:09

## 2025-06-26 LAB — SURGICAL PATHOLOGY REPORT: NORMAL

## 2025-06-27 ENCOUNTER — TELEPHONE (OUTPATIENT)
Dept: SURGERY | Age: 75
End: 2025-06-27

## 2025-06-27 ENCOUNTER — RESULTS FOLLOW-UP (OUTPATIENT)
Dept: PRIMARY CARE CLINIC | Age: 75
End: 2025-06-27

## 2025-06-27 NOTE — TELEPHONE ENCOUNTER
Patient finished her antibiotics on 6/26/25. She didn't know if another prescription should be called in. She was having some redness around area. She did not have a fever and it wasn't warm to the touch. She said the area looks a little . She has a follow up on 7/2

## 2025-06-30 ENCOUNTER — OFFICE VISIT (OUTPATIENT)
Dept: SURGERY | Age: 75
End: 2025-06-30

## 2025-06-30 VITALS
RESPIRATION RATE: 20 BRPM | HEART RATE: 82 BPM | OXYGEN SATURATION: 98 % | SYSTOLIC BLOOD PRESSURE: 116 MMHG | TEMPERATURE: 97.8 F | DIASTOLIC BLOOD PRESSURE: 80 MMHG

## 2025-06-30 DIAGNOSIS — C43.4 MALIGNANT MELANOMA OF NECK (HCC): Primary | ICD-10-CM

## 2025-06-30 PROCEDURE — 99024 POSTOP FOLLOW-UP VISIT: CPT | Performed by: PHYSICIAN ASSISTANT

## 2025-06-30 NOTE — PROGRESS NOTES
Subjective:    Follow up today from Excision of melanoma right neck - Right 6/19/2025. Denies fever, nausea, vomiting, leg pain or swelling, pain is absent.  The pt states that they have  kept the wound site(s) covered as directed.    They state that their pain is absent.     Objective:    /80 (BP Site: Left Upper Arm, Patient Position: Sitting, BP Cuff Size: Medium Adult)   Pulse 82   Temp 97.8 °F (36.6 °C) (Infrared)   Resp 20   SpO2 98%       Wound: Clean, dry, and intact, no drainage.  No signs of infection, wound healing well. Sutures intact there is some superficial wound dehiscence with serous drainage granulation tissue noted at the base of the wound site measuring 1 cm x 0.5 cm x 0.3 cm in depth    Neuro- Sensation  intact to earnest incisional site with light touch .      Assessment:    Patient Active Problem List   Diagnosis    Post-op pain    Lymphedema of both lower extremities    Pure hypercholesterolemia    Acquired hypothyroidism    Anxiety and depression    Restless leg syndrome    Statin intolerance    Prediabetes    Neuropathy of left lower extremity    Pulmonary embolism on right (HCC)    Nonrheumatic aortic valve insufficiency    HTN (hypertension)    Osteoporosis    Iron deficiency anemia secondary to inadequate dietary iron intake    Vitamin D deficiency    Melanoma (HCC)       Labs: CBC:   Lab Results   Component Value Date/Time    WBC 7.3 05/06/2025 11:35 AM    RBC 4.05 05/06/2025 11:35 AM    HGB 10.2 05/06/2025 11:35 AM    HCT 34.9 05/06/2025 11:35 AM    MCV 86.2 05/06/2025 11:35 AM    MCH 25.2 05/06/2025 11:35 AM    MCHC 29.2 05/06/2025 11:35 AM    RDW 15.8 05/06/2025 11:35 AM     05/06/2025 11:35 AM    MPV 10.2 05/06/2025 11:35 AM     BMP:    Lab Results   Component Value Date/Time     06/24/2025 11:56 AM    K 4.7 06/24/2025 11:56 AM    K 4.3 11/09/2018 06:05 AM     06/24/2025 11:56 AM    CO2 25 06/24/2025 11:56 AM    BUN 24 06/24/2025 11:56 AM    CREATININE 0.7

## 2025-07-02 ENCOUNTER — OFFICE VISIT (OUTPATIENT)
Dept: PRIMARY CARE CLINIC | Age: 75
End: 2025-07-02

## 2025-07-02 VITALS
RESPIRATION RATE: 16 BRPM | TEMPERATURE: 97.2 F | DIASTOLIC BLOOD PRESSURE: 85 MMHG | BODY MASS INDEX: 22.78 KG/M2 | WEIGHT: 116 LBS | HEIGHT: 60 IN | SYSTOLIC BLOOD PRESSURE: 127 MMHG | OXYGEN SATURATION: 98 % | HEART RATE: 100 BPM

## 2025-07-02 DIAGNOSIS — K21.9 GASTROESOPHAGEAL REFLUX DISEASE WITHOUT ESOPHAGITIS: ICD-10-CM

## 2025-07-02 DIAGNOSIS — K59.09 CHRONIC CONSTIPATION: ICD-10-CM

## 2025-07-02 DIAGNOSIS — R93.3 ABNORMAL CT SCAN, COLON: Primary | ICD-10-CM

## 2025-07-02 DIAGNOSIS — K80.20 CALCULUS OF GALLBLADDER WITHOUT CHOLECYSTITIS WITHOUT OBSTRUCTION: ICD-10-CM

## 2025-07-02 RX ORDER — RABEPRAZOLE SODIUM 20 MG/1
20 TABLET, DELAYED RELEASE ORAL DAILY
Qty: 90 TABLET | Refills: 3 | Status: SHIPPED | OUTPATIENT
Start: 2025-07-02

## 2025-07-02 NOTE — PROGRESS NOTES
cough, no sore throat, no sinus pain, no runny nose, no ear pain  Cardiology:  No chest pain, no palpitations, no leg edema, no shortness of breath, no PND  Gastroenterology:  No dysphagia, no abdominal pain, no nausea, no vomiting, no constipation, no diarrhea, no heartburn  Musculoskeletal:  No joint pain, no leg cramps, no back pain, no muscle aches  Respiratory:  No shortness of breath, no orthopnea, no wheezing, no SIMPSON, no hemoptysis  Urology:  No blood in the urine, no urinary frequency, no urinary incontinence, no urinary urgency, no nocturia, no dysuria    Vitals:    07/02/25 1510   BP: 127/85   Pulse: 100   Resp: 16   Temp: 97.2 °F (36.2 °C)   SpO2: 98%   Weight: 52.6 kg (116 lb)   Height: 1.524 m (5')       Physical Exam  Constitutional:       General: She is not in acute distress.     Appearance: Normal appearance. She is well-developed.   HENT:      Head: Normocephalic and atraumatic.      Right Ear: External ear normal.      Left Ear: External ear normal.      Nose: Nose normal.   Eyes:      General: No scleral icterus.     Conjunctiva/sclera: Conjunctivae normal.      Pupils: Pupils are equal, round, and reactive to light.   Neck:      Thyroid: No thyromegaly.   Cardiovascular:      Rate and Rhythm: Normal rate and regular rhythm.      Heart sounds: Normal heart sounds. No murmur heard.  Pulmonary:      Effort: Pulmonary effort is normal. No accessory muscle usage or respiratory distress.      Breath sounds: Normal breath sounds. No wheezing.   Abdominal:      General: Bowel sounds are normal. There is no distension.      Palpations: Abdomen is soft. There is no hepatomegaly or splenomegaly.      Tenderness: There is no abdominal tenderness.   Musculoskeletal:         General: Normal range of motion.      Cervical back: Normal range of motion and neck supple.      Right lower leg: No edema.      Left lower leg: No edema.   Skin:     General: Skin is warm and dry.      Findings: No rash.   Neurological:

## 2025-07-10 ENCOUNTER — OFFICE VISIT (OUTPATIENT)
Dept: SURGERY | Age: 75
End: 2025-07-10

## 2025-07-10 VITALS
HEART RATE: 73 BPM | TEMPERATURE: 97.7 F | OXYGEN SATURATION: 96 % | RESPIRATION RATE: 20 BRPM | DIASTOLIC BLOOD PRESSURE: 82 MMHG | SYSTOLIC BLOOD PRESSURE: 126 MMHG

## 2025-07-10 DIAGNOSIS — C43.4 MALIGNANT MELANOMA OF NECK (HCC): Primary | ICD-10-CM

## 2025-07-10 PROCEDURE — 99024 POSTOP FOLLOW-UP VISIT: CPT | Performed by: PLASTIC SURGERY

## 2025-07-10 NOTE — PROGRESS NOTES
AM    CO2 25 06/24/2025 11:56 AM    BUN 24 06/24/2025 11:56 AM    CREATININE 0.7 06/24/2025 11:56 AM    CALCIUM 10.5 06/24/2025 11:56 AM    GFRAA >60 09/29/2022 09:36 AM    LABGLOM >90 06/24/2025 11:56 AM    LABGLOM >60 02/28/2024 01:50 PM    GLUCOSE 106 06/24/2025 11:56 AM    GLUCOSE 98 05/28/2012 02:50 PM         Pathology Report- Path Number: [id number]     -- Diagnosis --   Right neck, wide excision: Cicatrix; negative for malignancy     -- Diagnosis Comment --   The entire specimen has been submitted for microscopic review.     Kerrie Alan MD   **Electronically Signed Out**         [facility]/6/26/2025      Clinical Information   Procedure:  Excision of melanoma right neck with 1 cm margins.     Pre-Operative Diagnosis   Melanoma.     Post Operative Diagnosis   Melanoma.     Source of Specimen   A: RIGHT NECK MELANOMA SHORT LATERAL LONG CAUDAL       Plan:     SP Excision of melanoma right neck - Right 6/19/2025        Educated the pt on their pathology report.  Pt voices understanding    Continue with Vaseline to wound site twice daily    I informed the patient that I cannot anticipate the duration of her wound closure for the patient again with her history of radiation therapy this may take several weeks to months.    Informed the patient again that this should continue to heal by secondary intention but will be delayed.  Showering at this time -okay to shower over the wound site.      Follow-up 14 days before she leaves to go on vacation to [place]      This document is generated, in part, by voice recognition software and thus  syntax and grammatical errors are possible.    Luis Trevino MD  9:23 AM  7/10/2025

## 2025-07-18 NOTE — PROGRESS NOTES
Subjective:    Follow up today from Excision of melanoma right neck - Right 6/19/2025. Denies fever, nausea, vomiting, leg pain or swelling, pain is absent.  The pt states that they have  kept the wound site(s) covered as directed.    They state that their pain is absent.  She presents today for continued wound examination    Objective:    There were no vitals taken for this visit.      Wound: Clean, dry, and intact, no drainage.  No signs of infection, wound healing well. Sutures intact there is some superficial wound dehiscence with serous drainage granulation tissue noted at the base of the wound site measuring 1 cm x 0.5 cm x 0.3 cm in depth with pale granulation tissue    Neuro- Sensation  intact to earnest incisional site with light touch .      Assessment:    Patient Active Problem List   Diagnosis    Post-op pain    Lymphedema of both lower extremities    Pure hypercholesterolemia    Acquired hypothyroidism    Anxiety and depression    Restless leg syndrome    Statin intolerance    Prediabetes    Neuropathy of left lower extremity    Pulmonary embolism on right (HCC)    Nonrheumatic aortic valve insufficiency    HTN (hypertension)    Osteoporosis    Iron deficiency anemia secondary to inadequate dietary iron intake    Vitamin D deficiency    Melanoma (HCC)       Labs: CBC:   Lab Results   Component Value Date/Time    WBC 7.3 05/06/2025 11:35 AM    RBC 4.05 05/06/2025 11:35 AM    HGB 10.2 05/06/2025 11:35 AM    HCT 34.9 05/06/2025 11:35 AM    MCV 86.2 05/06/2025 11:35 AM    MCH 25.2 05/06/2025 11:35 AM    MCHC 29.2 05/06/2025 11:35 AM    RDW 15.8 05/06/2025 11:35 AM     05/06/2025 11:35 AM    MPV 10.2 05/06/2025 11:35 AM     BMP:    Lab Results   Component Value Date/Time     06/24/2025 11:56 AM    K 4.7 06/24/2025 11:56 AM    K 4.3 11/09/2018 06:05 AM     06/24/2025 11:56 AM    CO2 25 06/24/2025 11:56 AM    BUN 24 06/24/2025 11:56 AM    CREATININE 0.7 06/24/2025 11:56 AM    CALCIUM 10.5

## 2025-07-21 ENCOUNTER — OFFICE VISIT (OUTPATIENT)
Dept: SURGERY | Age: 75
End: 2025-07-21

## 2025-07-21 VITALS — TEMPERATURE: 97.3 F

## 2025-07-21 DIAGNOSIS — C43.4 MALIGNANT MELANOMA OF NECK (HCC): Primary | ICD-10-CM

## 2025-07-21 PROCEDURE — 99024 POSTOP FOLLOW-UP VISIT: CPT | Performed by: PHYSICIAN ASSISTANT

## 2025-08-04 ENCOUNTER — PATIENT MESSAGE (OUTPATIENT)
Dept: PRIMARY CARE CLINIC | Age: 75
End: 2025-08-04

## 2025-08-05 DIAGNOSIS — F51.04 CHRONIC INSOMNIA: Primary | ICD-10-CM

## 2025-08-05 RX ORDER — TEMAZEPAM 15 MG/1
15 CAPSULE ORAL NIGHTLY PRN
COMMUNITY
End: 2025-08-05 | Stop reason: SDUPTHER

## 2025-08-05 RX ORDER — TEMAZEPAM 15 MG/1
15 CAPSULE ORAL NIGHTLY PRN
Qty: 30 CAPSULE | Refills: 0 | Status: SHIPPED | OUTPATIENT
Start: 2025-08-05 | End: 2025-09-04

## 2025-08-11 ENCOUNTER — OFFICE VISIT (OUTPATIENT)
Dept: SURGERY | Age: 75
End: 2025-08-11

## 2025-08-11 VITALS — OXYGEN SATURATION: 98 % | TEMPERATURE: 99 F | HEART RATE: 88 BPM

## 2025-08-11 DIAGNOSIS — C43.4 MALIGNANT MELANOMA OF NECK (HCC): Primary | ICD-10-CM

## 2025-08-11 PROCEDURE — 99024 POSTOP FOLLOW-UP VISIT: CPT | Performed by: PHYSICIAN ASSISTANT

## 2025-08-28 DIAGNOSIS — M54.12 CERVICAL RADICULOPATHY: ICD-10-CM

## 2025-08-28 RX ORDER — GABAPENTIN 400 MG/1
400 CAPSULE ORAL 3 TIMES DAILY
Qty: 270 CAPSULE | Refills: 0 | Status: CANCELLED | OUTPATIENT
Start: 2025-08-28 | End: 2025-11-26

## 2025-08-28 RX ORDER — GABAPENTIN 400 MG/1
400 CAPSULE ORAL 3 TIMES DAILY
Qty: 270 CAPSULE | Refills: 0 | Status: SHIPPED | OUTPATIENT
Start: 2025-08-28 | End: 2025-11-26

## 2025-08-30 ENCOUNTER — HOSPITAL ENCOUNTER (OUTPATIENT)
Dept: LAB | Age: 75
Discharge: HOME | End: 2025-08-30
Payer: MEDICARE

## 2025-08-30 DIAGNOSIS — I10 PRIMARY HYPERTENSION: ICD-10-CM

## 2025-08-30 LAB
BUN SERPL-MCNC: 15 MG/DL (ref 8–23)
CREAT SERPL-MCNC: 0.7 MG/DL (ref 0.5–1)
GFR, ESTIMATED: 84 ML/MIN/1.73M2

## 2025-08-30 PROCEDURE — 84520 ASSAY OF UREA NITROGEN: CPT

## 2025-08-30 PROCEDURE — 82565 ASSAY OF CREATININE: CPT

## 2025-08-30 PROCEDURE — 36415 COLL VENOUS BLD VENIPUNCTURE: CPT

## 2025-09-03 ENCOUNTER — HOSPITAL ENCOUNTER (OUTPATIENT)
Dept: INFUSION THERAPY | Age: 75
Setting detail: INFUSION SERIES
Discharge: HOME | End: 2025-09-03
Payer: MEDICARE

## 2025-09-03 VITALS — DIASTOLIC BLOOD PRESSURE: 67 MMHG | RESPIRATION RATE: 16 BRPM | TEMPERATURE: 97.7 F | SYSTOLIC BLOOD PRESSURE: 127 MMHG

## 2025-09-03 DIAGNOSIS — M81.0 OSTEOPOROSIS, UNSPECIFIED OSTEOPOROSIS TYPE, UNSPECIFIED PATHOLOGICAL FRACTURE PRESENCE: Primary | ICD-10-CM

## 2025-09-03 DIAGNOSIS — I10 PRIMARY HYPERTENSION: ICD-10-CM

## 2025-09-03 DIAGNOSIS — I10 PRIMARY HYPERTENSION: Primary | ICD-10-CM

## 2025-09-03 LAB
ANION GAP SERPL CALCULATED.3IONS-SCNC: 9 MMOL/L (ref 7–16)
BUN SERPL-MCNC: 21 MG/DL (ref 8–23)
CALCIUM SERPL-MCNC: 9.7 MG/DL (ref 8.8–10.2)
CHLORIDE SERPL-SCNC: 100 MMOL/L (ref 98–107)
CO2 SERPL-SCNC: 27 MMOL/L (ref 22–29)
CREAT SERPL-MCNC: 0.8 MG/DL (ref 0.5–1)
GFR, ESTIMATED: 80 ML/MIN/1.73M2
GLUCOSE SERPL-MCNC: 96 MG/DL (ref 74–99)
POTASSIUM SERPL-SCNC: 4.5 MMOL/L (ref 3.5–5.1)
SODIUM SERPL-SCNC: 136 MMOL/L (ref 136–145)

## 2025-09-03 PROCEDURE — 96372 THER/PROPH/DIAG INJ SC/IM: CPT

## 2025-09-03 PROCEDURE — 80048 BASIC METABOLIC PNL TOTAL CA: CPT

## 2025-09-03 PROCEDURE — 36415 COLL VENOUS BLD VENIPUNCTURE: CPT

## 2025-09-03 PROCEDURE — 6360000002 HC RX W HCPCS: Performed by: NURSE PRACTITIONER

## 2025-09-03 RX ADMIN — DENOSUMAB 60 MG: 60 INJECTION SUBCUTANEOUS at 10:27

## 2025-09-03 ASSESSMENT — PAIN DESCRIPTION - DESCRIPTORS: DESCRIPTORS: ACHING;CRUSHING

## 2025-09-03 ASSESSMENT — PAIN DESCRIPTION - ONSET: ONSET: ON-GOING

## 2025-09-03 ASSESSMENT — PAIN DESCRIPTION - PAIN TYPE: TYPE: CHRONIC PAIN

## 2025-09-03 ASSESSMENT — PAIN SCALES - GENERAL: PAINLEVEL_OUTOF10: 6

## 2025-09-03 ASSESSMENT — PAIN DESCRIPTION - ORIENTATION: ORIENTATION: RIGHT;LEFT

## 2025-09-03 ASSESSMENT — PAIN DESCRIPTION - LOCATION: LOCATION: BACK

## 2025-09-03 ASSESSMENT — PAIN DESCRIPTION - FREQUENCY: FREQUENCY: INTERMITTENT

## (undated) DEVICE — MICRODISSECTION NEEDLE STRAIGHT SLEEVE: Brand: COLORADO

## (undated) DEVICE — HEAD AND NECK: Brand: MEDLINE INDUSTRIES, INC.

## (undated) DEVICE — Z DISCONTINUED APPLICATOR SURG PREP 0.35OZ 2% CHG 70% ISO ALC W/ HI LT

## (undated) DEVICE — INTENDED FOR TISSUE SEPARATION, AND OTHER PROCEDURES THAT REQUIRE A SHARP SURGICAL BLADE TO PUNCTURE OR CUT.: Brand: BARD-PARKER ® STAINLESS STEEL BLADES

## (undated) DEVICE — TOWEL,OR,DSP,ST,BLUE,STD,6/PK,12PK/CS: Brand: MEDLINE

## (undated) DEVICE — STERILE POLYISOPRENE POWDER-FREE SURGICAL GLOVES: Brand: PROTEXIS

## (undated) DEVICE — STANDARD HYPODERMIC NEEDLE,POLYPROPYLENE HUB: Brand: MONOJECT

## (undated) DEVICE — 1.5L THIN WALL CAN: Brand: CRD

## (undated) DEVICE — PATIENT RETURN ELECTRODE, SINGLE-USE, CONTACT QUALITY MONITORING, ADULT, WITH 9FT CORD, FOR PATIENTS WEIGING OVER 33LBS. (15KG): Brand: MEGADYNE

## (undated) DEVICE — NEEDLE HYPO 27GA L15IN REG BVL W O SFTY FOR SYR DISPOSABLE

## (undated) DEVICE — GLOVE ORANGE PI 8   MSG9080

## (undated) DEVICE — ELECTRODE PT RET AD L9FT HI MOIST COND ADH HYDRGEL CORDED

## (undated) DEVICE — WIPES SKIN CLOTH READYPREP 9 X 10.5 IN 2% CHLORHEX GLUCONATE CHG PREOP

## (undated) DEVICE — TRAY,SKIN SCRUB,DRY,W/GAUZE: Brand: MEDLINE

## (undated) DEVICE — GLOVE ORANGE PI 7 1/2   MSG9075

## (undated) DEVICE — E-Z CLEAN, NON-STICK, PTFE COATED, ELECTROSURGICAL BLADE ELECTRODE, MODIFIED EXTENDED INSULATION, 2.5 INCH (6.35 CM): Brand: MEGADYNE

## (undated) DEVICE — GOWN,AURORA,NONREINF,RAGLAN,L,STERILE: Brand: MEDLINE

## (undated) DEVICE — SKIN AFFIX SURG ADHESIVE 72/CS 0.55ML: Brand: MEDLINE

## (undated) DEVICE — SYRINGE MED 10ML POLYPR LUERSLIP TIP FLAT TOP W/O SFTY DISP

## (undated) DEVICE — SET SURG INSTR MINI VASC

## (undated) DEVICE — Device

## (undated) DEVICE — STRAP POS MP 30X3 IN HK LOOP CLOSURE FOAM DISP

## (undated) DEVICE — MAGNETIC INSTR DRAPE 20X16: Brand: MEDLINE INDUSTRIES, INC.

## (undated) DEVICE — LABEL MED 4 IN SURG PANEL W/ PEN STRL

## (undated) DEVICE — PACK,LAPAROTOMY,NO GOWNS: Brand: MEDLINE

## (undated) DEVICE — SURGICAL PROCEDURE PACK BASIC